# Patient Record
Sex: FEMALE | Race: WHITE | NOT HISPANIC OR LATINO | Employment: OTHER | URBAN - METROPOLITAN AREA
[De-identification: names, ages, dates, MRNs, and addresses within clinical notes are randomized per-mention and may not be internally consistent; named-entity substitution may affect disease eponyms.]

---

## 2017-08-11 ENCOUNTER — OFFICE VISIT (OUTPATIENT)
Dept: NEUROLOGY | Facility: CLINIC | Age: 37
End: 2017-08-11

## 2017-08-11 VITALS
BODY MASS INDEX: 30.25 KG/M2 | HEART RATE: 80 BPM | SYSTOLIC BLOOD PRESSURE: 147 MMHG | DIASTOLIC BLOOD PRESSURE: 105 MMHG | WEIGHT: 193.13 LBS

## 2017-08-11 DIAGNOSIS — M79.2 NEUROPATHIC PAIN: ICD-10-CM

## 2017-08-11 DIAGNOSIS — G62.9 SMALL FIBER NEUROPATHY: Primary | ICD-10-CM

## 2017-08-11 PROCEDURE — 99204 OFFICE O/P NEW MOD 45 MIN: CPT | Mod: S$PBB,,, | Performed by: PHYSICIAN ASSISTANT

## 2017-08-11 PROCEDURE — 3008F BODY MASS INDEX DOCD: CPT | Mod: ,,, | Performed by: PHYSICIAN ASSISTANT

## 2017-08-11 PROCEDURE — 99999 PR PBB SHADOW E&M-NEW PATIENT-LVL III: CPT | Mod: PBBFAC,,, | Performed by: PHYSICIAN ASSISTANT

## 2017-08-11 PROCEDURE — 99203 OFFICE O/P NEW LOW 30 MIN: CPT | Mod: PBBFAC | Performed by: PHYSICIAN ASSISTANT

## 2017-08-11 RX ORDER — CYCLOBENZAPRINE HCL 10 MG
10 TABLET ORAL CONTINUOUS PRN
COMMUNITY
End: 2019-06-29

## 2017-08-11 RX ORDER — PREGABALIN 100 MG/1
100 CAPSULE ORAL 4 TIMES DAILY
COMMUNITY
End: 2017-09-24 | Stop reason: SDUPTHER

## 2017-08-11 RX ORDER — MESALAMINE 0.38 G/1
1.5 CAPSULE, EXTENDED RELEASE ORAL 3 TIMES DAILY
COMMUNITY
End: 2019-01-03 | Stop reason: SDUPTHER

## 2017-08-11 RX ORDER — IBUPROFEN 600 MG/1
600 TABLET ORAL DAILY PRN
COMMUNITY

## 2017-08-11 NOTE — PROGRESS NOTES
[facility], Department of Neurology   [facility] [place] [address]  Phone:[phone]  Fax: [phone]    Patient Name: Caryl Cedeno  : 1980  MRN:  [id number]    2017     chief complaint: neuropathy     PCP: Primary Doctor No.    HPI:  Caryl Cedeno is a 37 y.o. female with hx of cervical spine surgery, celiac disease, fibromyalgia presents for small fiber neuropathy. Presents to [facility] care and for refills of Lyrica.     Onset of symptoms began in . Dx with small fiber neuropathy .   Notes fatigue and burning of feet. This gradually progressed up legs, then into hands, and finally into her face. On Lyrica 100 mg qid, initiated 2-3 yrs ago-helps though still has some pain. Denies numbness. Has occasional dizziness which can effect balance otherwise denies balance issues.     Had reportedly positive ebstein barr virus which has continued to be positive. Reportedly unremarkable emg. Went to [place] and dx with small fiber neuropathy in addition to fibromyalgia, reportedly had + sweat test. Tried gabapentin in the past with no relief. Thinks she has tried Nortriptyline. Has not tried Elavil, Cymbalta. Fear of Cymbalta due to friend turning bipolar.      Medications:   Current Outpatient Prescriptions   Medication Sig Dispense Refill    cyclobenzaprine (FLEXERIL) 10 MG tablet Take 10 mg by mouth continuous prn for Muscle spasms.      ibuprofen (ADVIL,MOTRIN) 600 MG tablet Take 600 mg by mouth daily as needed for Pain.      mesalamine (APRISO) 0.375 gram Cp24 Take 1.5 g by mouth 3 (three) times daily.      milnacipran (SAVELLA) 50 mg Tab Take 50 mg by mouth 2 (two) times daily.      pregabalin (LYRICA) 100 MG capsule Take 100 mg by mouth 4 (four) times daily.       No current facility-administered medications for this visit.        Allergies:  Review of patient's allergies indicates:   Allergen Reactions    Gluten protein     Latex, natural rubber         PMHx:  Past Medical History:   Diagnosis Date    Celiac disease     Fibromyalgia     Migraines     Small fiber neuropathy      Past Surgical History:   Procedure Laterality Date    CERVICAL SPINE SURGERY  2016       Fhx:  Family History   Problem Relation Age of Onset    Autoimmune disease Father     Autoimmune disease Sister     Celiac disease Maternal Grandfather     Cancer Maternal Grandfather     Cancer Paternal Grandmother        Shx:   Social History     Social History    Marital status:      Spouse name: N/A    Number of children: N/A    Years of education: N/A     Occupational History    Not on file.     Social History Main Topics    Smoking status: Former Smoker    Smokeless tobacco: Not on file    Alcohol use Yes      Comment: rarely     Drug use: Unknown    Sexual activity: Not on file     Other Topics Concern    Not on file     Social History Narrative    No narrative on file       ROS:  Review of Systems (Questions asked; positives or additions noted in BOLD)  Gen Malaise/fatigue, weight change   HEENT Hearing loss, blurred vision, diplopia   Card CP, palpitations   Pulm SOB, cough    Vasc Easy bruising, easy bleeding    GI Nausea, vomiting, constipation    Frequency, urgency   M/S Joint pain, myalgias, falls    Endocrine Temperature intolerance, polyuria, polydipsia   Neuro Dizziness, tremors, seizures, focal weakness, Others- as noted in HPI   Psych Memory loss, depression, anxiety, hallucinations     PHYSICAL EXAM:   BP (!) 147/105   Pulse 80   Wt 87.6 kg (193 lb 2 oz)   BMI 30.25 kg/m²    GEN:  NAD, well-developed, well-groomed.  HEENT: No cervical lymphadenopathy noted.  CARDIOVASCULAR: Radial pulses 2+ bilaterally. No LE edema noted.  NEURO:  Mental Status: Awake, alert, and oriented. Normal attention and concentration.  Speech is fluent and appropriate language function.    Cranial Nerves:  II Pupils are round and reactive to direct and consensual light and  accommodation. Visual fields full to confrontation.   III, IV, VI Extraocular eye movements full bilaterally. No ptosis noted.   V Normal facial sensation to pinprick and light touch.   VII Symmetric facial expressions.   VIII Hearing intact to finger rub bilaterally.   IX, X Palate elevates midline and symmetrically.   XI Shoulder elevation is symmetric and full strength bilaterally. Neck rotation strength is normal bilaterally.   XII Tongue is midline.     Sensory: Sensation is decreased to pinprick in bl distal upper and lower extremities.     Motor: Extremities demonstrate normal bulk and tone in all four limbs. No atrophy or fasciculations noted. No pronator drift.   Strength-       RUE: 5/5                LUE: 5/5                RLE: 5/5                LLE: 5/5     Deep Tendon Relfexes: 2+ and symmetric in the upper and lower extremities bilaterally, slightly brisk LE. Babinski downgoing bilaterally.    Coordination: Finger to nose WNL.     Gait: Normal heel, toe, tandem, and casual gait.    ASSESSMENT:     ICD-10-CM ICD-9-CM   1. Small fiber neuropathy G62.9 356.9   2. Neuropathic pain M79.2 729.2       PLAN:     No orders of the defined types were placed in this encounter.    36 yo female who presents to establish care for reported dx of small fiber neuropathy along with medication refill. No outside records available for me to review today, though it sounds as if she has had extensive outside work up. Has not had nerve bx. Exam with length dependent sensory decrement, good strength. Reflexes are somewhat brisk in setting of neuropathy, however, this may be due to her hx of cervical spine surgery.     -refilled Lyrica 100 mg qid for 2 mos. Should consider tapering this dose at next visit.   -informed pt to bring outside records   -may wish to consider nerve bx   -RTC 2 mos     The patient indicates understanding of these issues and agrees to the plan.      Attending, Dr. Kelley, was available during  today's encounter. Any change to plan along with cosign to appear in the EMR.       This document has been electronically signed by Olena Hernández PA-C on 8/11/2017, 2:51 PM. I have personally typed this message using the EMR.

## 2017-09-24 ENCOUNTER — NURSE TRIAGE (OUTPATIENT)
Dept: ADMINISTRATIVE | Facility: CLINIC | Age: 37
End: 2017-09-24

## 2017-09-24 RX ORDER — PREGABALIN 100 MG/1
100 CAPSULE ORAL 4 TIMES DAILY
Qty: 12 CAPSULE | Refills: 0 | Status: SHIPPED | OUTPATIENT
Start: 2017-09-24 | End: 2017-11-01 | Stop reason: SDUPTHER

## 2017-09-24 NOTE — TELEPHONE ENCOUNTER
Pt called re rx. Recent rx transferred as [facility] not [facility] med. Pt found pharmacy for lyrica.   [facility]: [facility] [phone]. Pharm closed.   Spoke with dr Keli vincent for 3 day supply. Pt notified. Pharm: [phone]. Called in at 747pm to [facility].       Reason for Disposition   Caller has URGENT medication question about med that PCP prescribed and triager unable to answer question    Protocols used: ST MEDICATION QUESTION CALL-A-

## 2017-10-11 ENCOUNTER — TELEPHONE (OUTPATIENT)
Dept: NEUROLOGY | Facility: CLINIC | Age: 37
End: 2017-10-11

## 2017-10-11 NOTE — TELEPHONE ENCOUNTER
----- Message from Alison Proctor sent at 10/11/2017  4:24 PM CDT -----  Contact: self @ [phone]  Pt is calling to apologize for missing her appt today.  Pt has rescheduled for 11-22-17.

## 2017-10-19 NOTE — TELEPHONE ENCOUNTER
----- Message from Lance Melendez sent at 10/19/2017  3:34 PM CDT -----  Contact: Self @ [phone]  Pt would like refill for milnacipran (SAVELLA) 50 mg Tab. Pt is asking for a refill asap, due to quantity at pharmacy.      [facility]/pharmacy #[id number] - [address] [address]  Phone: [phone] Fax: [phone]

## 2017-11-02 RX ORDER — PREGABALIN 100 MG/1
CAPSULE ORAL
Qty: 120 CAPSULE | Refills: 5 | Status: SHIPPED | OUTPATIENT
Start: 2017-11-02 | End: 2017-11-22 | Stop reason: SDUPTHER

## 2017-11-22 ENCOUNTER — LAB VISIT (OUTPATIENT)
Dept: LAB | Facility: HOSPITAL | Age: 37
End: 2017-11-22
Payer: COMMERCIAL

## 2017-11-22 ENCOUNTER — OFFICE VISIT (OUTPATIENT)
Dept: NEUROLOGY | Facility: CLINIC | Age: 37
End: 2017-11-22
Payer: COMMERCIAL

## 2017-11-22 VITALS
DIASTOLIC BLOOD PRESSURE: 106 MMHG | WEIGHT: 195.13 LBS | HEART RATE: 105 BPM | BODY MASS INDEX: 31.36 KG/M2 | SYSTOLIC BLOOD PRESSURE: 157 MMHG | HEIGHT: 66 IN

## 2017-11-22 DIAGNOSIS — G62.9 SMALL FIBER NEUROPATHY: ICD-10-CM

## 2017-11-22 DIAGNOSIS — K90.0 CELIAC DISEASE: ICD-10-CM

## 2017-11-22 DIAGNOSIS — G62.9 SMALL FIBER NEUROPATHY: Primary | ICD-10-CM

## 2017-11-22 LAB
ALBUMIN SERPL BCP-MCNC: 4.1 G/DL
ALP SERPL-CCNC: 104 U/L
ALT SERPL W/O P-5'-P-CCNC: 47 U/L
ANION GAP SERPL CALC-SCNC: 8 MMOL/L
AST SERPL-CCNC: 35 U/L
BASOPHILS # BLD AUTO: 0.02 K/UL
BASOPHILS NFR BLD: 0.3 %
BILIRUB SERPL-MCNC: 0.4 MG/DL
BUN SERPL-MCNC: 12 MG/DL
CALCIUM SERPL-MCNC: 9.5 MG/DL
CHLORIDE SERPL-SCNC: 101 MMOL/L
CO2 SERPL-SCNC: 30 MMOL/L
CREAT SERPL-MCNC: 0.9 MG/DL
DIFFERENTIAL METHOD: NORMAL
EOSINOPHIL # BLD AUTO: 0.1 K/UL
EOSINOPHIL NFR BLD: 1 %
ERYTHROCYTE [DISTWIDTH] IN BLOOD BY AUTOMATED COUNT: 13.1 %
EST. GFR  (AFRICAN AMERICAN): >60 ML/MIN/1.73 M^2
EST. GFR  (NON AFRICAN AMERICAN): >60 ML/MIN/1.73 M^2
GLUCOSE SERPL-MCNC: 101 MG/DL
HCT VFR BLD AUTO: 41.4 %
HGB BLD-MCNC: 13.9 G/DL
IMM GRANULOCYTES # BLD AUTO: 0.01 K/UL
IMM GRANULOCYTES NFR BLD AUTO: 0.1 %
LYMPHOCYTES # BLD AUTO: 1.5 K/UL
LYMPHOCYTES NFR BLD: 21.3 %
MCH RBC QN AUTO: 28.4 PG
MCHC RBC AUTO-ENTMCNC: 33.6 G/DL
MCV RBC AUTO: 85 FL
MONOCYTES # BLD AUTO: 0.5 K/UL
MONOCYTES NFR BLD: 7.4 %
NEUTROPHILS # BLD AUTO: 4.9 K/UL
NEUTROPHILS NFR BLD: 69.9 %
NRBC BLD-RTO: 0 /100 WBC
PLATELET # BLD AUTO: 316 K/UL
PMV BLD AUTO: 10.6 FL
POTASSIUM SERPL-SCNC: 3.2 MMOL/L
PROT SERPL-MCNC: 8 G/DL
RBC # BLD AUTO: 4.9 M/UL
SODIUM SERPL-SCNC: 139 MMOL/L
VIT B12 SERPL-MCNC: 580 PG/ML
WBC # BLD AUTO: 7.04 K/UL

## 2017-11-22 PROCEDURE — 84425 ASSAY OF VITAMIN B-1: CPT

## 2017-11-22 PROCEDURE — 99214 OFFICE O/P EST MOD 30 MIN: CPT | Mod: S$GLB,,, | Performed by: PSYCHIATRY & NEUROLOGY

## 2017-11-22 PROCEDURE — 80053 COMPREHEN METABOLIC PANEL: CPT

## 2017-11-22 PROCEDURE — 36415 COLL VENOUS BLD VENIPUNCTURE: CPT

## 2017-11-22 PROCEDURE — 84252 ASSAY OF VITAMIN B-2: CPT

## 2017-11-22 PROCEDURE — 83520 IMMUNOASSAY QUANT NOS NONAB: CPT | Mod: 59

## 2017-11-22 PROCEDURE — 85025 COMPLETE CBC W/AUTO DIFF WBC: CPT

## 2017-11-22 PROCEDURE — 82607 VITAMIN B-12: CPT

## 2017-11-22 PROCEDURE — 99999 PR PBB SHADOW E&M-EST. PATIENT-LVL IV: CPT | Mod: PBBFAC,,, | Performed by: PSYCHIATRY & NEUROLOGY

## 2017-11-22 PROCEDURE — 84207 ASSAY OF VITAMIN B-6: CPT

## 2017-11-22 RX ORDER — PREGABALIN 100 MG/1
100 CAPSULE ORAL 3 TIMES DAILY
Qty: 90 CAPSULE | Refills: 5 | Status: SHIPPED | OUTPATIENT
Start: 2017-11-22 | End: 2018-07-14 | Stop reason: SDUPTHER

## 2017-11-22 NOTE — PROGRESS NOTES
[facility], Department of Neurology   [facility] [place] [address]  Phone:[phone]  Fax: [phone]    Patient Name: Caryl Cedeno  : 1980  MRN:  [id number]    2017     chief complaint: neuropathy     PCP: Primary Doctor No.    Patient states that she has had improvement with savella. She endorses swelling feeling and burning of hands and feet. No visible swelling appreciated. Continues to take high dose lyrica.  Has been on savella for a few months and states that it has helped a lot.       HPI:  Caryl Cedeno is a 37 y.o. female with hx of cervical spine surgery, celiac disease, fibromyalgia presents for small fiber neuropathy. Presents to [facility] care and for refills of Lyrica.     Onset of symptoms began in . Dx with small fiber neuropathy .   Notes fatigue and burning of feet. This gradually progressed up legs, then into hands, and finally into her face. On Lyrica 100 mg qid, initiated 2-3 yrs ago-helps though still has some pain. Denies numbness. Has occasional dizziness which can effect balance otherwise denies balance issues.     Had reportedly positive ebstein barr virus which has continued to be positive. Reportedly unremarkable emg. Went to [place] and dx with small fiber neuropathy in addition to fibromyalgia, reportedly had + sweat test. Tried gabapentin in the past with no relief. Thinks she has tried Nortriptyline. Has not tried Elavil, Cymbalta. Fear of Cymbalta due to friend turning bipolar.      Medications:   Current Outpatient Prescriptions   Medication Sig Dispense Refill    cyclobenzaprine (FLEXERIL) 10 MG tablet Take 10 mg by mouth continuous prn for Muscle spasms.      ibuprofen (ADVIL,MOTRIN) 600 MG tablet Take 600 mg by mouth daily as needed for Pain.      LYRICA 100 mg capsule TAKE 1 CAPSULE BY MOUTH 4 TIMES A  capsule 5    mesalamine (APRISO) 0.375 gram Cp24 Take 1.5 g by mouth 3 (three) times daily.       "milnacipran (SAVELLA) 50 mg Tab Take 1 tablet (50 mg total) by mouth 2 (two) times daily. 60 tablet 1     No current facility-administered medications for this visit.        Allergies:  Review of patient's allergies indicates:   Allergen Reactions    Gluten protein     Latex, natural rubber        PMHx:  Past Medical History:   Diagnosis Date    Celiac disease     Fibromyalgia     Migraines     Small fiber neuropathy      Past Surgical History:   Procedure Laterality Date    CERVICAL SPINE SURGERY  2016       Fhx:  Family History   Problem Relation Age of Onset    Autoimmune disease Father     Autoimmune disease Sister     Celiac disease Maternal Grandfather     Cancer Maternal Grandfather     Cancer Paternal Grandmother        Shx:   Social History     Social History    Marital status:      Spouse name: N/A    Number of children: N/A    Years of education: N/A     Occupational History    Not on file.     Social History Main Topics    Smoking status: Former Smoker    Smokeless tobacco: Not on file    Alcohol use Yes      Comment: rarely     Drug use: Unknown    Sexual activity: Not on file     Other Topics Concern    Not on file     Social History Narrative    No narrative on file       ROS:  Review of Systems (Questions asked; positives or additions noted in BOLD)  Gen Malaise/fatigue, weight change   HEENT Hearing loss, blurred vision, diplopia   Card CP, palpitations   Pulm SOB, cough    Vasc Easy bruising, easy bleeding    GI Nausea, vomiting, constipation    Frequency, urgency   M/S Joint pain, myalgias, falls    Endocrine Temperature intolerance, polyuria, polydipsia   Neuro Dizziness, tremors, seizures, focal weakness, Others- as noted in HPI   Psych Memory loss, depression, anxiety, hallucinations     PHYSICAL EXAM:   BP (!) 157/106   Pulse 105   Ht 5' 6" (1.676 m)   Wt 88.5 kg (195 lb 1.7 oz)   BMI 31.49 kg/m²    GEN:  NAD, well-developed, well-groomed.  HEENT: No " cervical lymphadenopathy noted.  CARDIOVASCULAR: Radial pulses 2+ bilaterally. No LE edema noted.  NEURO:  Mental Status: Awake, alert, and oriented. Normal attention and concentration.  Speech is fluent and appropriate language function.    Cranial Nerves:  II Pupils are round and reactive to direct and consensual light and accommodation. Visual fields full to confrontation.   III, IV, VI Extraocular eye movements full bilaterally. No ptosis noted.   V Normal facial sensation to pinprick and light touch.   VII Symmetric facial expressions.   VIII Hearing intact to finger rub bilaterally.   IX, X Palate elevates midline and symmetrically.   XI Shoulder elevation is symmetric and full strength bilaterally. Neck rotation strength is normal bilaterally.   XII Tongue is midline.     Sensory: Sensation is decreased to pinprick in bl distal upper and lower extremities.     Motor: Extremities demonstrate normal bulk and tone in all four limbs. No atrophy or fasciculations noted. No pronator drift.   Strength-       RUE: 5/5                LUE: 5/5                RLE: 5/5                LLE: 5/5     Deep Tendon Relfexes: 2+ and symmetric in the upper and lower extremities bilaterally, slightly brisk LE. Babinski downgoing bilaterally.    Coordination: Finger to nose WNL.     Gait: Normal heel, toe, tandem, and casual gait.    ASSESSMENT:   No diagnosis found.    PLAN:     No orders of the defined types were placed in this encounter.    36 yo female who presents to establish care for reported dx of small fiber neuropathy along with medication refill. No outside records available for me to review today, though it sounds as if she has had extensive outside work up. Has not had nerve bx. Exam with length dependent sensory decrement, good strength. Reflexes are somewhat brisk in setting of neuropathy, however, this may be due to her hx of cervical spine surgery.     - Decrease Lyrica 100 mg tid (written for qid- takes 4x on bad  days). Takes 300 mg usually- will decreased to 300 mg and possibly 200 mg on good days total dosage. Will start this following increase in Savella.   - Increase Savella to 75 mg bid  - informed pt to bring outside records   - Nerve biopsy- consider next visit  - Sensory neuropathy panel  - Rheumatology referral   - Pain mgmt referral   - Gastroenterology referral     -RTC 2 mos - [facility] clinic

## 2017-11-25 LAB — VIT B2 SERPL-MCNC: 3 MCG/L (ref 1–19)

## 2017-11-26 NOTE — PROGRESS NOTES
I have seen the patient, reviewed the Resident's history and physical, assessment and plan. I have personally interviewed and examined the patient at bedside and agree with the findings.       MD David Puckett - Neurology

## 2017-11-27 ENCOUNTER — OFFICE VISIT (OUTPATIENT)
Dept: INTERNAL MEDICINE | Facility: CLINIC | Age: 37
End: 2017-11-27
Payer: COMMERCIAL

## 2017-11-27 VITALS
SYSTOLIC BLOOD PRESSURE: 134 MMHG | BODY MASS INDEX: 30.38 KG/M2 | DIASTOLIC BLOOD PRESSURE: 92 MMHG | HEART RATE: 101 BPM | WEIGHT: 193.56 LBS | TEMPERATURE: 98 F | OXYGEN SATURATION: 97 % | HEIGHT: 67 IN

## 2017-11-27 DIAGNOSIS — K90.0 CELIAC DISEASE: ICD-10-CM

## 2017-11-27 DIAGNOSIS — Z76.89 ENCOUNTER TO ESTABLISH CARE: Primary | ICD-10-CM

## 2017-11-27 DIAGNOSIS — M79.7 FIBROMYALGIA: ICD-10-CM

## 2017-11-27 DIAGNOSIS — J39.2 LESION OF THROAT: ICD-10-CM

## 2017-11-27 PROBLEM — G62.9 SMALL FIBER NEUROPATHY: Status: ACTIVE | Noted: 2017-11-27

## 2017-11-27 LAB
PYRIDOXAL SERPL-MCNC: 8 UG/L (ref 5–50)
VIT B1 SERPL-MCNC: 43 UG/L (ref 38–122)

## 2017-11-27 PROCEDURE — 99999 PR PBB SHADOW E&M-EST. PATIENT-LVL V: CPT | Mod: PBBFAC,,, | Performed by: NURSE PRACTITIONER

## 2017-11-27 PROCEDURE — 99204 OFFICE O/P NEW MOD 45 MIN: CPT | Mod: S$GLB,,, | Performed by: NURSE PRACTITIONER

## 2017-11-27 PROCEDURE — 87070 CULTURE OTHR SPECIMN AEROBIC: CPT

## 2017-11-27 NOTE — PROGRESS NOTES
"Subjective:       Patient ID: Caryl Cedeno is a 37 y.o. female.    Chief Complaint: Establish Care    HPI:  38 yo female that presents to clinic today to establish care and with concern of lesion/cyst in throat.    Medical history includes cervical spine surgery, celiac disease, fibromyalgia, thrush and small small fiber neuropathy.  She is currently followed by neurology here at [facility].    States that she has a history of thrush infections in throat and usually treats them with lemon water and acidophilus which always seems to work.  She noticed a cyst/lesion on the left side of her soft palate about two days ago.  States that it does not hurt and that it does not appear to have changed in size.  Denies any fever, sore throat or dysphagia.  States that she was being worked up for Crohn's disease by GI specialist before she moved that said "mouth lesions can be attributed to Crohn's disease."  She is scheduled to follow up with GI in December.    States that she would like to have her "throat cultured as she is very concerned to what it could be."      Review of Systems   Constitutional: Negative for chills, fatigue, fever and unexpected weight change.   HENT: Negative for congestion, ear pain, mouth sores, postnasal drip, rhinorrhea, sneezing and sore throat.         Admits throat lesion   Eyes: Negative for photophobia and visual disturbance.   Respiratory: Negative for apnea, cough, choking, chest tightness and shortness of breath.    Cardiovascular: Negative for chest pain, palpitations and leg swelling.   Gastrointestinal: Negative for abdominal distention, abdominal pain, constipation, diarrhea, nausea and vomiting.   Genitourinary: Negative for difficulty urinating, dysuria, flank pain, frequency and hematuria.   Musculoskeletal: Positive for arthralgias and myalgias. Negative for neck pain and neck stiffness.   Skin: Negative for color change, rash and wound.   Neurological: Positive for numbness. " Negative for weakness and headaches.   Psychiatric/Behavioral: Negative for behavioral problems. The patient is nervous/anxious.        Objective:      Physical Exam   Constitutional: She is oriented to person, place, and time. She appears well-developed and well-nourished. No distress.   HENT:   Head: Normocephalic and atraumatic.   Right Ear: External ear normal.   Left Ear: External ear normal.   Nose: Nose normal.   Mouth/Throat: Oropharynx is clear and moist and mucous membranes are normal. Mucous membranes are not pale and not dry. Normal dentition. No dental abscesses, uvula swelling or lacerations. No oropharyngeal exudate.       Lesion/appendage that is to the left of the uvula.  No signs of infection noted. Scant white spots but is not erythematous.   Eyes: Conjunctivae and EOM are normal. Pupils are equal, round, and reactive to light. No scleral icterus.   Neck: Normal range of motion. Neck supple. No tracheal deviation present. No thyromegaly present.   Cardiovascular: Normal rate, regular rhythm, normal heart sounds and intact distal pulses.    No murmur heard.  Pulmonary/Chest: Effort normal and breath sounds normal. No respiratory distress. She has no wheezes.   Abdominal: Soft. Bowel sounds are normal. She exhibits no distension and no mass. There is no tenderness.   Musculoskeletal: Normal range of motion. She exhibits no edema.   Lymphadenopathy:     She has no cervical adenopathy.   Neurological: She is alert and oriented to person, place, and time. No sensory deficit.   Skin: Skin is dry. No erythema.   Psychiatric: Her behavior is normal.       Assessment:       1. Encounter to establish care    2. Lesion of throat    3. Celiac disease    4. Fibromyalgia        Plan:         1. Encounter to establish care   -Will order blood work (TSH and vitamin D) today.  CMP and CBC already draw at previous neurology visit on 11/22/17.  -Can follow up with designated PCP for EPP in 6 months.    2. Lesion of  throat   -Will do a throat swab culture of lesion to rule or any fungal infections at request of patient.  -Will refer to ENT for further evaluation.  3. Celiac disease   -Is scheduled to see GI in December  -Encouraged to keep scheduled appointment.  4. Fibromyalgia and small fiber neuropathy.  -Continue to follow up with neurology as directed.

## 2017-11-29 ENCOUNTER — TELEPHONE (OUTPATIENT)
Dept: INTERNAL MEDICINE | Facility: CLINIC | Age: 37
End: 2017-11-29

## 2017-11-29 ENCOUNTER — NURSE TRIAGE (OUTPATIENT)
Dept: ADMINISTRATIVE | Facility: CLINIC | Age: 37
End: 2017-11-29

## 2017-11-29 NOTE — PROGRESS NOTES
Delroy Hewitt,    Can you give Ms. Cedeno a call and just let her know that her throat culture came back completely normal.  Please tell te keep her appointment with ENT.  Thanks!  Shayne

## 2017-11-29 NOTE — TELEPHONE ENCOUNTER
Reason for Disposition   [1] Caller requesting NON-URGENT health information AND [2] PCP's office is the best resource    Protocols used: ST INFORMATION ONLY CALL-A-AH

## 2017-11-29 NOTE — TELEPHONE ENCOUNTER
Have this thing in throat and was seen and had it swabbed. Does not see results on line at this time. Now feels like something is growing under her tongue. Pain varies when bumped up to 7-8. When not tocudhe rated 2/10. No bruising or discoloration noted. Pt verbalized concerns related to tongue. Looks like swelling on taste buds. Growth on roof of mouth on soft palate is what she came in for the first time. Pt wanting results from throat swab. States she is unable to get result on line. 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 Pt advised to urgent care for moderate to severe pain to tongue.

## 2017-11-30 LAB — BACTERIA THROAT CULT: NORMAL

## 2017-12-04 ENCOUNTER — OFFICE VISIT (OUTPATIENT)
Dept: OTOLARYNGOLOGY | Facility: CLINIC | Age: 37
End: 2017-12-04
Payer: COMMERCIAL

## 2017-12-04 VITALS
DIASTOLIC BLOOD PRESSURE: 97 MMHG | WEIGHT: 193.81 LBS | HEART RATE: 112 BPM | SYSTOLIC BLOOD PRESSURE: 142 MMHG | BODY MASS INDEX: 30.81 KG/M2 | TEMPERATURE: 99 F

## 2017-12-04 DIAGNOSIS — D10.9 THROAT PAPILLOMA: ICD-10-CM

## 2017-12-04 DIAGNOSIS — K13.79 LESION OF PALATE: Primary | ICD-10-CM

## 2017-12-04 PROCEDURE — 99999 PR PBB SHADOW E&M-EST. PATIENT-LVL III: CPT | Mod: PBBFAC,,, | Performed by: OTOLARYNGOLOGY

## 2017-12-04 PROCEDURE — 99203 OFFICE O/P NEW LOW 30 MIN: CPT | Mod: S$GLB,,, | Performed by: OTOLARYNGOLOGY

## 2017-12-04 NOTE — PATIENT INSTRUCTIONS
Understanding Human Papillomavirus (HPV)  Human papillomavirus (HPV) is a virus that causes warts. It can be hard to detect, so many people never even know they have it. Some strains (types) of HPV may cause warts on the hands, legs, or other parts of the body. These can spread from person to person. Other strains of HPV cause warts in the genital area. Of these, a few strains can lead to cancer in the area where the uterus and vagina meet (the cervix) and the genitals, as well as some other places. Treating genital forms of HPV now can help prevent serious health problems in the future.  How was I infected?  HPV is passed from person to person through contact with infected skin. Everyone with HPV has a different experience. Some people notice genital warts (condyloma) within a few months of exposure. In other people, warts take years to appear or may never appear. This makes it almost impossible to know when or by whom you were infected.    How warts form  HPV lives inside skin and mucous membrane (including in the mouth and vagina). The virus can make skin cells reproduce more often than they should. These extra skin cells build up into warts.  1. HPV invades the skin.  2. DNA from the virus enters skin cells.  3. HPV causes infected skin cells to multiply and form warts.  4. The virus sheds, allowing it to be passed to others.  Date Last Reviewed: 1/1/2017  © 5470-5204 [employer]. [address] [place] [address], [place] [address]. All rights reserved. This information is not intended as a substitute for professional medical care. Always follow your healthcare professional's instructions.

## 2017-12-04 NOTE — PROGRESS NOTES
[facility]  Department of Otorhinolaryngology and Communication Sciences    Caryl Cedeno is a 37 y.o. female who presents to the [facility] for consultation at the kind request of Shayne Griffith for further management of a throat lesion.     She reports an incidentally noted lesion in her throat. She had strained her voice recently and had a sore throat. She looked in the mirror and saw something in her throat. It has been essentially unchanged for a few weeks. She denies throat pain, odynophagia, otalgia, hemoptysis, hematemesis, neck mass. She quit smoking about 10 years ago. She rarely drinks EtOH. 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 Her symptoms have improved. She denies any exacerbating or alleviating factors. She denies any associated symptoms.    Past Medical History  She has a past medical history of Celiac disease; Fibromyalgia; Migraines; and Small fiber neuropathy.    Past Surgical History  She has a past surgical history that includes Cervical spine surgery (2016) and Spine surgery.    Family History  Her family history includes Autoimmune disease in her father and sister; Cancer in her maternal grandfather and paternal grandmother; Celiac disease in her maternal grandfather.    Social History  She reports that she has quit smoking. She has never used smokeless tobacco. She reports that she drinks alcohol. She reports that she does not use drugs.    Allergies  She is allergic to gluten protein and latex, natural rubber.    Medications  She has a current medication list which includes the following prescription(s): cyclobenzaprine, ibuprofen, mesalamine, milnacipran, and pregabalin.    Review of Systems   Constitutional: Negative for fever.   HENT: Positive for sore throat.    Eyes: Negative for visual disturbance.   Respiratory: Negative for wheezing.    Cardiovascular: Negative for chest pain.   Gastrointestinal: Positive for nausea.    Musculoskeletal: Negative for arthralgias.   Skin: Negative for rash.   Neurological: Negative for tremors.   Hematological: Does not bruise/bleed easily.   Psychiatric/Behavioral: The patient is nervous/anxious.           Objective:     VS reviewed     Physical Exam    Constitutional: comfortable, well dressed  Psychiatric: appropriate affect  Respiratory: comfortably breathing, symmetric chest rise, no stridor  Voice: minimal variable roughness/strain  Cardiovascular: upper extremities non-edematous  Lymphatic: no cervical lymphadenopathy  Neurologic: alert and oriented to time, place, person, and situation; cranial nerves 3-12 grossly intact  Head: normocephalic  Eyes: conjunctivae and sclerae clear  Ears: normal pinnae, normal external auditory canals, tympanic membranes intact  Nose: mucosa pink and noncongested, no masses, no mucopurulence, no polyps  Oral cavity / oropharynx: left posterior tonsillar pillar/soft palate with 4 mm papillomatous lesion; no ulceration/bleeding; no further mucosal lesions  Neck: soft, full range of motion, laryngotracheal complex palpable with appropriate landmarks, larynx elevates on swallowing  Indirect laryngoscopy: no masses, no mucosal abnormalities, no vocal fold paralysis/paresis      Assessment:     Caryl Cedeno is a 37 y.o. female with a left pharyngeal lesion involving the free edge of soft palate/posterior tonsillar pillar. 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      Plan:        I had a discussion with the patient and her  regarding her condition and the further workup and management options.      Options include definitive excision of the lesion or continued surveillance. I discussed with her the risks and benefits of excision, with risks including but not limited to pain, bleeding, scar, recurrence, and risks of anesthesia. I gave her the opportunity to ask questions and I answered  all of them to her satisfaction. She defers on excision at this time.    She will follow up with me in 6 month(s), or sooner if needed.    All questions were answered, and the patient is in agreement with the above.     Guanakito Prater M.D.  [facility]  Department of Otorhinolaryngology and Communication Sciences

## 2017-12-04 NOTE — LETTER
December 4, 2017      Shayne Griffith, MICHAELA  [address]  [facility] [address]           [facility]  [address] [address]  [facility] [address]  Phone: [phone]  Fax: [phone]          Patient: Caryl Cedeno   MR Number: [id number]   YOB: 1980   Date of Visit: 12/4/2017       Dear Shayne Griffith:    Thank you for referring Caryl Cedeno to me for evaluation. Attached you will find relevant portions of my assessment and plan of care.    If you have questions, please do not hesitate to call me. I look forward to following Caryl Cedeno along with you.    Sincerely,    Guanakito Prater MD    Enclosure  CC:  No Recipients    If you would like to receive this communication electronically, please contact [email or web address] or [phone] to request more information on [employer] access.    For providers and/or their staff who would like to refer a patient to [facility], please contact us through our one-stop-shop provider referral line, [facility] [place], at [phone].    If you feel you have received this communication in error or would no longer like to receive these types of communications, please e-mail [email or web address]

## 2017-12-08 LAB — SENSORY NEUROPATHY PROFILE: NORMAL

## 2018-01-08 ENCOUNTER — OFFICE VISIT (OUTPATIENT)
Dept: NEUROLOGY | Facility: CLINIC | Age: 38
End: 2018-01-08
Payer: COMMERCIAL

## 2018-01-08 VITALS
WEIGHT: 192.69 LBS | HEIGHT: 66 IN | SYSTOLIC BLOOD PRESSURE: 157 MMHG | HEART RATE: 105 BPM | BODY MASS INDEX: 30.97 KG/M2 | DIASTOLIC BLOOD PRESSURE: 107 MMHG

## 2018-01-08 DIAGNOSIS — M79.7 FIBROMYALGIA: ICD-10-CM

## 2018-01-08 DIAGNOSIS — G62.9 SMALL FIBER NEUROPATHY: Primary | ICD-10-CM

## 2018-01-08 DIAGNOSIS — G43.109 MIGRAINE WITH AURA AND WITHOUT STATUS MIGRAINOSUS, NOT INTRACTABLE: Chronic | ICD-10-CM

## 2018-01-08 DIAGNOSIS — G90.A POTS (POSTURAL ORTHOSTATIC TACHYCARDIA SYNDROME): Chronic | ICD-10-CM

## 2018-01-08 PROCEDURE — 99999 PR PBB SHADOW E&M-EST. PATIENT-LVL III: CPT | Mod: PBBFAC,,, | Performed by: PSYCHIATRY & NEUROLOGY

## 2018-01-08 PROCEDURE — 99214 OFFICE O/P EST MOD 30 MIN: CPT | Mod: S$GLB,,, | Performed by: PSYCHIATRY & NEUROLOGY

## 2018-01-08 RX ORDER — ZOLMITRIPTAN 5 MG/1
1 SPRAY NASAL ONCE AS NEEDED
Qty: 6 EACH | Refills: 11 | Status: SHIPPED | OUTPATIENT
Start: 2018-01-08 | End: 2019-05-13 | Stop reason: SDUPTHER

## 2018-01-10 PROBLEM — G43.109 MIGRAINE WITH AURA AND WITHOUT STATUS MIGRAINOSUS, NOT INTRACTABLE: Chronic | Status: ACTIVE | Noted: 2018-01-10

## 2018-01-10 PROBLEM — G90.A POTS (POSTURAL ORTHOSTATIC TACHYCARDIA SYNDROME): Chronic | Status: ACTIVE | Noted: 2018-01-10

## 2018-01-10 NOTE — ASSESSMENT & PLAN NOTE
Burning sensation mainly in the lower extremities with normal NCS. On Lyrica 100 Q12 at this point. At last resident clinic visit she had been on 150 Q12 but was pared down to 100 Q12 with an increase in Savella. She has noted an increase in pain and symptoms and wants to increase back to total 300/day. She is also interested in pursuing IVIg as therapy, but we will first need to have diagnostic confirmation per biopsy before IVIg will be approved.   - Order biopsy kit and schedule for procedure with me in clinic.   - IVIg therapy pending confirmation of the biopsy results.   - Continue Savella as currently prescribed and increase Lyrica to 100/200

## 2018-01-10 NOTE — ASSESSMENT & PLAN NOTE
Two per month with visual aura. Duration is up to 3 days. Included photo and phonophobia but no dysautonomia. Occipital onset with anterior spread. She refuses to take prophylactic medications despite my urging her to do so. She has considerable nausea with vomiting during the events and so pill form abortive therapy is a poor option.   - Zomig nasal spray 5 mg prn migraine. Use immediately at headache / aura onset and she can repeat after two hours if severe headache persists but no more than two applications per day. No history of cerebrovascular or cardiovascular disease.   - Compazine and Zofran offered and patient declined.   - PPX medications offered and patient declined.

## 2018-01-10 NOTE — ASSESSMENT & PLAN NOTE
Patient reports chronic clinical scenario and formal diagnostic testing with confirmatory results having been done at the [facility] recently. She does not have the documents with her and they have not been provided by the  via fax. Pending confirmation of positive objective analysis for POTS, I offered to start trial of Midodrine to address symptoms, but she is not interested in starting a new medication until assessed by a POTS expert. The regional expert is Dr. Oquendo at [facility].   - Referral to [facility] to see Dr. Oquendo for assessment and treatment   - Patient was urged to obtain her formal studies results from the [facility] in order to provide information to Dr. Oquendo.

## 2018-01-10 NOTE — PROGRESS NOTES
"Subjective:     Chief Complaint:  Follow-up      History of Present Illness:  Caryl Cedeno is a 37 y.o. female who presents today for initial evaluation in my clinic for multiple complaints. She has been seen in the [facility] and reports dissatisfaction that she was never seen by "a specialist." She moved to [place] from [place] and much of her medical care has been completed in [place] and we have scant records aside from some pertinent lab work.    Her first complaint is small fiber neuropathy, which she reports has been worked up extensively in [place] and included normal NCS studies but no biopsies as of yet. There has been an extensive battery of lab work done and she has shown me results in her [place] EMR with included normal values of: SPEP with Immunofixation, B12, folate, ferritin, ESR, voltage gated K channels, N-type Ca channels, ACh-R Ab, ACh-binding Ab, P/Q Ca channels, striated Ab, PCA 1-2, Anti-Magalis, SSA and SSB, Lyme, Gliadin, ARELY-1 and ARELY-2. It's unclear why myasthenia panels were run as she has no MG type complaints. Her ELIGIO was minimally positive (1:160). She has been fairly well-controlled on Lyrica and was recently started on Savella and had Lyrica reduced from 100/200 to 100/100 with an increase in symptoms since the reduction in Lyrica.    She has complaints of POTS and reports formal diagnostic testing was done at [facility] with impaired sweating in the hands and feet. She has not been treated medically and does not have a cardiologist here in [place]. She does not want to start Midodrine until seen by an expert here. She does not have any pertinent records available from the [facility] for my review today.    She has chronic migraine headaches, which occur twice monthly and are not catamenial. They are preceded by visual aura and have pain onset usually in the occipital region with anterior spread. Pain is sharp and stabbing / pounding and severe in intensity. There is " "associated photo and phonophobia and N/V. She is not on any ppx medications and uses only NSAIDs as abortive. Duration of headaches is 4 hours to 3 days and they are debilitating.    Review of Systems  Review of Systems   Constitutional: Positive for activity change and fatigue. Negative for fever.   HENT: Negative for congestion, dental problem, facial swelling, sinus pain, sinus pressure and tinnitus.    Eyes: Positive for photophobia and visual disturbance.   Respiratory: Negative for chest tightness and shortness of breath.    Cardiovascular: Positive for palpitations. Negative for chest pain.   Gastrointestinal: Positive for nausea and vomiting. Negative for abdominal pain.   Endocrine: Negative for cold intolerance and heat intolerance.   Musculoskeletal: Negative for arthralgias, back pain, neck pain and neck stiffness.   Skin: Negative for color change.   Allergic/Immunologic: Negative for environmental allergies and food allergies.   Neurological: Positive for light-headedness and headaches. Negative for dizziness, seizures, syncope, weakness and numbness.   Psychiatric/Behavioral: Negative.         Objective:     Vitals:    01/08/18 1456   BP: (!) 157/107   Pulse: 105   Weight: 87.4 kg (192 lb 10.9 oz)   Height: 5' 6" (1.676 m)   PainSc:   6       Neurologic Exam     Mental Status   Oriented to person, place, and time.   Attention: normal.   Speech: speech is normal   Level of consciousness: alert  Knowledge: good.     Cranial Nerves     CN II   Visual fields full to confrontation.     CN III, IV, VI   Pupils are equal, round, and reactive to light.  Extraocular motions are normal.     CN V   Facial sensation intact.     CN VII   Facial expression full, symmetric.     CN VIII   Hearing: intact    CN IX, X   CN IX normal.     CN XI   CN XI normal.     CN XII   CN XII normal.     Motor Exam   Muscle bulk: normal  Overall muscle tone: normal    Strength   Strength 5/5 throughout.     Sensory Exam   Right leg " light touch: decreased from ankle  Left leg light touch: decreased from ankle  Vibration normal.   Right leg pinprick: decreased from ankle  Left leg pinprick: decreased from ankle    Gait, Coordination, and Reflexes     Gait  Gait: normal    Tremor   Resting tremor: absent  Intention tremor: absent  Action tremor: absent    Reflexes   Right brachioradialis: 2+  Left brachioradialis: 2+  Right biceps: 2+  Left biceps: 2+  Right triceps: 2+  Left triceps: 2+  Right patellar: 2+  Left patellar: 2+  Right achilles: 2+  Left achilles: 2+      Physical Exam   Constitutional: She is oriented to person, place, and time. She appears well-developed and well-nourished.   HENT:   Head: Normocephalic and atraumatic.   Eyes: EOM are normal. Pupils are equal, round, and reactive to light.   Neck: Normal range of motion. Neck supple. No thyromegaly present.   Cardiovascular: Normal rate.    Pulmonary/Chest: Effort normal.   Abdominal: Soft.   Lymphadenopathy:     She has no cervical adenopathy.   Neurological: She is oriented to person, place, and time. She has normal strength. Gait normal.   Reflex Scores:       Tricep reflexes are 2+ on the right side and 2+ on the left side.       Bicep reflexes are 2+ on the right side and 2+ on the left side.       Brachioradialis reflexes are 2+ on the right side and 2+ on the left side.       Patellar reflexes are 2+ on the right side and 2+ on the left side.       Achilles reflexes are 2+ on the right side and 2+ on the left side.  Skin: Skin is warm and dry.   Psychiatric: She has a normal mood and affect. Her speech is normal and behavior is normal. Thought content normal.   Vitals reviewed.        Lab Results   Component Value Date    WBC 7.04 11/22/2017    HGB 13.9 11/22/2017    HCT 41.4 11/22/2017     11/22/2017    ALT 47 (H) 11/22/2017    AST 35 11/22/2017     11/22/2017    K 3.2 (L) 11/22/2017     11/22/2017    CREATININE 0.9 11/22/2017    BUN 12 11/22/2017    CO2  30 (H) 11/22/2017    TSH 1.536 11/27/2017    VNOARMGG21 580 11/22/2017    VITAMINB6 8 11/22/2017         Assessment and Plan:     Problem List Items Addressed This Visit     POTS (postural orthostatic tachycardia syndrome) (Chronic)    Current Assessment & Plan     Patient reports chronic clinical scenario and formal diagnostic testing with confirmatory results having been done at the [facility] recently. She does not have the documents with her and they have not been provided by the  via fax. Pending confirmation of positive objective analysis for POTS, I offered to start trial of Midodrine to address symptoms, but she is not interested in starting a new medication until assessed by a POTS expert. The regional expert is Dr. Oquendo at [facility].   - Referral to [facility] to see Dr. Oquendo for assessment and treatment   - Patient was urged to obtain her formal studies results from the [facility] in order to provide information to Dr. Oquendo.         Migraine with aura and without status migrainosus, not intractable (Chronic)    Current Assessment & Plan     Two per month with visual aura. Duration is up to 3 days. Included photo and phonophobia but no dysautonomia. Occipital onset with anterior spread. She refuses to take prophylactic medications despite my urging her to do so. She has considerable nausea with vomiting during the events and so pill form abortive therapy is a poor option.   - Zomig nasal spray 5 mg prn migraine. Use immediately at headache / aura onset and she can repeat after two hours if severe headache persists but no more than two applications per day. No history of cerebrovascular or cardiovascular disease.   - Compazine and Zofran offered and patient declined.   - PPX medications offered and patient declined.         Small fiber neuropathy - Primary    Current Assessment & Plan     Burning sensation mainly in the lower extremities with normal NCS. On Lyrica 100 Q12 at this point. At last  resident clinic visit she had been on 150 Q12 but was pared down to 100 Q12 with an increase in Savella. She has noted an increase in pain and symptoms and wants to increase back to total 300/day. She is also interested in pursuing IVIg as therapy, but we will first need to have diagnostic confirmation per biopsy before IVIg will be approved.   - Order biopsy kit and schedule for procedure with me in clinic.   - IVIg therapy pending confirmation of the biopsy results.   - Continue Savella as currently prescribed and increase Lyrica to 100/200         Fibromyalgia    Current Assessment & Plan     Continue Savella as prescribed.             Addendum 7/26/2018 - Tissue biopsy consistent with a small fiber neuropathy / idiopathic peripheral autonomic neuropathy. We will pursue IVIg therapy. Orders submitted.    Berlin Saleem MD  [facility]

## 2018-01-26 ENCOUNTER — TELEPHONE (OUTPATIENT)
Dept: NEUROLOGY | Facility: CLINIC | Age: 38
End: 2018-01-26

## 2018-01-26 NOTE — TELEPHONE ENCOUNTER
----- Message from Caprice Reeves sent at 1/25/2018  2:51 PM CST -----  Contact: self  Pt is calling in regards to the insurance only covering 60 pills a month for pt. Per pt she takes 90 pills a month. Pt states that she needs an approval from Dr. Saleem to get 90 pills a month.Pt also states that she is out of the medication(milnacipran (SAVELLA) 50 mg Tab) and would like to know if it can be called in today.    Pt can be reached at [phone].    Thank you

## 2018-01-26 NOTE — TELEPHONE ENCOUNTER
----- Message from Caprice Reeves sent at 1/25/2018  2:51 PM CST -----  Contact: self  Pt is calling in regards to the insurance only covering 60 pills a month for pt. Per pt she takes 90 pills a month. Pt states that she needs an approval from Dr. Saleem to get 90 pills a month.Pt also states that she is out of the medication(milnacipran (SAVELLA) 50 mg Tab) and would like to know if it can be called in today.    Pt can be reached at [phone].    Thank you

## 2018-02-02 ENCOUNTER — OFFICE VISIT (OUTPATIENT)
Dept: GASTROENTEROLOGY | Facility: CLINIC | Age: 38
End: 2018-02-02
Payer: COMMERCIAL

## 2018-02-02 VITALS
WEIGHT: 196.19 LBS | SYSTOLIC BLOOD PRESSURE: 157 MMHG | HEIGHT: 66 IN | BODY MASS INDEX: 31.53 KG/M2 | DIASTOLIC BLOOD PRESSURE: 111 MMHG

## 2018-02-02 DIAGNOSIS — K90.0 CELIAC DISEASE: Primary | ICD-10-CM

## 2018-02-02 PROCEDURE — 3008F BODY MASS INDEX DOCD: CPT | Mod: S$GLB,,, | Performed by: INTERNAL MEDICINE

## 2018-02-02 PROCEDURE — 99999 PR PBB SHADOW E&M-EST. PATIENT-LVL III: CPT | Mod: PBBFAC,,, | Performed by: INTERNAL MEDICINE

## 2018-02-02 PROCEDURE — 99204 OFFICE O/P NEW MOD 45 MIN: CPT | Mod: S$GLB,,, | Performed by: INTERNAL MEDICINE

## 2018-02-02 NOTE — PROGRESS NOTES
"REASON FOR VISIT:  Diagnosis of celiac and possible Crohn's disease.    HISTORY OF PRESENT ILLNESS:  Ms. Cedeno is a 37-year-old with multiple medical   problems and symptoms, currently being evaluated for nephropathy, especially   small fiber neuropathy.  She has been worked up extensively in [place], where   she moved down from about nine months ago and has also been worked up at [facility].  She does not have any records with her today.  She was asked to bring   records along that have been done so far, especially [place] in [facility], so   that I can review those and see what has been done thus far and what the   potential diagnoses are.  She describes "inflammation" in her intestines with   generalized abdominal pain, more so with palpation in the left lower quadrant,   some bloating.  She used to have diarrhea until celiac was diagnosed and   thereafter being gluten-free, has noticed more constipation with straining and a   feeling of incomplete evacuation.  Today, we discussed about trial of Senokot-S   one to two tablets after supper to help with possible dyssynergic   defecation/slow transit constipation.  Currently, patient is on Apriso three a   day for possible diagnosis of Crohn's disease; however, not clear as to the   extent or severity of the disease.    PAST MEDICAL, SURGICAL, SOCIAL AND FAMILY HISTORY:  Reviewed.    MEDICATIONS AND ALLERGIES:  Reviewed.    REVIEW OF SYSTEMS:  CONSTITUTIONAL:  No fevers or chills.  Complains of malaise, fatigue and   lightheadedness.  ENT occasional thrush in the mouth.  Currently, no dysphagia   or odynophagia.  EYES:  No visual changes or red eyes.  CARDIOVASCULAR:  No angina or palpitation.  RESPIRATORY:  No shortness of breath or wheezing.  GENITOURINARY:  No dysuria or frequency.  MUSCULOSKELETAL:  Arthralgias and myalgias.  SKIN:  No pruritus or eczema.  NEUROLOGIC:  Numbness.  PSYCHIATRIC:  Some anxiety.  No depression.    PHYSICAL EXAMINATION:  VITAL " SIGNS:  See EPIC.  Alert and oriented x3, no acute distress.  NECK:  Supple.  No carotid bruits.  No cervical adenopathy.  ABDOMEN:  Obese, soft, nondistended, mild tenderness to palpation diffusely with   no focal guarding or rigidity.  Bowel sounds are normal.  No murmurs heard.  EYES:  Conjunctivae anicteric.  ENT:  Oropharynx, mucosa moist.  CARDIOVASCULAR:  S1, S2 normal.  RESPIRATORY:  Bilateral air entry equal.  SKIN:  No palmar erythema or spider angiomata.  NEUROLOGIC:  No asterixis.  PSYCHIATRY:  Affect appropriate.  LOWER EXTREMITY:  No pedal edema.    IMPRESSION:  1.  Presumed celiac disease, currently gluten-free:  2.  Chronic constipation.  3.  Presumed inflammatory bowel disease, i.e., possible Crohn's, currently on   Apriso (diagnosis uncertain).    RECOMMENDATION:  1.  Obtain records both from [place] and from [facility].  2.  Start Senokot-S 1-2 tablets post-supper daily.  3.  Further recommendations will be based on record review.      ACT/IN  dd: 02/02/2018 16:09:09 (CST)  td: 02/03/2018 06:28:33 (CST)  Doc ID   #[id number]  Job ID #[id number]    CC:

## 2018-02-16 ENCOUNTER — PATIENT MESSAGE (OUTPATIENT)
Dept: NEUROLOGY | Facility: CLINIC | Age: 38
End: 2018-02-16

## 2018-02-16 ENCOUNTER — PATIENT MESSAGE (OUTPATIENT)
Dept: GASTROENTEROLOGY | Facility: CLINIC | Age: 38
End: 2018-02-16

## 2018-02-16 RX ORDER — FLUCONAZOLE 100 MG/1
100 TABLET ORAL DAILY
Qty: 15 TABLET | Refills: 1 | Status: SHIPPED | OUTPATIENT
Start: 2018-02-16 | End: 2018-03-18

## 2018-02-19 ENCOUNTER — PATIENT MESSAGE (OUTPATIENT)
Dept: GASTROENTEROLOGY | Facility: CLINIC | Age: 38
End: 2018-02-19

## 2018-03-06 ENCOUNTER — PATIENT MESSAGE (OUTPATIENT)
Dept: NEUROLOGY | Facility: CLINIC | Age: 38
End: 2018-03-06

## 2018-04-10 ENCOUNTER — TELEPHONE (OUTPATIENT)
Dept: NEUROLOGY | Facility: CLINIC | Age: 38
End: 2018-04-10

## 2018-04-10 ENCOUNTER — PATIENT MESSAGE (OUTPATIENT)
Dept: NEUROLOGY | Facility: CLINIC | Age: 38
End: 2018-04-10

## 2018-04-13 ENCOUNTER — OFFICE VISIT (OUTPATIENT)
Dept: NEUROLOGY | Facility: CLINIC | Age: 38
End: 2018-04-13
Payer: COMMERCIAL

## 2018-04-13 VITALS
BODY MASS INDEX: 30.63 KG/M2 | SYSTOLIC BLOOD PRESSURE: 176 MMHG | DIASTOLIC BLOOD PRESSURE: 121 MMHG | HEIGHT: 67 IN | HEART RATE: 86 BPM | WEIGHT: 195.13 LBS

## 2018-04-13 DIAGNOSIS — G89.29 CHRONIC NECK PAIN: ICD-10-CM

## 2018-04-13 DIAGNOSIS — M54.2 CHRONIC NECK PAIN: ICD-10-CM

## 2018-04-13 DIAGNOSIS — G62.9 SMALL FIBER NEUROPATHY: Primary | ICD-10-CM

## 2018-04-13 PROCEDURE — 11100 PR BIOPSY OF SKIN LESION: CPT | Mod: S$GLB,,, | Performed by: PSYCHIATRY & NEUROLOGY

## 2018-04-13 PROCEDURE — 11101 PR BIOPSY, EACH ADDED LESION: CPT | Mod: S$GLB,,, | Performed by: PSYCHIATRY & NEUROLOGY

## 2018-04-13 PROCEDURE — 99999 PR PBB SHADOW E&M-EST. PATIENT-LVL III: CPT | Mod: PBBFAC,,, | Performed by: PSYCHIATRY & NEUROLOGY

## 2018-04-15 NOTE — PROGRESS NOTES
The indication for the punch biopsy is evaluation for small fiber neuropathy.     After informed written consent was obtained, using Betadine for cleansing and 1% Lidocaine without epinephrine for anesthetic, with sterile technique a 3 mm punch biopsy was used to obtain two biopsy specimens, one from the left leg approximately 10 cm proximal to the lateral malleolus, and a second approximately 20 cm distal to the left iliac spine at the level of the pubus. Hemostasis was obtained by pressure and wound was appropriately bandaged. Wound care instructions were provided. Patient was instructed to be alert for any signs of cutaneous infection. The specimen was labeled and sent to pathology for evaluation ([facility]). The procedure was well tolerated without complications.    Berlin Saleem MD  [facility]

## 2018-05-01 ENCOUNTER — PATIENT MESSAGE (OUTPATIENT)
Dept: NEUROLOGY | Facility: CLINIC | Age: 38
End: 2018-05-01

## 2018-05-03 DIAGNOSIS — G62.9 SMALL FIBER NEUROPATHY: Primary | ICD-10-CM

## 2018-05-04 ENCOUNTER — TELEPHONE (OUTPATIENT)
Dept: NEUROLOGY | Facility: CLINIC | Age: 38
End: 2018-05-04

## 2018-05-04 NOTE — TELEPHONE ENCOUNTER
----- Message from Lance COLMENARES Melendez sent at 5/4/2018  8:54 AM CDT -----  Needs Medical Advice    Who Called: Pt  Communication Preference (Franky response to Pt. (or) Call Back # and timeframe): [phone]  Additional Information: Pt is asking for a doctor's note for school that states her chronic illness causes her to miss school. Pt is also asking to speak w/ the doctor about being prescribed a scooter. Pt states she needs this today b4 she goes to school in the afternoon. Pt said if Dr. Saleem can't complete this, she's asking Dr. Shoemaker complete it. Pls call. (pt would like the note sent through [facility], if possible).

## 2018-05-08 ENCOUNTER — CLINICAL SUPPORT (OUTPATIENT)
Dept: REHABILITATION | Facility: OTHER | Age: 38
End: 2018-05-08
Attending: PSYCHIATRY & NEUROLOGY
Payer: COMMERCIAL

## 2018-05-08 DIAGNOSIS — M54.2 CHRONIC NECK PAIN: ICD-10-CM

## 2018-05-08 DIAGNOSIS — G89.29 CHRONIC NECK PAIN: ICD-10-CM

## 2018-05-08 PROCEDURE — 97163 PT EVAL HIGH COMPLEX 45 MIN: CPT | Performed by: PHYSICAL MEDICINE & REHABILITATION

## 2018-05-08 PROCEDURE — 97110 THERAPEUTIC EXERCISES: CPT | Performed by: PHYSICAL MEDICINE & REHABILITATION

## 2018-05-08 NOTE — PLAN OF CARE
[facility] - PHYSICAL THERAPY EVALUATION   CHRONIC PAIN< MULTIPLE PRECAUTIONS< ANXIETY>PROGRESS WITH CARE    Name: Caryl Cedeno  Clinic Number: [id number]    Caryl is a 37 y.o. female evaluated on 05/08/2018.   Time in: 3:15 ( patient 15 min late)  Time out:4:30 pm    Diagnosis:   Encounter Diagnosis   Name Primary?    Chronic neck pain      Physician: Berlin Saleem*  Treatment Orders: PT Eval and Treat    Past Medical History:   Diagnosis Date    Celiac disease     Fibromyalgia     Migraines     Small fiber neuropathy      Current Outpatient Prescriptions   Medication Sig    cyclobenzaprine (FLEXERIL) 10 MG tablet Take 10 mg by mouth continuous prn for Muscle spasms.    ibuprofen (ADVIL,MOTRIN) 600 MG tablet Take 600 mg by mouth daily as needed for Pain.    mesalamine (APRISO) 0.375 gram Cp24 Take 1.5 g by mouth 3 (three) times daily.    milnacipran (SAVELLA) 50 mg Tab Take 1.5 tablets (75 mg total) by mouth 2 (two) times daily. (Patient taking differently: Take 100 mg by mouth 2 (two) times daily. )    pregabalin (LYRICA) 100 MG capsule Take 1 capsule (100 mg total) by mouth 3 (three) times daily.    water Liqd 150 mL with MILK OF MAGNESIA 400 mg/5 mL Susp 400 mg, diphenhydrAMINE 12.5 mg/5 mL Elix 60 mg, nystatin 100,000 unit/mL Susp 500,000 Units SWISH AND SPIT 10ML'S BY MOUTH EVERY 4 HOURS    ZOLMitriptan (ZOMIG) 5 mg Spry 1 spray by Nasal route once as needed.     No current facility-administered medications for this visit.      Review of patient's allergies indicates:   Allergen Reactions    Gluten protein     Latex, natural rubber      Precautions:   POTS (postural orthostatic tachycardia syndrome) (Chronic)  Small fiber neuropathy - Primary  Fibromyalgia  Neck sx: disc replacement Nov 2016        Pattern of pain determined: 1 no movement preference, anxiety, fearful of movement, neck sx, POTS, small fiber neuropathy, fibromyalgia   Visit # authorized:  25  Authorization period: 12/31/18  Plan of care Expiration: sent 5/8/18        HISTORY   History of Present Illness: patient is from [place] and most of her care occurred there.   She  reports she has had headaches for a long time. She started with left arm tingling, had MRI, due to small fiber neuropathy to ensure not MS. She was dx with disc issues and had disc replacement in 2016.  Since having the disc replacement, she has had neck pain, but the arm tingling is gone.  She has had therapy in [place], which helped.  She takes migraine medication and muscle relaxer's.   She has been in [place] since July 2017.   She is unable to work, or clean her house.  She feels limited by fatigue, pain, and some anxiety.    Current symptoms:  1. Neck fatigue, sensitive to bumps in the car.  Neck pain is constant, bilat neck, base of neck, into base of head, and into left shoulder.    No symptoms in left arm.   No numbness or tingling in hands.  10/10 at worst, 2/10 at best, and 2/10 now.  2.  Migraines, light sensitivity, around eyes, base of head, around head.  3-4/week.  Last until she takes her meds.          Diagnostic Tests: not recently    Pain Scale: Caryl rates pain on a scale of 0-10 to be 10 at worst; 2 currently; 2 at best using VAS.   Pain location: neck and left shoulder    Aggravating factors: any time she has to hold her neck up, sitting, driving  Easing Factors: lying down  Disturbed Sleep: once in awhile but not usually    Pattern of pain questions:  1.  Where is your pain the worst? neck  2.  Is your pain constant or intermittent? constant  3.  Does bending forward make your typical pain worse? yes  4.  Since the start of your back pain, has there been a change in your bowel or bladder? no  5.  What can't you do now that you use to be able to do? Working, cleaning, going for walks, on a good day she can drive and make a meal, uses scooter to shop, can't walk more than 2 blocks    Prior Treatment:  therapy, med, sx, chiropractic treatment  Prior functional status: full  DME owned/used: belongs to [employer], swimming, going to gym and doing rowing 4 min  Occupation:     , but not working, working on disability  Leisure: gym 1 if that per week, pool 2/week, sometimes goes to gym                     Pts goals:   Socialize, cleaning, working-tutoring    Red Flag Screening:   Cough  Sneeze  Strain: (--)  Bladder/ bowel: (--)  Falls: (--)  Night pain: (--)  Unexplained weight loss: (--)  Swallowing: (--)  Dizziness: (--)  General health: fair    OBJECTIVE   Postural examination/scapula alignment: Rounded shoulder and Head forward  Sitting: poor posture, slouches, poor trunk control  Standing: shifts weight, leans on counter, reports fatigue  Correction of posture: better with lumbar roll    Range of Motion - MOVEMENT LOSS    ROM Loss   Flexion within functional limits   Extension minimal loss   Side bending Right minimal loss   Side bending Left minimal loss   Rotation Right minimal loss   Rotation Left minimal loss   Protraction within functional limits   Retraction  minimal loss       Upper Extremity Strength  (R) UE  (L) UE    Shoulder flexion: 4+/5 Shoulder flexion: 4+/5   Shoulder Abduction: 4+/5 Shoulder abduction: 4+/5   Elbow flexion: 5/5 Elbow flexion: 5/5   Elbow extension: 5/5 Elbow extension: 5/5   Wrist flexion: 5/5 Wrist flexion: 5/5   Wrist extension: 5/5 Wrist extension: 5/5    5/5 : 5/5     NEUROLOGICAL SCREEN    Sensory deficit: intact UE and LE  Walks on toes  Walks on heels  SLS 10 sec    Special Tests:   Test Name  Testing Result   Compression (--)   Distraction (--)   Neural Tension Test (--)   Saddle Sensation (--)     Reflexes:    Left Right   Biceps  1+ 1+   Brachioradialis  1+ 1+   Clonus (--) (--)   Babinski (--) (--)       REPEATED TEST MOVEMENTS:   Repeated Protraction in Sitting end range pain  no worse   Repeated Flexion in Sitting end range pain  no worse   Repeated Retraction  in Sitting  no better   Repeated Retraction Extension in Sitting end range pain  no worse   Repeated Protraction in lying end range pain  no worse   Repeated Flexion in lying end range pain  no worse   Repeated Retraction in lying end range pain  no worse   Repeated Retraction Extension in lying end range pain  no worse       Baseline Isometric Testing on Med X equipment:  Testing administered by PT  Date of testin18  ROM 36-90 deg   Max Peak Torque 96 in lbs    Min Peak Torque 67 in lbs    Flex/Ext Ratio 1./21   % below normative data -67%   Seat adjustment 291   counterweight .8   Seat pad 0     [facility] - Short 2018   Visit Number 1   VAS Pain Rating 2   Recumbent Bike - Seat Pos. 6   Retraction in Sitting 10   Scapular Retraction 10   Cervical Extension - Seat Pad 0   Seat Adjustment 291   Top Dead Center 66   Counterweight 0.8   Cervical Flexion 90   Cervical Extension 36   Cervical Peak Torque 96       GAIT:  Assistive Device used: none  Level of Assistance: independent  Patient displays the following gait deviations:  no gait deviations observed.       FOTO: Focus on Therapeutic Outcomes   Category: cervical   Intake 59% Current Status CK - At least 40 percent but less than 60 percent  % Impaired: 59%  Current Score  = CK = at least 40% but < 60% impaired, limited or restricted  Goal  = CJ = at least 20% but < 40% impaired, limited or restricted    Score interpretation is as follows:   TEST SCORE  Modifier  Impairment Limitation Restriction    0/50  CH  0 % impaired, limited or restricted   1-9/50  CI  @ least 1% but less than 20% impaired, limited or restricted   10-19/50  CJ  @ least 20%<40% impaired, limited or restricted   20-29/50  CK  @ least 40%<60% impaired, limited or restricted   30-39/50  CL  @ least 60% <80% impaired, limited or restricted   40-49/50  CM  @ least 80%<100% impaired limited or restricted   50/50  CN  100% impaired, limited or restricted       Treatment   Time  "In: 4:00pm  Time Out: 4:30 pm    PT Evaluation Completed? Yes  Discussed Plan of Care with patient: Yes      Home Exercise Program as follows:   Handouts were given to the patient. Pt demo fair understanding of the education provided. Caryl demonstrated fair return demonstration of activities.     - Patient received education regarding proper posture and body mechanics.    - Chang roll tried, recommended, and purchase information was provided.    - Patient received a handout regarding anticipated muscular soreness following the isometric test and strategies for management were reviewed with patient including stretching, using ice and scheduled rest.       Pt was instructed in and performed the following:   Cardiovascular exercise and therapeutic exercise to improve posture, lumbar/cervical ROM, strength, and muscular endurance as follows:     Caryl received therapeutic exercises to develop/improve posture, lumbar/cervical ROM, strength and muscular endurance for 30 minutes including the following exercises:   Bike  Dynamic cervical warm up  Isometric neck test, practice and baseline      HEP started as follows:  -patient received education and handouts given regarding back care, with information regarding posture, body mech, ergonomics, and components of good exercise program  -Patient received education regarding proper posture and body mechanics.  Chang roll tried, recommended, and purchase information was provided.  -discussed concept of developing "tool box" or "strategies, using positions or exercises to reduce symptoms.  Discussed using these tools to reduce symptoms, and also to prevent symptoms if able.      - Patient received a handout regarding anticipated muscular soreness following the isometric test and strategies for management were reviewed with patient including stretching, using ice and scheduled rest.   --gave top 10 tips handouts on back and neck care and discusses sitting posture, use of " lumbar roll, standing  Posture, correct lifting techniques, need to exercise and encouraged walking, drinking water, healthy diet, regular sleep    HEP as follows:  Swim 2/week  Use lumbar roll  Neck retractions 3/day 10 reps  Shoulder shrugs 3/day 10 reps  Shoulder ext rot and  scap retraction 3/day 10 reps        Assessment   This is a 37 y.o. female referred to [facility] and presents with a medical diagnosis of   Encounter Diagnosis   Name Primary?    Chronic neck pain     and demonstrates limitations as described below in the problem list. Pt rehab potential is Fair. Pt presents with no movement preference, anxiety, fearful of movement, neck sx, POTS, small fiber neuropathy, fibromyalgia , decreased endurance and tolerance to movement        Pattern of pain determined: 1 no movement preference, anxiety, fearful of movement, neck sx, POTS, small fiber neuropathy, fibromyalgia        Patient received education on the [facility] program, purpose of the isometric test, progression of back strengthening as well as wellness approach and systemic strengthening.  Details of the program were discussed.  Reviewed that patient should feel support/pressure from med ex restraints but no pain or discomfort and patient expressed understanding.    Based on the above history and physical examination an active physical therapy program is recommended.  Pt will continue to benefit from skilled outpatient physical therapy to address the deficits listed below in the chart, provide pt/family education and to maximize pt's level of independence in the home and community environment. .     No environmental, cultural, spiritual, developmental or education needs expressed or noted    Medical necessity is demonstrated by the following problem list.    Pt presents with the following impairments:   History  Co-morbidities and personal factors that may impact the plan of care Examination  Body Structures and Functions, activity  limitations and participation restrictions that may impact the plan of care Clinical Presentation   Decision Making/ Complexity Score   Co-morbidities:     POTS (postural orthostatic tachycardia syndrome) (Chronic)  Small fiber neuropathy - Primary  Fibromyalgia  Neck sx: disc replacement Nov 2016          Personal Factors:   Anxiety  Weak  Fearful of movement Body Regions:   head  neck  back  lower extremities  upper extremities    Body Systems:   gross symmetry  ROM  strength  gross coordinated movement  balance  gait  transfers  motor control    Activity limitations:   Learning and applying knowledge  no deficits    General Tasks and Commands  no deficits    Communication  no deficits    Mobility  fine hand use (grasping/picking up)  walking  moving around using equipment (WC)  using transportation (bus, train, plane, car)  driving (bike, car, motorcycle)    Self care  washing oneself (bathing, drying, washing hands)  caring for body parts (brushing teeth, shaving, grooming)  toileting  dressing  looking after one's health    Domestic Life  shopping  cooking  doing house work (cleaning house, washing dishes, laundry)  assisting others    Interactions/Relationships  no deficits    Life Areas  no deficits    Community and Social Life  community life  recreation and leisure    Participation Restrictions:  Not shopping, going out, driving     unstable clinical presentation with unpredictable characteristics   high     GOALS: Pt is in agreement with the following goals.    Short term goals: 6 weeks or 10 visits   1.  Pt will demonstrate increased isometric torque by 5% from initial test indicating positive muscular response to program of progressive resistance training  2. Pt will demonstrate reduced pain and improved functional outcomes as reported on the patient centered questionnaires. Report 2-4 points reduction NDI indicating improvement in function and pain  3.. Pt will demonstrate independence with reducing or  "controlling symptoms with ther ex, movement, or position independently, able to reduce pain 1-2 points on pain scale using strategies taught in therapy  4. Pt will demonstrate increased maximum isometric torque value by 30% when compared to the initial value resulting in improved ability to perform bending, lifting, and carrying activities safely, confidently.        Long term goals: 13 weeks or 20 visits  1. Pt will demonstratte increased cervical ROM as measured by med ex by 6 degrees from initial test which results in full functional ROM of neck for ease with ADLs and driving  2. Pt will demonstrate increased isometric torque by 10% from initial test to improve ability to lift and carry.   3.Patient will demonstrate improved overall function per FOTO Survey to CJ = at least 20% but < 40% impaired, limited or restricted score or less.   4. Pt will demonstrate independence with reducing or controlling symptoms with ther ex, movement, or position independently, able to reduce pain 2-4 points on pain scale using strategies taught in therapy  5. Pt will demonstrate independence with the HEP at discharge.   6.   drive and make a meal, regularly  7. Not use a scooter to shop  8.   walk more than 2 blocks      Plan   Outpatient physical therapy 2x week for 13 weeks or 20 visits to include the following:   - Patient education  - Therapeutic exercise  - Manual therapy  - Performance testing   - Neuromuscular Re-education  - Therapeutic activity   - Modalities    Pt may be seen by PTA as part of the rehabilitation team.     Therapist: Adrianna Mendoza, PT  5/8/2018      "I certify the need for these services furnished under this plan of treatment and while under my care."    ____________________________________  Physician/Referring Practitioner    _______________  Date of Signature                                      "

## 2018-05-08 NOTE — PATIENT INSTRUCTIONS
Top 10 tips for back and neck pain    The spine is the pillar of the body, providing the foundation for the upper and lower extremities to attach.  Our spines withstand significant forces all day long.  There are many ways in which we can take care of our backs.  Here are a couple tips to help you keep your back in action.    1. Watch your posture in sitting.     Sit in chairs with back supports, and use a lumbar roll to maintain the normal curve of your low back. Ensure the height of your chair is such that your feet rest flat on the floor with your knees and hips level.  The average American sits 9 hours a day.  Do not sit longer than 1 hour without getting up to stretch or move.     2. Watch your posture in standing.   Maintain the normal curves of your spine in standing.   When standing tall, you should be able to draw a line down through your ear, shoulder, hip, and ankle.  Wear good shoes and consider using a standing desk mat if you stand a lot during work.  Take breaks from standing.    3. Lift correctly   Lift objects by using the strong muscles in your legs.  Get close to the object, bend your knees and your hips, and maintain the normal curve of your low back. Do not twist when lifting or carrying items. Think about the tasks you perform daily at work or home, and minimize lifting and carrying objects.  Use rolling carts or other strategies to reduce back strain.    4. Exercise regularly  Individuals who exercise regularly generally experience better health, reduced back pain, and less stress.  A good exercise program has a stretching component, a strengthening component, and an aerobic component.   Maintain the mobility of your spine by stretching daily. Strengthen your core and extremities several times a week.   Get regular cardiovascular exercise, 3-5/week.  Choose activities you like such as walking, swimming, dancing, or riding a bike.     5. Quit Smoking  Smoking increases the likelihood of back  pain.  It is thought that smoking reduces the blood supply to the discs between the vertebrae and this may lead to degeneration of these discs.  Talk to your Physician about quitting.  There are many smoking cessation options that may work for you.    6. Keep moving even when you have pain  Motion is lotion.   The majority of back pain is mechanical in nature, and will likely reduce with gentle movements, stretching, and walking.  As tempting as it may be to stay in bed when you are hurting, remember that you will likely feel better by getting up and gently moving and walking.  Limit bed rest.      7. Maintain a healthy diet  Try to maintain a healthy diet and weight.        8. Stay hydrated  The average adult is approximately 60 % water.  Staying hydrated is beneficial for all aspects of health.  In general, an adult should drink half of their body weight in ounces.  For example, if you weight 180 lbs, you should drink 90 ounces of water daily.     9. Get regular sleep   Ensure that you get a good nights rest on a regular basis. The discs in your spine hydrate when you lie down to sleep. Your spine needs the rest too.     10. See Your Physician    Make an appointment to see your Physician for back pain that is progressively worsening, and for back pain that is no better or worse with changing positions and activities.        Z LIE POSITION  Z Lie is a position that you can use to unload your back and assist with pain reduction.  Lie on your back and rest your calves on the seat on a chair or a bench.  Viewed from the side, you should resemble a Z.  Your therapist may suggest sliding the chair closer to you, so your knees are over your stomach.   Your therapist may also suggest a pillow under your buttock if needed.  Follow the directions from your therapist.  The goal of this position is to reduce your symptoms.    Z lie can be done in a variety of ways.  It can be done on a bed resting your legs on a light  and easy to lift chair                          CERVICAL FLEXION    Tilt your head downwards, then return back to looking straight ahead.                      CERVICAL EXTENSION    Tilt your head upwards, then return back to looking straight ahead.                  CERVICAL SIDE BEND    Tilt your head towards the side, then return back to looking straight ahead. Repeat to opposite side        CERVICAL ROTATION    Turn your head towards the side, then return back to looking straight ahead. Repeat to opposite side.            RETRACTION / CHIN TUCK    Slowly draw your head back so that your ears line up with your shoulders. Hold for  5-10 secs.                                SHOULDER ROLLS    Roll your shoulders forward    Roll your shoulders backward  Practice doing one shoulder at a time                    SHRUGS    Raise your shoulders upward towards your ears    Practice shrugging both shoulders at same time  Practice shrugging one shoulder at a time                SCAPULAR RETRACTIONS  Draw your shoulder blades back and down  Practice doing both at same time  Practice one shoulder blade at a time              BILATERAL SCAPTION    With thumbs pointing up toward the ceiling, raise your arms up in a V angle (45 deg)  Do not shrug your shoulder upwards

## 2018-05-11 ENCOUNTER — OFFICE VISIT (OUTPATIENT)
Dept: GASTROENTEROLOGY | Facility: CLINIC | Age: 38
End: 2018-05-11
Payer: COMMERCIAL

## 2018-05-11 VITALS
BODY MASS INDEX: 30.17 KG/M2 | HEART RATE: 94 BPM | DIASTOLIC BLOOD PRESSURE: 108 MMHG | HEIGHT: 67 IN | WEIGHT: 192.25 LBS | SYSTOLIC BLOOD PRESSURE: 157 MMHG

## 2018-05-11 DIAGNOSIS — K59.09 CHRONIC CONSTIPATION: Primary | ICD-10-CM

## 2018-05-11 PROCEDURE — 3008F BODY MASS INDEX DOCD: CPT | Mod: CPTII,S$GLB,, | Performed by: INTERNAL MEDICINE

## 2018-05-11 PROCEDURE — 99999 PR PBB SHADOW E&M-EST. PATIENT-LVL III: CPT | Mod: PBBFAC,,, | Performed by: INTERNAL MEDICINE

## 2018-05-11 PROCEDURE — 99214 OFFICE O/P EST MOD 30 MIN: CPT | Mod: S$GLB,,, | Performed by: INTERNAL MEDICINE

## 2018-05-11 RX ORDER — CETIRIZINE HYDROCHLORIDE 10 MG/1
10 TABLET ORAL DAILY
COMMUNITY

## 2018-05-11 NOTE — PROGRESS NOTES
DATE OF CONSULTATION: 05/11/2018    REASON FOR VISIT:  Constipation.      Ms. Cedeno was last seen on 02/02/2018 for complaints of constipation with   straining and feeling of incomplete evacuation.  She was asked to try Senokot as   one to two after supper and returns today with some improvement in her   constipation as long as she takes the medication.  She has also initiated on   Metamucil capsules daily.  She carries a diagnosis of possible Crohn disease and   is on Apriso three a day; however, I do not have any records from her previous   physicians to corroborate that.  Today, we discussed about trying to obtain   records from her physicians in [place], so I can review those records and see   if any additional evaluation needs to be done.  Currently, she got a note from   her insurance company saying they will not be covering Apriso and I have asked   the patient to give them a call to see if there is an alternate drug that  they would approve. In the meanwhile, she is being evaluated for small fiber   neuropathy and is being assessed for IVIG infusion.  Otherwise, no new   complaints.    PAST MEDICAL, SURGICAL, SOCIAL AND FAMILY HISTORY:  Reviewed.    MEDICATIONS AND ALLERGIES:  Reviewed.    REVIEW OF SYSTEMS:  CONSTITUTIONAL:  No fever, no chills, malaise and fatigue, lightheadedness.  EYES:  No visual changes.  ENT:  No odynophagia or hoarseness of voice.  CARDIOVASCULAR:  No angina or palpitation.  RESPIRATORY:  No shortness of breath or wheezing.  GASTROINTESTINAL:  See HPI.  No blood in the stool.    PHYSICAL EXAMINATION:  VITAL SIGNS see EPIC.  GENERAL:  Alert and oriented x3, no acute distress.      About 25 minutes spent with the patient with more than 50% in counseling.    IMPRESSION:    1.  Presumed Crohn disease, presumed celiac disease, currently on gluten-free   diet.   2.  Chronic constipation.    RECOMMENDATION:  1.  Continue Senokot as one to two tablets after supper daily.  2.  Obtain  records from her prior gastroenterologist in [place].    Further recommendation based on record review.      ACT/HN  dd: 05/11/2018 17:05:48 (CDT)  td: 05/12/2018 00:57:55 (CDT)  Doc ID   #[id number]  Job ID #[id number]    CC:

## 2018-05-15 ENCOUNTER — CLINICAL SUPPORT (OUTPATIENT)
Dept: REHABILITATION | Facility: OTHER | Age: 38
End: 2018-05-15
Attending: PSYCHIATRY & NEUROLOGY
Payer: COMMERCIAL

## 2018-05-15 DIAGNOSIS — M54.2 CHRONIC NECK PAIN: ICD-10-CM

## 2018-05-15 DIAGNOSIS — G89.29 CHRONIC NECK PAIN: ICD-10-CM

## 2018-05-15 PROCEDURE — 97110 THERAPEUTIC EXERCISES: CPT

## 2018-05-15 NOTE — PROGRESS NOTES
[facility]      Name: Caryl Cedeno  Clinic Number: [id number]  Date of Treatment: 05/15/2018   Diagnosis:   Encounter Diagnosis   Name Primary?    Chronic neck pain      Physician: [place] Saleem*    Pain pattern determined: 1 no movement preference, anxiety, fearful of movement, neck sx, POTS, small fiber neuropathy, fibromyalgia   Plan of care signed: 2018  Time in: 3:05  Time Out: 415  Total Billable Units: 30  Precautions: POTS (postural orthostatic tachycardia syndrome) (Chronic)  Small fiber neuropathy - Primary  Fibromyalgia  Neck sx: disc replacement 2016  Visit #: 2 (pt states that manual helps her more than anything)    CHRONIC PAIN< MULTIPLE PRECAUTIONS< ANXIETY>PROGRESS WITH CARE    Visit # authorized: 25  Authorization period: 18  Plan of care Expiration: 18    Reassessment Due: 2018    Subjective   Caryl reports no change in symptoms. She was much worse the day after the test but the soreness only lasted for a day, but she has been very cautious, only doing the stretches and exercises once a day and she plans on progressing to twice a day starting this week. Pt wore a dress with flip flops and states that she can only do the recumbent bike and cannot walk on the treadmill or around the therapy gym. Pt declined to do a warm up today.    Patient reports tolerating previous visit poorly, states that she was in a lot of pain  Patient reports their pain to be 4/10 on a 0-10 scale with 0 being no pain and 10 being the worst pain imaginable.  Pain Location: neck and shoulders     Occupation:     , but not working, working on disability  Leisure: gym 1 if that per week, pool 2/week, sometimes goes to gym                     Pts goals:   Socialize, cleaning, working-tutoring    Objective     Baseline IM Testing Results:   Date of testin2018  ROM 36-90 deg   Max Peak Torque 96    Min Peak Torque 67    Flex/Ext Ratio 1.2/1    % below normative data 67     Seat adjustment 291   counterweight .8   Seat pad 0       Outcomes: Cervical  Initial score:59  Visit 5 score:  Goal:      Treatment    Pt was instructed in and performed the following:     Caryl received therapeutic exercises to develop/improved posture, cardiovascular endurance, muscular endurance, lumbar/cervical ROM, strength and muscular endurance for 30 minutes including the following exercises:       [facility] 5/15/2018   Visit Number 2   VAS Pain Rating 4   Recumbent Bike Seat Pos. -   Retraction in Sitting 10   Scapular Retraction 10   Cervical Extension Seat Pad -   Seat Adjustment -   Top Dead Center -   Counterweight -   Cervical Flexion -   Cervical Extension -   Cervical Peak Torque -   Cervical Weight 60   Repetitions 15   Rating of Perceived Exertion 5.5   Ice - Z Lie (in min.) -     Shoulder shrugs in sitting    Peripheral muscle strengthening which included 1 set of 15-20 repetitions at a slow, controlled 7 second per rep pace focused on strengthening supporting musculature for improved body mechanics and functional mobility.  Pt and therapist focused on proper form during treatment to ensure optimal strengthening of each targeted muscle group.  Machines were utilized including torso rotation, leg extension, leg curl, chest press, upright row. Tricep extension, bicep curl, leg press, and hip abduction added visit 3    Caryl received the following manual therapy techniques: none were applied to the: neck for 00 minutes.         Home Exercise Program as follows:   Handouts were given to the patient. Pt demo good understanding of the education provided. Caryl demonstrated good return demonstration of activities.     Swim 2/week  Use lumbar roll  Neck retractions 3/day 10 reps  Shoulder shrugs 3/day 10 reps  Shoulder ext rot and  scap retraction 3/day 10 reps    Lumbar roll use compliance: unknown  Additional exercises taught this treatment session:     HEP  review    Assessment   fair  Patient is making fairnprogress towards established goals. She is completing her exercises intermittently. She was not able to complete the warm up and didn't want to do the ice because she feels like that was one of the contributing factors for the increase in her pain. Pt was able to complete 15 reps on the cervical MedX at 60 inlbs and felt it was difficulty. Will continue with progressing the repetitions as tolerated. Pt is highly irritated and and cautious of movements and activities.  Pt will continue to benefit from skilled outpatient physical therapy to address the deficits stated in the impairment chart, provide pt/family education and to maximize pt's level of independence in the home and community environment.       Pt's spiritual, cultural and educational needs considered and pt agreeable to plan of care and goals as stated below:     Medical necessity is demonstrated by the following problem list.    Pt presents with the following impairments:   History  Co-morbidities and personal factors that may impact the plan of care Examination  Body Structures and Functions, activity limitations and participation restrictions that may impact the plan of care Clinical Presentation    Decision Making/ Complexity Score   Co-morbidities:      POTS (postural orthostatic tachycardia syndrome) (Chronic)  Small fiber neuropathy - Primary  Fibromyalgia  Neck sx: disc replacement Nov 2016              Personal Factors:   Anxiety  Weak  Fearful of movement Body Regions:   head  neck  back  lower extremities  upper extremities     Body Systems:   gross symmetry  ROM  strength  gross coordinated movement  balance  gait  transfers  motor control     Activity limitations:   Learning and applying knowledge  no deficits     General Tasks and Commands  no deficits     Communication  no deficits     Mobility  fine hand use (grasping/picking up)  walking  moving around using equipment (WC)  using  transportation (bus, train, plane, car)  driving (bike, car, motorcycle)     Self care  washing oneself (bathing, drying, washing hands)  caring for body parts (brushing teeth, shaving, grooming)  toileting  dressing  looking after one's health     Domestic Life  shopping  cooking  doing house work (cleaning house, washing dishes, laundry)  assisting others     Interactions/Relationships  no deficits     Life Areas  no deficits     Community and Social Life  community life  recreation and leisure     Participation Restrictions:  Not shopping, going out, driving       unstable clinical presentation with unpredictable characteristics    high      GOALS: Pt is in agreement with the following goals.     Short term goals: 6 weeks or 10 visits   1.  Pt will demonstrate increased isometric torque by 5% from initial test indicating positive muscular response to program of progressive resistance trainingProgressing  2. Pt will demonstrate reduced pain and improved functional outcomes as reported on the patient centered questionnaires. Report 2-4 points reduction NDI indicating improvement in function and pain Progressing  3.. Pt will demonstrate independence with reducing or controlling symptoms with ther ex, movement, or position independently, able to reduce pain 1-2 points on pain scale using strategies taught in therapy Progressing  4. Pt will demonstrate increased maximum isometric torque value by 30% when compared to the initial value resulting in improved ability to perform bending, lifting, and carrying activities safely, confidently.Progressing           Long term goals: 13 weeks or 20 visits  1. Pt will demonstratte increased cervical ROM as measured by med ex by 6 degrees from initial test which results in full functional ROM of neck for ease with ADLs and drivingProgressing  2. Pt will demonstrate increased isometric torque by 10% from initial test to improve ability to lift and carry.   3.Patient will  demonstrate improved overall function per FOTO Survey to CJ = at least 20% but < 40% impaired, limited or restricted score or less.  Progressing  4. Pt will demonstrate independence with reducing or controlling symptoms with ther ex, movement, or position independently, able to reduce pain 2-4 points on pain scale using strategies taught in therapy Progressing  5. Pt will demonstrate independence with the HEP at discharge.  Progressing  6.   drive and make a meal, regularly Progressing  7. Not use a scooter to shop Progressing  8.   walk more than 2 blocks Progressing        Plan   Continue with established Plan of Care towards established PT goals.

## 2018-05-21 ENCOUNTER — CLINICAL SUPPORT (OUTPATIENT)
Dept: REHABILITATION | Facility: OTHER | Age: 38
End: 2018-05-21
Attending: PSYCHIATRY & NEUROLOGY
Payer: COMMERCIAL

## 2018-05-21 DIAGNOSIS — M54.2 CHRONIC NECK PAIN: ICD-10-CM

## 2018-05-21 DIAGNOSIS — G89.29 CHRONIC NECK PAIN: ICD-10-CM

## 2018-05-21 PROCEDURE — 97140 MANUAL THERAPY 1/> REGIONS: CPT

## 2018-05-21 PROCEDURE — 97110 THERAPEUTIC EXERCISES: CPT

## 2018-05-21 NOTE — PROGRESS NOTES
[facility]      Name: Caryl Cedeno  Clinic Number: [id number]  Date of Treatment: 2018   Diagnosis:   Encounter Diagnosis   Name Primary?    Chronic neck pain      Physician: [place] Saleem*    Pain pattern determined: 1 no movement preference, anxiety, fearful of movement, neck sx, POTS, small fiber neuropathy, fibromyalgia   Plan of care signed: 2018  Time in: 2:30pm  Time Out: 3:30pm  Total Billable Units: 40  Precautions: POTS (postural orthostatic tachycardia syndrome) (Chronic)  Small fiber neuropathy - Primary  Fibromyalgia  Neck sx: disc replacement 2016  Visit #: 3    CHRONIC PAIN< MULTIPLE PRECAUTIONS< ANXIETY>PROGRESS WITH CARE    Visit # authorized: 25  Authorization period: 18  Plan of care Expiration: 18    Reassessment Due: 2018    Subjective   Caryl reports she feels a little better today and is afraid to progress too quickly secondary to chronic nature of her pain.    Patient reports tolerating previous visit poorly, states that she was in a lot of pain  Patient reports their pain to be 4/10 on a 0-10 scale with 0 being no pain and 10 being the worst pain imaginable.  Pain Location: neck and shoulders     Occupation:     , but not working, working on disability  Leisure: gym 1 if that per week, pool 2/week, sometimes goes to gym                     Pts goals:   Socialize, cleaning, working-tutoring    Objective     Baseline IM Testing Results:   Date of testin2018  ROM 36-90 deg   Max Peak Torque 96    Min Peak Torque 67    Flex/Ext Ratio 1.2/1   % below normative data 67     Seat adjustment 291   counterweight .8   Seat pad 0       Outcomes: Cervical  Initial score:59  Visit 5 score:  Goal:      Treatment    Pt was instructed in and performed the following:     Caryl received therapeutic exercises to develop/improved posture, cardiovascular endurance, muscular endurance, lumbar/cervical ROM, strength and  muscular endurance for 30 minutes including the following exercises:     [facility] 5/21/2018   Visit Number 3   VAS Pain Rating 4   Recumbent Bike Seat Pos. -   Time 6   Retraction in Sitting 10   Scapular Retraction 10   Manual Therapy 10   Cervical Extension Seat Pad -   Seat Adjustment -   Top Dead Center -   Counterweight -   Cervical Flexion -   Cervical Extension -   Cervical Peak Torque -   Cervical Weight 60   Repetitions 18   Rating of Perceived Exertion 4.5   Ice - Z Lie (in min.) 10   Pt able to perform bike x 6 minutes, attempt 7 next visit    Shoulder shrugs in sitting    Peripheral muscle strengthening which included 1 set of 15-20 repetitions at a slow, controlled 7 second per rep pace focused on strengthening supporting musculature for improved body mechanics and functional mobility.  Pt and therapist focused on proper form during treatment to ensure optimal strengthening of each targeted muscle group.  Machines were utilized including torso rotation, leg extension, leg curl, chest press, upright row. Tricep extension, bicep curl, leg press, and hip abduction added visit 3    Caryl received the following manual therapy techniques: none were applied to the: neck for 10 minutes. Supine sub occipital release/DTM B UT        Home Exercise Program as follows:   Handouts were given to the patient. Pt demo good understanding of the education provided. Caryl demonstrated good return demonstration of activities.     Swim 2/week  Use lumbar roll  Neck retractions 3/day 10 reps  Shoulder shrugs 3/day 10 reps  Shoulder ext rot and  scap retraction 3/day 10 reps    Lumbar roll use compliance: unknown  Additional exercises taught this treatment session:     HEP review    Assessment     Patient tolerated treatment fair .  Pt very fearful of progressing too quickly and was educated on progress through our program being based on exertion scale and working up towards 20 reps before weight increase..  Pt  responds well to manual therapy, added gentle stretching,massage and sub occ release with good results.  Pt understood.  Pt will continue to benefit from skilled outpatient physical therapy to address the deficits stated in the impairment chart, provide pt/family education and to maximize pt's level of independence in the home and community environment.       Pt's spiritual, cultural and educational needs considered and pt agreeable to plan of care and goals as stated below:     Medical necessity is demonstrated by the following problem list.    Pt presents with the following impairments:   History  Co-morbidities and personal factors that may impact the plan of care Examination  Body Structures and Functions, activity limitations and participation restrictions that may impact the plan of care Clinical Presentation    Decision Making/ Complexity Score   Co-morbidities:      POTS (postural orthostatic tachycardia syndrome) (Chronic)  Small fiber neuropathy - Primary  Fibromyalgia  Neck sx: disc replacement Nov 2016              Personal Factors:   Anxiety  Weak  Fearful of movement Body Regions:   head  neck  back  lower extremities  upper extremities     Body Systems:   gross symmetry  ROM  strength  gross coordinated movement  balance  gait  transfers  motor control     Activity limitations:   Learning and applying knowledge  no deficits     General Tasks and Commands  no deficits     Communication  no deficits     Mobility  fine hand use (grasping/picking up)  walking  moving around using equipment (WC)  using transportation (bus, train, plane, car)  driving (bike, car, motorcycle)     Self care  washing oneself (bathing, drying, washing hands)  caring for body parts (brushing teeth, shaving, grooming)  toileting  dressing  looking after one's health     Domestic Life  shopping  cooking  doing house work (cleaning house, washing dishes, laundry)  assisting others     Interactions/Relationships  no  deficits     Life Areas  no deficits     Community and Social Life  community life  recreation and leisure     Participation Restrictions:  Not shopping, going out, driving       unstable clinical presentation with unpredictable characteristics    high      GOALS: Pt is in agreement with the following goals.     Short term goals: 6 weeks or 10 visits   1.  Pt will demonstrate increased isometric torque by 5% from initial test indicating positive muscular response to program of progressive resistance trainingProgressing  2. Pt will demonstrate reduced pain and improved functional outcomes as reported on the patient centered questionnaires. Report 2-4 points reduction NDI indicating improvement in function and pain Progressing  3.. Pt will demonstrate independence with reducing or controlling symptoms with ther ex, movement, or position independently, able to reduce pain 1-2 points on pain scale using strategies taught in therapy Progressing  4. Pt will demonstrate increased maximum isometric torque value by 30% when compared to the initial value resulting in improved ability to perform bending, lifting, and carrying activities safely, confidently.Progressing           Long term goals: 13 weeks or 20 visits  1. Pt will demonstratte increased cervical ROM as measured by med ex by 6 degrees from initial test which results in full functional ROM of neck for ease with ADLs and drivingProgressing  2. Pt will demonstrate increased isometric torque by 10% from initial test to improve ability to lift and carry.   3.Patient will demonstrate improved overall function per FOTO Survey to CJ = at least 20% but < 40% impaired, limited or restricted score or less.  Progressing  4. Pt will demonstrate independence with reducing or controlling symptoms with ther ex, movement, or position independently, able to reduce pain 2-4 points on pain scale using strategies taught in therapy Progressing  5. Pt will demonstrate independence with  the HEP at discharge.  Progressing  6.   drive and make a meal, regularly Progressing  7. Not use a scooter to shop Progressing  8.   walk more than 2 blocks Progressing        Plan   Continue with established Plan of Care towards established PT goals.

## 2018-05-30 ENCOUNTER — CLINICAL SUPPORT (OUTPATIENT)
Dept: REHABILITATION | Facility: OTHER | Age: 38
End: 2018-05-30
Attending: PSYCHIATRY & NEUROLOGY
Payer: COMMERCIAL

## 2018-05-30 DIAGNOSIS — M54.2 CHRONIC NECK PAIN: ICD-10-CM

## 2018-05-30 DIAGNOSIS — G89.29 CHRONIC NECK PAIN: ICD-10-CM

## 2018-05-30 PROCEDURE — 97140 MANUAL THERAPY 1/> REGIONS: CPT | Performed by: PHYSICAL THERAPIST

## 2018-05-30 PROCEDURE — 97110 THERAPEUTIC EXERCISES: CPT | Performed by: PHYSICAL THERAPIST

## 2018-05-30 NOTE — PROGRESS NOTES
[facility] Treatment      Name: Caryl Cedeno  Clinic Number: [id number]  Date of Treatment: 2018   Diagnosis:   Encounter Diagnosis   Name Primary?    Chronic neck pain      Physician: Berlin Saleem*    Pain pattern determined: 1 no movement preference, anxiety, fearful of movement, neck sx, POTS, small fiber neuropathy, fibromyalgia   Plan of care signed: 2018  Time in: 3pm  Time Out: 4pm  Total Billable Units: 40  Precautions: POTS (postural orthostatic tachycardia syndrome) (Chronic)  Small fiber neuropathy - Primary  Fibromyalgia  Neck sx: disc replacement 2016  Visit #: 4    CHRONIC PAIN< MULTIPLE PRECAUTIONS< ANXIETY>PROGRESS WITH CARE    Visit # authorized: 25  Authorization period: 18  Plan of care Expiration: 18    Reassessment Due: 2018    Subjective   Caryl reports she has a bad HA today and is afraid to progress too quickly secondary to chronic nature of her pain.  She is concerned about overdoing the exercises and/or the manual therapy.  She states she was sore after last visit that lasted a few days.     Patient reports tolerating previous visit poorly, states that she was in a lot of pain  Patient reports their pain to be 6/10 on a 0-10 scale with 0 being no pain and 10 being the worst pain imaginable.  Pain Location: neck and shoulders     Occupation:     , but not working, working on disability  Leisure: gym 1 if that per week, pool 2/week, sometimes goes to gym                     Pts goals:   Socialize, cleaning, working-tutoring    Objective     Baseline IM Testing Results:   Date of testin2018  ROM 36-90 deg   Max Peak Torque 96    Min Peak Torque 67    Flex/Ext Ratio 1.2/1   % below normative data 67     Seat adjustment 291   counterweight .8   Seat pad 0       Outcomes: Cervical  Initial score:59  Visit 5 score:  Goal:      Treatment    Pt was instructed in and performed the following:     Caryl received  therapeutic exercises to develop/improved posture, cardiovascular endurance, muscular endurance, lumbar/cervical ROM, strength and muscular endurance for 30 minutes including the following exercises:     [facility] 5/30/2018   Visit Number 4   VAS Pain Rating 6   Recumbent Bike Seat Pos. 6   Time 7   Retraction in Sitting 10   Scapular Retraction 10   Manual Therapy 10   Cervical Weight 60   Repetitions 20   Rating of Perceived Exertion 5   Ice - Z Lie (in min.) 10     Pt able to perform bike x 7 minutes, attempt 8 next visit?    Shoulder shrugs in sitting  Retractions 10 reps in sitting  Scapular adduction with shoulder ER 10 reps    Peripheral muscle strengthening which included 1 set of 15-20 repetitions at a slow, controlled 7 second per rep pace focused on strengthening supporting musculature for improved body mechanics and functional mobility.  Pt and therapist focused on proper form during treatment to ensure optimal strengthening of each targeted muscle group.  Machines were utilized including torso rotation, leg extension, leg curl, chest press, upright row. Tricep extension, and hip abduction    Caryl received the following manual therapy techniques: none were applied to the: neck for 10 minutes. Supine sub occipital release/DTM B UT      Home Exercise Program as follows:   Handouts were given to the patient. Pt demo good understanding of the education provided. Caryl demonstrated good return demonstration of activities.     Swim 2/week  Use lumbar roll  Neck retractions 3/day 10 reps  Shoulder shrugs 3/day 10 reps  Shoulder ext rot and  scap retraction 3/day 10 reps    Lumbar roll use compliance: unknown  Additional exercises taught this treatment session:     HEP review    Assessment     Patient tolerated treatment fair, HA some better after STM and gentle retractions.  Pt very fearful of progressing too quickly and was educated on progress through our program being based on exertion scale and  working up towards 20 reps before weight increase..  Pt responds well to manual therapy, continue with  gentle stretching,massage and sub occ release with good results.  Pt understood.  Pt will continue to benefit from skilled outpatient physical therapy to address the deficits stated in the impairment chart, provide pt/family education and to maximize pt's level of independence in the home and community environment.       Pt's spiritual, cultural and educational needs considered and pt agreeable to plan of care and goals as stated below:     Medical necessity is demonstrated by the following problem list.    Pt presents with the following impairments:   History  Co-morbidities and personal factors that may impact the plan of care Examination  Body Structures and Functions, activity limitations and participation restrictions that may impact the plan of care Clinical Presentation    Decision Making/ Complexity Score   Co-morbidities:      POTS (postural orthostatic tachycardia syndrome) (Chronic)  Small fiber neuropathy - Primary  Fibromyalgia  Neck sx: disc replacement Nov 2016              Personal Factors:   Anxiety  Weak  Fearful of movement Body Regions:   head  neck  back  lower extremities  upper extremities     Body Systems:   gross symmetry  ROM  strength  gross coordinated movement  balance  gait  transfers  motor control     Activity limitations:   Learning and applying knowledge  no deficits     General Tasks and Commands  no deficits     Communication  no deficits     Mobility  fine hand use (grasping/picking up)  walking  moving around using equipment (WC)  using transportation (bus, train, plane, car)  driving (bike, car, motorcycle)     Self care  washing oneself (bathing, drying, washing hands)  caring for body parts (brushing teeth, shaving, grooming)  toileting  dressing  looking after one's health     Domestic Life  shopping  cooking  doing house work (cleaning house, washing dishes,  laundry)  assisting others     Interactions/Relationships  no deficits     Life Areas  no deficits     Community and Social Life  community life  recreation and leisure     Participation Restrictions:  Not shopping, going out, driving       unstable clinical presentation with unpredictable characteristics    high      GOALS: Pt is in agreement with the following goals.     Short term goals: 6 weeks or 10 visits   1.  Pt will demonstrate increased isometric torque by 5% from initial test indicating positive muscular response to program of progressive resistance trainingProgressing  2. Pt will demonstrate reduced pain and improved functional outcomes as reported on the patient centered questionnaires. Report 2-4 points reduction NDI indicating improvement in function and pain Progressing  3.. Pt will demonstrate independence with reducing or controlling symptoms with ther ex, movement, or position independently, able to reduce pain 1-2 points on pain scale using strategies taught in therapy Progressing  4. Pt will demonstrate increased maximum isometric torque value by 30% when compared to the initial value resulting in improved ability to perform bending, lifting, and carrying activities safely, confidently.Progressing           Long term goals: 13 weeks or 20 visits  1. Pt will demonstratte increased cervical ROM as measured by med ex by 6 degrees from initial test which results in full functional ROM of neck for ease with ADLs and drivingProgressing  2. Pt will demonstrate increased isometric torque by 10% from initial test to improve ability to lift and carry.   3.Patient will demonstrate improved overall function per FOTO Survey to CJ = at least 20% but < 40% impaired, limited or restricted score or less.  Progressing  4. Pt will demonstrate independence with reducing or controlling symptoms with ther ex, movement, or position independently, able to reduce pain 2-4 points on pain scale using strategies taught in  therapy Progressing  5. Pt will demonstrate independence with the HEP at discharge.  Progressing  6.   drive and make a meal, regularly Progressing  7. Not use a scooter to shop Progressing  8.   walk more than 2 blocks Progressing        Plan   Continue with established Plan of Care towards established PT goals. Continue with STM and stretching if it helps.

## 2018-06-01 ENCOUNTER — CLINICAL SUPPORT (OUTPATIENT)
Dept: REHABILITATION | Facility: OTHER | Age: 38
End: 2018-06-01
Attending: PSYCHIATRY & NEUROLOGY
Payer: COMMERCIAL

## 2018-06-01 DIAGNOSIS — M54.2 CHRONIC NECK PAIN: ICD-10-CM

## 2018-06-01 DIAGNOSIS — G89.29 CHRONIC NECK PAIN: ICD-10-CM

## 2018-06-01 PROCEDURE — 97110 THERAPEUTIC EXERCISES: CPT

## 2018-06-01 PROCEDURE — 97140 MANUAL THERAPY 1/> REGIONS: CPT

## 2018-06-01 NOTE — PROGRESS NOTES
[facility]      Name: Caryl Cedeno  Clinic Number: [id number]  Date of Treatment: 2018   Diagnosis:   Encounter Diagnosis   Name Primary?    Chronic neck pain      Physician: [place] Saleem*    Pain pattern determined: 1 no movement preference, anxiety, fearful of movement, neck sx, POTS, small fiber neuropathy, fibromyalgia   Plan of care signed: 2018  Time in: 2:50pm (20 min late)  Time Out:3:40  Total Billable Units: 40  Precautions: POTS (postural orthostatic tachycardia syndrome) (Chronic)  Small fiber neuropathy - Primary  Fibromyalgia  Neck sx: disc replacement 2016  Visit #: 5    CHRONIC PAIN< MULTIPLE PRECAUTIONS< ANXIETY>PROGRESS WITH CARE    Visit # authorized: 25  Authorization period: 18  Plan of care Expiration: 18    Reassessment Due: 2018    Subjective   Caryl reports she is feeling okay today. Likes more slike manual therapy    Patient reports tolerating previous visit poorly, states that she was in a lot of pain  Patient reports their pain to be 3-4/10 on a 0-10 scale with 0 being no pain and 10 being the worst pain imaginable.  Pain Location: neck and shoulders     Occupation:     , but not working, working on disability  Leisure: gym 1 if that per week, pool 2/week, sometimes goes to gym                     Pts goals:   Socialize, cleaning, working-tutoring    Objective     Baseline IM Testing Results:   Date of testin2018  ROM 36-90 deg   Max Peak Torque 96    Min Peak Torque 67    Flex/Ext Ratio 1.2/1   % below normative data 67     Seat adjustment 291   counterweight .8   Seat pad 0       Outcomes: Cervical  Initial score:59  Visit 5 score:  Goal:      Treatment    Pt was instructed in and performed the following:     Caryl received therapeutic exercises to develop/improved posture, cardiovascular endurance, muscular endurance, lumbar/cervical ROM, strength and muscular endurance for 30 minutes  including the following exercises:     [facility] 6/1/2018   Visit Number 5   VAS Pain Rating 3.5   Recumbent Bike Seat Pos. -   Time -   Retraction in Sitting 10   Scapular Retraction 10   Manual Therapy 10   Cervical Extension Seat Pad -   Seat Adjustment -   Top Dead Center -   Counterweight -   Cervical Flexion -   Cervical Extension -   Cervical Peak Torque -   Cervical Weight 63   Repetitions 15   Rating of Perceived Exertion 5   Ice - Z Lie (in min.) 10         Pt able to perform bike x 7 minutes, attempt 8 next visit?    Shoulder shrugs in sitting  Retractions 10 reps in sitting  Scapular adduction with shoulder ER 10 reps    Peripheral muscle strengthening which included 1 set of 15-20 repetitions at a slow, controlled 7 second per rep pace focused on strengthening supporting musculature for improved body mechanics and functional mobility.  Pt and therapist focused on proper form during treatment to ensure optimal strengthening of each targeted muscle group.  Machines were utilized including torso rotation, leg extension, leg curl, chest press, upright row. Tricep extension, and hip abduction    Caryl received the following manual therapy techniques: none were applied to the: neck for 10 minutes. Supine sub occipital release/DTM B UT      Home Exercise Program as follows:   Handouts were given to the patient. Pt demo good understanding of the education provided. Caryl demonstrated good return demonstration of activities.     Swim 2/week  Use lumbar roll  Neck retractions 3/day 10 reps  Shoulder shrugs 3/day 10 reps  Shoulder ext rot and  scap retraction 3/day 10 reps    Lumbar roll use compliance: unknown  Additional exercises taught this treatment session:     HEP review    Assessment     Patient was 20 min late to therapy and skipped the warm up.  Pt responds well to manual therapy, continue with  gentle stretching,massage and sub occ release with good results.  Pt was able to complete 15 reps  at 5% weight increase and complete all prepheral strengthening exercise. Will continue to progress as tolerated.   Pt will continue to benefit from skilled outpatient physical therapy to address the deficits stated in the impairment chart, provide pt/family education and to maximize pt's level of independence in the home and community environment.       Pt's spiritual, cultural and educational needs considered and pt agreeable to plan of care and goals as stated below:     Medical necessity is demonstrated by the following problem list.    Pt presents with the following impairments:   History  Co-morbidities and personal factors that may impact the plan of care Examination  Body Structures and Functions, activity limitations and participation restrictions that may impact the plan of care Clinical Presentation    Decision Making/ Complexity Score   Co-morbidities:      POTS (postural orthostatic tachycardia syndrome) (Chronic)  Small fiber neuropathy - Primary  Fibromyalgia  Neck sx: disc replacement Nov 2016              Personal Factors:   Anxiety  Weak  Fearful of movement Body Regions:   head  neck  back  lower extremities  upper extremities     Body Systems:   gross symmetry  ROM  strength  gross coordinated movement  balance  gait  transfers  motor control     Activity limitations:   Learning and applying knowledge  no deficits     General Tasks and Commands  no deficits     Communication  no deficits     Mobility  fine hand use (grasping/picking up)  walking  moving around using equipment (WC)  using transportation (bus, train, plane, car)  driving (bike, car, motorcycle)     Self care  washing oneself (bathing, drying, washing hands)  caring for body parts (brushing teeth, shaving, grooming)  toileting  dressing  looking after one's health     Domestic Life  shopping  cooking  doing house work (cleaning house, washing dishes, laundry)  assisting others     Interactions/Relationships  no deficits     Life  Areas  no deficits     Community and Social Life  community life  recreation and leisure     Participation Restrictions:  Not shopping, going out, driving       unstable clinical presentation with unpredictable characteristics    high      GOALS: Pt is in agreement with the following goals.     Short term goals: 6 weeks or 10 visits   1.  Pt will demonstrate increased isometric torque by 5% from initial test indicating positive muscular response to program of progressive resistance trainingProgressing  2. Pt will demonstrate reduced pain and improved functional outcomes as reported on the patient centered questionnaires. Report 2-4 points reduction NDI indicating improvement in function and pain Progressing  3.. Pt will demonstrate independence with reducing or controlling symptoms with ther ex, movement, or position independently, able to reduce pain 1-2 points on pain scale using strategies taught in therapy Progressing  4. Pt will demonstrate increased maximum isometric torque value by 30% when compared to the initial value resulting in improved ability to perform bending, lifting, and carrying activities safely, confidently.Progressing           Long term goals: 13 weeks or 20 visits  1. Pt will demonstratte increased cervical ROM as measured by med ex by 6 degrees from initial test which results in full functional ROM of neck for ease with ADLs and drivingProgressing  2. Pt will demonstrate increased isometric torque by 10% from initial test to improve ability to lift and carry.   3.Patient will demonstrate improved overall function per FOTO Survey to CJ = at least 20% but < 40% impaired, limited or restricted score or less.  Progressing  4. Pt will demonstrate independence with reducing or controlling symptoms with ther ex, movement, or position independently, able to reduce pain 2-4 points on pain scale using strategies taught in therapy Progressing  5. Pt will demonstrate independence with the HEP at  discharge.  Progressing  6.   drive and make a meal, regularly Progressing  7. Not use a scooter to shop Progressing  8.   walk more than 2 blocks Progressing        Plan   Continue with established Plan of Care towards established PT goals. Continue with STM and stretching if it helps.

## 2018-06-04 ENCOUNTER — CLINICAL SUPPORT (OUTPATIENT)
Dept: REHABILITATION | Facility: OTHER | Age: 38
End: 2018-06-04
Attending: PSYCHIATRY & NEUROLOGY
Payer: COMMERCIAL

## 2018-06-04 DIAGNOSIS — M54.2 CHRONIC NECK PAIN: ICD-10-CM

## 2018-06-04 DIAGNOSIS — G89.29 CHRONIC NECK PAIN: ICD-10-CM

## 2018-06-04 PROCEDURE — 97110 THERAPEUTIC EXERCISES: CPT

## 2018-06-04 PROCEDURE — 97140 MANUAL THERAPY 1/> REGIONS: CPT

## 2018-06-04 NOTE — PROGRESS NOTES
[facility]      Name: Caryl Cedeno  Clinic Number: [id number]  Date of Treatment: 2018   Diagnosis:   Encounter Diagnosis   Name Primary?    Chronic neck pain      Physician: Berlin Saleem*    Pain pattern determined: 1 no movement preference, anxiety, fearful of movement, neck sx, POTS, small fiber neuropathy, fibromyalgia   Plan of care signed: 2018  Time in: 3:00  Time Out: 4:00  Total Billable Units: 40  Precautions: POTS (postural orthostatic tachycardia syndrome) (Chronic)  Small fiber neuropathy - Primary  Fibromyalgia  Neck sx: disc replacement 2016  Visit #: 6    CHRONIC PAIN< MULTIPLE PRECAUTIONS< ANXIETY>PROGRESS WITH CARE    Visit # authorized: 25  Authorization period: 18  Plan of care Expiration: 18    Reassessment Due: 2018    Subjective   Caryl reports she is not too bad today. She always has pain on the L side in the lower neck/shoulder region and in the clavicle.     Patient reports tolerating previous visit poorly, states that she was in a lot of pain  Patient reports their pain to be 2/10 on a 0-10 scale with 0 being no pain and 10 being the worst pain imaginable.  Pain Location: neck and shoulders     Occupation:     , but not working, working on disability  Leisure: gym 1 if that per week, pool 2/week, sometimes goes to gym                     Pts goals:   Socialize, cleaning, working-tutoring    Objective     Baseline IM Testing Results:   Date of testin2018  ROM 36-90 deg   Max Peak Torque 96    Min Peak Torque 67    Flex/Ext Ratio 1.2/1   % below normative data 67     Seat adjustment 291   counterweight .8   Seat pad 0       Outcomes: Cervical  Initial score:59  Visit 5 score:  Goal:      Treatment    Pt was instructed in and performed the following:     Caryl received therapeutic exercises to develop/improved posture, cardiovascular endurance, muscular endurance, lumbar/cervical ROM, strength  and muscular endurance for 30 minutes including the following exercises:     [facility] 6/4/2018   Visit Number 6   VAS Pain Rating 2   Recumbent Bike Seat Pos. 6   Time 7   Retraction in Sitting 10   Scapular Retraction 10   Manual Therapy 10   Cervical Extension Seat Pad -   Seat Adjustment -   Top Dead Center -   Counterweight -   Cervical Flexion -   Cervical Extension -   Cervical Peak Torque -   Cervical Weight 63   Repetitions 15   Rating of Perceived Exertion 7   Ice - Z Lie (in min.) 10           Pt able to perform bike x 7 minutes, attempt 8 next visit?    Shoulder shrugs in sitting  Retractions 10 reps in sitting  Scapular adduction with shoulder ER 10 reps    Peripheral muscle strengthening which included 1 set of 15-20 repetitions at a slow, controlled 7 second per rep pace focused on strengthening supporting musculature for improved body mechanics and functional mobility.  Pt and therapist focused on proper form during treatment to ensure optimal strengthening of each targeted muscle group.  Machines were utilized including torso rotation, leg extension, leg curl, chest press, upright row. Tricep extension, and hip abduction    Caryl received the following manual therapy techniques: none were applied to the: neck for 10 minutes. Supine sub occipital release/DTM B UT      Home Exercise Program as follows:   Handouts were given to the patient. Pt demo good understanding of the education provided. Caryl demonstrated good return demonstration of activities.     Swim 2/week  Use lumbar roll  Neck retractions 3/day 10 reps  Shoulder shrugs 3/day 10 reps  Shoulder ext rot and  scap retraction 3/day 10 reps    Lumbar roll use compliance: unknown  Additional exercises taught this treatment session:     HEP review    Assessment     Patient reports that she doesn't feel any worse post therapy. She feels like she needs to increase her core strength and asked about core exercises that she couple do,  introduced her to PPT and LTR with legs unsupported. Pt responds well to manual therapy, continue with  gentle stretching,massage and sub occ release with good results.  Pt was able to complete 15 reps without weight increase, she reported feeling dizzy at about 15. She was able to complete all prepheral strengthening exercise. Will continue to progress as tolerated.   Pt will continue to benefit from skilled outpatient physical therapy to address the deficits stated in the impairment chart, provide pt/family education and to maximize pt's level of independence in the home and community environment.       Pt's spiritual, cultural and educational needs considered and pt agreeable to plan of care and goals as stated below:     Medical necessity is demonstrated by the following problem list.    Pt presents with the following impairments:   History  Co-morbidities and personal factors that may impact the plan of care Examination  Body Structures and Functions, activity limitations and participation restrictions that may impact the plan of care Clinical Presentation    Decision Making/ Complexity Score   Co-morbidities:      POTS (postural orthostatic tachycardia syndrome) (Chronic)  Small fiber neuropathy - Primary  Fibromyalgia  Neck sx: disc replacement Nov 2016              Personal Factors:   Anxiety  Weak  Fearful of movement Body Regions:   head  neck  back  lower extremities  upper extremities     Body Systems:   gross symmetry  ROM  strength  gross coordinated movement  balance  gait  transfers  motor control     Activity limitations:   Learning and applying knowledge  no deficits     General Tasks and Commands  no deficits     Communication  no deficits     Mobility  fine hand use (grasping/picking up)  walking  moving around using equipment (WC)  using transportation (bus, train, plane, car)  driving (bike, car, motorcycle)     Self care  washing oneself (bathing, drying, washing hands)  caring for body parts  (brushing teeth, shaving, grooming)  toileting  dressing  looking after one's health     Domestic Life  shopping  cooking  doing house work (cleaning house, washing dishes, laundry)  assisting others     Interactions/Relationships  no deficits     Life Areas  no deficits     Community and Social Life  community life  recreation and leisure     Participation Restrictions:  Not shopping, going out, driving       unstable clinical presentation with unpredictable characteristics    high      GOALS: Pt is in agreement with the following goals.     Short term goals: 6 weeks or 10 visits   1.  Pt will demonstrate increased isometric torque by 5% from initial test indicating positive muscular response to program of progressive resistance trainingProgressing  2. Pt will demonstrate reduced pain and improved functional outcomes as reported on the patient centered questionnaires. Report 2-4 points reduction NDI indicating improvement in function and pain Progressing  3.. Pt will demonstrate independence with reducing or controlling symptoms with ther ex, movement, or position independently, able to reduce pain 1-2 points on pain scale using strategies taught in therapy Progressing  4. Pt will demonstrate increased maximum isometric torque value by 30% when compared to the initial value resulting in improved ability to perform bending, lifting, and carrying activities safely, confidently.Progressing           Long term goals: 13 weeks or 20 visits  1. Pt will demonstratte increased cervical ROM as measured by med ex by 6 degrees from initial test which results in full functional ROM of neck for ease with ADLs and drivingProgressing  2. Pt will demonstrate increased isometric torque by 10% from initial test to improve ability to lift and carry.   3.Patient will demonstrate improved overall function per FOTO Survey to CJ = at least 20% but < 40% impaired, limited or restricted score or less.  Progressing  4. Pt will demonstrate  independence with reducing or controlling symptoms with ther ex, movement, or position independently, able to reduce pain 2-4 points on pain scale using strategies taught in therapy Progressing  5. Pt will demonstrate independence with the HEP at discharge.  Progressing  6.   drive and make a meal, regularly Progressing  7. Not use a scooter to shop Progressing  8.   walk more than 2 blocks Progressing        Plan   Continue with established Plan of Care towards established PT goals. Continue with STM and stretching if it helps.

## 2018-06-06 ENCOUNTER — CLINICAL SUPPORT (OUTPATIENT)
Dept: REHABILITATION | Facility: OTHER | Age: 38
End: 2018-06-06
Attending: PSYCHIATRY & NEUROLOGY
Payer: COMMERCIAL

## 2018-06-06 DIAGNOSIS — M54.2 CHRONIC NECK PAIN: ICD-10-CM

## 2018-06-06 DIAGNOSIS — G89.29 CHRONIC NECK PAIN: ICD-10-CM

## 2018-06-06 PROCEDURE — 97140 MANUAL THERAPY 1/> REGIONS: CPT

## 2018-06-06 PROCEDURE — 97110 THERAPEUTIC EXERCISES: CPT

## 2018-06-06 NOTE — PROGRESS NOTES
[facility]      Name: Caryl Cedeno  Clinic Number: [id number]  Date of Treatment: 2018   Diagnosis:   Encounter Diagnosis   Name Primary?    Chronic neck pain      Physician: [place] Saleem*    Pain pattern determined: 1 no movement preference, anxiety, fearful of movement, neck sx, POTS, small fiber neuropathy, fibromyalgia   Plan of care signed: 2018  Time in: 3:00  Time Out: 4:00  Total Billable Units: 40  Precautions: POTS (postural orthostatic tachycardia syndrome) (Chronic)  Small fiber neuropathy - Primary  Fibromyalgia  Neck sx: disc replacement 2016  Visit #: 7(inc 5%)    CHRONIC PAIN< MULTIPLE PRECAUTIONS< ANXIETY>PROGRESS WITH CARE    Visit # authorized: 25  Authorization period: 18  Plan of care Expiration: 18    Reassessment Due: 2018    Subjective   Pt reports she feels pretty good today.  States she had a HA and neck pain the next day after last treatment which she feels is due to more aggressive manual treatment than previous treatments    Patient reports tolerating previous visit poorly, states that she was in a lot of pain  Patient reports their pain to be 4/10 on a 0-10 scale with 0 being no pain and 10 being the worst pain imaginable.  Pain Location: neck and shoulders     Occupation:     , but not working, working on disability  Leisure: gym 1 if that per week, pool 2/week, sometimes goes to gym                     Pts goals:   Socialize, cleaning, working-tutoring    Objective     Baseline IM Testing Results:   Date of testin2018  ROM 36-90 deg   Max Peak Torque 96    Min Peak Torque 67    Flex/Ext Ratio 1.2/1   % below normative data 67     Seat adjustment 291   counterweight .8   Seat pad 0       Outcomes: Cervical  Initial score:59  Visit 5 score:  Goal:      Treatment    Pt was instructed in and performed the following:     Caryl received therapeutic exercises to develop/improved posture,  cardiovascular endurance, muscular endurance, lumbar/cervical ROM, strength and muscular endurance for 40 minutes including the following exercises:   [facility] 6/6/2018   Visit Number 7   VAS Pain Rating 4   Recumbent Bike Seat Pos. -   Time 8   Retraction in Sitting 10   Scapular Retraction 10   Manual Therapy 10   Cervical Extension Seat Pad -   Seat Adjustment -   Top Dead Center -   Counterweight -   Cervical Flexion -   Cervical Extension -   Cervical Peak Torque -   Cervical Weight 63   Repetitions 20   Rating of Perceived Exertion 6   Ice - Z Lie (in min.) 10          cont to increase time on recumbent bike  Shoulder shrugs in sitting  Retractions 10 reps in sitting  Scapular adduction with shoulder ER 10 reps    Peripheral muscle strengthening which included 1 set of 15-20 repetitions at a slow, controlled 7 second per rep pace focused on strengthening supporting musculature for improved body mechanics and functional mobility.  Pt and therapist focused on proper form during treatment to ensure optimal strengthening of each targeted muscle group.  Machines were utilized including torso rotation, leg extension, leg curl, chest press, upright row. Tricep extension, and hip abduction    Caryl received the following manual therapy techniques: none were applied to the: neck for 10 minutes. Supine sub occipital release/DTM B UT/trigger point release to B UT      Home Exercise Program as follows:   Handouts were given to the patient. Pt demo good understanding of the education provided. Caryl demonstrated good return demonstration of activities.     Swim 2/week  Use lumbar roll  Neck retractions 3/day 10 reps  Shoulder shrugs 3/day 10 reps  Shoulder ext rot and  scap retraction 3/day 10 reps    Lumbar roll use compliance: unknown  Additional exercises taught this treatment session:     HEP review    Assessment   Pt tolerated treatment fair with c/o increased discomfort with sub occipt release when  fingertips were along occipital ridge.  Attempted towel to provide gentle manual traction/distraction which pt also c/o inc pain.  Pt did achieve 20 reps on Med X and will inc 5% next visit and increased to 8 minutes on bike.Pt appears very fearful of progressing too quickly .  Continue to progress slowly.  Pt will continue to benefit from skilled outpatient physical therapy to address the deficits stated in the impairment chart, provide pt/family education and to maximize pt's level of independence in the home and community environment.       Pt's spiritual, cultural and educational needs considered and pt agreeable to plan of care and goals as stated below:     Medical necessity is demonstrated by the following problem list.    Pt presents with the following impairments:   History  Co-morbidities and personal factors that may impact the plan of care Examination  Body Structures and Functions, activity limitations and participation restrictions that may impact the plan of care Clinical Presentation    Decision Making/ Complexity Score   Co-morbidities:      POTS (postural orthostatic tachycardia syndrome) (Chronic)  Small fiber neuropathy - Primary  Fibromyalgia  Neck sx: disc replacement Nov 2016              Personal Factors:   Anxiety  Weak  Fearful of movement Body Regions:   head  neck  back  lower extremities  upper extremities     Body Systems:   gross symmetry  ROM  strength  gross coordinated movement  balance  gait  transfers  motor control     Activity limitations:   Learning and applying knowledge  no deficits     General Tasks and Commands  no deficits     Communication  no deficits     Mobility  fine hand use (grasping/picking up)  walking  moving around using equipment (WC)  using transportation (bus, train, plane, car)  driving (bike, car, motorcycle)     Self care  washing oneself (bathing, drying, washing hands)  caring for body parts (brushing teeth, shaving,  grooming)  toileting  dressing  looking after one's health     Domestic Life  shopping  cooking  doing house work (cleaning house, washing dishes, laundry)  assisting others     Interactions/Relationships  no deficits     Life Areas  no deficits     Community and Social Life  community life  recreation and leisure     Participation Restrictions:  Not shopping, going out, driving       unstable clinical presentation with unpredictable characteristics    high      GOALS: Pt is in agreement with the following goals.     Short term goals: 6 weeks or 10 visits   1.  Pt will demonstrate increased isometric torque by 5% from initial test indicating positive muscular response to program of progressive resistance trainingProgressing  2. Pt will demonstrate reduced pain and improved functional outcomes as reported on the patient centered questionnaires. Report 2-4 points reduction NDI indicating improvement in function and pain Progressing  3.. Pt will demonstrate independence with reducing or controlling symptoms with ther ex, movement, or position independently, able to reduce pain 1-2 points on pain scale using strategies taught in therapy Progressing  4. Pt will demonstrate increased maximum isometric torque value by 30% when compared to the initial value resulting in improved ability to perform bending, lifting, and carrying activities safely, confidently.Progressing           Long term goals: 13 weeks or 20 visits  1. Pt will demonstratte increased cervical ROM as measured by med ex by 6 degrees from initial test which results in full functional ROM of neck for ease with ADLs and drivingProgressing  2. Pt will demonstrate increased isometric torque by 10% from initial test to improve ability to lift and carry.   3.Patient will demonstrate improved overall function per FOTO Survey to CJ = at least 20% but < 40% impaired, limited or restricted score or less.  Progressing  4. Pt will demonstrate independence with reducing  or controlling symptoms with ther ex, movement, or position independently, able to reduce pain 2-4 points on pain scale using strategies taught in therapy Progressing  5. Pt will demonstrate independence with the HEP at discharge.  Progressing  6.   drive and make a meal, regularly Progressing  7. Not use a scooter to shop Progressing  8.   walk more than 2 blocks Progressing        Plan   Continue with established Plan of Care towards established PT goals.

## 2018-06-13 ENCOUNTER — CLINICAL SUPPORT (OUTPATIENT)
Dept: REHABILITATION | Facility: OTHER | Age: 38
End: 2018-06-13
Attending: PSYCHIATRY & NEUROLOGY
Payer: COMMERCIAL

## 2018-06-13 DIAGNOSIS — M54.2 CHRONIC NECK PAIN: ICD-10-CM

## 2018-06-13 DIAGNOSIS — G89.29 CHRONIC NECK PAIN: ICD-10-CM

## 2018-06-13 PROCEDURE — 97110 THERAPEUTIC EXERCISES: CPT

## 2018-06-13 PROCEDURE — 97140 MANUAL THERAPY 1/> REGIONS: CPT

## 2018-06-13 NOTE — PROGRESS NOTES
[facility]      Name: Caryl Cedeno  Clinic Number: [id number]  Date of Treatment: 2018   Diagnosis:   Encounter Diagnosis   Name Primary?    Chronic neck pain      Physician: Berlin Saleem*    Pain pattern determined: 1 no movement preference, anxiety, fearful of movement, neck sx, POTS, small fiber neuropathy, fibromyalgia   Plan of care signed: 2018  Time in: 3:30  Time Out: 4:30  Total Billable Units: 60  Precautions: POTS (postural orthostatic tachycardia syndrome) (Chronic)  Small fiber neuropathy - Primary  Fibromyalgia  Neck sx: disc replacement 2016  Visit #: 8(inc 5%)    CHRONIC PAIN< MULTIPLE PRECAUTIONS< ANXIETY>PROGRESS WITH CARE    Visit # authorized: 25  Authorization period: 18  Plan of care Expiration: 18    Reassessment Due:18    Subjective   Pt reports the gentel traction peformed last session gave her a migraine and Ha's all week.  Pt reports pain from that session last throughout the week     Patient reports tolerating previous visit poorly, states that she was in a lot of pain  Patient reports their pain to be 7/10 on a 0-10 scale with 0 being no pain and 10 being the worst pain imaginable.  Pain Location: neck and shoulders     Occupation:     , but not working, working on disability  Leisure: gym 1 if that per week, pool 2/week, sometimes goes to gym                     Pts goals:   Socialize, cleaning, working-tutoring    Objective     Baseline IM Testing Results:   Date of testin2018  ROM 36-90 deg   Max Peak Torque 96    Min Peak Torque 67    Flex/Ext Ratio 1.2/1   % below normative data 67     Seat adjustment 291   counterweight .8   Seat pad 0       Outcomes: Cervical  Initial score:59  Visit 5 score:  Goal:      Treatment    Pt was instructed in and performed the following:     Caryl received therapeutic exercises to develop/improved posture, cardiovascular endurance, muscular endurance,  lumbar/cervical ROM, strength and muscular endurance for 40 minutes including the following exercises:     [facility] 6/13/2018   Visit Number 8   VAS Pain Rating 7   Recumbent Bike Seat Pos. -   Time 8   Retraction in Sitting 10   Scapular Retraction 10   Manual Therapy 15   Cervical Extension Seat Pad -   Seat Adjustment -   Top Dead Center -   Counterweight -   Cervical Flexion -   Cervical Extension -   Cervical Peak Torque -   Cervical Weight 69   Repetitions 15   Rating of Perceived Exertion 5   Ice - Z Lie (in min.) 10          cont to increase time on recumbent bike  Shoulder shrugs in sitting  Retractions 10 reps in sitting  Scapular adduction with shoulder ER 10 reps    Peripheral muscle strengthening which included 1 set of 15-20 repetitions at a slow, controlled 7 second per rep pace focused on strengthening supporting musculature for improved body mechanics and functional mobility.  Pt and therapist focused on proper form during treatment to ensure optimal strengthening of each targeted muscle group.  Machines were utilized including torso rotation, leg extension, leg curl, chest press, upright row. Tricep extension, and hip abduction    Caryl received the following manual therapy techniques: none were applied to the: neck for 10 minutes. Supine sub occipital release/DTM B UT/trigger point release to B UT      Home Exercise Program as follows:   Handouts were given to the patient. Pt demo good understanding of the education provided. Caryl demonstrated good return demonstration of activities.     Swim 2/week  Use lumbar roll  Neck retractions 3/day 10 reps  Shoulder shrugs 3/day 10 reps  Shoulder ext rot and  scap retraction 3/day 10 reps    Lumbar roll use compliance: unknown  Additional exercises taught this treatment session:     HEP review    Assessment   Pt tolerated treatment fair today.  Reports increased pain after last session, specifically after manual occipital release. Pt  requested only STM today secondary to fear of exacerbation of symptoms. Pt increased Med X weight to 69 in/lbs with completion of 15 reps. Continue to progress slowly.  Pt will continue to benefit from skilled outpatient physical therapy to address the deficits stated in the impairment chart, provide pt/family education and to maximize pt's level of independence in the home and community environment.       Pt's spiritual, cultural and educational needs considered and pt agreeable to plan of care and goals as stated below:     Medical necessity is demonstrated by the following problem list.    Pt presents with the following impairments:   History  Co-morbidities and personal factor after manual thrs that may impact the plan of care Examination  Body Structures and Functions, activity limitations and participation restrictions that may impact the plan of care Clinical Presentation    Decision Making/ Complexity Score   Co-morbidities:      POTS (postural orthostatic tachycardia syndrome) (Chronic)  Small fiber neuropathy - Primary  Fibromyalgia  Neck sx: disc replacement Nov 2016              Personal Factors:   Anxiety  Weak  Fearful of movement Body Regions:   head  neck  back  lower extremities  upper extremities     Body Systems:   gross symmetry  ROM  strength  gross coordinated movement  balance  gait  transfers  motor control     Activity limitations:   Learning and applying knowledge  no deficits     General Tasks and Commands  no deficits     Communication  no deficits     Mobility  fine hand use (grasping/picking up)  walking  moving around using equipment (WC)  using transportation (bus, train, plane, car)  driving (bike, car, motorcycle)     Self care  washing oneself (bathing, drying, washing hands)  caring for body parts (brushing teeth, shaving, grooming)  toileting  dressing  looking after one's health     Domestic Life  shopping  cooking  doing house work (cleaning house, washing dishes,  laundry)  assisting others     Interactions/Relationships  no deficits     Life Areas  no deficits     Community and Social Life  community life  recreation and leisure     Participation Restrictions:  Not shopping, going out, driving       unstable clinical presentation with unpredictable characteristics    high      GOALS: Pt is in agreement with the following goals.     Short term goals: 6 weeks or 10 visits   1.  Pt will demonstrate increased isometric torque by 5% from initial test indicating positive muscular response to program of progressive resistance trainingProgressing  2. Pt will demonstrate reduced pain and improved functional outcomes as reported on the patient centered questionnaires. Report 2-4 points reduction NDI indicating improvement in function and pain Progressing  3.. Pt will demonstrate independence with reducing or controlling symptoms with ther ex, movement, or position independently, able to reduce pain 1-2 points on pain scale using strategies taught in therapy Progressing  4. Pt will demonstrate increased maximum isometric torque value by 30% when compared to the initial value resulting in improved ability to perform bending, lifting, and carrying activities safely, confidently.Progressing           Long term goals: 13 weeks or 20 visits  1. Pt will demonstratte increased cervical ROM as measured by med ex by 6 degrees from initial test which results in full functional ROM of neck for ease with ADLs and drivingProgressing  2. Pt will demonstrate increased isometric torque by 10% from initial test to improve ability to lift and carry.   3.Patient will demonstrate improved overall function per FOTO Survey to CJ = at least 20% but < 40% impaired, limited or restricted score or less.  Progressing  4. Pt will demonstrate independence with reducing or controlling symptoms with ther ex, movement, or position independently, able to reduce pain 2-4 points on pain scale using strategies taught in  therapy Progressing  5. Pt will demonstrate independence with the HEP at discharge.  Progressing  6.   drive and make a meal, regularly Progressing  7. Not use a scooter to shop Progressing  8.   walk more than 2 blocks Progressing        Plan   Continue with established Plan of Care towards established PT goals.

## 2018-06-15 ENCOUNTER — CLINICAL SUPPORT (OUTPATIENT)
Dept: REHABILITATION | Facility: OTHER | Age: 38
End: 2018-06-15
Attending: PSYCHIATRY & NEUROLOGY
Payer: COMMERCIAL

## 2018-06-15 DIAGNOSIS — M54.2 CHRONIC NECK PAIN: ICD-10-CM

## 2018-06-15 DIAGNOSIS — G89.29 CHRONIC NECK PAIN: ICD-10-CM

## 2018-06-15 PROCEDURE — 97110 THERAPEUTIC EXERCISES: CPT

## 2018-06-15 NOTE — PROGRESS NOTES
[facility]      Name: Caryl Cedeno  Clinic Number: [id number]  Date of Treatment: 06/15/2018   Diagnosis:   Encounter Diagnosis   Name Primary?    Chronic neck pain      Physician: [place] Saleem*    Pain pattern determined: 1 no movement preference, anxiety, fearful of movement, neck sx, POTS, small fiber neuropathy, fibromyalgia   Plan of care signed: 2018  Time in: 3:12 (Pt late, warm up skipped)   Time Out: 4:00  Total Billable Units: 45  Precautions: POTS (postural orthostatic tachycardia syndrome) (Chronic)  Small fiber neuropathy - Primary  Fibromyalgia  Neck sx: disc replacement 2016    Visit #: 9  CHRONIC PAIN< MULTIPLE PRECAUTIONS< ANXIETY>PROGRESS WITH CARE    Visit # authorized: 25  Authorization period: 18  Plan of care Expiration: 18    Reassessment Due:18    Subjective   Pt reports the gentel traction peformed the session before last gave her a migraine and Ha's all week and she is still recovering. However she feels better today. Pt reports pain from that session last throughout the week     Patient reports tolerating previous visit fairly well with minimal reports of increased pain.     Patient reports their pain to be 5/10 on a 0-10 scale with 0 being no pain and 10 being the worst pain imaginable.  Pain Location: neck and shoulders     Occupation:     , but not working, working on disability  Leisure: gym 1 if that per week, pool 2/week, sometimes goes to gym                     Pts goals:   Socialize, cleaning, working-tutoring    Objective     Baseline IM Testing Results:   Date of testin2018  ROM 36-90 deg   Max Peak Torque 96    Min Peak Torque 67    Flex/Ext Ratio 1.2/1   % below normative data 67     Seat adjustment 291   counterweight .8   Seat pad 0       Outcomes: Cervical  Initial score:59  Visit 5 score:  Goal:      Treatment    Pt was instructed in and performed the following:     Caryl delon  therapeutic exercises to develop/improved posture, cardiovascular endurance, muscular endurance, lumbar/cervical ROM, strength and muscular endurance for 40 minutes including the following exercises:   [facility] 6/15/2018   Visit Number 9   VAS Pain Rating 5   Recumbent Bike Seat Pos. -   Time -   Retraction in Sitting 10   Scapular Retraction 10   Manual Therapy -   Cervical Extension Seat Pad -   Seat Adjustment -   Top Dead Center -   Counterweight -   Cervical Flexion -   Cervical Extension -   Cervical Peak Torque -   Cervical Weight 66   Repetitions 20   Rating of Perceived Exertion 5   Ice - Z Lie (in min.) 10          cont to increase time on recumbent bike  Shoulder shrugs in sitting  Retractions 10 reps in sitting  Scapular adduction with shoulder ER YTB 20x reps  RIS 10x     Peripheral muscle strengthening which included 1 set of 15-20 repetitions at a slow, controlled 7 second per rep pace focused on strengthening supporting musculature for improved body mechanics and functional mobility.  Pt and therapist focused on proper form during treatment to ensure optimal strengthening of each targeted muscle group.  Machines were utilized including torso rotation, leg extension, leg curl, chest press, upright row. Tricep extension, and hip abduction    Caryl received the following manual therapy techniques: none were applied to the: neck for 10 minutes. Supine sub occipital release/DTM B UT/trigger point release to B UT      Home Exercise Program as follows:   Handouts were given to the patient. Pt demo good understanding of the education provided. Caryl demonstrated good return demonstration of activities.     Swim 2/week  Use lumbar roll  Neck retractions 3/day 10 reps  Shoulder shrugs 3/day 10 reps  Shoulder ext rot and  scap retraction YTB 3/day 10 reps    Lumbar roll use compliance: unknown  Additional exercises taught this treatment session:   HEP review    Assessment   Pt tolerated treatment  fair today.  Reports she is still recovering from increased irritation from manual traction a few sessions ago. Reviewed HEP with minimal v/c. No exercises added today. Pt increased Med X weight to 66 in/lbs with completion of 20 reps. Continue to progress slowly. Retest next session.   Pt will continue to benefit from skilled outpatient physical therapy to address the deficits stated in the impairment chart, provide pt/family education and to maximize pt's level of independence in the home and community environment.       Pt's spiritual, cultural and educational needs considered and pt agreeable to plan of care and goals as stated below:     Medical necessity is demonstrated by the following problem list.    Pt presents with the following impairments:   History  Co-morbidities and personal factor after manual thrs that may impact the plan of care Examination  Body Structures and Functions, activity limitations and participation restrictions that may impact the plan of care Clinical Presentation    Decision Making/ Complexity Score   Co-morbidities:      POTS (postural orthostatic tachycardia syndrome) (Chronic)  Small fiber neuropathy - Primary  Fibromyalgia  Neck sx: disc replacement Nov 2016              Personal Factors:   Anxiety  Weak  Fearful of movement Body Regions:   head  neck  back  lower extremities  upper extremities     Body Systems:   gross symmetry  ROM  strength  gross coordinated movement  balance  gait  transfers  motor control     Activity limitations:   Learning and applying knowledge  no deficits     General Tasks and Commands  no deficits     Communication  no deficits     Mobility  fine hand use (grasping/picking up)  walking  moving around using equipment (WC)  using transportation (bus, train, plane, car)  driving (bike, car, motorcycle)     Self care  washing oneself (bathing, drying, washing hands)  caring for body parts (brushing teeth, shaving, grooming)  toileting  dressing  looking  after one's health     Domestic Life  shopping  cooking  doing house work (cleaning house, washing dishes, laundry)  assisting others     Interactions/Relationships  no deficits     Life Areas  no deficits     Community and Social Life  community life  recreation and leisure     Participation Restrictions:  Not shopping, going out, driving       unstable clinical presentation with unpredictable characteristics    high      GOALS: Pt is in agreement with the following goals.     Short term goals: 6 weeks or 10 visits   1.  Pt will demonstrate increased isometric torque by 5% from initial test indicating positive muscular response to program of progressive resistance trainingProgressing  2. Pt will demonstrate reduced pain and improved functional outcomes as reported on the patient centered questionnaires. Report 2-4 points reduction NDI indicating improvement in function and pain Progressing  3.. Pt will demonstrate independence with reducing or controlling symptoms with ther ex, movement, or position independently, able to reduce pain 1-2 points on pain scale using strategies taught in therapy Progressing  4. Pt will demonstrate increased maximum isometric torque value by 30% when compared to the initial value resulting in improved ability to perform bending, lifting, and carrying activities safely, confidently.Progressing           Long term goals: 13 weeks or 20 visits  1. Pt will demonstratte increased cervical ROM as measured by med ex by 6 degrees from initial test which results in full functional ROM of neck for ease with ADLs and drivingProgressing  2. Pt will demonstrate increased isometric torque by 10% from initial test to improve ability to lift and carry.   3.Patient will demonstrate improved overall function per FOTO Survey to CJ = at least 20% but < 40% impaired, limited or restricted score or less.  Progressing  4. Pt will demonstrate independence with reducing or controlling symptoms with ther ex,  movement, or position independently, able to reduce pain 2-4 points on pain scale using strategies taught in therapy Progressing  5. Pt will demonstrate independence with the HEP at discharge.  Progressing  6.   drive and make a meal, regularly Progressing  7. Not use a scooter to shop Progressing  8.   walk more than 2 blocks Progressing        Plan   Continue with established Plan of Care towards established PT goals.

## 2018-06-18 ENCOUNTER — CLINICAL SUPPORT (OUTPATIENT)
Dept: REHABILITATION | Facility: OTHER | Age: 38
End: 2018-06-18
Attending: PSYCHIATRY & NEUROLOGY
Payer: COMMERCIAL

## 2018-06-18 DIAGNOSIS — M54.2 CHRONIC NECK PAIN: ICD-10-CM

## 2018-06-18 DIAGNOSIS — G89.29 CHRONIC NECK PAIN: ICD-10-CM

## 2018-06-18 PROCEDURE — 97110 THERAPEUTIC EXERCISES: CPT

## 2018-06-18 PROCEDURE — 97140 MANUAL THERAPY 1/> REGIONS: CPT

## 2018-06-18 NOTE — PROGRESS NOTES
[facility]      Name: Caryl Cedeno  Clinic Number: [id number]  Date of Treatment: 2018   Diagnosis:   Encounter Diagnosis   Name Primary?    Chronic neck pain      Physician: Berlin Saleem*    Pain pattern determined: 1 no movement preference, anxiety, fearful of movement, neck sx, POTS, small fiber neuropathy, fibromyalgia   Plan of care signed: 2018  Time in: 4:35   Time Out: 5:30  Total Billable Units: 45  Precautions: POTS (postural orthostatic tachycardia syndrome) (Chronic)  Small fiber neuropathy - Primary  Fibromyalgia  Neck sx: disc replacement 2016    Visit #: 10 (maintain weight next visit, pt nervous because of ROM increase)  CHRONIC PAIN< MULTIPLE PRECAUTIONS< ANXIETY>PROGRESS WITH CARE    Visit # authorized: 25  Authorization period: 18  Plan of care Expiration: 18    Reassessment Due:18    Subjective   Pt reports that she is feeling good today, today is a good day as far as neck pain. She is feeling a little woozy because they are trying to figure out her blood pressure medication.     Patient reports tolerating previous visit fairly well with minimal reports of increased pain.     Patient reports their pain to be 3/10 on a 0-10 scale with 0 being no pain and 10 being the worst pain imaginable.  Pain Location: neck and shoulders     Occupation:     , but not working, working on disability  Leisure: gym 1 if that per week, pool 2/week, sometimes goes to gym                     Pts goals:   Socialize, cleaning, working-tutoring    Objective     Baseline IM Testing Results:   Date of testin2018  ROM 36-90 deg   Max Peak Torque 96    Min Peak Torque 67    Flex/Ext Ratio 1.2/1   % below normative data 67     Midpoint IM Testing Results:   Date of testin2018  ROM  deg   Max Peak Torque 98   Min Peak Torque 69   Flex/Ext Ratio 1.42:1   % below normative data -56%       Seat adjustment 291    counterweight .8   Seat pad 0       Outcomes: Cervical  Initial score:59  Visit 5 score:  Goal:      Treatment    Pt was instructed in and performed the following:     Caryl received therapeutic exercises to develop/improved posture, cardiovascular endurance, muscular endurance, lumbar/cervical ROM, strength and muscular endurance for 40 minutes including the following exercises:     [facility] 6/18/2018   Visit Number 10   VAS Pain Rating 3   Recumbent Bike Seat Pos. 6   Time 8   Retraction in Sitting 10   Scapular Retraction 10   Manual Therapy 10   Cervical Extension Seat Pad -   Seat Adjustment -   Top Dead Center -   Counterweight -   Cervical Flexion 102   Cervical Extension 30   Cervical Peak Torque 98   Cervical Weight -   Repetitions -   Rating of Perceived Exertion -   Ice - Z Lie (in min.) 10          cont to increase time on recumbent bike  Shoulder shrugs in sitting  Retractions 10 reps in sitting  Scapular adduction with shoulder ER YTB 20x reps  RIS 10x     Peripheral muscle strengthening which included 1 set of 15-20 repetitions at a slow, controlled 7 second per rep pace focused on strengthening supporting musculature for improved body mechanics and functional mobility.  Pt and therapist focused on proper form during treatment to ensure optimal strengthening of each targeted muscle group.  Machines were utilized including torso rotation, leg extension, leg curl, chest press, upright row. Tricep extension, and hip abduction    Caryl received the following manual therapy techniques: none were applied to the: neck for 10 minutes. Supine sub occipital release/DTM B UT/trigger point release to B UT      Home Exercise Program as follows:   Handouts were given to the patient. Pt demo good understanding of the education provided. Caryl demonstrated good return demonstration of activities.     Swim 2/week  Use lumbar roll  Neck retractions 3/day 10 reps  Shoulder shrugs 3/day 10 reps  Shoulder  ext rot and  scap retraction YTB 3/day 10 reps    Lumbar roll use compliance: unknown  Additional exercises taught this treatment session:   HEP review    Assessment   Patient has attended 10 visits at [facility] which included MD evaluation, PT evaluation with isometric testing, and physical therapy treatment including HEP instruction, education, aerobic work, dynamic strengthening on med ex equipment for the spine, and whole body strengthening on med ex equipment with increasing weight loads.  Patient  is demonstrating increased ability to reduce symptoms, improved posture, improved  cervical ROM, and improved cervical  strength on med ex test by 13%  Average. Pt did well on the test was nervous when the test started but she got more confident as the test went on, as a result her numbers are lower compared to her first visit at the most flexed motions.      Pt will continue to benefit from skilled outpatient physical therapy to address the deficits stated in the impairment chart, provide pt/family education and to maximize pt's level of independence in the home and community environment.       Pt's spiritual, cultural and educational needs considered and pt agreeable to plan of care and goals as stated below:     Medical necessity is demonstrated by the following problem list.    Pt presents with the following impairments:   History  Co-morbidities and personal factor after manual thrs that may impact the plan of care Examination  Body Structures and Functions, activity limitations and participation restrictions that may impact the plan of care Clinical Presentation    Decision Making/ Complexity Score   Co-morbidities:      POTS (postural orthostatic tachycardia syndrome) (Chronic)  Small fiber neuropathy - Primary  Fibromyalgia  Neck sx: disc replacement Nov 2016              Personal Factors:   Anxiety  Weak  Fearful of movement Body Regions:   head  neck  back  lower extremities  upper  extremities     Body Systems:   gross symmetry  ROM  strength  gross coordinated movement  balance  gait  transfers  motor control     Activity limitations:   Learning and applying knowledge  no deficits     General Tasks and Commands  no deficits     Communication  no deficits     Mobility  fine hand use (grasping/picking up)  walking  moving around using equipment (WC)  using transportation (bus, train, plane, car)  driving (bike, car, motorcycle)     Self care  washing oneself (bathing, drying, washing hands)  caring for body parts (brushing teeth, shaving, grooming)  toileting  dressing  looking after one's health     Domestic Life  shopping  cooking  doing house work (cleaning house, washing dishes, laundry)  assisting others     Interactions/Relationships  no deficits     Life Areas  no deficits     Community and Social Life  community life  recreation and leisure     Participation Restrictions:  Not shopping, going out, driving       unstable clinical presentation with unpredictable characteristics    high      GOALS: Pt is in agreement with the following goals.     Short term goals: 6 weeks or 10 visits   1.  Pt will demonstrate increased isometric torque by 5% from initial test indicating positive muscular response to program of progressive resistance trainingProgressing  2. Pt will demonstrate reduced pain and improved functional outcomes as reported on the patient centered questionnaires. Report 2-4 points reduction NDI indicating improvement in function and pain Progressing  3.. Pt will demonstrate independence with reducing or controlling symptoms with ther ex, movement, or position independently, able to reduce pain 1-2 points on pain scale using strategies taught in therapy Progressing  4. Pt will demonstrate increased maximum isometric torque value by 30% when compared to the initial value resulting in improved ability to perform bending, lifting, and carrying activities safely,  confidently.Progressing           Long term goals: 13 weeks or 20 visits  1. Pt will demonstratte increased cervical ROM as measured by med ex by 6 degrees from initial test which results in full functional ROM of neck for ease with ADLs and drivingProgressing  2. Pt will demonstrate increased isometric torque by 10% from initial test to improve ability to lift and carry.   3.Patient will demonstrate improved overall function per FOTO Survey to CJ = at least 20% but < 40% impaired, limited or restricted score or less.  Progressing  4. Pt will demonstrate independence with reducing or controlling symptoms with ther ex, movement, or position independently, able to reduce pain 2-4 points on pain scale using strategies taught in therapy Progressing  5. Pt will demonstrate independence with the HEP at discharge.  Progressing  6.   drive and make a meal, regularly Progressing  7. Not use a scooter to shop Progressing  8.   walk more than 2 blocks Progressing        Plan   Continue with established Plan of Care towards established PT goals.

## 2018-06-20 ENCOUNTER — CLINICAL SUPPORT (OUTPATIENT)
Dept: REHABILITATION | Facility: OTHER | Age: 38
End: 2018-06-20
Attending: PSYCHIATRY & NEUROLOGY
Payer: COMMERCIAL

## 2018-06-20 DIAGNOSIS — G89.29 CHRONIC NECK PAIN: ICD-10-CM

## 2018-06-20 DIAGNOSIS — M54.2 CHRONIC NECK PAIN: ICD-10-CM

## 2018-06-20 PROCEDURE — 97110 THERAPEUTIC EXERCISES: CPT

## 2018-06-20 PROCEDURE — 97140 MANUAL THERAPY 1/> REGIONS: CPT

## 2018-06-20 NOTE — PROGRESS NOTES
[facility] Back Physical Therapy Treatment      Name: Caryl Cedeno  Clinic Number: [id number]  Date of Treatment: 2018   Diagnosis:   Encounter Diagnosis   Name Primary?    Chronic neck pain      Physician: Berlin Saleem*    Pain pattern determined: 1 no movement preference, anxiety, fearful of movement, neck sx, POTS, small fiber neuropathy, fibromyalgia   Plan of care signed: 2018  Time in: 4:40  Time Out: 5:40  Total Billable Units: 45  Precautions: POTS (postural orthostatic tachycardia syndrome) (Chronic)  Small fiber neuropathy - Primary  Fibromyalgia  Neck sx: disc replacement 2016    Visit #: 11   CHRONIC PAIN< MULTIPLE PRECAUTIONS< ANXIETY>PROGRESS WITH CARE    Visit # authorized: 25  Authorization period: 18  Plan of care Expiration: 18    Reassessment Due:18    Subjective   Pt reports that she is feeling good today,and reports she did pretty good on her test last visit     Patient reports tolerating previous visit fairly well with minimal reports of increased pain.     Patient reports their pain to be 3/10 on a 0-10 scale with 0 being no pain and 10 being the worst pain imaginable.  Pain Location: neck and shoulders     Occupation:     , but not working, working on disability  Leisure: gym 1 if that per week, pool 2/week, sometimes goes to gym                     Pts goals:   Socialize, cleaning, working-tutoring    Objective     Baseline IM Testing Results:   Date of testin2018  ROM 36-90 deg   Max Peak Torque 96    Min Peak Torque 67    Flex/Ext Ratio 1.2/1   % below normative data 67     Midpoint IM Testing Results:   Date of testin2018  ROM  deg   Max Peak Torque 98   Min Peak Torque 69   Flex/Ext Ratio 1.42:1   % below normative data -56%       Seat adjustment 291   counterweight .8   Seat pad 0       Outcomes: Cervical  Initial score:59  Visit 5 score:  Goal:      Treatment    Pt was instructed in and performed the  following:     Caryl received therapeutic exercises to develop/improved posture, cardiovascular endurance, muscular endurance, lumbar/cervical ROM, strength and muscular endurance for 40 minutes including the following exercises:   [facility] 6/20/2018   Visit Number 11   VAS Pain Rating 3   Recumbent Bike Seat Pos. 6   Time 5   Retraction in Sitting 10   Scapular Retraction 10   Manual Therapy 10   Cervical Extension Seat Pad -   Seat Adjustment -   Top Dead Center -   Counterweight -   Cervical Flexion -   Cervical Extension -   Cervical Peak Torque -   Cervical Weight 66   Repetitions 20   Rating of Perceived Exertion 5   Ice - Z Lie (in min.) 10        cont to increase time on recumbent bike  Shoulder shrugs in sitting  Retractions 10 reps in sitting  Scapular adduction with shoulder ER YTB 20x reps  RIS 10x     Peripheral muscle strengthening which included 1 set of 15-20 repetitions at a slow, controlled 7 second per rep pace focused on strengthening supporting musculature for improved body mechanics and functional mobility.  Pt and therapist focused on proper form during treatment to ensure optimal strengthening of each targeted muscle group.  Machines were utilized including torso rotation, leg extension, leg curl, chest press, upright row. Tricep extension, and hip abduction    Caryl received the following manual therapy techniques: : neck for 10 minutes.DTM B UT/trigger point release to B UT      Home Exercise Program as follows:   Handouts were given to the patient. Pt demo good understanding of the education provided. Caryl demonstrated good return demonstration of activities.     Swim 2/week  Use lumbar roll  Neck retractions 3/day 10 reps  Shoulder shrugs 3/day 10 reps  Shoulder ext rot and  scap retraction YTB 3/day 10 reps    Lumbar roll use compliance: unknown  Additional exercises taught this treatment session:   HEP review    Assessment   Pt tolerated treatment well  and able to  complete 20 reps at 66 in/lbs.  Weight not increased today secondary to pts fear level.  ROM increased on visit 10 and pt was afraid to increase weight today.  Increase 5% next visit.  Trigger points in L>R UT, performed trigger point release in supine with stretching after  Pt will continue to benefit from skilled outpatient physical therapy to address the deficits stated in the impairment chart, provide pt/family education and to maximize pt's level of independence in the home and community environment.       Pt's spiritual, cultural and educational needs considered and pt agreeable to plan of care and goals as stated below:     Medical necessity is demonstrated by the following problem list.    Pt presents with the following impairments:   History  Co-morbidities and personal factor after manual thrs that may impact the plan of care Examination  Body Structures and Functions, activity limitations and participation restrictions that may impact the plan of care Clinical Presentation    Decision Making/ Complexity Score   Co-morbidities:      POTS (postural orthostatic tachycardia syndrome) (Chronic)  Small fiber neuropathy - Primary  Fibromyalgia  Neck sx: disc replacement Nov 2016              Personal Factors:   Anxiety  Weak  Fearful of movement Body Regions:   head  neck  back  lower extremities  upper extremities     Body Systems:   gross symmetry  ROM  strength  gross coordinated movement  balance  gait  transfers  motor control     Activity limitations:   Learning and applying knowledge  no deficits     General Tasks and Commands  no deficits     Communication  no deficits     Mobility  fine hand use (grasping/picking up)  walking  moving around using equipment (WC)  using transportation (bus, train, plane, car)  driving (bike, car, motorcycle)     Self care  washing oneself (bathing, drying, washing hands)  caring for body parts (brushing teeth, shaving, grooming)  toileting  dressing  looking after one's  health     Domestic Life  shopping  cooking  doing house work (cleaning house, washing dishes, laundry)  assisting others     Interactions/Relationships  no deficits     Life Areas  no deficits     Community and Social Life  community life  recreation and leisure     Participation Restrictions:  Not shopping, going out, driving       unstable clinical presentation with unpredictable characteristics    high      GOALS: Pt is in agreement with the following goals.     Short term goals: 6 weeks or 10 visits   1.  Pt will demonstrate increased isometric torque by 5% from initial test indicating positive muscular response to program of progressive resistance trainingProgressing  2. Pt will demonstrate reduced pain and improved functional outcomes as reported on the patient centered questionnaires. Report 2-4 points reduction NDI indicating improvement in function and pain Progressing  3.. Pt will demonstrate independence with reducing or controlling symptoms with ther ex, movement, or position independently, able to reduce pain 1-2 points on pain scale using strategies taught in therapy Progressing  4. Pt will demonstrate increased maximum isometric torque value by 30% when compared to the initial value resulting in improved ability to perform bending, lifting, and carrying activities safely, confidently.Progressing           Long term goals: 13 weeks or 20 visits  1. Pt will demonstratte increased cervical ROM as measured by med ex by 6 degrees from initial test which results in full functional ROM of neck for ease with ADLs and drivingProgressing  2. Pt will demonstrate increased isometric torque by 10% from initial test to improve ability to lift and carry.   3.Patient will demonstrate improved overall function per FOTO Survey to CJ = at least 20% but < 40% impaired, limited or restricted score or less.  Progressing  4. Pt will demonstrate independence with reducing or controlling symptoms with ther ex, movement, or  position independently, able to reduce pain 2-4 points on pain scale using strategies taught in therapy Progressing  5. Pt will demonstrate independence with the HEP at discharge.  Progressing  6.   drive and make a meal, regularly Progressing  7. Not use a scooter to shop Progressing  8.   walk more than 2 blocks Progressing        Plan   Continue with established Plan of Care towards established PT goals.

## 2018-06-25 ENCOUNTER — PATIENT MESSAGE (OUTPATIENT)
Dept: NEUROLOGY | Facility: CLINIC | Age: 38
End: 2018-06-25

## 2018-06-27 RX ORDER — PREGABALIN 100 MG/1
CAPSULE ORAL
Qty: 120 CAPSULE | OUTPATIENT
Start: 2018-06-27

## 2018-07-05 ENCOUNTER — CLINICAL SUPPORT (OUTPATIENT)
Dept: REHABILITATION | Facility: OTHER | Age: 38
End: 2018-07-05
Attending: PSYCHIATRY & NEUROLOGY
Payer: COMMERCIAL

## 2018-07-05 DIAGNOSIS — M54.2 CHRONIC NECK PAIN: ICD-10-CM

## 2018-07-05 DIAGNOSIS — G89.29 CHRONIC NECK PAIN: ICD-10-CM

## 2018-07-05 PROCEDURE — 97110 THERAPEUTIC EXERCISES: CPT

## 2018-07-11 ENCOUNTER — CLINICAL SUPPORT (OUTPATIENT)
Dept: REHABILITATION | Facility: OTHER | Age: 38
End: 2018-07-11
Attending: PSYCHIATRY & NEUROLOGY
Payer: COMMERCIAL

## 2018-07-11 DIAGNOSIS — G89.29 CHRONIC NECK PAIN: ICD-10-CM

## 2018-07-11 DIAGNOSIS — M54.2 CHRONIC NECK PAIN: ICD-10-CM

## 2018-07-11 PROCEDURE — 97110 THERAPEUTIC EXERCISES: CPT | Performed by: PHYSICAL THERAPIST

## 2018-07-11 PROCEDURE — 97140 MANUAL THERAPY 1/> REGIONS: CPT | Performed by: PHYSICAL THERAPIST

## 2018-07-11 NOTE — PROGRESS NOTES
[facility]      Name: Caryl Cedeno  Clinic Number: [id number]  Date of Treatment: 2018   Diagnosis:   Encounter Diagnosis   Name Primary?    Chronic neck pain      Physician: Berlin Saleem*    Pain pattern determined: 1 no movement preference, anxiety, fearful of movement, neck sx, POTS, small fiber neuropathy, fibromyalgia   Plan of care signed: 2018  Time in: 5  Time Out: 6  Total Billable Units: 45  Precautions: POTS (postural orthostatic tachycardia syndrome) (Chronic)  Small fiber neuropathy - Primary  Fibromyalgia  Neck sx: disc replacement 2016    Visit #: 13  CHRONIC PAIN< MULTIPLE PRECAUTIONS< ANXIETY>PROGRESS WITH CARE    Visit # authorized: 25  Authorization period: 18  Plan of care Expiration: 18    Reassessment done: 18  Reassessment Due:18, done 18  Next due 18    Subjective   Pt reports that she was sore after trying some new stretching and the craniocradle last visit.  Pt feels the craniocradle may have been too much, so will omit. Pt would like to begin lower back program when completed 20 visits for cervical spine.      Patient reports tolerating previous visit fairly well with minimal reports of increased pain.     Patient reports their pain to be 4/10 on a 0-10 scale with 0 being no pain and 10 being the worst pain imaginable.  Pain Location: neck and shoulders     Occupation:     , but not working, working on disability  Leisure: gym 1 if that per week, pool 2/week, sometimes goes to gym                     Pts goals:   Socialize, cleaning, working-tutoring    Objective     Baseline IM Testing Results:   Date of testin2018  ROM 36-90 deg   Max Peak Torque 96    Min Peak Torque 67    Flex/Ext Ratio 1.2/1   % below normative data 67     Midpoint IM Testing Results:   Date of testin2018  ROM  deg   Max Peak Torque 98   Min Peak Torque 69   Flex/Ext Ratio 1.42:1   % below  normative data -56%   Hinojosa compared to IE  +13%    Seat adjustment 291   counterweight .8   Seat pad 0       Outcomes: Cervical  Initial score:59  Visit 5 score:  Goal:      Range of Motion - MOVEMENT LOSS 7/11/18   ROM Loss   Flexion within functional limits   Extension minimal loss   Side bending Right minimal loss   Side bending Left minimal loss   Rotation Right minimal loss   Rotation Left minimal loss   Protraction within functional limits       Treatment    Pt was instructed in and performed the following:     Caryl received therapeutic exercises to develop/improved posture, cardiovascular endurance, muscular endurance, cervical ROM, strength and muscular endurance for 40 minutes including the following exercises:     [facility] 7/11/2018   Visit Number 13   VAS Pain Rating 4   Recumbent Bike Seat Pos. 6   Time 7:30   Retraction in Sitting 10   Scapular Retraction 10   Manual Therapy 10   Cervical Weight 69   Repetitions 15   Rating of Perceived Exertion 4   Ice - Z Lie (in min.) 10          cont to increase time on recumbent bike  Shoulder shrugs in sitting  Retractions 10 reps in sitting  Scapular adduction with shoulder ER YTB 20x reps  RIS 10x  3-5 s/h  RIL 10x 3-5 s/h  OB 10x   Craniocradle placement in supine 5 minutes  Reviewed PPT for core strengthening.      Peripheral muscle strengthening which included 1 set of 15-20 repetitions at a slow, controlled 7 second per rep pace focused on strengthening supporting musculature for improved body mechanics and functional mobility.  Pt and therapist focused on proper form during treatment to ensure optimal strengthening of each targeted muscle group.  Machines were utilized including torso rotation, leg extension, leg curl, chest press, upright row. Tricep extension, and hip abduction    Caryl received the following manual therapy techniques: : STM, manual therapy, no distraction 10 min      Home Exercise Program as follows:   Handouts were given  "to the patient. Pt demo good understanding of the education provided. Caryl demonstrated good return demonstration of activities.     Swim 2/week  Use lumbar roll  Neck retractions 3/day 10 reps  Shoulder shrugs 3/day 10 reps  Shoulder ext rot and  scap retraction YTB 3/day 10 reps  Cervical retractions in supine with towel roll 10x, 2x daily  Sidelying thoracic rotations "Open Books" 10x, 1-2daily    Lumbar roll use compliance: unknown  Additional exercises taught this treatment session:   Cervical retractions in supine with towel roll 10x, 2x daily  Sidelying thoracic rotations "Open Books" 10x, 1-2daily      Assessment   Pt tolerated treatment well  and able to complete 15 reps at 69 in/lbs  Pt tolerated genltle STM and gentle stretching with good results. Also attempted OB stretch with positive stretch response but pt reported some left should pain during. Pt is making fair-good progress towards treatment goals.   Pt will continue to benefit from skilled outpatient physical therapy to address the deficits stated in the impairment chart, provide pt/family education and to maximize pt's level of independence in the home and community environment.       Pt's spiritual, cultural and educational needs considered and pt agreeable to plan of care and goals as stated below:     Medical necessity is demonstrated by the following problem list.    Pt presents with the following impairments:   History  Co-morbidities and personal factor after manual thrs that may impact the plan of care Examination  Body Structures and Functions, activity limitations and participation restrictions that may impact the plan of care Clinical Presentation    Decision Making/ Complexity Score   Co-morbidities:      POTS (postural orthostatic tachycardia syndrome) (Chronic)  Small fiber neuropathy - Primary  Fibromyalgia  Neck sx: disc replacement Nov 2016              Personal Factors:   Anxiety  Weak  Fearful of movement Body Regions: "   head  neck  back  lower extremities  upper extremities     Body Systems:   gross symmetry  ROM  strength  gross coordinated movement  balance  gait  transfers  motor control     Activity limitations:   Learning and applying knowledge  no deficits     General Tasks and Commands  no deficits     Communication  no deficits     Mobility  fine hand use (grasping/picking up)  walking  moving around using equipment (WC)  using transportation (bus, train, plane, car)  driving (bike, car, motorcycle)     Self care  washing oneself (bathing, drying, washing hands)  caring for body parts (brushing teeth, shaving, grooming)  toileting  dressing  looking after one's health     Domestic Life  shopping  cooking  doing house work (cleaning house, washing dishes, laundry)  assisting others     Interactions/Relationships  no deficits     Life Areas  no deficits     Community and Social Life  community life  recreation and leisure     Participation Restrictions:  Not shopping, going out, driving       unstable clinical presentation with unpredictable characteristics    high      GOALS: Pt is in agreement with the following goals.     Short term goals: 6 weeks or 10 visits   1.  Pt will demonstrate increased isometric torque by 5% from initial test indicating positive muscular response to program of progressive resistance training Met 6/18/2018  2. Pt will demonstrate reduced pain and improved functional outcomes as reported on the patient centered questionnaires. Report 2-4 points reduction NDI indicating improvement in function and pain Progressing  3.. Pt will demonstrate independence with reducing or controlling symptoms with ther ex, movement, or position independently, able to reduce pain 1-2 points on pain scale using strategies taught in therapy Progressing  4. Pt will demonstrate increased maximum isometric torque value by 30% when compared to the initial value resulting in improved ability to perform bending, lifting, and  carrying activities safely, confidently.Progressing           Long term goals: 13 weeks or 20 visits  1. Pt will demonstratte increased cervical ROM as measured by med ex by 6 degrees from initial test which results in full functional ROM of neck for ease with ADLs and drivingMet 6/18/2018  2. Pt will demonstrate increased isometric torque by 10% from initial test to improve ability to lift and carry. Met 6/18/20183.Patient will   demonstrate improved overall function per FOTO Survey to CJ = at least 20% but < 40% impaired, limited or restricted score or less.  Progressing  4. Pt will demonstrate independence with reducing or controlling symptoms with ther ex, movement, or position independently, able to reduce pain 2-4 points on pain scale using strategies taught in therapy Progressing  5. Pt will demonstrate independence with the HEP at discharge.  Progressing  6.   drive and make a meal, regularly Progressing  7. Not use a scooter to shop Progressing  8.   walk more than 2 blocks Progressing        Plan   Continue with established Plan of Care towards established PT goals.

## 2018-07-13 ENCOUNTER — CLINICAL SUPPORT (OUTPATIENT)
Dept: REHABILITATION | Facility: OTHER | Age: 38
End: 2018-07-13
Attending: PSYCHIATRY & NEUROLOGY
Payer: COMMERCIAL

## 2018-07-13 DIAGNOSIS — G89.29 CHRONIC NECK PAIN: ICD-10-CM

## 2018-07-13 DIAGNOSIS — M54.2 CHRONIC NECK PAIN: ICD-10-CM

## 2018-07-13 PROCEDURE — 97110 THERAPEUTIC EXERCISES: CPT

## 2018-07-13 NOTE — PROGRESS NOTES
[facility]      Name: Caryl Cedeno  Clinic Number: [id number]  Date of Treatment: 2018   Diagnosis:   Encounter Diagnosis   Name Primary?    Chronic neck pain      Physician: Berlin Saleem*    Pain pattern determined: 1 no movement preference, anxiety, fearful of movement, neck sx, POTS, small fiber neuropathy, fibromyalgia   Plan of care signed: 2018  Time in: 3:08  Time Out: 4:05  Total Billable Units: 45  Precautions: POTS (postural orthostatic tachycardia syndrome) (Chronic)  Small fiber neuropathy - Primary  Fibromyalgia  Neck sx: disc replacement 2016    Visit #: 14  CHRONIC PAIN< MULTIPLE PRECAUTIONS< ANXIETY>PROGRESS WITH CARE    Visit # authorized: 25  Authorization period: 18  Plan of care Expiration: 18    Reassessment done: 18  Reassessment Due:18, done 18  Next due 18    Subjective   Pt reports that she was not too sore after last session. She feels like she is having a good day today. Pt would like to begin lower back program when completed 20 visits for cervical spine.      Patient reports tolerating previous visit fairly well with minimal reports of increased pain.     Patient reports their pain to be 3/10 on a 0-10 scale with 0 being no pain and 10 being the worst pain imaginable.  Pain Location: neck and shoulders     Occupation:     , but not working, working on disability  Leisure: gym 1 if that per week, pool 2/week, sometimes goes to gym                     Pts goals:   Socialize, cleaning, working-tutoring    Objective     Baseline IM Testing Results:   Date of testin2018  ROM 36-90 deg   Max Peak Torque 96    Min Peak Torque 67    Flex/Ext Ratio 1.2/1   % below normative data 67     Midpoint IM Testing Results:   Date of testin2018  ROM  deg   Max Peak Torque 98   Min Peak Torque 69   Flex/Ext Ratio 1.42:1   % below normative data -56%   Hinojosa compared to IE   +13%    Seat adjustment 291   counterweight .8   Seat pad 0       Outcomes: Cervical  Initial score:59  Visit 5 score:  Goal:      Range of Motion - MOVEMENT LOSS 7/11/18   ROM Loss   Flexion within functional limits   Extension minimal loss   Side bending Right minimal loss   Side bending Left minimal loss   Rotation Right minimal loss   Rotation Left minimal loss   Protraction within functional limits       Treatment    Pt was instructed in and performed the following:     Caryl received therapeutic exercises to develop/improved posture, cardiovascular endurance, muscular endurance, cervical ROM, strength and muscular endurance for 40 minutes including the following exercises:     [facility] 7/13/2018   Visit Number 14   VAS Pain Rating 3   Recumbent Bike Seat Pos. -   Time -   Cervical Stretches - Retraction In Lying 10   Retraction in Sitting 10   Scapular Retraction 10   Manual Therapy -   Cervical Extension Seat Pad -   Seat Adjustment -   Top Dead Center -   Counterweight -   Cervical Flexion -   Cervical Extension -   Cervical Peak Torque -   Cervical Weight 69   Repetitions 20   Rating of Perceived Exertion 5   Lumbar Weight -   Repetitions -   Rating of Perceived Exertion -   Ice - Z Lie (in min.) 10            cont to increase time on recumbent bike NT  Shoulder shrugs in sitting  Retractions 10 reps in sitting  Scapular adduction with shoulder ER YTB 20x reps  RIS 10x  3-5 s/h  RIL 10x 3-5 s/h  OB 10x   Reviewed PPT for core strengthening.      Peripheral muscle strengthening which included 1 set of 15-20 repetitions at a slow, controlled 7 second per rep pace focused on strengthening supporting musculature for improved body mechanics and functional mobility.  Pt and therapist focused on proper form during treatment to ensure optimal strengthening of each targeted muscle group.  Machines were utilized including torso rotation, leg extension, leg curl, chest press, upright row. Tricep  "extension, and hip abduction    Caryl received the following manual therapy techniques: STM in sitting. Vacuum/cupping STM with manual therapy techniques was performed to upper shoulder region to decrease muscle tightness, increase circulation and promote healing process. The pt's skin was monitored for redness adjusting pressure as needed. The pt was instructed in possible side effects of bruising and/or soreness.  5 min      Home Exercise Program as follows:   Handouts were given to the patient. Pt demo good understanding of the education provided. Caryl demonstrated good return demonstration of activities.     Swim 2/week  Use lumbar roll  Neck retractions 3/day 10 reps  Shoulder shrugs 3/day 10 reps  Shoulder ext rot and  scap retraction YTB 3/day 10 reps  Cervical retractions in supine with towel roll 10x, 2x daily  Sidelying thoracic rotations "Open Books" 10x, 1-2daily    Lumbar roll use compliance: unknown  Additional exercises taught this treatment session:   HEP Review       Assessment   Pt tolerated treatment well  and able to complete 20 reps at 69 in/lbs  Pt tolerated gentle STM and vacuum cupping with good results. Pt reported some skin sensitivity while cupping but found she felt less tender overall after. Also attempted OB stretch with positive stretch response and no reports of left shoulder pain this session. Pt is making fair-good progress towards treatment goals.   Pt will continue to benefit from skilled outpatient physical therapy to address the deficits stated in the impairment chart, provide pt/family education and to maximize pt's level of independence in the home and community environment.       Pt's spiritual, cultural and educational needs considered and pt agreeable to plan of care and goals as stated below:     Medical necessity is demonstrated by the following problem list.    Pt presents with the following impairments:   History  Co-morbidities and personal factor after manual thrs " that may impact the plan of care Examination  Body Structures and Functions, activity limitations and participation restrictions that may impact the plan of care Clinical Presentation    Decision Making/ Complexity Score   Co-morbidities:      POTS (postural orthostatic tachycardia syndrome) (Chronic)  Small fiber neuropathy - Primary  Fibromyalgia  Neck sx: disc replacement Nov 2016              Personal Factors:   Anxiety  Weak  Fearful of movement Body Regions:   head  neck  back  lower extremities  upper extremities     Body Systems:   gross symmetry  ROM  strength  gross coordinated movement  balance  gait  transfers  motor control     Activity limitations:   Learning and applying knowledge  no deficits     General Tasks and Commands  no deficits     Communication  no deficits     Mobility  fine hand use (grasping/picking up)  walking  moving around using equipment (WC)  using transportation (bus, train, plane, car)  driving (bike, car, motorcycle)     Self care  washing oneself (bathing, drying, washing hands)  caring for body parts (brushing teeth, shaving, grooming)  toileting  dressing  looking after one's health     Domestic Life  shopping  cooking  doing house work (cleaning house, washing dishes, laundry)  assisting others     Interactions/Relationships  no deficits     Life Areas  no deficits     Community and Social Life  community life  recreation and leisure     Participation Restrictions:  Not shopping, going out, driving       unstable clinical presentation with unpredictable characteristics    high      GOALS: Pt is in agreement with the following goals.     Short term goals: 6 weeks or 10 visits   1.  Pt will demonstrate increased isometric torque by 5% from initial test indicating positive muscular response to program of progressive resistance training Met 6/18/2018  2. Pt will demonstrate reduced pain and improved functional outcomes as reported on the patient centered questionnaires. Report  2-4 points reduction NDI indicating improvement in function and pain Progressing  3.. Pt will demonstrate independence with reducing or controlling symptoms with ther ex, movement, or position independently, able to reduce pain 1-2 points on pain scale using strategies taught in therapy Progressing  4. Pt will demonstrate increased maximum isometric torque value by 30% when compared to the initial value resulting in improved ability to perform bending, lifting, and carrying activities safely, confidently.Progressing           Long term goals: 13 weeks or 20 visits  1. Pt will demonstratte increased cervical ROM as measured by med ex by 6 degrees from initial test which results in full functional ROM of neck for ease with ADLs and drivingMet 6/18/2018  2. Pt will demonstrate increased isometric torque by 10% from initial test to improve ability to lift and carry. Met 6/18/20183.Patient will   demonstrate improved overall function per FOTO Survey to CJ = at least 20% but < 40% impaired, limited or restricted score or less.  Progressing  4. Pt will demonstrate independence with reducing or controlling symptoms with ther ex, movement, or position independently, able to reduce pain 2-4 points on pain scale using strategies taught in therapy Progressing  5. Pt will demonstrate independence with the HEP at discharge.  Progressing  6.   drive and make a meal, regularly Progressing  7. Not use a scooter to shop Progressing  8.   walk more than 2 blocks Progressing        Plan   Continue with established Plan of Care towards established PT goals.

## 2018-07-14 RX ORDER — PREGABALIN 100 MG/1
100 CAPSULE ORAL 3 TIMES DAILY
Qty: 90 CAPSULE | Refills: 5 | Status: SHIPPED | OUTPATIENT
Start: 2018-07-14 | End: 2018-07-19 | Stop reason: SDUPTHER

## 2018-07-18 ENCOUNTER — NURSE TRIAGE (OUTPATIENT)
Dept: ADMINISTRATIVE | Facility: CLINIC | Age: 38
End: 2018-07-18

## 2018-07-18 NOTE — TELEPHONE ENCOUNTER
Patient called to report the following:     -patient is out of Lyrica, requesting refill  -[facility] states that MD denied refill      Education completed per [facility] including follow up with provider. Patient verbalized understating.     Can you please advise patient?

## 2018-07-19 ENCOUNTER — PATIENT MESSAGE (OUTPATIENT)
Dept: NEUROLOGY | Facility: CLINIC | Age: 38
End: 2018-07-19

## 2018-07-19 RX ORDER — PREGABALIN 100 MG/1
100 CAPSULE ORAL 3 TIMES DAILY
Qty: 90 CAPSULE | Refills: 5 | Status: SHIPPED | OUTPATIENT
Start: 2018-07-19 | End: 2019-02-20 | Stop reason: SDUPTHER

## 2018-07-20 ENCOUNTER — CLINICAL SUPPORT (OUTPATIENT)
Dept: REHABILITATION | Facility: OTHER | Age: 38
End: 2018-07-20
Attending: PSYCHIATRY & NEUROLOGY
Payer: COMMERCIAL

## 2018-07-20 DIAGNOSIS — G89.29 CHRONIC NECK PAIN: ICD-10-CM

## 2018-07-20 DIAGNOSIS — M54.2 CHRONIC NECK PAIN: ICD-10-CM

## 2018-07-20 PROCEDURE — 97110 THERAPEUTIC EXERCISES: CPT

## 2018-07-20 NOTE — PROGRESS NOTES
[facility]      Name: Caryl Cedeno  Clinic Number: [id number]  Date of Treatment: 2018   Diagnosis:   No diagnosis found.  Physician: Berlin Saleem*    Pain pattern determined: 1 no movement preference, anxiety, fearful of movement, neck sx, POTS, small fiber neuropathy, fibromyalgia   Plan of care signed: 2018  Time in: 12:10  Time Out: 1:10  Total Billable Units: 45  Precautions: POTS (postural orthostatic tachycardia syndrome) (Chronic)  Small fiber neuropathy - Primary  Fibromyalgia  Neck sx: disc replacement 2016    Visit #: 14       CHRONIC PAIN< MULTIPLE PRECAUTIONS< ANXIETY>PROGRESS WITH CARE    Visit # authorized: 25  Authorization period: 18  Plan of care Expiration: 18    Reassessment done: 18  Reassessment Due:18, done 18  Next due 18    Subjective   Pt reports that she was not too sore after last session. She feels like she is having a good day today, but not feeling well due to her condition. Pt would like to begin lower back program when completed 20 visits for cervical spine.      Patient reports tolerating previous visit fairly well with minimal reports of increased pain.     Patient reports their pain to be 3/10 on a 0-10 scale with 0 being no pain and 10 being the worst pain imaginable.  Pain Location: neck and shoulders     Occupation:     , but not working, working on disability  Leisure: gym 1 if that per week, pool 2/week, sometimes goes to gym                     Pts goals:   Socialize, cleaning, working-tutoring    Objective     Baseline IM Testing Results:   Date of testin2018  ROM 36-90 deg   Max Peak Torque 96    Min Peak Torque 67    Flex/Ext Ratio 1.2/1   % below normative data 67     Midpoint IM Testing Results:   Date of testin2018  ROM  deg   Max Peak Torque 98   Min Peak Torque 69   Flex/Ext Ratio 1.42:1   % below normative data -56%   Hinojosa compared to IE   +13%    Seat adjustment 291   counterweight .8   Seat pad 0       Outcomes: Cervical  Initial score:59  Visit 5 score:  Goal:      Range of Motion - MOVEMENT LOSS 7/11/18   ROM Loss   Flexion within functional limits   Extension minimal loss   Side bending Right minimal loss   Side bending Left minimal loss   Rotation Right minimal loss   Rotation Left minimal loss   Protraction within functional limits       Treatment    Pt was instructed in and performed the following:     Caryl received therapeutic exercises to develop/improved posture, cardiovascular endurance, muscular endurance, cervical ROM, strength and muscular endurance for 40 minutes including the following exercises:       [facility] 7/20/2018   Visit Number 15   VAS Pain Rating 3   Recumbent Bike Seat Pos. -   Time -   Cervical Stretches - Retraction In Lying -   Retraction in Sitting 10   Scapular Retraction 10   Manual Therapy 10   Cervical Extension Seat Pad -   Seat Adjustment -   Top Dead Center -   Counterweight -   Cervical Flexion -   Cervical Extension -   Cervical Peak Torque -   Cervical Weight 72   Repetitions 15   Rating of Perceived Exertion 4   Lumbar Weight -   Repetitions -   Rating of Perceived Exertion -   Ice - Z Lie (in min.) 10   No warmup today due to pt not feeling well(fatigue)    cont to increase time on recumbent bike NT  Shoulder shrugs in sitting  Retractions 10 reps in sitting  Scapular adduction with shoulder ER YTB 20x reps  RIS 10x  3-5 s/h  RIL 10x 3-5 s/h  OB 10x   Reviewed PPT for core strengthening.  Scapular retractions 10x     Peripheral muscle strengthening which included 1 set of 15-20 repetitions at a slow, controlled 7 second per rep pace focused on strengthening supporting musculature for improved body mechanics and functional mobility.  Pt and therapist focused on proper form during treatment to ensure optimal strengthening of each targeted muscle group.  Machines were utilized including torso  "rotation, leg extension, leg curl, chest press, upright row. Tricep extension, and hip abduction    Caryl received the following manual therapy techniques: STM to cervical spine, B upper trap with gentle side bend and passive retraction 10 mins.      Home Exercise Program as follows:   Handouts were given to the patient. Pt demo good understanding of the education provided. Caryl demonstrated good return demonstration of activities.     Swim 2/week  Use lumbar roll  Neck retractions 3/day 10 reps  Shoulder shrugs 3/day 10 reps  Shoulder ext rot and  scap retraction YTB 3/day 10 reps  Cervical retractions in supine with towel roll 10x, 2x daily  Sidelying thoracic rotations "Open Books" 10x, 1-2daily    Lumbar roll use compliance: unknown  Additional exercises taught this treatment session:   Scapular retractions 10x       Assessment   Pt tolerated treatment well  and able to complete 15 reps with a weight increase 72 in/lbs.  Pt tolerated gentle STM that did not increase symptoms. She gets pain with traction, no traction done. No cupping today, but over pt does like cupping.  Pt is making fair-good progress towards treatment goals. Pt with less pain psot session. Educated her to ice at home after her visits due to she will occasional get pain and soreness with session.   Pt will continue to benefit from skilled outpatient physical therapy to address the deficits stated in the impairment chart, provide pt/family education and to maximize pt's level of independence in the home and community environment.       Pt's spiritual, cultural and educational needs considered and pt agreeable to plan of care and goals as stated below:     Medical necessity is demonstrated by the following problem list.    Pt presents with the following impairments:   History  Co-morbidities and personal factor after manual thrs that may impact the plan of care Examination  Body Structures and Functions, activity limitations and participation " restrictions that may impact the plan of care Clinical Presentation    Decision Making/ Complexity Score   Co-morbidities:      POTS (postural orthostatic tachycardia syndrome) (Chronic)  Small fiber neuropathy - Primary  Fibromyalgia  Neck sx: disc replacement Nov 2016              Personal Factors:   Anxiety  Weak  Fearful of movement Body Regions:   head  neck  back  lower extremities  upper extremities     Body Systems:   gross symmetry  ROM  strength  gross coordinated movement  balance  gait  transfers  motor control     Activity limitations:   Learning and applying knowledge  no deficits     General Tasks and Commands  no deficits     Communication  no deficits     Mobility  fine hand use (grasping/picking up)  walking  moving around using equipment (WC)  using transportation (bus, train, plane, car)  driving (bike, car, motorcycle)     Self care  washing oneself (bathing, drying, washing hands)  caring for body parts (brushing teeth, shaving, grooming)  toileting  dressing  looking after one's health     Domestic Life  shopping  cooking  doing house work (cleaning house, washing dishes, laundry)  assisting others     Interactions/Relationships  no deficits     Life Areas  no deficits     Community and Social Life  community life  recreation and leisure     Participation Restrictions:  Not shopping, going out, driving       unstable clinical presentation with unpredictable characteristics    high      GOALS: Pt is in agreement with the following goals.     Short term goals: 6 weeks or 10 visits   1.  Pt will demonstrate increased isometric torque by 5% from initial test indicating positive muscular response to program of progressive resistance training Met 6/18/2018  2. Pt will demonstrate reduced pain and improved functional outcomes as reported on the patient centered questionnaires. Report 2-4 points reduction NDI indicating improvement in function and pain Progressing  3.. Pt will demonstrate independence  with reducing or controlling symptoms with ther ex, movement, or position independently, able to reduce pain 1-2 points on pain scale using strategies taught in therapy Progressing  4. Pt will demonstrate increased maximum isometric torque value by 30% when compared to the initial value resulting in improved ability to perform bending, lifting, and carrying activities safely, confidently.Progressing           Long term goals: 13 weeks or 20 visits  1. Pt will demonstratte increased cervical ROM as measured by med ex by 6 degrees from initial test which results in full functional ROM of neck for ease with ADLs and drivingMet 6/18/2018  2. Pt will demonstrate increased isometric torque by 10% from initial test to improve ability to lift and carry. Met 6/18/20183.Patient will   demonstrate improved overall function per FOTO Survey to CJ = at least 20% but < 40% impaired, limited or restricted score or less.  Progressing  4. Pt will demonstrate independence with reducing or controlling symptoms with ther ex, movement, or position independently, able to reduce pain 2-4 points on pain scale using strategies taught in therapy Progressing  5. Pt will demonstrate independence with the HEP at discharge.  Progressing  6.   drive and make a meal, regularly Progressing  7. Not use a scooter to shop Progressing  8.   walk more than 2 blocks Progressing        Plan   Continue with established Plan of Care towards established PT goals.

## 2018-07-25 ENCOUNTER — CLINICAL SUPPORT (OUTPATIENT)
Dept: REHABILITATION | Facility: OTHER | Age: 38
End: 2018-07-25
Attending: PSYCHIATRY & NEUROLOGY
Payer: COMMERCIAL

## 2018-07-25 ENCOUNTER — PATIENT MESSAGE (OUTPATIENT)
Dept: NEUROLOGY | Facility: CLINIC | Age: 38
End: 2018-07-25

## 2018-07-25 DIAGNOSIS — M54.41 CHRONIC BILATERAL LOW BACK PAIN WITH BILATERAL SCIATICA: ICD-10-CM

## 2018-07-25 DIAGNOSIS — G89.29 CHRONIC BILATERAL LOW BACK PAIN WITH BILATERAL SCIATICA: ICD-10-CM

## 2018-07-25 DIAGNOSIS — M54.2 CHRONIC NECK PAIN: ICD-10-CM

## 2018-07-25 DIAGNOSIS — M54.42 CHRONIC BILATERAL LOW BACK PAIN WITH BILATERAL SCIATICA: ICD-10-CM

## 2018-07-25 DIAGNOSIS — G89.29 CHRONIC NECK PAIN: ICD-10-CM

## 2018-07-25 DIAGNOSIS — G89.29 CHRONIC NECK PAIN: Primary | ICD-10-CM

## 2018-07-25 DIAGNOSIS — M54.2 CHRONIC NECK PAIN: Primary | ICD-10-CM

## 2018-07-25 PROCEDURE — 97140 MANUAL THERAPY 1/> REGIONS: CPT

## 2018-07-25 PROCEDURE — 97110 THERAPEUTIC EXERCISES: CPT

## 2018-07-25 NOTE — PROGRESS NOTES
[facility]      Name: Caryl Cedeno  Clinic Number: [id number]  Date of Treatment: 2018   Diagnosis:   Encounter Diagnosis   Name Primary?    Chronic neck pain      Physician: Berlin Saleem*    Pain pattern determined: 1 no movement preference, anxiety, fearful of movement, neck sx, POTS, small fiber neuropathy, fibromyalgia   Plan of care signed: 2018  Time in:230pm  Time Out: 330pm  Total Billable Units:60  Precautions: POTS (postural orthostatic tachycardia syndrome) (Chronic)  Small fiber neuropathy - Primary  Fibromyalgia  Neck sx: disc replacement 2016    Visit #: 16     CHRONIC PAIN< MULTIPLE PRECAUTIONS< ANXIETY>PROGRESS WITH CARE  No manual traction/sub occipital release    Visit # authorized: 25  Authorization period: 18  Plan of care Expiration: 18    Reassessment done: 18  Reassessment Due:18, done 18  Next due 18    Subjective   Pt reports that she is not too sore today. Pt states she is having less really bad days since starting the program.  .   Patient reports their pain to be 2/10 on a 0-10 scale with 0 being no pain and 10 being the worst pain imaginable.  Pain Location: neck and shoulders     Occupation:     , but not working, working on disability  Leisure: gym 1 if that per week, pool 2/week, sometimes goes to gym                     Pts goals:   Socialize, cleaning, working-tutoring    Objective     Baseline IM Testing Results:   Date of testin2018  ROM 36-90 deg   Max Peak Torque 96    Min Peak Torque 67    Flex/Ext Ratio 1.2/1   % below normative data 67     Midpoint IM Testing Results:   Date of testin2018  ROM  deg   Max Peak Torque 98   Min Peak Torque 69   Flex/Ext Ratio 1.42:1   % below normative data -56%   Hinojosa compared to IE  +13%    Seat adjustment 291   counterweight .8   Seat pad 0       Outcomes: Cervical  Initial score:59  Visit 5 score:  Goal:      Range  of Motion - MOVEMENT LOSS 7/11/18   ROM Loss   Flexion within functional limits   Extension minimal loss   Side bending Right minimal loss   Side bending Left minimal loss   Rotation Right minimal loss   Rotation Left minimal loss   Protraction within functional limits       Treatment    Pt was instructed in and performed the following:     Caryl received therapeutic exercises to develop/improved posture, cardiovascular endurance, muscular endurance, cervical ROM, strength and muscular endurance for 45 minutes including the following exercises:   [facility] 7/25/2018   Visit Number 16   VAS Pain Rating 2   Recumbent Bike Seat Pos. 6   Time 10   Cervical Stretches - Retraction In Lying 10   Retraction in Sitting 10   Scapular Retraction 10   Manual Therapy 10   Cervical Extension Seat Pad -   Seat Adjustment -   Top Dead Center -   Counterweight -   Cervical Flexion -   Cervical Extension -   Cervical Peak Torque -   Cervical Weight 72   Repetitions 20   Rating of Perceived Exertion 5   Lumbar Weight -   Repetitions -   Rating of Perceived Exertion -   Ice - Z Lie (in min.) 10         Shoulder shrugs in sitting  Retractions 10 reps in sitting  Scapular adduction with shoulder ER YTB 20x reps  RIS 10x  3-5 s/h  RIL 10x 3-5 s/h  OB 10x   Reviewed PPT for core strengthening.  Scapular retractions 10x     Peripheral muscle strengthening which included 1 set of 15-20 repetitions at a slow, controlled 7 second per rep pace focused on strengthening supporting musculature for improved body mechanics and functional mobility.  Pt and therapist focused on proper form during treatment to ensure optimal strengthening of each targeted muscle group.  Machines were utilized including torso rotation, leg extension, leg curl, chest press, upright row. Tricep extension, and hip abduction    Caryl received the following manual therapy techniques: STM to cervical spine, B upper trap with gentle side bend and passive retraction  "10 mins.      Home Exercise Program as follows:   Handouts were given to the patient. Pt demo good understanding of the education provided. Caryl demonstrated good return demonstration of activities.     Swim 2/week  Use lumbar roll  Neck retractions 3/day 10 reps  Shoulder shrugs 3/day 10 reps  Shoulder ext rot and  scap retraction YTB 3/day 10 reps  Cervical retractions in supine with towel roll 10x, 2x daily  Sidelying thoracic rotations "Open Books" 10x, 1-2daily    Lumbar roll use compliance: unknown  Additional exercises taught this treatment session:   None today      Assessment   Pt tolerated treatment well.  Pt rec'd DTM to B UT with PROM of the cervical spine with release of trigger points.  Pt responds well to DTM/PROM, not traction or sub occipital release.  Pt able to complete 20 reps at 72 in/lbs with 5/10 exertion level.  Progress 5% next visit, allowing pt to guide reps by exertion level.  Pt is making progress towards goals.  Pt will continue to benefit from skilled outpatient physical therapy to address the deficits stated in the impairment chart, provide pt/family education and to maximize pt's level of independence in the home and community environment.       Pt's spiritual, cultural and educational needs considered and pt agreeable to plan of care and goals as stated below:     Medical necessity is demonstrated by the following problem list.    Pt presents with the following impairments:   History  Co-morbidities and personal factor after manual thrs that may impact the plan of care Examination  Body Structures and Functions, activity limitations and participation restrictions that may impact the plan of care Clinical Presentation    Decision Making/ Complexity Score   Co-morbidities:      POTS (postural orthostatic tachycardia syndrome) (Chronic)  Small fiber neuropathy - Primary  Fibromyalgia  Neck sx: disc replacement Nov 2016              Personal Factors:   Anxiety  Weak  Fearful of " movement Body Regions:   head  neck  back  lower extremities  upper extremities     Body Systems:   gross symmetry  ROM  strength  gross coordinated movement  balance  gait  transfers  motor control     Activity limitations:   Learning and applying knowledge  no deficits     General Tasks and Commands  no deficits     Communication  no deficits     Mobility  fine hand use (grasping/picking up)  walking  moving around using equipment (WC)  using transportation (bus, train, plane, car)  driving (bike, car, motorcycle)     Self care  washing oneself (bathing, drying, washing hands)  caring for body parts (brushing teeth, shaving, grooming)  toileting  dressing  looking after one's health     Domestic Life  shopping  cooking  doing house work (cleaning house, washing dishes, laundry)  assisting others     Interactions/Relationships  no deficits     Life Areas  no deficits     Community and Social Life  community life  recreation and leisure     Participation Restrictions:  Not shopping, going out, driving       unstable clinical presentation with unpredictable characteristics    high      GOALS: Pt is in agreement with the following goals.     Short term goals: 6 weeks or 10 visits   1.  Pt will demonstrate increased isometric torque by 5% from initial test indicating positive muscular response to program of progressive resistance training Met 6/18/2018  2. Pt will demonstrate reduced pain and improved functional outcomes as reported on the patient centered questionnaires. Report 2-4 points reduction NDI indicating improvement in function and pain Progressing  3.. Pt will demonstrate independence with reducing or controlling symptoms with ther ex, movement, or position independently, able to reduce pain 1-2 points on pain scale using strategies taught in therapy Progressing  4. Pt will demonstrate increased maximum isometric torque value by 30% when compared to the initial value resulting in improved ability to perform  bending, lifting, and carrying activities safely, confidently.Progressing           Long term goals: 13 weeks or 20 visits  1. Pt will demonstratte increased cervical ROM as measured by med ex by 6 degrees from initial test which results in full functional ROM of neck for ease with ADLs and drivingMet 6/18/2018  2. Pt will demonstrate increased isometric torque by 10% from initial test to improve ability to lift and carry. Met 6/18/20183.Patient will   demonstrate improved overall function per FOTO Survey to CJ = at least 20% but < 40% impaired, limited or restricted score or less.  Progressing  4. Pt will demonstrate independence with reducing or controlling symptoms with ther ex, movement, or position independently, able to reduce pain 2-4 points on pain scale using strategies taught in therapy Progressing  5. Pt will demonstrate independence with the HEP at discharge.  Progressing  6.   drive and make a meal, regularly Progressing  7. Not use a scooter to shop Progressing  8.   walk more than 2 blocks Progressing        Plan   Continue with established Plan of Care towards established PT goals.

## 2018-07-26 ENCOUNTER — PATIENT MESSAGE (OUTPATIENT)
Dept: NEUROLOGY | Facility: CLINIC | Age: 38
End: 2018-07-26

## 2018-07-26 DIAGNOSIS — G90.09 IDIOPATHIC PERIPHERAL AUTONOMIC NEUROPATHY: Primary | ICD-10-CM

## 2018-07-27 ENCOUNTER — CLINICAL SUPPORT (OUTPATIENT)
Dept: REHABILITATION | Facility: OTHER | Age: 38
End: 2018-07-27
Attending: PSYCHIATRY & NEUROLOGY
Payer: COMMERCIAL

## 2018-07-27 DIAGNOSIS — G89.29 CHRONIC NECK PAIN: ICD-10-CM

## 2018-07-27 DIAGNOSIS — M54.2 CHRONIC NECK PAIN: ICD-10-CM

## 2018-07-27 PROCEDURE — 97110 THERAPEUTIC EXERCISES: CPT

## 2018-07-27 NOTE — PROGRESS NOTES
[facility]      Name: Caryl Cedeno  Clinic Number: [id number]  Date of Treatment: 2018   Diagnosis:   Encounter Diagnosis   Name Primary?    Chronic neck pain      Physician: Berlin Saleem*    Pain pattern determined: 1 no movement preference, anxiety, fearful of movement, neck sx, POTS, small fiber neuropathy, fibromyalgia   Plan of care signed: 2018  Time in:245pm  Time Out: 330pm  Total Billable Units:45  Precautions: POTS (postural orthostatic tachycardia syndrome) (Chronic)  Small fiber neuropathy - Primary  Fibromyalgia  Neck sx: disc replacement 2016    Visit #: 17     CHRONIC PAIN< MULTIPLE PRECAUTIONS< ANXIETY>PROGRESS WITH CARE  No manual traction/sub occipital release    Visit # authorized: 25  Authorization period: 18  Plan of care Expiration: 18    Reassessment done: 18  Reassessment Due:18, done 18  Next due 18    Subjective   Pt reports that she is sore today which she attributes to stressful situations recently  .   Patient reports their pain to be 4/10 on a 0-10 scale with 0 being no pain and 10 being the worst pain imaginable.  Pain Location: neck and shoulders     Occupation:     , but not working, working on disability  Leisure: gym 1 if that per week, pool 2/week, sometimes goes to gym                     Pts goals:   Socialize, cleaning, working-tutoring    Objective     Baseline IM Testing Results:   Date of testin2018  ROM 36-90 deg   Max Peak Torque 96    Min Peak Torque 67    Flex/Ext Ratio 1.2/1   % below normative data 67     Midpoint IM Testing Results:   Date of testin2018  ROM  deg   Max Peak Torque 98   Min Peak Torque 69   Flex/Ext Ratio 1.42:1   % below normative data -56%   Hinojosa compared to IE  +13%    Seat adjustment 291   counterweight .8   Seat pad 0       Outcomes: Cervical  Initial score:59  Visit 5 score:  Goal:      Range of Motion - MOVEMENT LOSS  7/11/18   ROM Loss   Flexion within functional limits   Extension minimal loss   Side bending Right minimal loss   Side bending Left minimal loss   Rotation Right minimal loss   Rotation Left minimal loss   Protraction within functional limits       Treatment    Pt was instructed in and performed the following:     Caryl received therapeutic exercises to develop/improved posture, cardiovascular endurance, muscular endurance, cervical ROM, strength and muscular endurance for 45 minutes including the following exercises:     [facility] 7/27/2018   Visit Number 17   VAS Pain Rating 4   Recumbent Bike Seat Pos. -   Time -   Cervical Stretches - Retraction In Lying -   Retraction in Sitting 10   Scapular Retraction -   Manual Therapy 10   Cervical Extension Seat Pad -   Seat Adjustment -   Top Dead Center -   Counterweight -   Cervical Flexion -   Cervical Extension -   Cervical Peak Torque -   Cervical Weight 78   Repetitions 15   Rating of Perceived Exertion 5   Lumbar Weight -   Repetitions -   Rating of Perceived Exertion -   Ice - Z Lie (in min.) 10     Shoulder shrugs in sitting  Retractions 10 reps in sitting  Scapular adduction with shoulder ER YTB 20x reps  RIS 10x  3-5 s/h  RIL 10x 3-5 s/h  OB 10x   Reviewed PPT for core strengthening.  Scapular retractions 10x     Peripheral muscle strengthening which included 1 set of 15-20 repetitions at a slow, controlled 7 second per rep pace focused on strengthening supporting musculature for improved body mechanics and functional mobility.  Pt and therapist focused on proper form during treatment to ensure optimal strengthening of each targeted muscle group.  Machines were utilized including torso rotation, leg extension, leg curl, chest press, upright row. Tricep extension, and hip abduction    Caryl received the following manual therapy techniques: STM to cervical spine, B upper trap with gentle side bend and passive retraction 10 mins.      Home Exercise  "Program as follows:   Handouts were given to the patient. Pt demo good understanding of the education provided. Caryl demonstrated good return demonstration of activities.     Swim 2/week  Use lumbar roll  Neck retractions 3/day 10 reps  Shoulder shrugs 3/day 10 reps  Shoulder ext rot and  scap retraction YTB 3/day 10 reps  Cervical retractions in supine with towel roll 10x, 2x daily  Sidelying thoracic rotations "Open Books" 10x, 1-2daily    Lumbar roll use compliance: unknown  Additional exercises taught this treatment session:   None today      Assessment   Pt tolerated treatment well. Able to perform 15 reps at 5% inc in Med X weights.Pt has large trigger points in B UT's, discussed trigger point injections to assist decreasing pain and increasing flexibility.  Pt making good progress with PT continue to progress slowly.  Pt will continue to benefit from skilled outpatient physical therapy to address the deficits stated in the impairment chart, provide pt/family education and to maximize pt's level of independence in the home and community environment.   Pt's spiritual, cultural and educational needs considered and pt agreeable to plan of care and goals as stated below:     Medical necessity is demonstrated by the following problem list.    Pt presents with the following impairments:   History  Co-morbidities and personal factor after manual thrs that may impact the plan of care Examination  Body Structures and Functions, activity limitations and participation restrictions that may impact the plan of care Clinical Presentation    Decision Making/ Complexity Score   Co-morbidities:      POTS (postural orthostatic tachycardia syndrome) (Chronic)  Small fiber neuropathy - Primary  Fibromyalgia  Neck sx: disc replacement Nov 2016              Personal Factors:   Anxiety  Weak  Fearful of movement Body Regions:   head  neck  back  lower extremities  upper extremities     Body Systems:   gross " symmetry  ROM  strength  gross coordinated movement  balance  gait  transfers  motor control     Activity limitations:   Learning and applying knowledge  no deficits     General Tasks and Commands  no deficits     Communication  no deficits     Mobility  fine hand use (grasping/picking up)  walking  moving around using equipment (WC)  using transportation (bus, train, plane, car)  driving (bike, car, motorcycle)     Self care  washing oneself (bathing, drying, washing hands)  caring for body parts (brushing teeth, shaving, grooming)  toileting  dressing  looking after one's health     Domestic Life  shopping  cooking  doing house work (cleaning house, washing dishes, laundry)  assisting others     Interactions/Relationships  no deficits     Life Areas  no deficits     Community and Social Life  community life  recreation and leisure     Participation Restrictions:  Not shopping, going out, driving       unstable clinical presentation with unpredictable characteristics    high      GOALS: Pt is in agreement with the following goals.     Short term goals: 6 weeks or 10 visits   1.  Pt will demonstrate increased isometric torque by 5% from initial test indicating positive muscular response to program of progressive resistance training Met 6/18/2018  2. Pt will demonstrate reduced pain and improved functional outcomes as reported on the patient centered questionnaires. Report 2-4 points reduction NDI indicating improvement in function and pain Progressing  3.. Pt will demonstrate independence with reducing or controlling symptoms with ther ex, movement, or position independently, able to reduce pain 1-2 points on pain scale using strategies taught in therapy Progressing  4. Pt will demonstrate increased maximum isometric torque value by 30% when compared to the initial value resulting in improved ability to perform bending, lifting, and carrying activities safely, confidently.Progressing           Long term goals: 13  weeks or 20 visits  1. Pt will demonstratte increased cervical ROM as measured by med ex by 6 degrees from initial test which results in full functional ROM of neck for ease with ADLs and drivingMet 6/18/2018  2. Pt will demonstrate increased isometric torque by 10% from initial test to improve ability to lift and carry. Met 6/18/20183.Patient will   demonstrate improved overall function per FOTO Survey to CJ = at least 20% but < 40% impaired, limited or restricted score or less.  Progressing  4. Pt will demonstrate independence with reducing or controlling symptoms with ther ex, movement, or position independently, able to reduce pain 2-4 points on pain scale using strategies taught in therapy Progressing  5. Pt will demonstrate independence with the HEP at discharge.  Progressing  6.   drive and make a meal, regularly Progressing  7. Not use a scooter to shop Progressing  8.   walk more than 2 blocks Progressing        Plan   Continue with established Plan of Care towards established PT goals.

## 2018-08-07 ENCOUNTER — PATIENT MESSAGE (OUTPATIENT)
Dept: GASTROENTEROLOGY | Facility: CLINIC | Age: 38
End: 2018-08-07

## 2018-08-20 ENCOUNTER — OFFICE VISIT (OUTPATIENT)
Dept: NEUROLOGY | Facility: CLINIC | Age: 38
End: 2018-08-20
Payer: COMMERCIAL

## 2018-08-20 ENCOUNTER — CLINICAL SUPPORT (OUTPATIENT)
Dept: REHABILITATION | Facility: OTHER | Age: 38
End: 2018-08-20
Attending: PSYCHIATRY & NEUROLOGY
Payer: COMMERCIAL

## 2018-08-20 ENCOUNTER — TELEPHONE (OUTPATIENT)
Dept: GASTROENTEROLOGY | Facility: CLINIC | Age: 38
End: 2018-08-20

## 2018-08-20 ENCOUNTER — PATIENT MESSAGE (OUTPATIENT)
Dept: NEUROLOGY | Facility: CLINIC | Age: 38
End: 2018-08-20

## 2018-08-20 VITALS
HEART RATE: 97 BPM | HEIGHT: 67 IN | SYSTOLIC BLOOD PRESSURE: 137 MMHG | DIASTOLIC BLOOD PRESSURE: 110 MMHG | WEIGHT: 190.69 LBS | BODY MASS INDEX: 29.93 KG/M2

## 2018-08-20 DIAGNOSIS — M79.7 FIBROMYALGIA: ICD-10-CM

## 2018-08-20 DIAGNOSIS — G62.9 SMALL FIBER NEUROPATHY: Primary | ICD-10-CM

## 2018-08-20 DIAGNOSIS — M54.2 CHRONIC NECK PAIN: ICD-10-CM

## 2018-08-20 DIAGNOSIS — G90.1 DYSAUTONOMIA: ICD-10-CM

## 2018-08-20 DIAGNOSIS — G43.109 MIGRAINE WITH AURA AND WITHOUT STATUS MIGRAINOSUS, NOT INTRACTABLE: Chronic | ICD-10-CM

## 2018-08-20 DIAGNOSIS — R20.0 NUMBNESS AND TINGLING OF BOTH LOWER EXTREMITIES: ICD-10-CM

## 2018-08-20 DIAGNOSIS — G89.29 CHRONIC NECK PAIN: ICD-10-CM

## 2018-08-20 DIAGNOSIS — R20.2 NUMBNESS AND TINGLING OF BOTH LOWER EXTREMITIES: ICD-10-CM

## 2018-08-20 PROCEDURE — 99999 PR PBB SHADOW E&M-EST. PATIENT-LVL III: CPT | Mod: PBBFAC,,, | Performed by: PSYCHIATRY & NEUROLOGY

## 2018-08-20 PROCEDURE — 3008F BODY MASS INDEX DOCD: CPT | Mod: CPTII,S$GLB,, | Performed by: PSYCHIATRY & NEUROLOGY

## 2018-08-20 PROCEDURE — 99215 OFFICE O/P EST HI 40 MIN: CPT | Mod: S$GLB,,, | Performed by: PSYCHIATRY & NEUROLOGY

## 2018-08-20 NOTE — PLAN OF CARE
[facility] /POC     Name: Caryl Cedeno  Clinic Number: [id number]  Date of Treatment: 2018   Diagnosis:   Encounter Diagnosis   Name Primary?    Chronic neck pain      Physician: [place] Saleem*    Pain pattern determined: 1 no movement preference, anxiety, fearful of movement, neck sx, POTS, small fiber neuropathy, fibromyalgia   Plan of care signed: 2018  Time in:4pm  Time Out: 5pm  Total Billable Units:45  Precautions: POTS (postural orthostatic tachycardia syndrome) (Chronic)  Small fiber neuropathy - Primary  Fibromyalgia  Neck sx: disc replacement 2016    Visit #: 18     CHRONIC PAIN< MULTIPLE PRECAUTIONS< ANXIETY>PROGRESS WITH CARE  No manual traction/sub occipital release    Visit # authorized: 25  Authorization period: 18  Plan of care Expiration: 18    Reassessment done: 18  Reassessment Due:18, done 18, 18  Next due 18    Subjective   Pt reports that she has not been here secondary to vacation.  Pt reports she did pretty good with pain level on vacation which she attributes to the program  .   Patient reports their pain to be 2/10 on a 0-10 scale with 0 being no pain and 10 being the worst pain imaginable.  Pain Location: neck and shoulders     Occupation:     , but not working, working on disability  Leisure: gym 1 if that per week, pool 2/week, sometimes goes to gym                     Pts goals:   Socialize, cleaning, working-tutoring    Objective     Baseline IM Testing Results:   Date of testin2018  ROM 36-90 deg   Max Peak Torque 96    Min Peak Torque 67    Flex/Ext Ratio 1.2/1   % below normative data 67     Midpoint IM Testing Results:   Date of testin2018  ROM  deg   Max Peak Torque 98   Min Peak Torque 69   Flex/Ext Ratio 1.42:1   % below normative data -56%   Hinojosa compared to IE  +13%    Seat adjustment 291   counterweight .8   Seat pad 0       Outcomes:  Cervical  Initial score:59  Visit 5 score:  Goal:      Range of Motion - MOVEMENT LOSS 8/20/18   ROM Loss   Flexion within functional limits   Extension minimal loss   Side bending Right minimal loss   Side bending Left minimal loss   Rotation Right minimal loss   Rotation Left minimal loss   Protraction within functional limits       Treatment    Pt was instructed in and performed the following:     Caryl received therapeutic exercises to develop/improved posture, cardiovascular endurance, muscular endurance, cervical ROM, strength and muscular endurance for 50 minutes including the following exercises:   [facility] 8/20/2018   Visit Number 18   VAS Pain Rating 2   Recumbent Bike Seat Pos. -   Time 10   Cervical Stretches - Retraction In Lying -   Retraction in Sitting 10   Scapular Retraction -   Manual Therapy 10   Cervical Extension Seat Pad -   Seat Adjustment -   Top Dead Center -   Counterweight -   Cervical Flexion -   Cervical Extension -   Cervical Peak Torque -   Cervical Weight 72   Repetitions 20   Rating of Perceived Exertion 3   Lumbar Weight -   Repetitions -   Rating of Perceived Exertion -   Ice - Z Lie (in min.) 10             Shoulder shrugs in sitting  Retractions 10 reps in sitting  Scapular adduction with shoulder ER YTB 20x reps  RIS 10x  3-5 s/h  RIL 10x 3-5 s/h  OB 10x   Reviewed PPT for core strengthening.  Scapular retractions 10x     Peripheral muscle strengthening which included 1 set of 15-20 repetitions at a slow, controlled 7 second per rep pace focused on strengthening supporting musculature for improved body mechanics and functional mobility.  Pt and therapist focused on proper form during treatment to ensure optimal strengthening of each targeted muscle group.  Machines were utilized including torso rotation, leg extension, leg curl, chest press, upright row. Tricep extension, and hip abduction    Caryl received the following manual therapy techniques: STM/DTM to  "cervical spine, B upper trap  10 mins.      Home Exercise Program as follows:   Handouts were given to the patient. Pt demo good understanding of the education provided. Caryl demonstrated good return demonstration of activities.     Swim 2/week  Use lumbar roll  Neck retractions 3/day 10 reps  Shoulder shrugs 3/day 10 reps  Shoulder ext rot and  scap retraction YTB 3/day 10 reps  Cervical retractions in supine with towel roll 10x, 2x daily  Sidelying thoracic rotations "Open Books" 10x, 1-2daily    Lumbar roll use compliance: unknown  Additional exercises taught this treatment session:   None today      Assessment   Pt tolerated treatment well.  Returns after 2wks vacation.  Reduced weight on Med X to 72in/lbs with pt completing 20 reps.  Pt is feeling releif with program and reports she was able to tolerate her trip without significant neck pain.   Pt will continue to benefit from skilled outpatient physical therapy to address the deficits stated in the impairment chart, provide pt/family education and to maximize pt's level of independence in the home and community environment.   Pt's spiritual, cultural and educational needs considered and pt agreeable to plan of care and goals as stated below:     Medical necessity is demonstrated by the following problem list.    Pt presents with the following impairments:   History  Co-morbidities and personal factor after manual thrs that may impact the plan of care Examination  Body Structures and Functions, activity limitations and participation restrictions that may impact the plan of care Clinical Presentation    Decision Making/ Complexity Score   Co-morbidities:      POTS (postural orthostatic tachycardia syndrome) (Chronic)  Small fiber neuropathy - Primary  Fibromyalgia  Neck sx: disc replacement Nov 2016              Personal Factors:   Anxiety  Weak  Fearful of movement Body Regions:   head  neck  back  lower extremities  upper extremities     Body Systems: "   gross symmetry  ROM  strength  gross coordinated movement  balance  gait  transfers  motor control     Activity limitations:   Learning and applying knowledge  no deficits     General Tasks and Commands  no deficits     Communication  no deficits     Mobility  fine hand use (grasping/picking up)  walking  moving around using equipment (WC)  using transportation (bus, train, plane, car)  driving (bike, car, motorcycle)     Self care  washing oneself (bathing, drying, washing hands)  caring for body parts (brushing teeth, shaving, grooming)  toileting  dressing  looking after one's health     Domestic Life  shopping  cooking  doing house work (cleaning house, washing dishes, laundry)  assisting others     Interactions/Relationships  no deficits     Life Areas  no deficits     Community and Social Life  community life  recreation and leisure     Participation Restrictions:  Not shopping, going out, driving       unstable clinical presentation with unpredictable characteristics    high      GOALS: Pt is in agreement with the following goals.     Short term goals: 6 weeks or 10 visits   1.  Pt will demonstrate increased isometric torque by 5% from initial test indicating positive muscular response to program of progressive resistance training Met 6/18/2018  2. Pt will demonstrate reduced pain and improved functional outcomes as reported on the patient centered questionnaires. Report 2-4 points reduction NDI indicating improvement in function and pain Progressing  3.. Pt will demonstrate independence with reducing or controlling symptoms with ther ex, movement, or position independently, able to reduce pain 1-2 points on pain scale using strategies taught in therapy Progressing  4. Pt will demonstrate increased maximum isometric torque value by 30% when compared to the initial value resulting in improved ability to perform bending, lifting, and carrying activities safely, confidently.Progressing           Long term  goals: 13 weeks or 20 visits  1. Pt will demonstratte increased cervical ROM as measured by med ex by 6 degrees from initial test which results in full functional ROM of neck for ease with ADLs and drivingMet 6/18/2018  2. Pt will demonstrate increased isometric torque by 10% from initial test to improve ability to lift and carry. Met 6/18/20183.Patient will   demonstrate improved overall function per FOTO Survey to CJ = at least 20% but < 40% impaired, limited or restricted score or less.  Progressing  4. Pt will demonstrate independence with reducing or controlling symptoms with ther ex, movement, or position independently, able to reduce pain 2-4 points on pain scale using strategies taught in therapy Progressing  5. Pt will demonstrate independence with the HEP at discharge.  Progressing  6.   drive and make a meal, regularly Progressing  7. Not use a scooter to shop Progressing  8.   walk more than 2 blocks Progressing        Plan   Continue [facility] 2x/wk x 1 month to complete visits.

## 2018-08-20 NOTE — ASSESSMENT & PLAN NOTE
"Burning sensation mainly in the lower extremities with normal NCS. On Lyrica 100/200 and Savella 100 Q12. She has noted an modest improvement in lower extremity sensory derangements. Diagnosis confirmed by biopsy. Results are in chart and can be found on [facility] web site. IVIg orders were submitted and today we received notice that [facility] has refused to pay for the therapy ("investigational").              - Appeal to [facility] for IVIg coverage              - Continue Savella and Lyrica as above.  "

## 2018-08-20 NOTE — PROGRESS NOTES
"Subjective:     Chief Complaint:  Follow-up    Interval History 8/20/2018 - Symptoms of tingling and numbness in the LEs is modestly improved since I last saw her. She is followed by Dr. Cardoza and has recently started Losartan, which has improved her cardiac symptoms and helped to normalize blood pressure. [facility]rap[facility] biopsy assessment for small fiber neuropathy was positive in both distal and proximal sites. Dizziness is minimally improved. She uses Savella to address FM and Lyrica to address neuropathic sensory changes. We have submitted orders for IVIg, but [employer] has declined coverage pending appeal (use of Ig cited as "investigational" in small fiber neuropathy).    History of Present Illness:  Caryl Cedeno is a 38 y.o. female who presents today for initial evaluation in my clinic for multiple complaints. [facility] and reports dissatisfaction that she was never seen by "a specialist." She moved to [place] from [place] and much of her medical care has been completed in [place] and we have scant records aside from some pertinent lab work.    Her first complaint is small fiber neuropathy, which she reports has been worked up extensively in [place] and included normal NCS studies but no biopsies as of yet. There has been an extensive battery of lab work done and she has shown me results in her [place] EMR with included normal values of: SPEP with Immunofixation, B12, folate, ferritin, ESR, voltage gated K channels, N-type Ca channels, ACh-R Ab, ACh-binding Ab, P/Q Ca channels, striated Ab, PCA 1-2, Anti-Magalis, SSA and SSB, Lyme, Gliadin, ARELY-1 and ARELY-2. It's unclear why myasthenia panels were run as she has no MG type complaints. Her ELIGIO was minimally positive (1:160). She has been fairly well-controlled on Lyrica and was recently started on Savella and had Lyrica reduced from 100/200 to 100/100 with an increase in symptoms since the reduction in Lyrica.    She has complaints of " "POTS and reports formal diagnostic testing was done at [facility] with impaired sweating in the hands and feet. She has not been treated medically and does not have a cardiologist here in [place]. She does not want to start Midodrine until seen by an expert here. She does not have any pertinent records available from the [facility] for my review today.    She has chronic migraine headaches, which occur twice monthly and are not catamenial. They are preceded by visual aura and have pain onset usually in the occipital region with anterior spread. Pain is sharp and stabbing / pounding and severe in intensity. There is associated photo and phonophobia and N/V. She is not on any ppx medications and uses only NSAIDs as abortive. Duration of headaches is 4 hours to 3 days and they are debilitating.    Review of Systems  Review of Systems   Constitutional: Positive for activity change and fatigue. Negative for fever.   HENT: Negative for congestion, dental problem, facial swelling, sinus pressure, sinus pain and tinnitus.    Eyes: Positive for photophobia and visual disturbance.   Respiratory: Negative for chest tightness and shortness of breath.    Cardiovascular: Positive for palpitations. Negative for chest pain.   Gastrointestinal: Positive for nausea and vomiting. Negative for abdominal pain.   Endocrine: Negative for cold intolerance and heat intolerance.   Musculoskeletal: Negative for arthralgias, back pain, neck pain and neck stiffness.   Skin: Negative for color change.   Allergic/Immunologic: Negative for environmental allergies and food allergies.   Neurological: Positive for light-headedness and headaches. Negative for dizziness, seizures, syncope, weakness and numbness.   Psychiatric/Behavioral: Negative.         Objective:     Vitals:    08/20/18 1402   BP: (!) 137/110   Pulse: 97   Weight: 86.5 kg (190 lb 11.2 oz)   Height: 5' 7" (1.702 m)   PainSc:   4       Neurologic Exam     Mental Status "   Oriented to person, place, and time.   Attention: normal.   Speech: speech is normal   Level of consciousness: alert  Knowledge: good.     Cranial Nerves     CN II   Visual fields full to confrontation.     CN III, IV, VI   Pupils are equal, round, and reactive to light.  Extraocular motions are normal.     CN V   Facial sensation intact.     CN VII   Facial expression full, symmetric.     CN VIII   Hearing: intact    CN IX, X   CN IX normal.     CN XI   CN XI normal.     CN XII   CN XII normal.     Motor Exam   Muscle bulk: normal  Overall muscle tone: normal    Strength   Strength 5/5 throughout.     Sensory Exam   Right leg light touch: decreased from ankle  Left leg light touch: decreased from ankle  Vibration normal.   Right leg pinprick: decreased from ankle  Left leg pinprick: decreased from ankle    Gait, Coordination, and Reflexes     Gait  Gait: normal    Tremor   Resting tremor: absent  Intention tremor: absent  Action tremor: absent    Reflexes   Right brachioradialis: 2+  Left brachioradialis: 2+  Right biceps: 2+  Left biceps: 2+  Right triceps: 2+  Left triceps: 2+  Right patellar: 2+  Left patellar: 2+  Right achilles: 2+  Left achilles: 2+      Physical Exam   Constitutional: She is oriented to person, place, and time. She appears well-developed and well-nourished.   HENT:   Head: Normocephalic and atraumatic.   Eyes: EOM are normal. Pupils are equal, round, and reactive to light.   Neck: Normal range of motion. Neck supple. No thyromegaly present.   Cardiovascular: Normal rate.   Pulmonary/Chest: Effort normal.   Abdominal: Soft.   Lymphadenopathy:     She has no cervical adenopathy.   Neurological: She is oriented to person, place, and time. She has normal strength. Gait normal.   Reflex Scores:       Tricep reflexes are 2+ on the right side and 2+ on the left side.       Bicep reflexes are 2+ on the right side and 2+ on the left side.       Brachioradialis reflexes are 2+ on the right side and  2+ on the left side.       Patellar reflexes are 2+ on the right side and 2+ on the left side.       Achilles reflexes are 2+ on the right side and 2+ on the left side.  Skin: Skin is warm and dry.   Psychiatric: She has a normal mood and affect. Her speech is normal and behavior is normal. Thought content normal.   Vitals reviewed.        Lab Results   Component Value Date    WBC 7.04 11/22/2017    HGB 13.9 11/22/2017    HCT 41.4 11/22/2017     11/22/2017    ALT 47 (H) 11/22/2017    AST 35 11/22/2017     11/22/2017    K 3.2 (L) 11/22/2017     11/22/2017    CREATININE 0.9 11/22/2017    BUN 12 11/22/2017    CO2 30 (H) 11/22/2017    TSH 1.536 11/27/2017    URMDZPWB39 580 11/22/2017    VITAMINB6 8 11/22/2017       Tissue Biopsy 7/26/2018 - Tissue biopsy consistent with a small fiber neuropathy / idiopathic peripheral autonomic neuropathy.     Assessment and Plan:     Problem List Items Addressed This Visit     Migraine with aura and without status migrainosus, not intractable (Chronic)    Current Assessment & Plan     Two per month with visual aura. Duration is up to 3 days. Included photo and phonophobia but no dysautonomia. Occipital onset with anterior spread. She refuses to take prophylactic medications despite my urging her to do so. She has considerable nausea with vomiting during the events and so pill form abortive therapy is a poor option.              - Zomig nasal spray 5 mg prn migraine. Use immediately at headache / aura onset and she can repeat after two hours if severe headache persists but no more than two applications per day. No history of cerebrovascular or cardiovascular disease.              - Compazine and Zofran offered and patient declined.              - PPX medications offered and patient declined.         Small fiber neuropathy - Primary    Current Assessment & Plan     Burning sensation mainly in the lower extremities with normal NCS. On Lyrica 100/200 and Savella 100 Q12.  "She has noted an modest improvement in lower extremity sensory derangements. Diagnosis confirmed by biopsy. Results are in chart and can be found on [facility] web site. IVIg orders were submitted and today we received notice that [facility] has refused to pay for the therapy ("investigational").              - Appeal to [facility] for IVIg coverage              - Continue Savella and Lyrica as above.         Relevant Orders    IGA    Basic metabolic panel    Dysautonomia    Current Assessment & Plan     Symptoms are modestly improved with Losartan per Dr. Cardoza. Hopeful continued improvements with IVIg once approved.         Fibromyalgia    Current Assessment & Plan     Continue Savella as prescribed         Numbness and tingling of both lower extremities            Berlin Saleem MD  [facility]  "

## 2018-08-20 NOTE — PROGRESS NOTES
[facility] /POC     Name: Caryl Cedeno  Clinic Number: [id number]  Date of Treatment: 2018   Diagnosis:   Encounter Diagnosis   Name Primary?    Chronic neck pain      Physician: Berlin Saleem*    Pain pattern determined: 1 no movement preference, anxiety, fearful of movement, neck sx, POTS, small fiber neuropathy, fibromyalgia   Plan of care signed: 2018  Time in:4pm  Time Out: 5pm  Total Billable Units:45  Precautions: POTS (postural orthostatic tachycardia syndrome) (Chronic)  Small fiber neuropathy - Primary  Fibromyalgia  Neck sx: disc replacement 2016    Visit #: 18     CHRONIC PAIN< MULTIPLE PRECAUTIONS< ANXIETY>PROGRESS WITH CARE  No manual traction/sub occipital release    Visit # authorized: 25  Authorization period: 18  Plan of care Expiration: 18    Reassessment done: 18  Reassessment Due:18, done 18, 18  Next due 18    Subjective   Pt reports that she has not been here secondary to vacation.  Pt reports she did pretty good with pain level on vacation which she attributes to the program  .   Patient reports their pain to be 2/10 on a 0-10 scale with 0 being no pain and 10 being the worst pain imaginable.  Pain Location: neck and shoulders     Occupation:     , but not working, working on disability  Leisure: gym 1 if that per week, pool 2/week, sometimes goes to gym                     Pts goals:   Socialize, cleaning, working-tutoring    Objective     Baseline IM Testing Results:   Date of testin2018  ROM 36-90 deg   Max Peak Torque 96    Min Peak Torque 67    Flex/Ext Ratio 1.2/1   % below normative data 67     Midpoint IM Testing Results:   Date of testin2018  ROM  deg   Max Peak Torque 98   Min Peak Torque 69   Flex/Ext Ratio 1.42:1   % below normative data -56%   Hinojosa compared to IE  +13%    Seat adjustment 291   counterweight .8   Seat pad 0       Outcomes:  Cervical  Initial score:59  Visit 5 score:  Goal:      Range of Motion - MOVEMENT LOSS 8/20/18   ROM Loss   Flexion within functional limits   Extension minimal loss   Side bending Right minimal loss   Side bending Left minimal loss   Rotation Right minimal loss   Rotation Left minimal loss   Protraction within functional limits       Treatment    Pt was instructed in and performed the following:     Caryl received therapeutic exercises to develop/improved posture, cardiovascular endurance, muscular endurance, cervical ROM, strength and muscular endurance for 50 minutes including the following exercises:   [facility] 8/20/2018   Visit Number 18   VAS Pain Rating 2   Recumbent Bike Seat Pos. -   Time 10   Cervical Stretches - Retraction In Lying -   Retraction in Sitting 10   Scapular Retraction -   Manual Therapy 10   Cervical Extension Seat Pad -   Seat Adjustment -   Top Dead Center -   Counterweight -   Cervical Flexion -   Cervical Extension -   Cervical Peak Torque -   Cervical Weight 72   Repetitions 20   Rating of Perceived Exertion 3   Lumbar Weight -   Repetitions -   Rating of Perceived Exertion -   Ice - Z Lie (in min.) 10             Shoulder shrugs in sitting  Retractions 10 reps in sitting  Scapular adduction with shoulder ER YTB 20x reps  RIS 10x  3-5 s/h  RIL 10x 3-5 s/h  OB 10x   Reviewed PPT for core strengthening.  Scapular retractions 10x     Peripheral muscle strengthening which included 1 set of 15-20 repetitions at a slow, controlled 7 second per rep pace focused on strengthening supporting musculature for improved body mechanics and functional mobility.  Pt and therapist focused on proper form during treatment to ensure optimal strengthening of each targeted muscle group.  Machines were utilized including torso rotation, leg extension, leg curl, chest press, upright row. Tricep extension, and hip abduction    Caryl received the following manual therapy techniques: STM/DTM to  "cervical spine, B upper trap  10 mins.      Home Exercise Program as follows:   Handouts were given to the patient. Pt demo good understanding of the education provided. Caryl demonstrated good return demonstration of activities.     Swim 2/week  Use lumbar roll  Neck retractions 3/day 10 reps  Shoulder shrugs 3/day 10 reps  Shoulder ext rot and  scap retraction YTB 3/day 10 reps  Cervical retractions in supine with towel roll 10x, 2x daily  Sidelying thoracic rotations "Open Books" 10x, 1-2daily    Lumbar roll use compliance: unknown  Additional exercises taught this treatment session:   None today      Assessment   Pt tolerated treatment well.  Returns after 2wks vacation.  Reduced weight on Med X to 72in/lbs with pt completing 20 reps.  Pt is feeling releif with program and reports she was able to tolerate her trip without significant neck pain.   Pt will continue to benefit from skilled outpatient physical therapy to address the deficits stated in the impairment chart, provide pt/family education and to maximize pt's level of independence in the home and community environment.   Pt's spiritual, cultural and educational needs considered and pt agreeable to plan of care and goals as stated below:     Medical necessity is demonstrated by the following problem list.    Pt presents with the following impairments:   History  Co-morbidities and personal factor after manual thrs that may impact the plan of care Examination  Body Structures and Functions, activity limitations and participation restrictions that may impact the plan of care Clinical Presentation    Decision Making/ Complexity Score   Co-morbidities:      POTS (postural orthostatic tachycardia syndrome) (Chronic)  Small fiber neuropathy - Primary  Fibromyalgia  Neck sx: disc replacement Nov 2016              Personal Factors:   Anxiety  Weak  Fearful of movement Body Regions:   head  neck  back  lower extremities  upper extremities     Body Systems: "   gross symmetry  ROM  strength  gross coordinated movement  balance  gait  transfers  motor control     Activity limitations:   Learning and applying knowledge  no deficits     General Tasks and Commands  no deficits     Communication  no deficits     Mobility  fine hand use (grasping/picking up)  walking  moving around using equipment (WC)  using transportation (bus, train, plane, car)  driving (bike, car, motorcycle)     Self care  washing oneself (bathing, drying, washing hands)  caring for body parts (brushing teeth, shaving, grooming)  toileting  dressing  looking after one's health     Domestic Life  shopping  cooking  doing house work (cleaning house, washing dishes, laundry)  assisting others     Interactions/Relationships  no deficits     Life Areas  no deficits     Community and Social Life  community life  recreation and leisure     Participation Restrictions:  Not shopping, going out, driving       unstable clinical presentation with unpredictable characteristics    high      GOALS: Pt is in agreement with the following goals.     Short term goals: 6 weeks or 10 visits   1.  Pt will demonstrate increased isometric torque by 5% from initial test indicating positive muscular response to program of progressive resistance training Met 6/18/2018  2. Pt will demonstrate reduced pain and improved functional outcomes as reported on the patient centered questionnaires. Report 2-4 points reduction NDI indicating improvement in function and pain Progressing  3.. Pt will demonstrate independence with reducing or controlling symptoms with ther ex, movement, or position independently, able to reduce pain 1-2 points on pain scale using strategies taught in therapy Progressing  4. Pt will demonstrate increased maximum isometric torque value by 30% when compared to the initial value resulting in improved ability to perform bending, lifting, and carrying activities safely, confidently.Progressing           Long term  goals: 13 weeks or 20 visits  1. Pt will demonstratte increased cervical ROM as measured by med ex by 6 degrees from initial test which results in full functional ROM of neck for ease with ADLs and drivingMet 6/18/2018  2. Pt will demonstrate increased isometric torque by 10% from initial test to improve ability to lift and carry. Met 6/18/20183.Patient will   demonstrate improved overall function per FOTO Survey to CJ = at least 20% but < 40% impaired, limited or restricted score or less.  Progressing  4. Pt will demonstrate independence with reducing or controlling symptoms with ther ex, movement, or position independently, able to reduce pain 2-4 points on pain scale using strategies taught in therapy Progressing  5. Pt will demonstrate independence with the HEP at discharge.  Progressing  6.   drive and make a meal, regularly Progressing  7. Not use a scooter to shop Progressing  8.   walk more than 2 blocks Progressing        Plan   Continue [facility] 2x/wk x 1 month to complete visits.

## 2018-08-20 NOTE — ASSESSMENT & PLAN NOTE
Symptoms are modestly improved with Losartan per Dr. Cardoza. Hopeful continued improvements with IVIg once approved.

## 2018-08-20 NOTE — TELEPHONE ENCOUNTER
Dr. Santoyo,  Patient would like to know if they have a replacement medication that just as effective as the apriso , she states that helps   Please advise

## 2018-08-20 NOTE — TELEPHONE ENCOUNTER
----- Message from Gretel Baron sent at 8/20/2018  3:08 PM CDT -----  Contact: self - [phone]  Natanael - returning your call - please call patient at

## 2018-08-20 NOTE — TELEPHONE ENCOUNTER
Ma returned pt call no answer message left on pt vm   +      Please notify patient, the records from [facility] reveal evidence of Celiac disease, no Crohn's. Insurance will not cover Apriso

## 2018-08-22 ENCOUNTER — PATIENT MESSAGE (OUTPATIENT)
Dept: NEUROLOGY | Facility: CLINIC | Age: 38
End: 2018-08-22

## 2018-08-23 ENCOUNTER — CLINICAL SUPPORT (OUTPATIENT)
Dept: REHABILITATION | Facility: OTHER | Age: 38
End: 2018-08-23
Attending: PSYCHIATRY & NEUROLOGY
Payer: COMMERCIAL

## 2018-08-23 DIAGNOSIS — G89.29 CHRONIC NECK PAIN: ICD-10-CM

## 2018-08-23 DIAGNOSIS — M54.2 CHRONIC NECK PAIN: ICD-10-CM

## 2018-08-23 PROCEDURE — 97110 THERAPEUTIC EXERCISES: CPT

## 2018-08-23 NOTE — PROGRESS NOTES
[facility] Treatment /POC     Name: Caryl Cedeno  Clinic Number: [id number]  Date of Treatment: 2018   Diagnosis:   Encounter Diagnosis   Name Primary?    Chronic neck pain      Physician: Berlin Saleem*    Pain pattern determined: 1 no movement preference, anxiety, fearful of movement, neck sx, POTS, small fiber neuropathy, fibromyalgia   Plan of care signed: 2018  Time in:4:13pm (Pt 11 minutes late for appointment)   Time Out: 5:15 pm  Total Billable Units:45  Precautions: POTS (postural orthostatic tachycardia syndrome) (Chronic)  Small fiber neuropathy - Primary  Fibromyalgia  Neck sx: disc replacement 2016    Visit #: 19     CHRONIC PAIN< MULTIPLE PRECAUTIONS< ANXIETY>PROGRESS WITH CARE  No manual traction/sub occipital release    Visit # authorized: 25  Authorization period: 18  Plan of care Expiration: 18    Reassessment Next due 18    Subjective   Pt reports that she has not been here secondary to vacation.  Pt reports she did pretty good with pain level on vacation which she attributes to the program. She reports he low back hurts (6/10) more than her neck (2/10). She plans on starting treatment for low back pain when completing neck treatment.   .   Patient reports their pain to be 2/10 on a 0-10 scale with 0 being no pain and 10 being the worst pain imaginable.  Pain Location: neck and shoulders     Occupation:     , but not working, working on disability  Leisure: gym 1 if that per week, pool 2/week, sometimes goes to gym                     Pts goals:   Socialize, cleaning, working-tutoring    Objective     Baseline IM Testing Results:   Date of testin2018  ROM 36-90 deg   Max Peak Torque 96    Min Peak Torque 67    Flex/Ext Ratio 1.2/1   % below normative data 67     Midpoint IM Testing Results:   Date of testin2018  ROM  deg   Max Peak Torque 98   Min Peak Torque 69   Flex/Ext Ratio 1.42:1   % below  normative data -56%   Hinojosa compared to IE  +13%    Seat adjustment 291   counterweight .8   Seat pad 0       Outcomes: Cervical  Initial score:59  Visit 5 score:  Goal:      Range of Motion - MOVEMENT LOSS 8/20/18   ROM Loss   Flexion within functional limits   Extension minimal loss   Side bending Right minimal loss   Side bending Left minimal loss   Rotation Right minimal loss   Rotation Left minimal loss   Protraction within functional limits       Treatment    Pt was instructed in and performed the following:     Caryl received therapeutic exercises to develop/improved posture, cardiovascular endurance, muscular endurance, cervical ROM, strength and muscular endurance for 50 minutes including the following exercises:   [facility] 8/23/2018   Visit Number 19   VAS Pain Rating -   Recumbent Bike Seat Pos. -   Time 10   Cervical Stretches - Retraction In Lying 10   Retraction in Sitting 10   Scapular Retraction -   Manual Therapy -   Cervical Extension Seat Pad -   Seat Adjustment -   Top Dead Center -   Counterweight -   Cervical Flexion -   Cervical Extension -   Cervical Peak Torque -   Cervical Weight 72   Repetitions 20   Rating of Perceived Exertion 4   Lumbar Weight -   Repetitions -   Rating of Perceived Exertion -   Ice - Z Lie (in min.) 10     Shoulder shrugs in sitting  Retractions 10 reps in sitting  Scapular adduction with shoulder ER YTB 20x reps  RIS 10x  3-5 s/h  RIL 10x 3-5 s/h  OB 10x   Reviewed PPT for core strengthening.  Scapular retractions 10x     Peripheral muscle strengthening which included 1 set of 15-20 repetitions at a slow, controlled 7 second per rep pace focused on strengthening supporting musculature for improved body mechanics and functional mobility.  Pt and therapist focused on proper form during treatment to ensure optimal strengthening of each targeted muscle group.  Machines were utilized including torso rotation, leg extension, leg curl, chest press, upright row.  "Tricep extension, and hip abduction    Caryl received the following manual therapy techniques: none      Home Exercise Program as follows:   Handouts were given to the patient. Pt demo good understanding of the education provided. Caryl demonstrated good return demonstration of activities.     Swim 2/week  Use lumbar roll  Neck retractions 3/day 10 reps  Shoulder shrugs 3/day 10 reps  Shoulder ext rot and  scap retraction YTB 3/day 10 reps  Cervical retractions in supine with towel roll 10x, 2x daily  Sidelying thoracic rotations "Open Books" 10x, 1-2daily    Lumbar roll use compliance: unknown  Additional exercises taught this treatment session:   RIL with self overpressure 10x       Assessment   Pt tolerated treatment well. Pt performed HEP with minimal v/c for technique. Reduced weight on Med X to 72in/lbs with pt completing 20 reps.  Pt is feeling releif with program and reports she was able to tolerate her trip without significant neck pain.   Pt will continue to benefit from skilled outpatient physical therapy to address the deficits stated in the impairment chart, provide pt/family education and to maximize pt's level of independence in the home and community environment.   Pt's spiritual, cultural and educational needs considered and pt agreeable to plan of care and goals as stated below:     Medical necessity is demonstrated by the following problem list.    Pt presents with the following impairments:   History  Co-morbidities and personal factor after manual thrs that may impact the plan of care Examination  Body Structures and Functions, activity limitations and participation restrictions that may impact the plan of care Clinical Presentation    Decision Making/ Complexity Score   Co-morbidities:      POTS (postural orthostatic tachycardia syndrome) (Chronic)  Small fiber neuropathy - Primary  Fibromyalgia  Neck sx: disc replacement Nov 2016              Personal Factors:   Anxiety  Weak  Fearful of " movement Body Regions:   head  neck  back  lower extremities  upper extremities     Body Systems:   gross symmetry  ROM  strength  gross coordinated movement  balance  gait  transfers  motor control     Activity limitations:   Learning and applying knowledge  no deficits     General Tasks and Commands  no deficits     Communication  no deficits     Mobility  fine hand use (grasping/picking up)  walking  moving around using equipment (WC)  using transportation (bus, train, plane, car)  driving (bike, car, motorcycle)     Self care  washing oneself (bathing, drying, washing hands)  caring for body parts (brushing teeth, shaving, grooming)  toileting  dressing  looking after one's health     Domestic Life  shopping  cooking  doing house work (cleaning house, washing dishes, laundry)  assisting others     Interactions/Relationships  no deficits     Life Areas  no deficits     Community and Social Life  community life  recreation and leisure     Participation Restrictions:  Not shopping, going out, driving       unstable clinical presentation with unpredictable characteristics    high      GOALS: Pt is in agreement with the following goals.     Short term goals: 6 weeks or 10 visits   1.  Pt will demonstrate increased isometric torque by 5% from initial test indicating positive muscular response to program of progressive resistance training Met 6/18/2018  2. Pt will demonstrate reduced pain and improved functional outcomes as reported on the patient centered questionnaires. Report 2-4 points reduction NDI indicating improvement in function and pain Progressing  3.. Pt will demonstrate independence with reducing or controlling symptoms with ther ex, movement, or position independently, able to reduce pain 1-2 points on pain scale using strategies taught in therapy Progressing  4. Pt will demonstrate increased maximum isometric torque value by 30% when compared to the initial value resulting in improved ability to perform  bending, lifting, and carrying activities safely, confidently.Progressing           Long term goals: 13 weeks or 20 visits  1. Pt will demonstratte increased cervical ROM as measured by med ex by 6 degrees from initial test which results in full functional ROM of neck for ease with ADLs and drivingMet 6/18/2018  2. Pt will demonstrate increased isometric torque by 10% from initial test to improve ability to lift and carry. Met 6/18/20183.Patient will   demonstrate improved overall function per FOTO Survey to CJ = at least 20% but < 40% impaired, limited or restricted score or less.  Progressing  4. Pt will demonstrate independence with reducing or controlling symptoms with ther ex, movement, or position independently, able to reduce pain 2-4 points on pain scale using strategies taught in therapy Progressing  5. Pt will demonstrate independence with the HEP at discharge.  Progressing  6.   drive and make a meal, regularly Progressing  7. Not use a scooter to shop Progressing  8.   walk more than 2 blocks Progressing        Plan   Continue wi [facility] 2x/wk x 1 month to complete visits.

## 2018-08-31 ENCOUNTER — OFFICE VISIT (OUTPATIENT)
Dept: INTERNAL MEDICINE | Facility: CLINIC | Age: 38
End: 2018-08-31
Payer: COMMERCIAL

## 2018-08-31 VITALS
WEIGHT: 189.81 LBS | SYSTOLIC BLOOD PRESSURE: 142 MMHG | BODY MASS INDEX: 29.79 KG/M2 | OXYGEN SATURATION: 99 % | HEART RATE: 91 BPM | HEIGHT: 67 IN | DIASTOLIC BLOOD PRESSURE: 98 MMHG | TEMPERATURE: 99 F

## 2018-08-31 DIAGNOSIS — K90.0 CELIAC DISEASE: ICD-10-CM

## 2018-08-31 DIAGNOSIS — D10.9 THROAT PAPILLOMA: ICD-10-CM

## 2018-08-31 DIAGNOSIS — J02.9 ACUTE VIRAL PHARYNGITIS: Primary | ICD-10-CM

## 2018-08-31 PROCEDURE — 99214 OFFICE O/P EST MOD 30 MIN: CPT | Mod: S$GLB,,, | Performed by: INTERNAL MEDICINE

## 2018-08-31 PROCEDURE — 99999 PR PBB SHADOW E&M-EST. PATIENT-LVL IV: CPT | Mod: PBBFAC,,, | Performed by: INTERNAL MEDICINE

## 2018-08-31 PROCEDURE — 3008F BODY MASS INDEX DOCD: CPT | Mod: CPTII,S$GLB,, | Performed by: INTERNAL MEDICINE

## 2018-08-31 NOTE — PATIENT INSTRUCTIONS
Viral Pharyngitis (Sore Throat)    You (or your child, if your child is the patient) have pharyngitis (sore throat). This infection is caused by a virus. It can cause throat pain that is worse when swallowing, aching all over, headache, and fever. The infection may be spread by coughing, kissing, or touching others after touching your mouth or nose. Antibiotic medications do not work against viruses, so they are not used for treating this condition.  Home care  · If your symptoms are severe, rest at home. Return to work or school when you feel well enough.   · Drink plenty of fluids to avoid dehydration.  · For children: Use acetaminophen for fever, fussiness or discomfort. In infants over six months of age, you may use ibuprofen instead of acetaminophen. (NOTE: If your child has chronic liver or kidney disease or ever had a stomach ulcer or GI bleeding, talk with your doctor before using these medicines.) (NOTE: Aspirin should never be used in anyone under 18 years of age who is ill with a fever. It may cause severe liver damage.)   · For adults: You may use acetaminophen or ibuprofen to control pain or fever, unless another medicine was prescribed for this. (NOTE: If you have chronic liver or kidney disease or ever had a stomach ulcer or GI bleeding, talk with your doctor before using these medicines.)  · Throat lozenges or numbing throat sprays can help reduce pain. Gargling with warm salt water will also help reduce throat pain. For this, dissolve 1/2 teaspoon of salt in 1 glass of warm water. To help soothe a sore throat, children can sip on juice or a popsicle. Children 5 years and older can also suck on a lollipop or hard candy.  · Avoid salty or spicy foods, which can be irritating to the throat.  Follow-up care  Follow up with your healthcare provider or our staff if you are not improving over the next week.  When to seek medical advice  Call your healthcare provider right away if any of these  occur:  · Fever as directed by your doctor.  For children, seek care if:  ¨ Your child is of any age and has repeated fevers above 104°F (40°C).  ¨ Your child is younger than 2 years of age and has a fever of 100.4°F (38°C) that continues for more than 1 day.  ¨ Your child is 2 years old or older and has a fever of 100.4°F (38°C) that continues for more than 3 days.  · New or worsening ear pain, sinus pain, or headache  · Painful lumps in the back of neck  · Stiff neck  · Lymph nodes are getting larger  · Inability to swallow liquids, excessive drooling, or inability to open mouth wide due to throat pain  · Signs of dehydration (very dark urine or no urine, sunken eyes, dizziness)  · Trouble breathing or noisy breathing  · Muffled voice  · New rash  · Child appears to be getting sicker  Date Last Reviewed: 4/13/2015  © 0852-5738 [employer]. [address] [place] [address], [place] [address]. All rights reserved. This information is not intended as a substitute for professional medical care. Always follow your healthcare professional's instructions.

## 2018-08-31 NOTE — PROGRESS NOTES
"Subjective:       Patient ID: Caryl Cedeno is a 38 y.o. female.    Chief Complaint: Sore Throat    HPI  39 y/o woman with h/o small fiber neuropathy, POTS, fibromyalgia, throat papilloma here for urgent visit for sore throat.    Sore throat x 3 days, feels chilled / sometimes sweats. Afebrile on clinic temp checks. Has been taking ibuprofen. Concerned about having "lumps in throat".  +cough, not productive. +ear fullness/pressure, but not pain. No sinus pain.   +hoarseness  +loose stool recently - pt attributes to her celiac disease, has this occasionally    Has been eating soup, cough drops, +chloraseptic spray, +ibuprofen  Nephew sick 3 weeks ago, no other sick contacts    Not taking her mesalamine regularly, only if having GI symptoms.  Per recent GI note - has celiac, not crohn's. Follows strict gluten free diet and has for years.     Review of Systems   Constitutional: Negative for activity change and fever (subjective; no objective fevers).   HENT: Positive for sore throat. Negative for congestion, ear pain, sinus pain, trouble swallowing and voice change.    Eyes: Negative for visual disturbance.   Respiratory: Positive for cough. Negative for shortness of breath.    Cardiovascular: Negative for chest pain and leg swelling.   Gastrointestinal: Positive for diarrhea. Negative for abdominal pain and blood in stool.        Notes having intermittent GI symptoms (pain, bloating, loose stool) as well as sometimes oral ulcerations/irritation; not currently   Genitourinary: Negative.    Musculoskeletal: Negative for gait problem and joint swelling.   Skin: Negative for rash.   Neurological: Negative.    Psychiatric/Behavioral: Negative.          Past medical history, surgical history, and family medical history reviewed and updated as appropriate.    Medications and allergies reviewed.     Objective:          Vitals:    08/31/18 1435   BP: (!) 142/98   BP Location: Right arm   Patient Position: Sitting   BP " "Method: Large (Manual)   Pulse: 91   Temp: 98.5 °F (36.9 °C)   SpO2: 99%   Weight: 86.1 kg (189 lb 13.1 oz)   Height: 5' 6.5" (1.689 m)     Body mass index is 30.18 kg/m².  Physical Exam   Constitutional: She is oriented to person, place, and time. She appears well-developed and well-nourished. No distress.   HENT:   Head: Normocephalic and atraumatic.   Right Ear: External ear normal.   Left Ear: External ear normal.   Nose: Nose normal. Right sinus exhibits no maxillary sinus tenderness and no frontal sinus tenderness. Left sinus exhibits no maxillary sinus tenderness and no frontal sinus tenderness.   Mouth/Throat: Mucous membranes are normal. No oral lesions. Posterior oropharyngeal erythema (mild) present. No oropharyngeal exudate or posterior oropharyngeal edema. No tonsillar exudate.   Small smooth nodule at tonsillar pillar on L, 5mm   Eyes: Conjunctivae and EOM are normal.   Neck: Neck supple. No thyromegaly present.   Cardiovascular: Normal rate, regular rhythm and normal heart sounds.   Pulmonary/Chest: Effort normal and breath sounds normal. No respiratory distress. She has no wheezes. She has no rales.   Abdominal: Soft. Bowel sounds are normal. She exhibits no distension. There is no tenderness.   Musculoskeletal: She exhibits no edema.   Lymphadenopathy:     She has cervical adenopathy (mildly tender slightly enlarged submandibular LAD).   Neurological: She is alert and oriented to person, place, and time. No cranial nerve deficit. Gait normal.   Skin: Skin is warm and dry.   Psychiatric: She has a normal mood and affect.   Vitals reviewed.      Lab Results   Component Value Date    WBC 7.04 11/22/2017    HGB 13.9 11/22/2017    HCT 41.4 11/22/2017     11/22/2017    ALT 47 (H) 11/22/2017    AST 35 11/22/2017     08/20/2018    K 3.7 08/20/2018     08/20/2018    CREATININE 1.0 08/20/2018    BUN 11 08/20/2018    CO2 27 08/20/2018    TSH 1.536 11/27/2017       Assessment:       1. Acute " "viral pharyngitis    2. Throat papilloma        Plan:   Caryl was seen today for sore throat.    Diagnoses and all orders for this visit:    Acute viral pharyngitis - counseled re: home symptom-focused treatment and normal progression of viral URI symptoms  Reviewed return precautions  Reassured patient that second "lump" in throat she has noted appears to be her tonsil which is mildly inflamed but otherwise normal.     Throat papilloma - no change per patient; counseled that she can return for follow up with ENT if desired    Celiac disease - she has questions about persistent GI symptoms despite following consistent gluten-free diet. Recommended she follow up with GI for this.    Follow-up if symptoms worsen or fail to improve.    Alex Pearce MD  Internal Medicine  [facility]  8/31/2018      "

## 2018-09-06 ENCOUNTER — PATIENT MESSAGE (OUTPATIENT)
Dept: NEUROLOGY | Facility: CLINIC | Age: 38
End: 2018-09-06

## 2018-09-10 ENCOUNTER — CLINICAL SUPPORT (OUTPATIENT)
Dept: REHABILITATION | Facility: OTHER | Age: 38
End: 2018-09-10
Attending: PSYCHIATRY & NEUROLOGY
Payer: COMMERCIAL

## 2018-09-10 DIAGNOSIS — M54.2 CHRONIC NECK PAIN: ICD-10-CM

## 2018-09-10 DIAGNOSIS — G89.29 CHRONIC NECK PAIN: ICD-10-CM

## 2018-09-10 PROCEDURE — 97110 THERAPEUTIC EXERCISES: CPT

## 2018-09-10 NOTE — PROGRESS NOTES
[facility] Treatment /DC     Name: Caryl Cedeno  Clinic Number: [id number]  Date of Treatment: 09/10/2018   Diagnosis:   Encounter Diagnosis   Name Primary?    Chronic neck pain      Physician: Berlin Saleem*    Pain pattern determined: 1 no movement preference, anxiety, fearful of movement, neck sx, POTS, small fiber neuropathy, fibromyalgia   Plan of care signed: 2018  Time in: 1505  Time Out: 1600  Total Billable Units: 45 minutes  Precautions: POTS (postural orthostatic tachycardia syndrome) (Chronic)  Small fiber neuropathy - Primary  Fibromyalgia  Neck sx: disc replacement 2016    Visit #: 20     CHRONIC PAIN< MULTIPLE PRECAUTIONS< ANXIETY>PROGRESS WITH CARE  No manual traction/sub occipital release    Visit # authorized: 25  Authorization period: 18  Plan of care Expiration: 18    Reassessment Next due 18    Enrique Clark arrives to PT today and reports 6-7/10 Neck pain. She had not been here for a little while, since 18. She agrees that today was her last visit for her neck. She  Agrees to proceed and transition to wellness for her neck while waiting for her    .   Patient reports their pain to be 6-7 /10 on a 0-10 scale with 0 being no pain and 10 being the worst pain imaginable.  Pain Location: neck and shoulders     Occupation:     , but not working, working on disability  Leisure: gym 1 if that per week, pool 2/week, sometimes goes to gym                     Pts goals:   Socialize, cleaning, working-tutoring    Objective     Gross AROM Cervical spine WNL in all planes.      Baseline IM Testing Results:   Date of testin2018  ROM 36-90 deg   Max Peak Torque 96    Min Peak Torque 67    Flex/Ext Ratio 1.2/1   % below normative data 67     Midpoint IM Testing Results:   Date of testin2018  ROM  deg   Max Peak Torque 98   Min Peak Torque 69   Flex/Ext Ratio 1.42:1   % below normative data -56%   Hinojosa  compared to IE  +13%    Seat adjustment 291   counterweight .8   Seat pad 0     Visit 20  Date of testin1018  ROM  deg   Max Peak Torque 143   Min Peak Torque 102   Flex/Ext Ratio 1.40:1   % below normative data -40%   Hinojosa compared to IE  +60%    Outcomes: Cervical  Initial score:59  Visit 5 score:  Visit 20: 9/10/18 at 59% (Patient has not been in to PT since 18)  Goal:      Range of Motion - MOVEMENT LOSS 18 (No changes 9/10/18)   ROM Loss   Flexion within functional limits   Extension minimal loss   Side bending Right minimal loss   Side bending Left minimal loss   Rotation Right minimal loss   Rotation Left minimal loss   Protraction within functional limits       Treatment    Pt was instructed in and performed the following:     Caryl received therapeutic exercises to develop/improved posture, cardiovascular endurance, muscular endurance, cervical ROM, strength and muscular endurance for 30  minutes including the following exercises:     [facility] 9/10/2018   Visit Number 20   VAS Pain Rating 7   Recumbent Bike Seat Pos. -   Time -   Cervical Stretches - Retraction In Lying -   Retraction in Sitting -   Scapular Retraction -   Manual Therapy 15   Cervical Extension Seat Pad -   Seat Adjustment -   Top Dead Center -   Counterweight -   Cervical Flexion -   Cervical Extension -   Cervical Peak Torque -   Cervical Weight -   Repetitions -   Rating of Perceived Exertion -   Lumbar Flexion 108   Lumbar Extension 30   Lumbar Peak Torque 143   Min Torque 102   Test Percent Below Normative Data 40   Test Percent Gain in Strength from Initial  60   Lumbar Weight -   Repetitions -   Rating of Perceived Exertion -   Ice - Z Lie (in min.) 10       Shoulder shrugs in sitting  Retractions 10 reps in sitting  Scapular adduction with shoulder ER YTB 20x reps  RIS 10x  3-5 s/h  RIL 10x 3-5 s/h  OB 10x   Reviewed PPT for core strengthening.  Scapular retractions 10x     Peripheral muscle  "strengthening which included 1 set of 15-20 repetitions at a slow, controlled 7 second per rep pace focused on strengthening supporting musculature for improved body mechanics and functional mobility.  Pt and therapist focused on proper form during treatment to ensure optimal strengthening of each targeted muscle group.  Machines were utilized including torso rotation, leg extension, leg curl, chest press, upright row. Tricep extension, and hip abduction    Caryl received the following manual therapy techniques: 15 Minutes  STM/MFR with Jai to B Cervico-thoracic  muscles in supine.       Home Exercise Program as follows:   Handouts were given to the patient. Pt demo good understanding of the education provided. Caryl demonstrated good return demonstration of activities.     Swim 2/week  Use lumbar roll  Neck retractions 3/day 10 reps  Shoulder shrugs 3/day 10 reps  Shoulder ext rot and  scap retraction YTB 3/day 10 reps  Cervical retractions in supine with towel roll 10x, 2x daily  Sidelying thoracic rotations "Open Books" 10x, 1-2daily    Lumbar roll use compliance: unknown  Additional exercises taught this treatment session:   RIL with self overpressure 10x       Assessment     Caryl displayed significant improvements with c-spine mobility and extensor strength gain per testing today. She has not been to PT since 8/23/18, and she arrived flared up today, with neck pain at 6-7/10. She received manual therapy with Jai, which brought her pain down to 3-4/10. Patient improved her strength by 38% from the last testing, and by 60% overall from IE. Her max peak torque reach 143, and min at 102. She was 40% below norm today. Patient will transition to Wellness to continue to address neck stability and strengthening. This is her last visit woth program for her neck. Patient will return for an assessment of her lower back.     Pt will continue to benefit from skilled outpatient physical therapy to address the " deficits stated in the impairment chart, provide pt/family education and to maximize pt's level of independence in the home and community environment.   Pt's spiritual, cultural and educational needs considered and pt agreeable to plan of care and goals as stated below:     Medical necessity is demonstrated by the following problem list.    Pt presents with the following impairments:   History  Co-morbidities and personal factor after manual thrs that may impact the plan of care Examination  Body Structures and Functions, activity limitations and participation restrictions that may impact the plan of care Clinical Presentation    Decision Making/ Complexity Score   Co-morbidities:      POTS (postural orthostatic tachycardia syndrome) (Chronic)  Small fiber neuropathy - Primary  Fibromyalgia  Neck sx: disc replacement Nov 2016              Personal Factors:   Anxiety  Weak  Fearful of movement Body Regions:   head  neck  back  lower extremities  upper extremities     Body Systems:   gross symmetry  ROM  strength  gross coordinated movement  balance  gait  transfers  motor control     Activity limitations:   Learning and applying knowledge  no deficits     General Tasks and Commands  no deficits     Communication  no deficits     Mobility  fine hand use (grasping/picking up)  walking  moving around using equipment (WC)  using transportation (bus, train, plane, car)  driving (bike, car, motorcycle)     Self care  washing oneself (bathing, drying, washing hands)  caring for body parts (brushing teeth, shaving, grooming)  toileting  dressing  looking after one's health     Domestic Life  shopping  cooking  doing house work (cleaning house, washing dishes, laundry)  assisting others     Interactions/Relationships  no deficits     Life Areas  no deficits     Community and Social Life  community life  recreation and leisure     Participation Restrictions:  Not shopping, going out, driving       unstable clinical  presentation with unpredictable characteristics    high      GOALS: Pt is in agreement with the following goals.     Short term goals: 6 weeks or 10 visits   1.  Pt will demonstrate increased isometric torque by 5% from initial test indicating positive muscular response to program of progressive resistance training Met 6/18/2018  2. Pt will demonstrate reduced pain and improved functional outcomes as reported on the patient centered questionnaires. Report 2-4 points reduction NDI indicating improvement in function and pain Progressing  3.. Pt will demonstrate independence with reducing or controlling symptoms with ther ex, movement, or position independently, able to reduce pain 1-2 points on pain scale using strategies taught in therapy Progressing  4. Pt will demonstrate increased maximum isometric torque value by 30% when compared to the initial value resulting in improved ability to perform bending, lifting, and carrying activities safely, confidently.Progressing           Long term goals: 13 weeks or 20 visits  1. Pt will demonstratte increased cervical ROM as measured by med ex by 6 degrees from initial test which results in full functional ROM of neck for ease with ADLs and drivingMet 6/18/2018  2. Pt will demonstrate increased isometric torque by 10% from initial test to improve ability to lift and carry. Met 6/18/20183.Patient will   demonstrate improved overall function per FOTO Survey to CJ = at least 20% but < 40% impaired, limited or restricted score or less.  Progressing  4. Pt will demonstrate independence with reducing or controlling symptoms with ther ex, movement, or position independently, able to reduce pain 2-4 points on pain scale using strategies taught in therapy Progressing  5. Pt will demonstrate independence with the HEP at discharge.  Progressing  6.   drive and make a meal, regularly Progressing  7. Not use a scooter to shop Progressing  8.   walk more than 2 blocks  Progressing        Plan   Continue [facility] 2x/wk x 1 month to complete visits.

## 2018-09-13 ENCOUNTER — DOCUMENTATION ONLY (OUTPATIENT)
Dept: REHABILITATION | Facility: OTHER | Age: 38
End: 2018-09-13
Attending: PSYCHIATRY & NEUROLOGY
Payer: COMMERCIAL

## 2018-09-13 NOTE — PROGRESS NOTES
Health  Wellness Visit Note    Name: Caryl Cedeno  Clinic Number: [id number]  Physician: Berlin Saleem*  Diagnosis: No diagnosis found.  Past Medical History:   Diagnosis Date    Celiac disease     Fibromyalgia     Migraines     Small fiber neuropathy      Visit Number: 21  Precautions: POTS (postural orthostatic tachycardia syndrome) (Chronic)  Small fiber neuropathy - Primary  Fibromyalgia  Neck sx: disc replacement Nov 2016  6 Month: November 12 Month: May  Time In: 12:30  Time Out: 1:15  Total Treatment Time: 45minutes    Subjective:   Patient reports having pain in her right hip, and having pain in her neck. She said the pain in her neck is ok unless she moves, but the pain in her back is constant. Pt had been inconsistent with going in for therapy due to no shows, cancellations, and no available times. Pt is not very compliant to HEP, but she said she's trying to be better. Pt is trying to go back through [facility] for her back and was hoping to do wellness and [facility]. Pt struggles with dizziness on torso rotation and chest press. She has to do the torso rotation last because the dizziness isn't as bad if she spaces it out from the cervical machine.     Objective:   Caryl completed therapeutic stretches (EIL, MATI) and the following MedX exercise machines: core lumbar, torso rotation l/r, leg extension, leg curl, upright row, chest press, biceps curl, triceps extension, leg press    See exercise log in patient folder for rate of exertion and repetitions completed.     Pt did hip abduction upstairs in [facility] after.     Assessment:   Patient tolerated Cervical MedX machine and all peripheral machines with minimal pain, but did have dizziness on the torso rotation and the chest press. Pt did warm up on bike and iced in [facility] after visit.     Plan:  Continue with established plan of care towards wellness goals. Pt has one free visit left, but is thinking of doing Plan B.    Health  : Lola Aguilar  PCT  9/13/2018

## 2018-09-17 ENCOUNTER — DOCUMENTATION ONLY (OUTPATIENT)
Dept: REHABILITATION | Facility: OTHER | Age: 38
End: 2018-09-17
Attending: PSYCHIATRY & NEUROLOGY
Payer: COMMERCIAL

## 2018-09-17 NOTE — PROGRESS NOTES
"Health  Wellness Visit Note    Name: Caryl Cedeno  Clinic Number: [id number]  Physician: Berlin Saleem*  Diagnosis: No diagnosis found.  Past Medical History:   Diagnosis Date    Celiac disease     Fibromyalgia     Migraines     Small fiber neuropathy      Visit Number: 22  Precautions: POTS (postural orthostatic tachycardia syndrome) (Chronic)  Small fiber neuropathy - Primary  Fibromyalgia  Neck sx: disc replacement Nov 2016  6 Month: November 12 Month: May  Time In: 5:00PM  Time Out:5:40 PM  Total TreatmentTime: 40minutes    Subjective:   Patient reports decreased pain today, says she is feeling ok. Last session she was very flared up, she had not exercised in a few weeks. Pt was having some neck soreness earlier but stretched and "rested" her neck which really helped. She has become more aware of her posture and keeping better form. She doesice at home trying to get her to be more complaint with stretching. Will be out of town for a week.       Objective:   Caryl completed therapeutic stretches (EIL, MATI) and the following MedX exercise machines: core lumbar, torso rotation l/r, leg extension, leg curl, upright row, chest press, biceps curl, triceps extension, leg press    See exercise log in patient folder for rate of exertion and repetitions completed.     Pt did hip abduction upstairs in [facility] after.     Assessment:   Patient tolerated Cervical MedX machine and all peripheral machines with minimal pain, but did have dizziness on the torso rotation and the chest press. Warm up on bike and ice.     Plan:  Continue with established plan of care towards wellness goals. Pt will do Plan B, until she is re admitted into [facility], then will do Plan A.     Health  : Osito Lizama  9/17/2018    "

## 2018-09-25 ENCOUNTER — PATIENT MESSAGE (OUTPATIENT)
Dept: NEUROLOGY | Facility: CLINIC | Age: 38
End: 2018-09-25

## 2018-09-28 ENCOUNTER — DOCUMENTATION ONLY (OUTPATIENT)
Dept: REHABILITATION | Facility: OTHER | Age: 38
End: 2018-09-28
Attending: PSYCHIATRY & NEUROLOGY
Payer: COMMERCIAL

## 2018-09-28 DIAGNOSIS — R20.0 NUMBNESS AND TINGLING OF BOTH LOWER EXTREMITIES: Primary | ICD-10-CM

## 2018-09-28 DIAGNOSIS — G89.29 CHRONIC BILATERAL LOW BACK PAIN WITHOUT SCIATICA: ICD-10-CM

## 2018-09-28 DIAGNOSIS — M54.50 CHRONIC BILATERAL LOW BACK PAIN WITHOUT SCIATICA: ICD-10-CM

## 2018-09-28 DIAGNOSIS — R20.2 NUMBNESS AND TINGLING OF BOTH LOWER EXTREMITIES: Primary | ICD-10-CM

## 2018-09-28 NOTE — PROGRESS NOTES
Health  Wellness Visit Note    Name: Caryl Cedeno  Clinic Number: [id number]  Physician: Berlin Saleem*  Diagnosis: No diagnosis found.  Past Medical History:   Diagnosis Date    Celiac disease     Fibromyalgia     Migraines     Small fiber neuropathy      Visit Number: 23  Precautions: POTS (postural orthostatic tachycardia syndrome) (Chronic)  Small fiber neuropathy - Primary  Fibromyalgia  Neck sx: disc replacement Nov 2016  6 Month: November 12 Month: May  Time In: 12:30PM  Time Out: 1:15 PM  Total TreatmentTime: 45 minutes    Subjective:   Patient reports decreased pain today, says she is feeling ok with her neck; she is having some soreness in her back though. She says she had a migraine for two days after returning from her trip but says it is better now. She was able to complete 20 reps on all machines today; none of the machines increased her pain. She has become more aware of her posture and keeping better form. She doesice at home trying to get her to be more complaint with stretching.       Objective:   Caryl completed therapeutic stretches (EIL, MATI) and the following MedX exercise machines: core lumbar, torso rotation l/r, leg extension, leg curl, upright row, chest press, biceps curl, triceps extension, leg press    See exercise log in patient folder for rate of exertion and repetitions completed.     Pt did hip abduction upstairs in [facility] after.     Assessment:   Patient tolerated Cervical MedX machine and all peripheral machines with minimal pain, but did have dizziness on the torso rotation and the chest press. Warm up on bike and ice.     Plan:  Continue with established plan of care towards wellness goals. Pt will do Plan B, until she is re admitted into [facility], then will do Plan A.     Health  : Maico Shearer, PCT  9/28/2018

## 2018-10-01 ENCOUNTER — DOCUMENTATION ONLY (OUTPATIENT)
Dept: REHABILITATION | Facility: OTHER | Age: 38
End: 2018-10-01
Attending: PSYCHIATRY & NEUROLOGY
Payer: COMMERCIAL

## 2018-10-01 NOTE — PROGRESS NOTES
Health  Wellness Visit Note    Name: Caryl Cedeno  Clinic Number: [id number]  Physician: Berlin Saleem*  Diagnosis: No diagnosis found.  Past Medical History:   Diagnosis Date    Celiac disease     Fibromyalgia     Migraines     Small fiber neuropathy      Visit Number: 24  Precautions: POTS (postural orthostatic tachycardia syndrome) (Chronic)  Small fiber neuropathy - Primary  Fibromyalgia  Neck sx: disc replacement Nov 2016  6 Month: November 12 Month: May  Time In: 4:50PM  Time Out: 5:50PM  Total TreatmentTime:  60minutes    Subjective:   Patient reports feeling ok today, says her migraine is gone for now. Says neck is always sore the next day after exercising, encouraged her to ice later in the day, only ices when it starts to hurt. Staying compliant with HEP throughout the day. She has been a little dizzy lately, in the process of changing her BP medicine, could not do torso rotation today. Stated she made a appointment for her back, isn't until November, in the mean time she will monitor her back motion and do the stretches she knows. Has become more aware of her posture and keeping better form.       Objective:   Caryl completed therapeutic stretches (EIL, MATI) and the following MedX exercise machines: core lumbar, torso rotation l/r, leg extension, leg curl, upright row, chest press, biceps curl, triceps extension, leg press    See exercise log in patient folder for rate of exertion and repetitions completed.     Pt did hip abduction upstairs in [facility] after.     Assessment:   Patient tolerated Cervical MedX machine and all peripheral machines with minimal pain, but did have dizziness on the torso rotation and the chest press. Warm up on bike and ice.     Plan:  Continue with established plan of care towards wellness goals. Pt will do Plan B, until she is re admitted into [facility], then will do Plan A.     Health  : Osito Lizama  10/1/2018

## 2018-10-02 DIAGNOSIS — G43.109 MIGRAINE WITH AURA AND WITHOUT STATUS MIGRAINOSUS, NOT INTRACTABLE: Primary | ICD-10-CM

## 2018-10-04 ENCOUNTER — OFFICE VISIT (OUTPATIENT)
Dept: INTERNAL MEDICINE | Facility: CLINIC | Age: 38
End: 2018-10-04
Payer: COMMERCIAL

## 2018-10-04 VITALS
DIASTOLIC BLOOD PRESSURE: 88 MMHG | OXYGEN SATURATION: 96 % | BODY MASS INDEX: 29.41 KG/M2 | WEIGHT: 187.38 LBS | HEIGHT: 67 IN | SYSTOLIC BLOOD PRESSURE: 140 MMHG | HEART RATE: 107 BPM | TEMPERATURE: 99 F

## 2018-10-04 DIAGNOSIS — N39.0 URINARY TRACT INFECTION WITHOUT HEMATURIA, SITE UNSPECIFIED: Primary | ICD-10-CM

## 2018-10-04 PROCEDURE — 99999 PR PBB SHADOW E&M-EST. PATIENT-LVL IV: CPT | Mod: PBBFAC,,, | Performed by: INTERNAL MEDICINE

## 2018-10-04 PROCEDURE — 3008F BODY MASS INDEX DOCD: CPT | Mod: CPTII,S$GLB,, | Performed by: INTERNAL MEDICINE

## 2018-10-04 PROCEDURE — 99214 OFFICE O/P EST MOD 30 MIN: CPT | Mod: S$GLB,,, | Performed by: INTERNAL MEDICINE

## 2018-10-04 RX ORDER — PHENAZOPYRIDINE HYDROCHLORIDE 100 MG/1
100 TABLET, FILM COATED ORAL 3 TIMES DAILY PRN
Qty: 21 TABLET | Refills: 0 | Status: SHIPPED | OUTPATIENT
Start: 2018-10-04 | End: 2018-10-14

## 2018-10-04 RX ORDER — NITROFURANTOIN 25; 75 MG/1; MG/1
100 CAPSULE ORAL EVERY 12 HOURS
Qty: 10 CAPSULE | Refills: 0 | Status: SHIPPED | OUTPATIENT
Start: 2018-10-04 | End: 2018-10-09

## 2018-10-04 NOTE — PROGRESS NOTES
Subjective:       Patient ID: Caryl Cedeno is a 38 y.o. female.    Chief Complaint: Urinary Tract Infection and Back Pain    HPI - Ms Cedeno has had UTI symptoms for the past 2-3 days, with dysuria, frequency and worsening obstruction.  In addition, she has had back pain.  No f/c.  S/S typical for UTI for her.  She has a fairly complex PMH with neuropathies and FM.  Not a smoker    Pmh/meds:  Reviewed and reconciled in EPIC with patient during visit today.    Review of Systems   Constitutional: Negative for fever.   HENT: Negative for congestion.    Respiratory: Negative for shortness of breath.    Cardiovascular: Negative for chest pain.   Gastrointestinal: Negative for abdominal pain.   Genitourinary: Positive for difficulty urinating, dysuria, flank pain, frequency and hematuria.   Musculoskeletal: Positive for back pain.   Skin: Negative for rash.   Neurological: Negative for headaches.   Psychiatric/Behavioral: Positive for sleep disturbance.       Objective:      Physical Exam   Constitutional: She is oriented to person, place, and time. She appears well-developed and well-nourished.   Moderate distress.  Standing and pacing during much of interview and exam   HENT:   Head: Normocephalic and atraumatic.   Cardiovascular: Normal rate, regular rhythm and normal heart sounds. Exam reveals no gallop and no friction rub.   No murmur heard.  Pulmonary/Chest: Effort normal and breath sounds normal. No respiratory distress. She has no wheezes. She has no rales. She exhibits no tenderness.   Genitourinary:   Genitourinary Comments: Bilateral mild flank tenderness   Neurological: She is alert and oriented to person, place, and time.   Skin: Skin is warm and dry. No erythema.   Psychiatric: She has a normal mood and affect.   Nursing note and vitals reviewed.      Assessment:       1. Urinary tract infection without hematuria, site unspecified        Plan:       Caryl was seen today for urinary tract infection  and back pain.    Diagnoses and all orders for this visit:    Urinary tract infection without hematuria, site unspecified - pregnancy test negative.  Will start pyridium for dysuria and abx for UTI symptoms.  She needs a primary provider in addition to her neurologist and other specialists.    -     PREGNANCY TEST, URINE RAPID  -     phenazopyridine (PYRIDIUM) 100 MG tablet; Take 1 tablet (100 mg total) by mouth 3 (three) times daily as needed for Pain.  -     nitrofurantoin, macrocrystal-monohydrate, (MACROBID) 100 MG capsule; Take 1 capsule (100 mg total) by mouth every 12 (twelve) hours. for 5 days    rtc prn    ALIN Cárdenas MD MPH  Staff Internist

## 2018-10-11 ENCOUNTER — NURSE TRIAGE (OUTPATIENT)
Dept: ADMINISTRATIVE | Facility: CLINIC | Age: 38
End: 2018-10-11

## 2018-10-12 NOTE — TELEPHONE ENCOUNTER
Reason for Disposition   Patient sounds very sick or weak to the triager    Protocols used: ST DIZZINESS - [id number]    Pt calling regarding getting out of shower and body began to buzz inside and dizziness.   Buzzing feeling stopped.  Pt continues with dizziness-mild to moderate Pt is lying down.  Care advice given.

## 2018-10-16 ENCOUNTER — DOCUMENTATION ONLY (OUTPATIENT)
Dept: REHABILITATION | Facility: OTHER | Age: 38
End: 2018-10-16
Attending: PSYCHIATRY & NEUROLOGY
Payer: COMMERCIAL

## 2018-10-16 NOTE — PROGRESS NOTES
Health  Wellness Visit Note    Name: Caryl Cedeno  Clinic Number: [id number]  Physician: Berlin Saleem*  Diagnosis: No diagnosis found.  Past Medical History:   Diagnosis Date    Celiac disease     Fibromyalgia     Migraines     Small fiber neuropathy      Visit Number: 25  Precautions: POTS (postural orthostatic tachycardia syndrome) (Chronic)  Small fiber neuropathy - Primary  Fibromyalgia  Neck sx: disc replacement Nov 2016  6 Month: November 12 Month: May  Time In: 1:20pM  Time Out: 2:20P  Total TreatmentTime:  40minutes    Subjective:   Patient reports some neck and R arm pain today, says she was holding her phone awkwardly and was not paying attention. This happened a few days, and has not gotten better yet. Will continue stretching and using ice. Says neck is always sore the next day after exercising. Still feeling a little dizzy, medication for BP has not taken effect yet. Has a appointment for her back, isn't until November, in the mean time she will monitor her back motion and do the stretches she knows. Has become more aware of her posture and keeping better form.       Objective:   Caryl completed therapeutic stretches (EIL, MATI) and the following MedX exercise machines: core lumbar, torso rotation l/r, leg extension, leg curl, upright row, chest press, biceps curl, triceps extension, leg press    See exercise log in patient folder for rate of exertion and repetitions completed.     Pt did hip abduction upstairs in [facility] after.     Assessment:   Patient tolerated Cervical MedX machine and all peripheral machines with minimal pain, but did have dizziness on the torso rotation and the chest press. Warm up on bike and ice.     Plan:  Continue with established plan of care towards wellness goals. Pt will do Plan B, until she is re admitted into [facility], then will do Plan A.     Health  : Osito Lizama  10/16/2018

## 2018-10-18 ENCOUNTER — DOCUMENTATION ONLY (OUTPATIENT)
Dept: REHABILITATION | Facility: OTHER | Age: 38
End: 2018-10-18
Attending: PSYCHIATRY & NEUROLOGY
Payer: COMMERCIAL

## 2018-10-18 NOTE — PROGRESS NOTES
Health  Wellness Visit Note    Name: Caryl Cedeno  Clinic Number: [id number]  Physician: Berlin Saleem*  Diagnosis: No diagnosis found.  Past Medical History:   Diagnosis Date    Celiac disease     Fibromyalgia     Migraines     Small fiber neuropathy      Visit Number: 26  Precautions: POTS (postural orthostatic tachycardia syndrome) (Chronic)  Small fiber neuropathy - Primary  Fibromyalgia  Neck sx: disc replacement Nov 2016  6 Month: November 12 Month: May  Time In: 1:45PM  Time Out: 2:30PM  Total TreatmentTime:  45minutes    Subjective:   Patient reports having some low back pain today. She said she was feeling pain more on there left side today, but usually she experiences back pain on her left side. She said her neck and arm is feeling better today, and she was able to complete the arm exercises today. Says neck is always sore the next day after exercising. Still feeling a little dizzy, medication for BP has not taken effect yet. She goes to [facility] for her back in early November. In the mean time she will monitor her back motion and do the stretches she knows. Has become more aware of her posture and keeping better form.     Objective:   Caryl completed therapeutic stretches (EIL, MATI) and the following MedX exercise machines: core lumbar, torso rotation l/r, leg extension, leg curl, upright row, chest press, biceps curl, triceps extension, leg press    See exercise log in patient folder for rate of exertion and repetitions completed.     Pt did hip abduction upstairs in [facility] after.     Assessment:   Patient tolerated Cervical MedX machine and all peripheral machines with minimal pain, but did have dizziness on the torso rotation and the chest press. Warm up on bike and ice.     Plan:  Continue with established plan of care towards wellness goals. Pt will do Plan B, until she is re admitted into [facility], then will do Plan A.     Health  : Lola Aguilar, PCT  10/18/2018

## 2018-10-24 ENCOUNTER — DOCUMENTATION ONLY (OUTPATIENT)
Dept: REHABILITATION | Facility: OTHER | Age: 38
End: 2018-10-24
Attending: PSYCHIATRY & NEUROLOGY
Payer: COMMERCIAL

## 2018-10-24 NOTE — PROGRESS NOTES
Health  Wellness Visit Note    Name: Caryl Cedeno  Clinic Number: [id number]  Physician: Berlin Saleem*  Diagnosis: No diagnosis found.  Past Medical History:   Diagnosis Date    Celiac disease     Fibromyalgia     Migraines     Small fiber neuropathy      Visit Number: 27  Precautions: POTS (postural orthostatic tachycardia syndrome) (Chronic)  Small fiber neuropathy - Primary  Fibromyalgia  Neck sx: disc replacement Nov 2016  6 Month: November 12 Month: May  Time In: 2:00PM  Time Out: 3:00PM  Total TreatmentTime:  60minutes    Subjective:   Patient reports having an overall good day today. She said she's not dizzy today but is feeling some general aches. Her biggest complaint is her low back today. She said she was trying to push herself a little on the bike today, and she did a mile and a half in 10 min (before was a mile in 10min). Pt goes to cardiologist today to check in on her BP medication.  She goes to [facility] for her back in early November. In the mean time she will monitor her back motion and do the stretches she knows. Has become more aware of her posture and keeping better form.     Objective:   Caryl completed therapeutic stretches (EIL, MATI) and the following MedX exercise machines: core lumbar, torso rotation l/r, leg extension, leg curl, upright row, chest press, biceps curl, triceps extension, leg press    See exercise log in patient folder for rate of exertion and repetitions completed.     Pt did hip abduction upstairs in [facility] after.     Assessment:   Patient tolerated Cervical MedX machine and all peripheral machines with minimal pain, but did have dizziness on the torso rotation and the chest press. Warm up on bike and ice.     Plan:  Continue with established plan of care towards wellness goals. Pt will do Plan B, until she is re admitted into [facility], then will do Plan A.     Health  : Lola Aguilar, PCT  10/24/2018

## 2018-10-26 ENCOUNTER — DOCUMENTATION ONLY (OUTPATIENT)
Dept: REHABILITATION | Facility: OTHER | Age: 38
End: 2018-10-26
Attending: PSYCHIATRY & NEUROLOGY
Payer: COMMERCIAL

## 2018-10-26 NOTE — PROGRESS NOTES
Health  Wellness Visit Note    Name: Caryl Cedeno  Clinic Number: [id number]  Physician: Berlin Saleem*  Diagnosis: No diagnosis found.  Past Medical History:   Diagnosis Date    Celiac disease     Fibromyalgia     Migraines     Small fiber neuropathy      Visit Number: 28  Precautions: POTS (postural orthostatic tachycardia syndrome) (Chronic)  Small fiber neuropathy - Primary  Fibromyalgia  Neck sx: disc replacement Nov 2016  6 Month: November 12 Month: May  Time In: 12:30PM  Time Out: 1:20PM  Total TreatmentTime:  50 minutes    Subjective:   Patient reports having an overall good day today. She said she's not dizzy today but is feeling some general aches. Her biggest complaint is her low back today. She said she was trying to push herself a little on the bike today, and she did a mile and a half in 10 min (before was a mile in 10min). Pt goes to cardiologist today to check in on her BP medication.  She goes to [facility] for her back in early November. In the mean time she will monitor her back motion and do the stretches she knows. Has become more aware of her posture and keeping better form. Will be going out with some old friends this weekend; HC reminded her to do some extra stretching if she feels any soreness this weekend.    Objective:   Caryl completed therapeutic stretches (EIL, MATI) and the following MedX exercise machines: core lumbar, torso rotation l/r, leg extension, leg curl, upright row, chest press, biceps curl, triceps extension, leg press    See exercise log in patient folder for rate of exertion and repetitions completed.     Pt did hip abduction upstairs in [facility] after.     Assessment:   Patient tolerated Cervical MedX machine and all peripheral machines with minimal pain, but did have dizziness on the torso rotation and the chest press. Warm up on bike and ice.     Plan:  Continue with established plan of care towards wellness goals. Pt will do Plan B, until she is re  admitted into [facility], then will do Plan A.     Health  : Maico Shearer, PCT  10/26/2018

## 2018-10-29 ENCOUNTER — DOCUMENTATION ONLY (OUTPATIENT)
Dept: REHABILITATION | Facility: OTHER | Age: 38
End: 2018-10-29
Attending: PSYCHIATRY & NEUROLOGY
Payer: COMMERCIAL

## 2018-10-29 NOTE — PROGRESS NOTES
Health  Wellness Visit Note    Name: Caryl Cedeno  Clinic Number: [id number]  Physician: Berlin Saleem*  Diagnosis: No diagnosis found.  Past Medical History:   Diagnosis Date    Celiac disease     Fibromyalgia     Migraines     Small fiber neuropathy      Visit Number: 29  Precautions: POTS (postural orthostatic tachycardia syndrome) (Chronic)  Small fiber neuropathy - Primary  Fibromyalgia  Neck sx: disc replacement Nov 2016  6 Month: November 12 Month: May  Time In: 3:45PM  Time Out: 4:20PM  Total TreatmentTime:  45 minutes    Subjective:   Patient reports feeling well today, says her neck has not had any problems recently, but her lower back is still aching. Eager for appointment with therapy. Doctor is currently changing BP medicine, on a few machines she felt some minor dizziness. Felt good after today's session, will participate in Wellness stretching class.       Objective:   Caryl completed therapeutic stretches (EIL, MATI) and the following MedX exercise machines: core lumbar, torso rotation l/r, leg extension, leg curl, upright row, chest press, biceps curl, triceps extension, leg press    See exercise log in patient folder for rate of exertion and repetitions completed.     Pt did hip abduction upstairs in [facility] after.     Assessment:   Patient tolerated Cervical MedX machine and all peripheral machines with minimal pain, but did have dizziness on the torso rotation and the chest press. Warm up on bike and ice.     Plan:  Continue with established plan of care towards wellness goals. Pt will do Plan B, until she is re admitted into [facility], then will do Plan A.     Health  : Osito Lizama  10/29/2018

## 2018-10-30 ENCOUNTER — OFFICE VISIT (OUTPATIENT)
Dept: OBSTETRICS AND GYNECOLOGY | Facility: CLINIC | Age: 38
End: 2018-10-30
Payer: COMMERCIAL

## 2018-10-30 VITALS
BODY MASS INDEX: 29.45 KG/M2 | DIASTOLIC BLOOD PRESSURE: 92 MMHG | HEIGHT: 67 IN | SYSTOLIC BLOOD PRESSURE: 130 MMHG | WEIGHT: 187.63 LBS

## 2018-10-30 DIAGNOSIS — Z12.39 BREAST CANCER SCREENING: ICD-10-CM

## 2018-10-30 DIAGNOSIS — N89.8 VAGINAL CYST: Primary | ICD-10-CM

## 2018-10-30 PROCEDURE — 3008F BODY MASS INDEX DOCD: CPT | Mod: CPTII,S$GLB,, | Performed by: NURSE PRACTITIONER

## 2018-10-30 PROCEDURE — 99203 OFFICE O/P NEW LOW 30 MIN: CPT | Mod: S$GLB,,, | Performed by: NURSE PRACTITIONER

## 2018-10-30 PROCEDURE — 99999 PR PBB SHADOW E&M-EST. PATIENT-LVL III: CPT | Mod: PBBFAC,,, | Performed by: NURSE PRACTITIONER

## 2018-10-30 RX ORDER — PREGABALIN 100 MG/1
100 CAPSULE ORAL
COMMUNITY
End: 2018-10-30 | Stop reason: SDUPTHER

## 2018-10-30 RX ORDER — LOSARTAN POTASSIUM 50 MG/1
100 TABLET ORAL DAILY
Refills: 5 | COMMUNITY
Start: 2018-09-12 | End: 2019-01-03 | Stop reason: SDUPTHER

## 2018-10-30 RX ORDER — MILNACIPRAN HYDROCHLORIDE 100 MG/1
1 TABLET, FILM COATED ORAL 2 TIMES DAILY
Refills: 11 | COMMUNITY
Start: 2018-09-12 | End: 2018-10-30 | Stop reason: SDUPTHER

## 2018-10-30 RX ORDER — MESALAMINE 0.38 G/1
375 CAPSULE, EXTENDED RELEASE ORAL
COMMUNITY
End: 2019-06-28

## 2018-10-31 NOTE — PROGRESS NOTES
CC: painful vaginal bump    HPI: Pt is a 38 y.o.  female who presents for c/o a painful vaginal bump- onset 2 days ago. Reports tender when wiping.  Denies associated itching or tingling.  She has not tried any alleviating factors.  She is in need of a screening mammogram.       ROS:  GENERAL: Feeling well overall. Denies fever or chills.   SKIN: Denies rash or lesions.   HEAD: Denies head injury or headache.   NODES: Denies enlarged lymph nodes.   CHEST: Denies chest pain or shortness of breath.   CARDIOVASCULAR: Denies palpitations or left sided chest pain.   ABDOMEN: No abdominal pain, constipation, diarrhea, nausea, vomiting or rectal bleeding.   URINARY: No dysuria, hematuria, or burning on urination.  REPRODUCTIVE: See HPI.   BREASTS: Denies pain, lumps, or nipple discharge.   HEMATOLOGIC: No easy bruisability or excessive bleeding.   MUSCULOSKELETAL: Denies joint pain or swelling.   NEUROLOGIC: Denies syncope or weakness.   PSYCHIATRIC: Denies depression, anxiety or mood swings.    PE:   APPEARANCE: Well nourished, well developed, White female in no acute distress.  VULVA: No lesions. Normal external female genitalia. + labial cyst to inferior labia minora, lanced with blunt needle.   URETHRAL MEATUS: Normal size and location, no lesions, no prolapse.  URETHRA: No masses, tenderness, or prolapse.  VAGINA: Moist. No lesions or lacerations noted. No abnormal discharge present. No odor present.   CERVIX: No lesions or discharge. No cervical motion tenderness.   UTERUS: Normal size, regular shape, mobile, non-tender.  ADNEXA: No tenderness. No fullness or masses palpated in the adnexal regions.   ANUS PERINEUM: Normal.      Diagnosis:  1. Vaginal cyst    2. Breast cancer screening        Plan:   Discussed to keep area clean and dry. Cleansing with antibacterial soap. Warm compresses BID, Motrin PRN pain  Discussed to notify clinic if symptoms worsen or fail to improve  Mammogram    Orders Placed This Encounter     Mammo Digital Screening Bil[facility] w/ [facility]         Follow-up PRN no resolution of symptoms.    Dalila Adames, TRACEYP-C

## 2018-11-01 ENCOUNTER — DOCUMENTATION ONLY (OUTPATIENT)
Dept: REHABILITATION | Facility: OTHER | Age: 38
End: 2018-11-01
Attending: PSYCHIATRY & NEUROLOGY
Payer: COMMERCIAL

## 2018-11-01 NOTE — PROGRESS NOTES
Health  Wellness Visit Note    Name: Caryl Cedeno  Clinic Number: [id number]  Physician: Berlin Saleem*  Diagnosis: No diagnosis found.  Past Medical History:   Diagnosis Date    Celiac disease     Fibromyalgia     Migraines     Small fiber neuropathy      Visit Number: 30  Precautions: POTS (postural orthostatic tachycardia syndrome) (Chronic)  Small fiber neuropathy - Primary  Fibromyalgia  Neck sx: disc replacement Nov 2016  6 Month: November 12 Month: May  Time In: 3:45PM  Time Out: 4:20PM  Total TreatmentTime:  45 minutes    Subjective:   Patient reports feeling ok today. Says she was feeling a lot of pain in her neck earlier but started feeling better throughout the day; says riding with slimline in the car also helped. Eager for appointment with therapy. Doctor is currently changing BP medicine, on a few machines she felt some minor dizziness. Felt good after today's session. She really enjoyed the wellness stretching class; says the hip stretch really helped relieve some pain she was having in her low back.     Objective:   Caryl completed therapeutic stretches (EIL, MATI) and the following MedX exercise machines: core lumbar, torso rotation l/r, leg extension, leg curl, upright row, chest press, biceps curl, triceps extension, leg press    See exercise log in patient folder for rate of exertion and repetitions completed.     Pt did hip abduction upstairs in [facility] after.     Assessment:   Patient tolerated Cervical MedX machine and all peripheral machines with minimal pain, but did have dizziness on the torso rotation and the chest press. Warm up on bike and ice.     Plan:  Continue with established plan of care towards wellness goals. Pt will do Plan B, until she is re admitted into [facility], then will do Plan A.     Health  : Maico Shearer, PCT  11/1/2018

## 2018-11-08 ENCOUNTER — CLINICAL SUPPORT (OUTPATIENT)
Dept: REHABILITATION | Facility: OTHER | Age: 38
End: 2018-11-08
Attending: PSYCHIATRY & NEUROLOGY
Payer: COMMERCIAL

## 2018-11-08 DIAGNOSIS — G89.29 CHRONIC BILATERAL LOW BACK PAIN WITHOUT SCIATICA: ICD-10-CM

## 2018-11-08 DIAGNOSIS — M54.50 CHRONIC BILATERAL LOW BACK PAIN WITHOUT SCIATICA: ICD-10-CM

## 2018-11-08 PROBLEM — M54.2 CHRONIC NECK PAIN: Status: RESOLVED | Noted: 2018-05-08 | Resolved: 2018-11-08

## 2018-11-08 PROCEDURE — 97110 THERAPEUTIC EXERCISES: CPT

## 2018-11-08 PROCEDURE — 97162 PT EVAL MOD COMPLEX 30 MIN: CPT

## 2018-11-12 ENCOUNTER — DOCUMENTATION ONLY (OUTPATIENT)
Dept: REHABILITATION | Facility: OTHER | Age: 38
End: 2018-11-12
Attending: PSYCHIATRY & NEUROLOGY
Payer: COMMERCIAL

## 2018-11-12 NOTE — PROGRESS NOTES
"Health  Wellness Visit Note    Name: Caryl Cedeno  Clinic Number: [id number]  Physician: Berlin Saleem*  Diagnosis: No diagnosis found.  Past Medical History:   Diagnosis Date    Celiac disease     Fibromyalgia     Migraines     Small fiber neuropathy      Visit Number: 31  Precautions: POTS (postural orthostatic tachycardia syndrome) (Chronic)  Small fiber neuropathy - Primary  Fibromyalgia  Neck sx: disc replacement Nov 2016  6 Month: November 12 Month: May  Time In: 3:30PM  Time Out: 4:30PM  Total TreatmentTime: 60  minutes    Subjective:   Patient reports her neck is feeling better today, over the weekend she consumed to much gluten and had a "neck headache" which really flared her up. She had her Lumbar evaluation last week, was told her hips are shifted and weak, but her back strength was low but not as low as expected. She is excited to be working on her neck and back currently. BP is almost back to normal with new medications.     Objective:   Caryl completed therapeutic stretches (EIL, MATI) and the following MedX exercise machines: core lumbar, torso rotation l/r, leg extension, leg curl, upright row, chest press, biceps curl, triceps extension, leg press    See exercise log in patient folder for rate of exertion and repetitions completed.     Pt did hip abduction upstairs in [facility] after.     Assessment:   Patient tolerated Cervical MedX machine and all peripheral machines with minimal pain, but did have dizziness on the torso rotation and the chest press. Warm up on bike and ice.     Plan:  Continue with established plan of care towards wellness goals. Pt will attend Physical Therapy and Wellness each once a week.     Health  : Osito Lizama  11/12/2018    "

## 2018-11-12 NOTE — PLAN OF CARE
[facility] - PHYSICAL THERAPY EVALUATION     Name: Caryl Cedeno  Clinic Number: [id number]      Diagnosis:   Encounter Diagnosis   Name Primary?    Chronic bilateral low back pain without sciatica      Physician: Berlin Saleem*  Treatment Orders: PT Eval and Treat    Past Medical History:   Diagnosis Date    Celiac disease     Fibromyalgia     Migraines     Small fiber neuropathy      Current Outpatient Medications   Medication Sig    cetirizine (ZYRTEC) 10 MG tablet Take 10 mg by mouth once daily.    cyclobenzaprine (FLEXERIL) 10 MG tablet Take 10 mg by mouth continuous prn for Muscle spasms.    ibuprofen (ADVIL,MOTRIN) 600 MG tablet Take 600 mg by mouth daily as needed for Pain.    losartan (COZAAR) 50 MG tablet Take 100 mg by mouth once daily.    mesalamine (APRISO) 0.375 gram Cp24 Take 1.5 g by mouth 3 (three) times daily.    mesalamine (APRISO) 0.375 gram Cp24 Take 375 mg by mouth.    milnacipran (SAVELLA) 50 mg Tab Take 1.5 tablets (75 mg total) by mouth 2 (two) times daily. (Patient taking differently: Take 100 mg by mouth 2 (two) times daily. )    pregabalin (LYRICA) 100 MG capsule Take 1 capsule (100 mg total) by mouth 3 (three) times daily.    water Liqd 150 mL with MILK OF MAGNESIA 400 mg/5 mL Susp 400 mg, diphenhydrAMINE 12.5 mg/5 mL Elix 60 mg, nystatin 100,000 unit/mL Susp 500,000 Units SWISH AND SPIT 10ML'S BY MOUTH EVERY 4 HOURS    ZOLMitriptan (ZOMIG) 5 mg Spry 1 spray by Nasal route once as needed.     Current Facility-Administered Medications   Medication    onabotulinumtoxina injection 200 Units     Review of patient's allergies indicates:   Allergen Reactions    Latex, natural rubber     Gluten protein Rash    Latex Rash       Precautions: Fibromyalgia, small fiber neuropathy, celiac disease, migraines, neck pain     Pattern of pain determined: 1 PEN    Evaluation Date: 11/8/2018  Authorization Period Expiration: 9/28/19  Plan of Care Expiration:  "2/6/19  Reassessment Due: 12/8/18  Visit # / Visits authorized: 1/ 30    Time In: 1:10  Time Out: 2:30  Total Billable Time: 80 minutes       HISTORY   History of Present Illness:  Pt reports pain is located in low back, pain either on the left or right side but usually only on the right side. Her symptoms can refer to the lateral leg, depending on the side is hurting more. She describes the symptoms as "achy" and intensify to sharpness with some positions. Her back has been bothering her on and off since high school. The last flare up symptoms started several months ago and is worsening overall. 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 She reports she is anxious about performing lumbar extensions in both standing and prone position.       Diagnostic Tests: From EPIC None      Pain Scale: Caryl rates pain on a scale of 0-10 to be 8 at worst; 8 currently; 3 at best using VAS.   Pain location: Low back    Aggravating factors: Walking, forward bending, lifting,   Easing Factors: stretching, laying down   Disturbed Sleep: Yes, pain can occasionally delay sleeping     Pattern of pain questions:  1.  Where is your pain the worst? LEs/ hip   2.  Is your pain constant or intermittent? Constant   3.  Does bending forward make your typical pain worse? Yes    4.  Since the start of your back pain, has there been a change in your bowel or bladder? Yes, some has some difficulty initiating urination. Has frequent constipation  from celiac's diet.   5.  What can't you do now that you use to be able to do? Everything, can work, play sports, do dishes cook, get dressed without leaning on wall.     Prior Treatment: Has done PT but not for long (no significant change)  Prior functional status: Independent  DME owned/used: none  Occupation:  On disability    Leisure: Mostly sedentary, watch TV, swimming at home pool                      Pts goals:  Reduce pain, get stronger and more mobile, " tolerate standing for longer than 10 minutes without increased pain     Red Flag Screening:   Cough  Sneeze  Strain: Yes, pain isolated to back   Bladder/ bowel: No  Falls: No   General health: Good   Night pain: No   Unexplained weight loss: No     OBJECTIVE     POSTURE  Posture Alignment :increased lordosis   Postural examination/scapula alignment: right psis lower compared to right, no movement on forward bending, right higher ocmpared to rightht malious,  supine left asis high compared to right     Right posterior  Joint integrity: WNL as seen through spring testing  Sitting: Fair  Standing: Good  Correction of posture: Added slimline roll, Improved posture in sitting.     SLS: Right 10 seconds, Left 10 seconds  Trendelenburg: Right +, Left +    MOVEMENT LOSS    ROM Loss   Flexion moderate loss and tight HS, ERP, W   Extension moderate loss   Side bending Right minimal loss ERP   Side bending Left minimal loss ERP   Rotation Right within functional limits   Rotation Left within functional limits     Hip Flexiblity:   Supine Flexion:              Right minimal loss   Left minimal loss  Supine Internal rotation: Right minimal loss Left minimal loss  Hamstrings 90/90:          Right minimal loss Left minimal loss  Prone Quadriceps:         Right minimal loss Left minimal loss  Prone Extension:            Right moderate loss Left moderate loss      Lower Extremity Strength   Right LE  Left LE   Hip flexion: 4-/5 Low back pain  Hip flexion: 4-/5 Low back pain    Hip extension:  3+/5 Hip extension: 3+/5   Hip abduction: 4+/5 Hip abduction: 4+/5   Hip adduction:  4/5 Hip adduction:  4/5   Hip Internal rotation   5/5 Hip Internal rotation 5/5   Knee Flexion 5/5 Knee Flexion 5/5   Knee Extension 5/5 Knee Extension 5/5   Ankle dorsiflexion: 5/5 Ankle dorsiflexion: 5/5   Ankle plantarflexion: 5/5 Ankle plantarflexion: 5/5         GAIT:  Assistive Device used: none  Level of Assistance: independent  Patient displays the  following gait deviations: antalgic gait.     Special Tests:   Test Name  Test Result   Prone Instability Test (+)   SI Joint Provocation Test (+)   Straight Leg Raise (--)   Neural Tension Test (--)   Crossed Straight Leg Raise (--)   Walking on toes (--)   Walking on heels  (--)       NEUROLOGICAL SCREENING     Sensory deficit: LE intact to light touch    Reflexes:    Left Right   Patella Tendon 2+ 2+   Achilles Tendon 2+ 2+   Babinski NT NT   Clonus - -     REPEATED TEST  MOVEMENTS:  Repeated Flexion in Standing NT   Repeated Extension in Standing no effect apprehension   Repeated Flexion in lying no effect   Repeated Extension in lying  no effect  apprehension       Baseline Isometric Testing on Med X equipment: Testing administered by PT    Baseline IM Testing Results:   Date of testin2018  Femur Restraint 6   Top Dead Center 24   Counterweight 185   Lumbar Flexion 42   Lumbar Extension 6   Lumbar Peak Torque 83   Min Torque 68   Test Percent Below Normative Data 41     FOTO: Focus on Therapeutic Outcomes   Category: lumbar   % Impaired: 78%  Current Score  = CL = least 60% but < 80% impaired, limited or restricted  Goal at Discharge Score = CK = at least 40% but < 60% impaired, limited or restricted    Score interpretation is as follows:     TEST SCORE  Modifier  Impairment Limitation Restriction    0/50  CH  0 % impaired, limited or restricted   1-9/50  CI  @ least 1% but less than 20% impaired, limited or restricted   10-19/50  CJ  @ least 20%<40% impaired, limited or restricted   20-29/50  CK  @ least 40%<60% impaired, limited or restricted   30-39/50  CL  @ least 60% <80% impaired, limited or restricted   40-49/50  CM  @ least 80%<100% impaired limited or restricted   50/50  CN  100% impaired, limited or restricted       Treatment   Time In: 1:50  Time Out: 2:30    PT Evaluation Completed? Yes  Discussed Plan of Care with patient: Yes      Written Home Exercises Provided:   Handouts were given to  the patient. Pt demo good understanding of the education provided. Caryl demonstrated good return demonstration of activities.     - Patient received education regarding proper posture and body mechanics.    - Chang roll tried, recommended, and purchase information was provided.    - Patient received a handout regarding anticipated muscular soreness following the isometric test and strategies for management were reviewed with patient including stretching, using ice and scheduled rest.     HEP as follows     MET for right anterior rotation 5 s/h 5x, 3-4x daily   Supine pelvic tilts 5-10 s/h 5x, 2x daily    Z-lie 10-20 min, 2x daily      Pt was instructed in and performed the following:   Cardiovascular exercise and therapeutic exercise to improve posture, lumbar/cervical ROM, strength, and muscular endurance as follows:     Caryl received therapeutic exercises to develop/improve posture, lumbar/cervical ROM, strength and muscular endurance for 30  minutes including the following exercises: med-x lumbar machine testing    [facility] 11/8/2018   Visit Number 1   VAS Pain Rating 8   Recumbent Bike Seat Pos. -   Time -   Cervical Stretches - Retraction In Lying -   Retraction in Sitting -   Scapular Retraction -   Manual Therapy -   Cervical Extension Seat Pad -   Seat Adjustment -   Top Dead Center -   Counterweight -   Cervical Flexion -   Cervical Extension -   Cervical Peak Torque -   Cervical Weight -   Repetitions -   Rating of Perceived Exertion -   Lumbar Extension Seat Pad 0   Femur Restraint 6   Top Dead Center 24   Counterweight 185   Lumbar Flexion 42   Lumbar Extension 6   Lumbar Peak Torque 83   Min Torque 68   Test Percent Below Normative Data 41   Test Percent Gain in Strength from Initial  -   Lumbar Weight 45   Repetitions -   Rating of Perceived Exertion -   Ice - Z Lie (in min.) 10       Caryl received the following manual therapy techniques:None  Assessment   This is a 38 y.o. female  referred to [facility] and presents with a medical diagnosis of   Encounter Diagnosis   Name Primary?    Chronic bilateral low back pain without sciatica     and demonstrates limitations as described below in the problem list. Pt rehab potential is Good. Pt presents with lumbar dysfunction, decreased lumbar strength and ROM compared to norms, decreased LE ROM, decreased LE strength, decreased hip flexibility, fair postural alignment, antalgic gait. Pt demonstrates right anterior ASIS rotation which improved following MET correction. Pt also demonstrated signficantly improved pain and reduced gait deviations by end of treatment session. Pt will continue to participate in wellness for cervical strengthening and perform lumbar strenthening 1x weekly.     Pain Pattern: 1 PEN        Patient received education on the [facility] program, purpose of the isometric test, progression of back strengthening as well as wellness approach and systemic strengthening.  Details of the program were discussed.  Reviewed that patient should feel support/pressure from med ex restraints but no pain or discomfort and patient expressed understanding.    Based on the above history and physical examination an active physical therapy program is recommended.  Pt will continue to benefit from skilled outpatient physical therapy to address the deficits listed below in the chart, provide pt/family education and to maximize pt's level of independence in the home and community environment. .     No environmental, cultural, spiritual, developmental or education needs expressed or noted    Medical necessity is demonstrated by the following problem list.    Pt presents with the following impairments:   History  Co-morbidities and personal factors that may impact the plan of care Examination  Body Structures and Functions, activity limitations and participation restrictions that may impact the plan of care Clinical Presentation   Decision  Making/ Complexity Score   Co-morbidities:   See precautions        Personal Factors:   lifestyle  sedentary Body Regions:   neck  back  lower extremities    Body Systems:   gross symmetry  ROM  strength  gait  motor control    Activity limitations:   Learning and applying knowledge  no deficits    General Tasks and Commands  no deficits    Communication  no deficits    Mobility  lifting and carrying objects  walking  driving (bike, car, motorcycle)    Self care  dressing  looking after one's health    Domestic Life  shopping  cooking  doing house work (cleaning house, washing dishes, laundry)    Interactions/Relationships  family relationships  intimate relationships    Life Areas  employment    Community and Social Life  community life  recreation and leisure    Participation Restrictions:   Pt unable to work, exercise, or participate in social activities      evolving clinical presentation with changing clinical characteristics   Moderate          GOALS: Pt is in agreement with the following goals.    Short term goals:  6 weeks or 10 visits   1.  Pt will demonstrate increased lumbar ROM by at least 6 degrees from the initial ROM value with improvements noted in functional ROM and ability to perform ADLs  2.  Pt will demonstrate increased maximum isometric torque value by 10% when compared to the initial value resulting in improved ability to perform bending, lifting, and carrying activities safely, confidently.  3.  Patient report a reduction in worst pain score by 1-2 points for improved tolerance during work and recreational activities  4.  Pt able to perform HEP correctly with minimal cueing or supervision for therapist    Long term goals: 13 weeks or 20 visits   1. Pt will demonstrate increased lumbar ROM by at least 9 degrees from initial ROM value, resulting in improved ability to perform functional fwd bending while standing and sitting.   2. Pt will demonstrate increased maximum isometric torque value by  "20% when compared to the initial value resulting in improved ability to perform bending, lifting, and carrying activities safely, confidently.  3. Pt to demonstrate ability to independently control and reduce their pain through posture positioning and mechanical movements throughout a typical day.  4.  Patient will demonstrate improved overall function per FOTO Survey to CK = at least 40% but < 60% impaired, limited or restricted score or less.  5. Pt will toelrate standing for longer than 10 minutes without increased pain by more than 1-2 VAS pain points.       Plan   Outpatient physical therapy 2x week for 13 weeks or 20 visits to include the following:   - Patient education  - Therapeutic exercise  - Manual therapy  - Performance testing   - Neuromuscular Re-education  - Therapeutic activity   - Modalities    Pt may be seen by PTA as part of the rehabilitation team.     Therapist: Brandi Baca, PT  11/8/2018    "I certify the need for these services furnished under this plan of treatment and while under my care."    ____________________________________  Physician/Referring Practitioner    _______________  Date of Signature              "

## 2018-11-14 ENCOUNTER — CLINICAL SUPPORT (OUTPATIENT)
Dept: REHABILITATION | Facility: OTHER | Age: 38
End: 2018-11-14
Attending: PSYCHIATRY & NEUROLOGY
Payer: COMMERCIAL

## 2018-11-14 DIAGNOSIS — G89.29 CHRONIC BILATERAL LOW BACK PAIN WITHOUT SCIATICA: ICD-10-CM

## 2018-11-14 DIAGNOSIS — M54.50 CHRONIC BILATERAL LOW BACK PAIN WITHOUT SCIATICA: ICD-10-CM

## 2018-11-14 PROCEDURE — 97110 THERAPEUTIC EXERCISES: CPT | Performed by: PHYSICAL THERAPIST

## 2018-11-14 NOTE — PROGRESS NOTES
[facility] Physical Therapy Treatment      Name: Caryl Cedeno  Clinic Number: [id number]  Date of Treatment: 2018   Diagnosis:   Encounter Diagnosis   Name Primary?    Chronic bilateral low back pain without sciatica      Physician: Berlin Saleem*    Pain pattern determined: Pattern 1 PEN  Plan of care signed: 18   Time in: 415 (pt 15 min late)  Time Out: 500  Total Treatment time: 60  Precautions: lower back pain  Visit #: 2    POC due: 19  Reassessment due:  18    Subjective   Caryl reports improvement of symptoms.  She was not sore after the initial evaluation and was pleased with how she felt.     Patient reports their pain to be 4/10 on a 0-10 scale with 0 being no pain and 10 being the worst pain imaginable.    Pain Location: lower back and cervical area     Occupation:  On disability    Leisure: Mostly sedentary, watch TV, swimming at home pool                      Pts goals:  Reduce pain, get stronger and more mobile, tolerate standing for longer than 10 minutes without increased pain       Objective       Baseline Isometric Testing on Med X equipment: Testing administered by PT     Baseline IM Testing Results:   Date of testin2018  Femur Restraint 6   Top Dead Center 24   Counterweight 185   Lumbar Flexion 42   Lumbar Extension 6   Lumbar Peak Torque 83   Min Torque 68   Test Percent Below Normative Data 41      FOTO: Focus on Therapeutic Outcomes   Category: lumbar   % Impaired: 78%  Current Score  = CL = least 60% but < 80% impaired, limited or restricted  Goal at Discharge Score = CK = at least 40% but < 60% impaired, limited or restricted    Score interpretation is as follows:      TEST SCORE  Modifier  Impairment Limitation Restriction    050  CH  0 % impaired, limited or restricted   1-  CI  @ least 1% but less than 20% impaired, limited or restricted   10-  CJ  @ least 20%<40% impaired, limited or restricted   -  CK  @  least 40%<60% impaired, limited or restricted   30-39/50  CL  @ least 60% <80% impaired, limited or restricted   40-49/50  CM  @ least 80%<100% impaired limited or restricted   50/50  CN  100% impaired, limited or restricted          Treatment    Pt was instructed in and performed the following:   PPT 5 reps  PPT with pillow squeeze 5 reps  Clamshells 10 reps  OB 10 reps  MET for right ant rotation ( not needed today), continue to reassess      Caryl received therapeutic exercises to develop/improved posture, cardiovascular endurance, muscular endurance, lumbar  ROM, strength and muscular endurance for 30 minutes including the following exercises:     [facility] 11/14/2018   Visit Number 2   VAS Pain Rating 4   Lumbar Weight 41   Repetitions 15   Rating of Perceived Exertion 4   Ice - Z Lie (in min.) 10           Peripheral muscle strengthening which included 1 set of 15-20 repetitions at a slow, controlled 7 second per rep pace focused on strengthening supporting musculature for improved body mechanics and functional mobility.  Pt and therapist focused on proper form during treatment to ensure optimal strengthening of each targeted muscle group.  Machines were utilized including torso rotation, leg extension, leg curl, chest press, upright row. Tricep extension, bicep curl, leg press, and hip abduction added on third visit.       Caryl received the following manual therapy techniques: none      Home Exercise Program as follows:   PPT 5 reps  PPT with pillow squeeze 5 reps  Clamshells 10 reps  OB 10 reps      Handouts were given to the patient. Pt demo good understanding of the education provided. Caryl demonstrated good return demonstration of activities.     Lumbar roll use compliance: yes  Additional exercises taught this treatment session: PPT with pillow squeeze and 5 sec hold    Assessment     Tolerated treatment well with good understanding of [facility] program as she has been through for the cervical  spine.  Good tolerance to Med X at 41 ftlbs (50% peak torque), 15 reps and RPE 5.   Patient is making good progress towards established goals.  Pt will continue to benefit from skilled outpatient physical therapy to address the deficits stated in the impairment chart, provide pt/family education and to maximize pt's level of independence in the home and community environment.       Pt's spiritual, cultural and educational needs considered and pt agreeable to plan of care and goals as stated below:     Medical necessity is demonstrated by the following problem list.    Pt presents with the following impairments:   History  Co-morbidities and personal factors that may impact the plan of care Examination  Body Structures and Functions, activity limitations and participation restrictions that may impact the plan of care Clinical Presentation    Decision Making/ Complexity Score   Co-morbidities:   See precautions           Personal Factors:   lifestyle  sedentary Body Regions:   neck  back  lower extremities     Body Systems:   gross symmetry  ROM  strength  gait  motor control     Activity limitations:   Learning and applying knowledge  no deficits     General Tasks and Commands  no deficits     Communication  no deficits     Mobility  lifting and carrying objects  walking  driving (bike, car, motorcycle)     Self care  dressing  looking after one's health     Domestic Life  shopping  cooking  doing house work (cleaning house, washing dishes, laundry)     Interactions/Relationships  family relationships  intimate relationships     Life Areas  employment     Community and Social Life  community life  recreation and leisure     Participation Restrictions:   Pt unable to work, exercise, or participate in social activities        evolving clinical presentation with changing clinical characteristics    Moderate             GOALS: Pt is in agreement with the following goals.     Short term goals:  6 weeks or 10 visits   1.   Pt will demonstrate increased lumbar ROM by at least 6 degrees from the initial ROM value with improvements noted in functional ROM and ability to perform ADLs  2.  Pt will demonstrate increased maximum isometric torque value by 10% when compared to the initial value resulting in improved ability to perform bending, lifting, and carrying activities safely, confidently.  3.  Patient report a reduction in worst pain score by 1-2 points for improved tolerance during work and recreational activities  4.  Pt able to perform HEP correctly with minimal cueing or supervision for therapist     Long term goals: 13 weeks or 20 visits   1. Pt will demonstrate increased lumbar ROM by at least 9 degrees from initial ROM value, resulting in improved ability to perform functional fwd bending while standing and sitting.   2. Pt will demonstrate increased maximum isometric torque value by 20% when compared to the initial value resulting in improved ability to perform bending, lifting, and carrying activities safely, confidently.  3. Pt to demonstrate ability to independently control and reduce their pain through posture positioning and mechanical movements throughout a typical day.  4.  Patient will demonstrate improved overall function per FOTO Survey to CK = at least 40% but < 60% impaired, limited or restricted score or less.  5. Pt will toelrate standing for longer than 10 minutes without increased pain by more than 1-2 VAS pain points.         Plan   Outpatient physical therapy 2x week for 13 weeks or 20 visits

## 2018-11-15 ENCOUNTER — TELEPHONE (OUTPATIENT)
Dept: NEUROLOGY | Facility: CLINIC | Age: 38
End: 2018-11-15

## 2018-11-15 NOTE — TELEPHONE ENCOUNTER
Call placed to patient to inform appointment is canceled due to denial of Botox approval. Left detailed message requesting office call back.

## 2018-11-19 ENCOUNTER — DOCUMENTATION ONLY (OUTPATIENT)
Dept: REHABILITATION | Facility: OTHER | Age: 38
End: 2018-11-19
Attending: PSYCHIATRY & NEUROLOGY
Payer: COMMERCIAL

## 2018-11-19 NOTE — PROGRESS NOTES
Health  Wellness Visit Note    Name: Caryl Cedeno  Clinic Number: [id number]  Physician: Berlin Saleem*  Diagnosis: No diagnosis found.  Past Medical History:   Diagnosis Date    Celiac disease     Fibromyalgia     Migraines     Small fiber neuropathy      Visit Number: 31  Precautions: POTS (postural orthostatic tachycardia syndrome) (Chronic)  Small fiber neuropathy - Primary  Fibromyalgia  Neck sx: disc replacement Nov 2016  6 Month: November 12 Month: May  Time In: 3:30PM  Time Out: 4:10PM  Total TreatmentTime: 40  minutes    Subjective:   Patient reports feeling good today. Not having too much pain. She had her Lumbar evaluation last week, was told her hips are shifted and weak, but her back strength was low but not as low as expected. She is excited to be working on her neck and back currently. BP is almost back to normal with new medications.     Objective:   Caryl completed therapeutic stretches (EIL, MATI) and the following MedX exercise machines: core lumbar, torso rotation l/r, leg extension, leg curl, upright row, chest press, biceps curl, triceps extension, leg press    See exercise log in patient folder for rate of exertion and repetitions completed.     Pt did hip abduction upstairs in [facility] after.     Assessment:   Patient tolerated Cervical MedX machine and all peripheral machines with minimal pain, but did have dizziness on the torso rotation and the chest press. Warm up on bike and ice.     Plan:  Continue with established plan of care towards wellness goals. Pt will attend Physical Therapy and Wellness each once a week.     Health  : Lola Aguilar, PCT  11/19/2018

## 2018-11-26 ENCOUNTER — DOCUMENTATION ONLY (OUTPATIENT)
Dept: REHABILITATION | Facility: OTHER | Age: 38
End: 2018-11-26
Attending: PSYCHIATRY & NEUROLOGY
Payer: COMMERCIAL

## 2018-11-26 NOTE — PROGRESS NOTES
Health  Wellness Visit Note    Name: Caryl Cedeno  Clinic Number: [id number]  Physician: Berlin Saleem*  Diagnosis: No diagnosis found.  Past Medical History:   Diagnosis Date    Celiac disease     Fibromyalgia     Migraines     Small fiber neuropathy      Visit Number: 32  Precautions: POTS (postural orthostatic tachycardia syndrome) (Chronic)  Small fiber neuropathy - Primary  Fibromyalgia  Neck sx: disc replacement Nov 2016  6 Month: November 12 Month: May  Time In: 3:30PM  Time Out: 4:10PM  Total TreatmentTime: 40  minutes    Subjective:   Patient reports feeling good today. She had a very tiring week because her family was in town. Her parents are visiting from [place] and they are still there this week. She said it does get frustrating because her family doesn't understand her situation and they don't understand she needs to rest. She was not having a lot of pain today. Overall, she was just tired.    Objective:   Caryl completed therapeutic stretches (EIL, MATI) and the following MedX exercise machines: core lumbar, torso rotation l/r, leg extension, leg curl, upright row, chest press, biceps curl, triceps extension, leg press    See exercise log in patient folder for rate of exertion and repetitions completed.     Pt did hip abduction upstairs in [facility] after.     Assessment:   Patient tolerated Cervical MedX machine and all peripheral machines with minimal pain, but did have dizziness on the torso rotation and the chest press. Warm up on bike and ice.     Plan:  Continue with established plan of care towards wellness goals. Pt will attend Physical Therapy and Wellness each once a week.     Health  : Lola Aguilar, PCT  11/26/2018

## 2018-12-04 ENCOUNTER — DOCUMENTATION ONLY (OUTPATIENT)
Dept: REHABILITATION | Facility: OTHER | Age: 38
End: 2018-12-04
Attending: PSYCHIATRY & NEUROLOGY
Payer: COMMERCIAL

## 2018-12-04 NOTE — PROGRESS NOTES
Health  Wellness Visit Note    Name: Caryl Cedeno  Clinic Number: [id number]  Physician: Berlin Saleem*  Diagnosis: No diagnosis found.  Past Medical History:   Diagnosis Date    Celiac disease     Fibromyalgia     Migraines     Small fiber neuropathy      Visit Number: 33  Precautions: POTS (postural orthostatic tachycardia syndrome) (Chronic)  Small fiber neuropathy - Primary  Fibromyalgia  Neck sx: disc replacement Nov 2016  6 Month: November 12 Month: May  Time In: 4:30PM  Time Out: 5:10PM  Total TreatmentTime: 40  minutes    Subjective:   Patient reports feeling sore today. She had a lot of pain after her last visit and lowered her weight on the cervical extension. She had to help with some laundry for her parents and do some tutoring on skype so that may have also contributed to the pain. She says her hip has been bothering her a lot so she will be trying to get in contact with an ortho doctor soon.  She was not having a lot of pain today. Overall, she felt better on the machines today and plans to take it easy the next couple of weeks.    Objective:   Caryl completed therapeutic stretches (EIL, MATI) and the following MedX exercise machines: core lumbar, torso rotation l/r, leg extension, leg curl, upright row, chest press, biceps curl, triceps extension, leg press    See exercise log in patient folder for rate of exertion and repetitions completed.     Pt did hip abduction upstairs in [facility] after.     Assessment:   Patient tolerated Cervical MedX machine and all peripheral machines with minimal pain, but did have dizziness on the torso rotation and the chest press. Warm up on bike and ice.     Plan:  Continue with established plan of care towards wellness goals. Pt will attend Physical Therapy and Wellness each once a week.     Health  : Maico Shearer, PCT  12/4/2018

## 2018-12-06 ENCOUNTER — HOSPITAL ENCOUNTER (OUTPATIENT)
Dept: RADIOLOGY | Facility: OTHER | Age: 38
Discharge: HOME | End: 2018-12-06
Attending: NURSE PRACTITIONER
Payer: COMMERCIAL

## 2018-12-06 ENCOUNTER — CLINICAL SUPPORT (OUTPATIENT)
Dept: REHABILITATION | Facility: OTHER | Age: 38
End: 2018-12-06
Attending: PSYCHIATRY & NEUROLOGY
Payer: COMMERCIAL

## 2018-12-06 DIAGNOSIS — G89.29 CHRONIC BILATERAL LOW BACK PAIN WITHOUT SCIATICA: ICD-10-CM

## 2018-12-06 DIAGNOSIS — M54.50 CHRONIC BILATERAL LOW BACK PAIN WITHOUT SCIATICA: ICD-10-CM

## 2018-12-06 DIAGNOSIS — Z12.39 BREAST CANCER SCREENING: ICD-10-CM

## 2018-12-06 PROCEDURE — 77067 SCR MAMMO BI INCL CAD: CPT | Mod: TC

## 2018-12-06 PROCEDURE — 77063 BREAST TOMOSYNTHESIS BI: CPT | Mod: TC

## 2018-12-06 PROCEDURE — 77063 BREAST TOMOSYNTHESIS BI: CPT | Mod: 26,,, | Performed by: INTERNAL MEDICINE

## 2018-12-06 PROCEDURE — 77067 SCR MAMMO BI INCL CAD: CPT | Mod: 26,,, | Performed by: INTERNAL MEDICINE

## 2018-12-06 PROCEDURE — 97110 THERAPEUTIC EXERCISES: CPT

## 2018-12-06 NOTE — PROGRESS NOTES
[facility]      Name: Caryl Cedeno  Clinic Number: [id number]  Date of Treatment: 2018   Diagnosis:   Encounter Diagnosis   Name Primary?    Chronic bilateral low back pain without sciatica      Physician: Berlin Saleem*    Pain pattern determined: Pattern 1 PEN  Plan of care signed: 18   Time in: 415 (pt 15 min late)  Time Out: 515  Total Treatment time: 60  Precautions: lower back pain  Visit #: 3 (inc 5%)    POC due: 19  Reassessment due:  18    Subjective   Caryl reports her right hip feels unstable and pain increases in Non weight bearing positions.  Pt reports she has not been performing MET as directed previously    Patient reports their pain to be 4/10 on a 0-10 scale with 0 being no pain and 10 being the worst pain imaginable.    Pain Location: lower back and cervical area     Occupation:  On disability    Leisure: Mostly sedentary, watch TV, swimming at home pool                      Pts goals:  Reduce pain, get stronger and more mobile, tolerate standing for longer than 10 minutes without increased pain       Objective       Baseline Isometric Testing on Med X equipment: Testing administered by PT     Baseline IM Testing Results:   Date of testin2018  Femur Restraint 6   Top Dead Center 24   Counterweight 185   Lumbar Flexion 42   Lumbar Extension 6   Lumbar Peak Torque 83   Min Torque 68   Test Percent Below Normative Data 41      FOTO: Focus on Therapeutic Outcomes   Category: lumbar   % Impaired: 78%  Current Score  = CL = least 60% but < 80% impaired, limited or restricted  Goal at Discharge Score = CK = at least 40% but < 60% impaired, limited or restricted    Score interpretation is as follows:      TEST SCORE  Modifier  Impairment Limitation Restriction    050  CH  0 % impaired, limited or restricted   1-  CI  @ least 1% but less than 20% impaired, limited or restricted   10-  CJ  @ least 20%<40%  impaired, limited or restricted   20-29/50  CK  @ least 40%<60% impaired, limited or restricted   30-39/50  CL  @ least 60% <80% impaired, limited or restricted   40-49/50  CM  @ least 80%<100% impaired limited or restricted   50/50  CN  100% impaired, limited or restricted          Treatment    Pt was instructed in and performed the following:   PPT 10 reps  PPT with pillow squeeze 5 reps  Clamshells 10 reps  OB 10 reps  MET for right ant rotation     Caryl received therapeutic exercises to develop/improved posture, cardiovascular endurance, muscular endurance, lumbar  ROM, strength and muscular endurance for 30 minutes including the following exercises:       [facility] 12/6/2018   Visit Number 3   VAS Pain Rating 4   Recumbent Bike Seat Pos. -   Time 5   Cervical Stretches - Retraction In Lying -   Retraction in Sitting -   Scapular Retraction -   Manual Therapy -   Cervical Extension Seat Pad -   Seat Adjustment -   Top Dead Center -   Counterweight -   Cervical Flexion -   Cervical Extension -   Cervical Peak Torque -   Cervical Weight -   Repetitions -   Rating of Perceived Exertion -   Lumbar Extension Seat Pad -   Femur Restraint -   Top Dead Center -   Counterweight -   Lumbar Flexion -   Lumbar Extension -   Lumbar Peak Torque -   Min Torque -   Test Percent Below Normative Data -   Test Percent Gain in Strength from Initial  -   Lumbar Weight 41   Repetitions 20   Rating of Perceived Exertion 6   Ice - Z Lie (in min.) 10       Peripheral muscle strengthening which included 1 set of 15-20 repetitions at a slow, controlled 7 second per rep pace focused on strengthening supporting musculature for improved body mechanics and functional mobility.  Pt and therapist focused on proper form during treatment to ensure optimal strengthening of each targeted muscle group.  Machines were utilized including torso rotation, leg extension, leg curl, chest press, upright row. Tricep extension, bicep curl, leg  press, and hip abduction added on third visit.       Caryl received the following manual therapy techniques: none      Home Exercise Program as follows:   PPT 5 reps  PPT with pillow squeeze 5 reps  Clamshells 10 reps  OB 10 reps      Handouts were given to the patient. Pt demo good understanding of the education provided. Caryl demonstrated good return demonstration of activities.     Lumbar roll use compliance: yes  Additional exercises taught this treatment session: reviewed HEP and instructed pt to perform MET 5x/day followed by stability exercises    Assessment     Tolerated treatment well and has been able to complete 20 repeat 41ft/lbs.  Pt arrived with assymetry of pelvis (R anterior illial rotation), corrected with MET.  Following MET performed stability exercises.  Also discussed pt performing hip flexor stretching to assist in stabilizing pelvis.  Reviewed MET with pt and placed a towel wrapped around cane for comfort.  Patient is making good progress towards established goals.  Pt will continue to benefit from skilled outpatient physical therapy to address the deficits stated in the impairment chart, provide pt/family education and to maximize pt's level of independence in the home and community environment.       Pt's spiritual, cultural and educational needs considered and pt agreeable to plan of care and goals as stated below:     Medical necessity is demonstrated by the following problem list.    Pt presents with the following impairments:   History  Co-morbidities and personal factors that may impact the plan of care Examination  Body Structures and Functions, activity limitations and participation restrictions that may impact the plan of care Clinical Presentation    Decision Making/ Complexity Score   Co-morbidities:   See precautions           Personal Factors:   lifestyle  sedentary Body Regions:   neck  back  lower extremities     Body Systems:   gross symmetry  ROM  strength  gait  motor  control     Activity limitations:   Learning and applying knowledge  no deficits     General Tasks and Commands  no deficits     Communication  no deficits     Mobility  lifting and carrying objects  walking  driving (bike, car, motorcycle)     Self care  dressing  looking after one's health     Domestic Life  shopping  cooking  doing house work (cleaning house, washing dishes, laundry)     Interactions/Relationships  family relationships  intimate relationships     Life Areas  employment     Community and Social Life  community life  recreation and leisure     Participation Restrictions:   Pt unable to work, exercise, or participate in social activities        evolving clinical presentation with changing clinical characteristics    Moderate             GOALS: Pt is in agreement with the following goals.     Short term goals:  6 weeks or 10 visits   1.  Pt will demonstrate increased lumbar ROM by at least 6 degrees from the initial ROM value with improvements noted in functional ROM and ability to perform ADLs  2.  Pt will demonstrate increased maximum isometric torque value by 10% when compared to the initial value resulting in improved ability to perform bending, lifting, and carrying activities safely, confidently.  3.  Patient report a reduction in worst pain score by 1-2 points for improved tolerance during work and recreational activities  4.  Pt able to perform HEP correctly with minimal cueing or supervision for therapist     Long term goals: 13 weeks or 20 visits   1. Pt will demonstrate increased lumbar ROM by at least 9 degrees from initial ROM value, resulting in improved ability to perform functional fwd bending while standing and sitting.   2. Pt will demonstrate increased maximum isometric torque value by 20% when compared to the initial value resulting in improved ability to perform bending, lifting, and carrying activities safely, confidently.  3. Pt to demonstrate ability to independently control  and reduce their pain through posture positioning and mechanical movements throughout a typical day.  4.  Patient will demonstrate improved overall function per FOTO Survey to CK = at least 40% but < 60% impaired, limited or restricted score or less.  5. Pt will toelrate standing for longer than 10 minutes without increased pain by more than 1-2 VAS pain points.         Plan   Outpatient physical therapy 2x week for 13 weeks or 20 visits

## 2018-12-07 ENCOUNTER — TELEPHONE (OUTPATIENT)
Dept: OBSTETRICS AND GYNECOLOGY | Facility: CLINIC | Age: 38
End: 2018-12-07

## 2018-12-07 DIAGNOSIS — Z91.89 AT RISK FOR BREAST CANCER: Primary | ICD-10-CM

## 2018-12-07 NOTE — TELEPHONE ENCOUNTER
notify pt her recent mammogram was normal.   Please contact pt and schedule her for a  CONSULT with [facility] [place] due to having a [facility] lifetime breast cancer risk of  22.87 %.         Thanks Lisa

## 2018-12-10 ENCOUNTER — DOCUMENTATION ONLY (OUTPATIENT)
Dept: REHABILITATION | Facility: OTHER | Age: 38
End: 2018-12-10
Attending: PSYCHIATRY & NEUROLOGY
Payer: COMMERCIAL

## 2018-12-10 NOTE — PROGRESS NOTES
Health  Wellness Visit Note    Name: Caryl Cedeno  Clinic Number: [id number]  Physician: Berlin Saleem*  Diagnosis: No diagnosis found.  Past Medical History:   Diagnosis Date    Celiac disease     Fibromyalgia     Migraines     Small fiber neuropathy      Visit Number: 33  Precautions: POTS (postural orthostatic tachycardia syndrome) (Chronic)  Small fiber neuropathy - Primary  Fibromyalgia  Neck sx: disc replacement Nov 2016  6 Month: November 12 Month: May  Time In: 3:40PM  Time Out: 4:30PM  Total TreatmentTime: 50  minutes    Subjective:   Patient reports having a lot of right hip pain today. She knows it's from weakness in her hips and that's what they're trying to help her with in [facility] for her back. Her neck was feeling better today. She says her hip has been bothering her a lot so she will be trying to get in contact with an ortho doctor soon.      Objective:   Caryl completed therapeutic stretches (EIL, MATI) and the following MedX exercise machines: core lumbar, torso rotation l/r, leg extension, leg curl, upright row, chest press, biceps curl, triceps extension, leg press    See exercise log in patient folder for rate of exertion and repetitions completed.     Pt did hip abduction upstairs in [facility] after.     Assessment:   Patient tolerated Cervical MedX machine and all peripheral machines with minimal pain, but did have dizziness on the torso rotation and the chest press. Warm up on bike and ice.     Plan:  Continue with established plan of care towards wellness goals. Pt will attend Physical Therapy and Wellness each once a week.     Health  : Lola Aguilar, PCT  12/10/2018

## 2018-12-12 ENCOUNTER — PATIENT MESSAGE (OUTPATIENT)
Dept: NEUROLOGY | Facility: CLINIC | Age: 38
End: 2018-12-12

## 2018-12-13 ENCOUNTER — CLINICAL SUPPORT (OUTPATIENT)
Dept: REHABILITATION | Facility: OTHER | Age: 38
End: 2018-12-13
Attending: PSYCHIATRY & NEUROLOGY
Payer: COMMERCIAL

## 2018-12-13 DIAGNOSIS — M54.50 CHRONIC BILATERAL LOW BACK PAIN WITHOUT SCIATICA: Primary | ICD-10-CM

## 2018-12-13 DIAGNOSIS — G89.29 CHRONIC BILATERAL LOW BACK PAIN WITHOUT SCIATICA: Primary | ICD-10-CM

## 2018-12-13 PROCEDURE — 97110 THERAPEUTIC EXERCISES: CPT

## 2018-12-13 NOTE — PROGRESS NOTES
[facility]      Name: Caryl Cedeno  Clinic Number: [id number]  Date of Treatment: 2018   Diagnosis:   Encounter Diagnosis   Name Primary?    Chronic bilateral low back pain without sciatica Yes     Physician: Berlin Saleem*    Pain pattern determined: Pattern 1 PEN  Plan of care signed: 18   Time in:10:00  Time Out: 11:00  Total Treatment time: 40  Precautions: lower back pain  Visit #: 4 (reassess ROM next session)     POC due: 19  Reassessment due:  19    Subjective   Caryl reports her right hip and back feel much better. She has been more consistent with HEP exercises and feels it is beginning to help.     Patient reports their pain to be 4/10 on a 0-10 scale with 0 being no pain and 10 being the worst pain imaginable.    Pain Location: lower back and cervical area     Occupation:  On disability    Leisure: Mostly sedentary, watch TV, swimming at home pool                      Pts goals:  Reduce pain, get stronger and more mobile, tolerate standing for longer than 10 minutes without increased pain       Objective       Baseline Isometric Testing on Med X equipment: Testing administered by PT     Baseline IM Testing Results:   Date of testin2018  Femur Restraint 6   Top Dead Center 24   Counterweight 185   Lumbar Flexion 42   Lumbar Extension 6   Lumbar Peak Torque 83   Min Torque 68   Test Percent Below Normative Data 41      FOTO: Focus on Therapeutic Outcomes   Category: lumbar   % Impaired: 78%  Current Score  = CL = least 60% but < 80% impaired, limited or restricted  Goal at Discharge Score = CK = at least 40% but < 60% impaired, limited or restricted    Treatment    Pt was instructed in and performed the following:       Caryl received therapeutic exercises to develop/improved posture, cardiovascular endurance, muscular endurance, lumbar  ROM, strength and muscular endurance for 30 minutes including the following  exercises:     [facility] 12/13/2018   Visit Number 4   VAS Pain Rating 5   Recumbent Bike Seat Pos. -   Time 10   Cervical Stretches - Retraction In Lying -   Retraction in Sitting -   Scapular Retraction -   Manual Therapy -   Cervical Extension Seat Pad -   Seat Adjustment -   Top Dead Center -   Counterweight -   Cervical Flexion -   Cervical Extension -   Cervical Peak Torque -   Cervical Weight -   Repetitions -   Rating of Perceived Exertion -   Lumbar Extension Seat Pad -   Femur Restraint -   Top Dead Center -   Counterweight -   Lumbar Flexion -   Lumbar Extension -   Lumbar Peak Torque -   Min Torque -   Test Percent Below Normative Data -   Test Percent Gain in Strength from Initial  -   Lumbar Weight 43   Repetitions 18   Rating of Perceived Exertion 6   Ice - Z Lie (in min.) 10         PPT 10 reps  PPT with pillow squeeze 5 reps  Clamshells 10 reps  OB 10 reps  MET for right ant rotation 5x 5 s/h     Peripheral muscle strengthening which included 1 set of 15-20 repetitions at a slow, controlled 7 second per rep pace focused on strengthening supporting musculature for improved body mechanics and functional mobility.  Pt and therapist focused on proper form during treatment to ensure optimal strengthening of each targeted muscle group.  Machines were utilized including torso rotation, leg extension, leg curl, chest press, upright row. Tricep extension, bicep curl, leg press, and hip abduction added on third visit.       Caryl received the following manual therapy techniques: none      Home Exercise Program as follows:   PPT 5 reps  PPT with pillow squeeze 5 reps  Clamshells 10 reps  OB 10 reps      Handouts were given to the patient. Pt demo good understanding of the education provided. Caryl demonstrated good return demonstration of activities.     Lumbar roll use compliance: yes  Additional exercises taught this treatment session: reviewed HEP and instructed pt to perform MET 5x/day followed  by stability exercises    Assessment     Tolerated treatment well and has been able to complete 18 repeat 43ft/lbs.  Pt arrived with minimal assymetry of pelvis (R anterior illial rotation), corrected with MET.  Improved following MET stability exercises.  Pt reported using cane helped a little but she preferred performing with hands today. She reports improving compliance with HEP exercises with improvement.   Patient is making good progress towards established goals.  Pt will continue to benefit from skilled outpatient physical therapy to address the deficits stated in the impairment chart, provide pt/family education and to maximize pt's level of independence in the home and community environment.       Pt's spiritual, cultural and educational needs considered and pt agreeable to plan of care and goals as stated below:     Medical necessity is demonstrated by the following problem list.    Pt presents with the following impairments:   History  Co-morbidities and personal factors that may impact the plan of care Examination  Body Structures and Functions, activity limitations and participation restrictions that may impact the plan of care Clinical Presentation    Decision Making/ Complexity Score   Co-morbidities:   See precautions           Personal Factors:   lifestyle  sedentary Body Regions:   neck  back  lower extremities     Body Systems:   gross symmetry  ROM  strength  gait  motor control     Activity limitations:   Learning and applying knowledge  no deficits     General Tasks and Commands  no deficits     Communication  no deficits     Mobility  lifting and carrying objects  walking  driving (bike, car, motorcycle)     Self care  dressing  looking after one's health     Domestic Life  shopping  cooking  doing house work (cleaning house, washing dishes, laundry)     Interactions/Relationships  family relationships  intimate relationships     Life Areas  employment     Community and Social Life  community  life  recreation and leisure     Participation Restrictions:   Pt unable to work, exercise, or participate in social activities        evolving clinical presentation with changing clinical characteristics    Moderate             GOALS: Pt is in agreement with the following goals.     Short term goals:  6 weeks or 10 visits   1.  Pt will demonstrate increased lumbar ROM by at least 6 degrees from the initial ROM value with improvements noted in functional ROM and ability to perform ADLs  2.  Pt will demonstrate increased maximum isometric torque value by 10% when compared to the initial value resulting in improved ability to perform bending, lifting, and carrying activities safely, confidently.  3.  Patient report a reduction in worst pain score by 1-2 points for improved tolerance during work and recreational activities  4.  Pt able to perform HEP correctly with minimal cueing or supervision for therapist     Long term goals: 13 weeks or 20 visits   1. Pt will demonstrate increased lumbar ROM by at least 9 degrees from initial ROM value, resulting in improved ability to perform functional fwd bending while standing and sitting.   2. Pt will demonstrate increased maximum isometric torque value by 20% when compared to the initial value resulting in improved ability to perform bending, lifting, and carrying activities safely, confidently.  3. Pt to demonstrate ability to independently control and reduce their pain through posture positioning and mechanical movements throughout a typical day.  4.  Patient will demonstrate improved overall function per FOTO Survey to CK = at least 40% but < 60% impaired, limited or restricted score or less.  5. Pt will toelrate standing for longer than 10 minutes without increased pain by more than 1-2 VAS pain points.         Plan   Outpatient physical therapy 2x week for 13 weeks or 20 visits

## 2018-12-17 ENCOUNTER — DOCUMENTATION ONLY (OUTPATIENT)
Dept: REHABILITATION | Facility: OTHER | Age: 38
End: 2018-12-17
Attending: PSYCHIATRY & NEUROLOGY
Payer: COMMERCIAL

## 2018-12-17 NOTE — PROGRESS NOTES
Health  Wellness Visit Note    Name: Caryl Cedeno  Clinic Number: [id number]  Physician: Berlin Saleem*  Diagnosis: No diagnosis found.  Past Medical History:   Diagnosis Date    Celiac disease     Fibromyalgia     Migraines     Small fiber neuropathy      Visit Number: 34  Precautions: POTS (postural orthostatic tachycardia syndrome) (Chronic)  Small fiber neuropathy - Primary  Fibromyalgia  Neck sx: disc replacement Nov 2016  6 Month: November 12 Month: May  Time In: 3:30PM  Time Out: 4:15PM  Total TreatmentTime: 45  minutes    Subjective:   Patient reports feeling ok today. After last session she was having a lot of neck pain, and she had to be in bed for a whole day. HC adjusted her ROM on the cervical machine. Her hip is not hurting, but that pain has moved to her quad. She thinks that it may be nerve related. She says her hip has been bothering her a lot so she will be trying to get in contact with an ortho doctor soon.      Objective:   Caryl completed therapeutic stretches (EIL, MATI) and the following MedX exercise machines: core lumbar, torso rotation l/r, leg extension, leg curl, upright row, chest press, biceps curl, triceps extension, leg press    See exercise log in patient folder for rate of exertion and repetitions completed.     Pt did hip abduction upstairs in [facility] after.     Assessment:   Patient tolerated Cervical MedX machine and all peripheral machines with minimal pain, but did have dizziness on the torso rotation and the chest press. Warm up on bike and ice.     Plan:  Continue with established plan of care towards wellness goals. Pt will attend Physical Therapy and Wellness each once a week.     Health  : Lola Aguilar, PCT  12/17/2018

## 2018-12-19 ENCOUNTER — CLINICAL SUPPORT (OUTPATIENT)
Dept: REHABILITATION | Facility: OTHER | Age: 38
End: 2018-12-19
Attending: PSYCHIATRY & NEUROLOGY
Payer: COMMERCIAL

## 2018-12-19 DIAGNOSIS — M54.50 CHRONIC BILATERAL LOW BACK PAIN WITHOUT SCIATICA: Primary | ICD-10-CM

## 2018-12-19 DIAGNOSIS — G89.29 CHRONIC BILATERAL LOW BACK PAIN WITHOUT SCIATICA: Primary | ICD-10-CM

## 2018-12-19 PROCEDURE — 97110 THERAPEUTIC EXERCISES: CPT

## 2018-12-19 NOTE — PROGRESS NOTES
[facility]      Name: Caryl Cedeno  Clinic Number: [id number]  Date of Treatment: 2018   Diagnosis:   Encounter Diagnosis   Name Primary?    Chronic bilateral low back pain without sciatica Yes     Physician: Berlin Saleem*    Pain pattern determined: Pattern 1 PEN  Plan of care signed: 18   Time in:400pm  Time Out: 500pm  Total Treatment time: 40  Precautions: lower back pain  Visit #: 5 (reassess ROM next session)     POC due: 19  Reassessment due:  19    Subjective   Caryl reports she can walk four blocks now without significant increase in LBP     Patient reports their pain to be 4/10 on a 0-10 scale with 0 being no pain and 10 being the worst pain imaginable.    Pain Location: lower back and cervical area     Occupation:  On disability    Leisure: Mostly sedentary, watch TV, swimming at home pool                      Pts goals:  Reduce pain, get stronger and more mobile, tolerate standing for longer than 10 minutes without increased pain       Objective       Baseline Isometric Testing on Med X equipment: Testing administered by PT     Baseline IM Testing Results:   Date of testin2018  Femur Restraint 6   Top Dead Center 24   Counterweight 185   Lumbar Flexion 42   Lumbar Extension 6   Lumbar Peak Torque 83   Min Torque 68   Test Percent Below Normative Data 41      FOTO: Focus on Therapeutic Outcomes   Category: lumbar   % Impaired: 78%  Current Score  = CL = least 60% but < 80% impaired, limited or restricted  Goal at Discharge Score = CK = at least 40% but < 60% impaired, limited or restricted    Treatment    Pt was instructed in and performed the following:       Caryl received therapeutic exercises to develop/improved posture, cardiovascular endurance, muscular endurance, lumbar  ROM, strength and muscular endurance for 30 minutes including the following exercises:   [facility] 2018   Visit Number 5   VAS  Pain Rating -   Recumbent Bike Seat Pos. -   Time 10   Cervical Stretches - Retraction In Lying -   Retraction in Sitting -   Scapular Retraction -   Manual Therapy 5   Cervical Extension Seat Pad -   Seat Adjustment -   Top Dead Center -   Counterweight -   Cervical Flexion -   Cervical Extension -   Cervical Peak Torque -   Cervical Weight -   Repetitions -   Rating of Perceived Exertion -   Lumbar Extension Seat Pad -   Femur Restraint -   Top Dead Center -   Counterweight -   Lumbar Flexion -   Lumbar Extension -   Lumbar Peak Torque -   Min Torque -   Test Percent Below Normative Data -   Test Percent Gain in Strength from Initial  -   Lumbar Weight 43   Repetitions 20   Rating of Perceived Exertion 2   Ice - Z Lie (in min.) 10         PPT 10 reps  PPT with pillow squeeze 5 reps  Clamshells 10 reps  OB 10 reps  MET for right ant rotation 5x 5 s/h     Peripheral muscle strengthening which included 1 set of 15-20 repetitions at a slow, controlled 7 second per rep pace focused on strengthening supporting musculature for improved body mechanics and functional mobility.  Pt and therapist focused on proper form during treatment to ensure optimal strengthening of each targeted muscle group.  Machines were utilized including torso rotation, leg extension, leg curl, chest press, upright row. Tricep extension, bicep curl, leg press, and hip abduction added on third visit.       Caryl received the following manual therapy techniques: cupping with chano lotion B LB region 5 min      Home Exercise Program as follows:   PPT 5 reps  PPT with pillow squeeze 5 reps  Clamshells 10 reps  OB 10 reps      Handouts were given to the patient. Pt demo good understanding of the education provided. Caryl demonstrated good return demonstration of activities.     Lumbar roll use compliance: yes  Additional exercises taught this treatment session:no exercises, discussed gradual inc in walking distances    Assessment     Pt tolerated  treatment well and was able to complete  20 reps on Med X at 43ft/lbs with 2/10 exertion level..  Pt gaining endurance and reports increased ability to walk with decreased pain. Pt instructed to continue to slowly increase activities.  Pt reports relief with massage and cupping today.  Patient is making good progress towards established goals.  Pt will continue to benefit from skilled outpatient physical therapy to address the deficits stated in the impairment chart, provide pt/family education and to maximize pt's level of independence in the home and community environment.       Pt's spiritual, cultural and educational needs considered and pt agreeable to plan of care and goals as stated below:     Medical necessity is demonstrated by the following problem list.    Pt presents with the following impairments:   History  Co-morbidities and personal factors that may impact the plan of care Examination  Body Structures and Functions, activity limitations and participation restrictions that may impact the plan of care Clinical Presentation    Decision Making/ Complexity Score   Co-morbidities:   See precautions           Personal Factors:   lifestyle  sedentary Body Regions:   neck  back  lower extremities     Body Systems:   gross symmetry  ROM  strength  gait  motor control     Activity limitations:   Learning and applying knowledge  no deficits     General Tasks and Commands  no deficits     Communication  no deficits     Mobility  lifting and carrying objects  walking  driving (bike, car, motorcycle)     Self care  dressing  looking after one's health     Domestic Life  shopping  cooking  doing house work (cleaning house, washing dishes, laundry)     Interactions/Relationships  family relationships  intimate relationships     Life Areas  employment     Community and Social Life  community life  recreation and leisure     Participation Restrictions:   Pt unable to work, exercise, or participate in social activities         evolving clinical presentation with changing clinical characteristics    Moderate             GOALS: Pt is in agreement with the following goals.     Short term goals:  6 weeks or 10 visits   1.  Pt will demonstrate increased lumbar ROM by at least 6 degrees from the initial ROM value with improvements noted in functional ROM and ability to perform ADLs  2.  Pt will demonstrate increased maximum isometric torque value by 10% when compared to the initial value resulting in improved ability to perform bending, lifting, and carrying activities safely, confidently.  3.  Patient report a reduction in worst pain score by 1-2 points for improved tolerance during work and recreational activities  4.  Pt able to perform HEP correctly with minimal cueing or supervision for therapist     Long term goals: 13 weeks or 20 visits   1. Pt will demonstrate increased lumbar ROM by at least 9 degrees from initial ROM value, resulting in improved ability to perform functional fwd bending while standing and sitting.   2. Pt will demonstrate increased maximum isometric torque value by 20% when compared to the initial value resulting in improved ability to perform bending, lifting, and carrying activities safely, confidently.  3. Pt to demonstrate ability to independently control and reduce their pain through posture positioning and mechanical movements throughout a typical day.  4.  Patient will demonstrate improved overall function per FOTO Survey to CK = at least 40% but < 60% impaired, limited or restricted score or less.  5. Pt will toelrate standing for longer than 10 minutes without increased pain by more than 1-2 VAS pain points.         Plan   Outpatient physical therapy 2x week for 13 weeks or 20 visits

## 2019-01-03 ENCOUNTER — OFFICE VISIT (OUTPATIENT)
Dept: OBSTETRICS AND GYNECOLOGY | Facility: CLINIC | Age: 39
End: 2019-01-03
Payer: COMMERCIAL

## 2019-01-03 ENCOUNTER — TELEPHONE (OUTPATIENT)
Dept: OBSTETRICS AND GYNECOLOGY | Facility: CLINIC | Age: 39
End: 2019-01-03

## 2019-01-03 VITALS
DIASTOLIC BLOOD PRESSURE: 88 MMHG | BODY MASS INDEX: 22.59 KG/M2 | SYSTOLIC BLOOD PRESSURE: 138 MMHG | WEIGHT: 142.06 LBS

## 2019-01-03 DIAGNOSIS — Z01.419 WELL WOMAN EXAM WITH ROUTINE GYNECOLOGICAL EXAM: Primary | ICD-10-CM

## 2019-01-03 PROCEDURE — 99396 PREV VISIT EST AGE 40-64: CPT | Mod: S$GLB,,, | Performed by: OBSTETRICS & GYNECOLOGY

## 2019-01-03 PROCEDURE — 99396 PR PREVENTIVE VISIT,EST,40-64: ICD-10-PCS | Mod: S$GLB,,, | Performed by: OBSTETRICS & GYNECOLOGY

## 2019-01-03 RX ORDER — MILNACIPRAN HYDROCHLORIDE 100 MG/1
1 TABLET, FILM COATED ORAL 2 TIMES DAILY
Refills: 11 | COMMUNITY
Start: 2018-12-08 | End: 2019-02-10 | Stop reason: SDUPTHER

## 2019-01-03 RX ORDER — ZOLMITRIPTAN 5 MG/1
SPRAY, METERED NASAL
Refills: 11 | COMMUNITY
Start: 2018-12-09 | End: 2019-06-20

## 2019-01-03 RX ORDER — LOSARTAN POTASSIUM 100 MG/1
100 TABLET ORAL DAILY
Refills: 3 | COMMUNITY
Start: 2018-10-24 | End: 2019-03-28

## 2019-01-03 NOTE — TELEPHONE ENCOUNTER
----- Message from Jefe Barrios sent at 1/3/2019  3:12 PM CST -----  Contact: ROMEO HAMM [[id number]]  Name of Who is Calling: ROMEO HAMM [[id number]]        What is the request in detail: Pt called stating she will be 10 mins late for today's appointment.           Can the clinic reply by [facility]: N    What Number to Call Back if not in [facility]: [phone]

## 2019-01-03 NOTE — PROGRESS NOTES
History & Physical  Gynecology      SUBJECTIVE:     Chief Complaint: Annual Exam       History of Present Illness:  Annual Exam-Premenopausal  Patient presents for annual exam. The patient has no complaints today. Menstrual cycles are once a month/regular.  The patient is sexually active. GYN screening history: last pap/hpv: around 4 years ago and normal per patient. The patient participates in regular exercise: yes.  The patient does not smoke.      Contraception: none, desires pregnancy    FH:  Breast cancer: paternal grandmother  Colon cancer: none  Ovarian cancer: none    Review of patient's allergies indicates:   Allergen Reactions    Latex, natural rubber     Gluten protein Rash    Latex Rash       Past Medical History:   Diagnosis Date    Celiac disease     Fibromyalgia     Migraines     Small fiber neuropathy      Past Surgical History:   Procedure Laterality Date    CERVICAL SPINE SURGERY  2016    SPINE SURGERY       OB History      Para Term  AB Living    1   0   1      SAB TAB Ectopic Multiple Live Births      1              Family History   Problem Relation Age of Onset    Autoimmune disease Father     Autoimmune disease Sister     Celiac disease Maternal Grandfather     Cancer Maternal Grandfather     Cancer Paternal Grandmother     Breast cancer Paternal Grandmother     Colon cancer Neg Hx     Esophageal cancer Neg Hx     Ovarian cancer Neg Hx      Social History     Tobacco Use    Smoking status: Former Smoker    Smokeless tobacco: Never Used   Substance Use Topics    Alcohol use: Yes     Comment: rarely     Drug use: No       Current Outpatient Medications   Medication Sig    cetirizine (ZYRTEC) 10 MG tablet Take 10 mg by mouth once daily.    cyclobenzaprine (FLEXERIL) 10 MG tablet Take 10 mg by mouth continuous prn for Muscle spasms.    ibuprofen (ADVIL,MOTRIN) 600 MG tablet Take 600 mg by mouth daily as needed for Pain.    losartan (COZAAR) 100 MG tablet Take  100 mg by mouth once daily.    mesalamine (APRISO) 0.375 gram Cp24 Take 375 mg by mouth.    milnacipran (SAVELLA) 50 mg Tab Take 1.5 tablets (75 mg total) by mouth 2 (two) times daily. (Patient taking differently: Take 100 mg by mouth 2 (two) times daily. )    pregabalin (LYRICA) 100 MG capsule Take 1 capsule (100 mg total) by mouth 3 (three) times daily.    SAVELLA 100 mg Tab Take 1 tablet by mouth 2 (two) times daily.    water Liqd 150 mL with MILK OF MAGNESIA 400 mg/5 mL Susp 400 mg, diphenhydrAMINE 12.5 mg/5 mL Elix 60 mg, nystatin 100,000 unit/mL Susp 500,000 Units SWISH AND SPIT 10ML'S BY MOUTH EVERY 4 HOURS    ZOMIG 5 mg Spry USE 1 SPRAY IN EACH NOSTRIL ONCE DAILY.     No current facility-administered medications for this visit.          Review of Systems:  Review of Systems   Constitutional: Negative for activity change, appetite change and fever.   Respiratory: Negative for shortness of breath.    Cardiovascular: Negative for chest pain.   Gastrointestinal: Negative for abdominal pain, constipation, diarrhea, nausea and vomiting.   Genitourinary: Negative for menorrhagia, menstrual problem, pelvic pain, vaginal bleeding, vaginal discharge and vaginal pain.   Neurological: Negative for numbness and headaches.   Breast: Negative for mastodynia and nipple discharge       OBJECTIVE:     Physical Exam:  Physical Exam   Constitutional: She is oriented to person, place, and time. She appears well-developed and well-nourished.   Neck: Normal range of motion. Neck supple. No tracheal deviation present. No thyromegaly present.   Cardiovascular: Normal rate, regular rhythm and normal heart sounds.   Pulmonary/Chest: Effort normal and breath sounds normal. Right breast exhibits no inverted nipple, no mass, no nipple discharge, no skin change and no tenderness. Left breast exhibits no inverted nipple, no mass, no nipple discharge, no skin change and no tenderness. Breasts are symmetrical.   Abdominal: Soft.    Genitourinary: Vagina normal and uterus normal. No labial fusion. There is no rash, tenderness, lesion or injury on the right labia. There is no rash, tenderness, lesion or injury on the left labia. Uterus is not deviated, not enlarged, not fixed and not tender. Cervix exhibits no motion tenderness, no discharge and no friability. Right adnexum displays no mass, no tenderness and no fullness. Left adnexum displays no mass, no tenderness and no fullness. No erythema, tenderness or bleeding in the vagina. No foreign body in the vagina. No signs of injury around the vagina. No vaginal discharge found.   Genitourinary Comments: Urethra: normal appearing urethra with no masses, tenderness or lesions  Urethral meatus: normal size, anterior vaginal wall with no prolapse, no lesions  Very difficult exam due to patient discomfort   Neurological: She is alert and oriented to person, place, and time.   Psychiatric: She has a normal mood and affect. Her behavior is normal. Judgment and thought content normal.   Nursing note and vitals reviewed.      Chaperoned by: Janice Abreu    ASSESSMENT:       ICD-10-CM ICD-9-CM    1. Well woman exam with routine gynecological exam Z01.419 V72.31 CANCELED: HPV High Risk Genotypes, PCR      CANCELED: Liquid-based pap smear, screening          Plan:      Caryl was seen today for annual exam.    Diagnoses and all orders for this visit:    Well woman exam with routine gynecological exam  -     Cancel: HPV High Risk Genotypes, PCR  -     Cancel: Liquid-based pap smear, screening        No orders of the defined types were placed in this encounter.      Well Woman:  - Pap smear up to date, due next year  - Birth control: n/a  - GC/CT:n/a  - Mammogram: up to date  - Smoking cessation: n/a  - Labs: none required   - Vaccines: none required  - Exercise counseling    Preconception:  - reviewed medications and discussed changing losartan and lyrica prior to conception  - advised to avoid flexaril  and ibuprofen  - discussed referral to [facility] given age and unprotected intercourse    Follow up in one year for annual or prn.    Barbara Cote

## 2019-01-04 ENCOUNTER — CLINICAL SUPPORT (OUTPATIENT)
Dept: REHABILITATION | Facility: OTHER | Age: 39
End: 2019-01-04
Attending: PSYCHIATRY & NEUROLOGY
Payer: COMMERCIAL

## 2019-01-04 DIAGNOSIS — G89.29 CHRONIC BILATERAL LOW BACK PAIN WITHOUT SCIATICA: Primary | ICD-10-CM

## 2019-01-04 DIAGNOSIS — M54.50 CHRONIC BILATERAL LOW BACK PAIN WITHOUT SCIATICA: Primary | ICD-10-CM

## 2019-01-04 PROCEDURE — 97110 THERAPEUTIC EXERCISES: CPT

## 2019-01-04 NOTE — PROGRESS NOTES
[facility]      Name: Caryl Cedeno  Clinic Number: [id number]  Date of Treatment: 2019   Diagnosis:   Encounter Diagnosis   Name Primary?    Chronic bilateral low back pain without sciatica Yes     Physician: Berlin Saleem*    Pain pattern determined: Pattern 1 PEN  Plan of care signed: 18   Time in:1110  Time Out: 1200  Total Treatment time: 50 minutes  Precautions: lower back pain  Visit #: 6     POC due: 19  Reassessment due:  19    Subjective   Caryl reports pain in the Right lumbar/hip region. She states it has been consistently moving between the low back and hip back-n-forth.      Patient reports their pain to be 4/10 on a 0-10 scale with 0 being no pain and 10 being the worst pain imaginable.    Pain Location: lower back and cervical area     Occupation:  On disability    Leisure: Mostly sedentary, watch TV, swimming at home pool                      Pts goals:  Reduce pain, get stronger and more mobile, tolerate standing for longer than 10 minutes without increased pain       Objective       Baseline Isometric Testing on Med X equipment: Testing administered by PT     Baseline IM Testing Results:   Date of testin2018  Femur Restraint 6   Top Dead Center 24   Counterweight 185   Lumbar Flexion 42   Lumbar Extension 6   Lumbar Peak Torque 83   Min Torque 68   Test Percent Below Normative Data 41      FOTO: Focus on Therapeutic Outcomes   Category: lumbar   % Impaired: 78%  Current Score  = CL = least 60% but < 80% impaired, limited or restricted  Goal at Discharge Score = CK = at least 40% but < 60% impaired, limited or restricted    Treatment    Pt was instructed in and performed the following:       Caryl received therapeutic exercises to develop/improved posture, cardiovascular endurance, muscular endurance, lumbar  ROM, strength and muscular endurance for 40 minutes including the following exercises:     Open Books x5  ea L/R  LTR x20  Pelvic Tilts x5--> TrA  +Manual Resisted Hip Flexion x5 ea L/R  Bridging x8 with 5 second holds     [facility] 1/4/2019   Visit Number 6   VAS Pain Rating 4   Recumbent Bike Seat Pos. -   Time 5   Lumbar Flexion 42   Lumbar Extension 0   Lumbar Weight 43   Repetitions 20   Rating of Perceived Exertion 4   Ice - Z Lie (in min.) 10     Peripheral muscle strengthening which included 1 set of 15-20 repetitions at a slow, controlled 7 second per rep pace focused on strengthening supporting musculature for improved body mechanics and functional mobility.  Pt and therapist focused on proper form during treatment to ensure optimal strengthening of each targeted muscle group.  Machines were utilized including torso rotation, leg extension, leg curl, chest press, upright row. Tricep extension, bicep curl, leg press, and hip abduction added on third visit.     Home Exercise Program as follows:   PPT 5 reps  PPT with pillow squeeze 5 reps  Clamshells 10 reps  OB 10 reps    Handouts were given to the patient. Pt demo good understanding of the education provided. Caryl demonstrated good return demonstration of activities.     Lumbar roll use compliance: yes  Additional exercises taught this treatment session: Bridging    Assessment     Caryl tolerated today's session well with good ability to progress to bridging and resisted TrA exercises, but she does fatigue quickly. She will benefit from conservative progression working to increase strength and endurance of pelvic/core stabilizers. She increased range well to full extension of 0-42d on the MedX.     -Patient is making good progress towards established goals. She completed x20 reps of 43 ft/lbs at an RPE of 4/10; Increase 5-10% as appropriate  Pt will continue to benefit from skilled outpatient physical therapy to address the deficits stated in the impairment chart, provide pt/family education and to maximize pt's level of independence in the home  and community environment.     Pt's spiritual, cultural and educational needs considered and pt agreeable to plan of care and goals as stated below:     Medical necessity is demonstrated by the following problem list.    Pt presents with the following impairments:   History  Co-morbidities and personal factors that may impact the plan of care Examination  Body Structures and Functions, activity limitations and participation restrictions that may impact the plan of care Clinical Presentation    Decision Making/ Complexity Score   Co-morbidities:   See precautions           Personal Factors:   lifestyle  sedentary Body Regions:   neck  back  lower extremities     Body Systems:   gross symmetry  ROM  strength  gait  motor control     Activity limitations:   Learning and applying knowledge  no deficits     General Tasks and Commands  no deficits     Communication  no deficits     Mobility  lifting and carrying objects  walking  driving (bike, car, motorcycle)     Self care  dressing  looking after one's health     Domestic Life  shopping  cooking  doing house work (cleaning house, washing dishes, laundry)     Interactions/Relationships  family relationships  intimate relationships     Life Areas  employment     Community and Social Life  community life  recreation and leisure     Participation Restrictions:   Pt unable to work, exercise, or participate in social activities        evolving clinical presentation with changing clinical characteristics    Moderate             GOALS: Pt is in agreement with the following goals.     Short term goals:  6 weeks or 10 visits   1.  Pt will demonstrate increased lumbar ROM by at least 6 degrees from the initial ROM value with improvements noted in functional ROM and ability to perform ADLs  2.  Pt will demonstrate increased maximum isometric torque value by 10% when compared to the initial value resulting in improved ability to perform bending, lifting, and carrying activities  safely, confidently.  3.  Patient report a reduction in worst pain score by 1-2 points for improved tolerance during work and recreational activities  4.  Pt able to perform HEP correctly with minimal cueing or supervision for therapist     Long term goals: 13 weeks or 20 visits   1. Pt will demonstrate increased lumbar ROM by at least 9 degrees from initial ROM value, resulting in improved ability to perform functional fwd bending while standing and sitting.   2. Pt will demonstrate increased maximum isometric torque value by 20% when compared to the initial value resulting in improved ability to perform bending, lifting, and carrying activities safely, confidently.  3. Pt to demonstrate ability to independently control and reduce their pain through posture positioning and mechanical movements throughout a typical day.  4.  Patient will demonstrate improved overall function per FOTO Survey to CK = at least 40% but < 60% impaired, limited or restricted score or less.  5. Pt will toelrate standing for longer than 10 minutes without increased pain by more than 1-2 VAS pain points.         Plan   Outpatient physical therapy 2x week for 13 weeks or 20 visits

## 2019-01-07 ENCOUNTER — DOCUMENTATION ONLY (OUTPATIENT)
Dept: REHABILITATION | Facility: OTHER | Age: 39
End: 2019-01-07
Attending: PSYCHIATRY & NEUROLOGY
Payer: COMMERCIAL

## 2019-01-07 NOTE — PROGRESS NOTES
Health  Wellness Visit Note    Name: Caryl Cedeno  Clinic Number: [id number]  Physician: Berlin Saleem*  Diagnosis: No diagnosis found.  Past Medical History:   Diagnosis Date    Celiac disease     Fibromyalgia     Migraines     Small fiber neuropathy      Visit Number: 35  Precautions: POTS (postural orthostatic tachycardia syndrome) (Chronic)  Small fiber neuropathy - Primary  Fibromyalgia  Neck sx: disc replacement Nov 2016  6 Month: November 12 Month: May  Time In: 3:45PM  Time Out: 4:45PM  Total TreatmentTime:  60 minutes    Subjective:   Patient reports feeling good today, believes her neck is doing better. Since changing extension ROM she has not had any delayed symptoms. Lower back therapy is progressing slow she says, but knows that it will help in the long run. She did not make appointment with Ortho doctor for hip was busy during the holidays. Pain fluctuates, experiencing  nerve pain in quad.    .      Objective:   Caryl completed therapeutic stretches (EIL, MATI) and the following MedX exercise machines: core lumbar, torso rotation l/r, leg extension, leg curl, upright row, chest press, biceps curl, triceps extension, leg press    See exercise log in patient folder for rate of exertion and repetitions completed.     Pt did hip abduction upstairs in [facility] after.     Assessment:   Patient tolerated Cervical MedX machine and all peripheral machines with minimal pain, but did have dizziness on the torso rotation and the chest press. Warm up on bike and ice.     Plan:  Continue with established plan of care towards wellness goals. Pt will attend Physical Therapy and Wellness each once a week.     Health  : Osito Lizama  1/7/2019

## 2019-01-08 ENCOUNTER — PATIENT MESSAGE (OUTPATIENT)
Dept: NEUROLOGY | Facility: CLINIC | Age: 39
End: 2019-01-08

## 2019-01-10 ENCOUNTER — CLINICAL SUPPORT (OUTPATIENT)
Dept: REHABILITATION | Facility: OTHER | Age: 39
End: 2019-01-10
Attending: PSYCHIATRY & NEUROLOGY
Payer: COMMERCIAL

## 2019-01-10 ENCOUNTER — DOCUMENTATION ONLY (OUTPATIENT)
Dept: REHABILITATION | Facility: OTHER | Age: 39
End: 2019-01-10

## 2019-01-10 DIAGNOSIS — M54.50 CHRONIC BILATERAL LOW BACK PAIN WITHOUT SCIATICA: Primary | ICD-10-CM

## 2019-01-10 DIAGNOSIS — G89.29 CHRONIC BILATERAL LOW BACK PAIN WITHOUT SCIATICA: Primary | ICD-10-CM

## 2019-01-10 PROCEDURE — 97110 THERAPEUTIC EXERCISES: CPT

## 2019-01-10 NOTE — PROGRESS NOTES
[facility]      Name: Caryl Cedeno  Clinic Number: [id number]  Date of Treatment: 01/10/2019   Diagnosis:   Encounter Diagnosis   Name Primary?    Chronic bilateral low back pain without sciatica Yes     Physician: Berlin Saleem*    Pain pattern determined: Pattern 1 PEN  Plan of care signed: 18   Time in:130pm  Time Out: 230pm  Total Treatment time: 60 minutes  Precautions: lower back pain  Visit #: 7    POC due: 19  Reassessment due:  2/10/19     Subjective   Caryl reports her LB is feeling better today, but her neck is hurting.      Patient reports their pain to be 4/10 on a 0-10 scale with 0 being no pain and 10 being the worst pain imaginable.    Pain Location: lower back and cervical area     Occupation:  On disability    Leisure: Mostly sedentary, watch TV, swimming at home pool                      Pts goals:  Reduce pain, get stronger and more mobile, tolerate standing for longer than 10 minutes without increased pain       Objective       Baseline Isometric Testing on Med X equipment: Testing administered by PT     Baseline IM Testing Results:   Date of testin2018  Femur Restraint 6   Top Dead Center 24   Counterweight 185   Lumbar Flexion 42   Lumbar Extension 6   Lumbar Peak Torque 83   Min Torque 68   Test Percent Below Normative Data 41      FOTO: Focus on Therapeutic Outcomes   Category: lumbar   % Impaired: 78%  Current Score  = CL = least 60% but < 80% impaired, limited or restricted  Goal at Discharge Score = CK = at least 40% but < 60% impaired, limited or restricted    Treatment    Pt was instructed in and performed the following:       Caryl received therapeutic exercises to develop/improved posture, cardiovascular endurance, muscular endurance, lumbar  ROM, strength and muscular endurance for 60 minutes including the following exercises:   [facility] 1/10/2019   Visit Number 7   VAS Pain Rating 4   Recumbent  Bike Seat Pos. -   Time 10   Cervical Stretches - Retraction In Lying -   Retraction in Sitting -   Scapular Retraction -   Manual Therapy -   Cervical Extension Seat Pad -   Seat Adjustment -   Top Dead Center -   Counterweight -   Cervical Flexion -   Cervical Extension -   Cervical Peak Torque -   Cervical Weight -   Repetitions -   Rating of Perceived Exertion -   Lumbar Extension Seat Pad -   Femur Restraint -   Top Dead Center -   Counterweight -   Lumbar Flexion -   Lumbar Extension -   Lumbar Peak Torque -   Min Torque -   Test Percent Below Normative Data -   Test Percent Gain in Strength from Initial  -   Lumbar Weight 45   Repetitions 15   Rating of Perceived Exertion 3.5   Ice - Z Lie (in min.) 10       Open Books x5 ea L/R  LTR x20  Pelvic Tilts x5--> TrA  +Manual Resisted Hip Flexion x5 ea L/R  Bridging x8 with 5 second holds Peripheral muscle strengthening which included 1 set of 15-20 repetitions at a slow, controlled 7 second per rep pace focused on strengthening supporting musculature for improved body mechanics and functional mobility.  Pt and therapist focused on proper form during treatment to ensure optimal strengthening of each targeted muscle group.  Machines were utilized including torso rotation, leg extension, leg curl, chest press, upright row. Tricep extension, bicep curl, leg press, and hip abduction added on third visit.     Home Exercise Program as follows:   PPT 5 reps  PPT with pillow squeeze 5 reps  Clamshells 10 reps  OB 10 reps    Handouts were given to the patient. Pt demo good understanding of the education provided. Caryl demonstrated good return demonstration of activities.     Lumbar roll use compliance: yes  Additional exercises taught this treatment session: none    Assessment     Caryl tolerated treatment well and was able to increase 5% on Lumbar Med X with completion of 15 reps.  Pt is progressing well and reports increased functional mobility with decreased pain  overall.  Instructed pt to try a cervical pillow to sleep to assist in restoring cervical curve.    Pt will continue to benefit from skilled outpatient physical therapy to address the deficits stated in the impairment chart, provide pt/family education and to maximize pt's level of independence in the home and community environment.     Pt's spiritual, cultural and educational needs considered and pt agreeable to plan of care and goals as stated below:     Medical necessity is demonstrated by the following problem list.    Pt presents with the following impairments:   History  Co-morbidities and personal factors that may impact the plan of care Examination  Body Structures and Functions, activity limitations and participation restrictions that may impact the plan of care Clinical Presentation    Decision Making/ Complexity Score   Co-morbidities:   See precautions           Personal Factors:   lifestyle  sedentary Body Regions:   neck  back  lower extremities     Body Systems:   gross symmetry  ROM  strength  gait  motor control     Activity limitations:   Learning and applying knowledge  no deficits     General Tasks and Commands  no deficits     Communication  no deficits     Mobility  lifting and carrying objects  walking  driving (bike, car, motorcycle)     Self care  dressing  looking after one's health     Domestic Life  shopping  cooking  doing house work (cleaning house, washing dishes, laundry)     Interactions/Relationships  family relationships  intimate relationships     Life Areas  employment     Community and Social Life  community life  recreation and leisure     Participation Restrictions:   Pt unable to work, exercise, or participate in social activities        evolving clinical presentation with changing clinical characteristics    Moderate             GOALS: Pt is in agreement with the following goals.     Short term goals:  6 weeks or 10 visits   1.  Pt will demonstrate increased lumbar ROM by at  least 6 degrees from the initial ROM value with improvements noted in functional ROM and ability to perform ADLs  2.  Pt will demonstrate increased maximum isometric torque value by 10% when compared to the initial value resulting in improved ability to perform bending, lifting, and carrying activities safely, confidently.  3.  Patient report a reduction in worst pain score by 1-2 points for improved tolerance during work and recreational activities  4.  Pt able to perform HEP correctly with minimal cueing or supervision for therapist     Long term goals: 13 weeks or 20 visits   1. Pt will demonstrate increased lumbar ROM by at least 9 degrees from initial ROM value, resulting in improved ability to perform functional fwd bending while standing and sitting.   2. Pt will demonstrate increased maximum isometric torque value by 20% when compared to the initial value resulting in improved ability to perform bending, lifting, and carrying activities safely, confidently.  3. Pt to demonstrate ability to independently control and reduce their pain through posture positioning and mechanical movements throughout a typical day.  4.  Patient will demonstrate improved overall function per FOTO Survey to CK = at least 40% but < 60% impaired, limited or restricted score or less.  5. Pt will toelrate standing for longer than 10 minutes without increased pain by more than 1-2 VAS pain points.         Plan   Outpatient physical therapy 2x week for 13 weeks or 20 visits

## 2019-01-12 ENCOUNTER — PATIENT MESSAGE (OUTPATIENT)
Dept: NEUROLOGY | Facility: CLINIC | Age: 39
End: 2019-01-12

## 2019-01-12 ENCOUNTER — NURSE TRIAGE (OUTPATIENT)
Dept: ADMINISTRATIVE | Facility: CLINIC | Age: 39
End: 2019-01-12

## 2019-01-12 ENCOUNTER — HOSPITAL ENCOUNTER (EMERGENCY)
Facility: HOSPITAL | Age: 39
Discharge: HOME | End: 2019-01-13
Attending: EMERGENCY MEDICINE
Payer: COMMERCIAL

## 2019-01-12 VITALS
OXYGEN SATURATION: 100 % | DIASTOLIC BLOOD PRESSURE: 114 MMHG | SYSTOLIC BLOOD PRESSURE: 186 MMHG | HEART RATE: 90 BPM | WEIGHT: 185 LBS | HEIGHT: 66 IN | BODY MASS INDEX: 29.73 KG/M2 | TEMPERATURE: 99 F

## 2019-01-12 DIAGNOSIS — G43.009 ATYPICAL MIGRAINE: Primary | ICD-10-CM

## 2019-01-12 PROCEDURE — 99281 EMR DPT VST MAYX REQ PHY/QHP: CPT

## 2019-01-12 PROCEDURE — 99282 EMERGENCY DEPT VISIT SF MDM: CPT | Mod: ,,, | Performed by: EMERGENCY MEDICINE

## 2019-01-12 PROCEDURE — 99282 PR EMERGENCY DEPT VISIT,LEVEL II: ICD-10-PCS | Mod: ,,, | Performed by: EMERGENCY MEDICINE

## 2019-01-13 ENCOUNTER — PATIENT MESSAGE (OUTPATIENT)
Dept: NEUROLOGY | Facility: CLINIC | Age: 39
End: 2019-01-13

## 2019-01-13 NOTE — ED PROVIDER NOTES
"Encounter Date: 1/12/2019    SCRIBE #1 NOTE: I, Graciela Eduardo , am scribing for, and in the presence of,  Phillip Louis MD. I have scribed the entire note.       History     Chief Complaint   Patient presents with    Headache     Pt states "earlier I had a weird buzzing feeling in my head an hour ago, it is pretty much gone now" Pt reports mild pain to left side of head and face along with tingling to left side of face. Pt states "It felt like a popping sensation and then it went away gradually". Pt denies weakness in any extremities. Pt AAX4, no facial asymetry.  Pt has history of migraines     Time patient was seen by the provider: 11:55 PM      The patient is a 38 y.o. female with co-morbidities including: of a history of migraines and viral meningitis, who presents to the ED with a complaint of a headache that started about an hour ago that lasted for about one minute. Patient states that her migraines usually cause her to be sensitive to light and a headache, but this migraine was different than the migraines that she has had in the past. Patient endorses having trouble with her vision, the left side of her face "vibrating" and "buzzing" for approximately 30 seconds. Patient also states having discomfort in her left temple and arm. Patient denies being nauseated and having blind spots in vision. Patient endorses that the headache has dissipated to a 1/10 and does not require medication.       The history is provided by the patient.     Review of patient's allergies indicates:   Allergen Reactions    Latex, natural rubber     Gluten protein Rash    Latex Rash     Past Medical History:   Diagnosis Date    Celiac disease     Fibromyalgia     Migraines     Small fiber neuropathy      Past Surgical History:   Procedure Laterality Date    CERVICAL SPINE SURGERY  2016    SPINE SURGERY       Family History   Problem Relation Age of Onset    Autoimmune disease Father     Autoimmune disease Sister     " "Celiac disease Maternal Grandfather     Cancer Maternal Grandfather     Cancer Paternal Grandmother     Breast cancer Paternal Grandmother     Colon cancer Neg Hx     Esophageal cancer Neg Hx     Ovarian cancer Neg Hx      Social History     Tobacco Use    Smoking status: Former Smoker    Smokeless tobacco: Never Used   Substance Use Topics    Alcohol use: Yes     Comment: rarely     Drug use: No     Review of Systems   Constitutional: Negative for fever.   HENT: Negative for sore throat.    Respiratory: Negative for shortness of breath.    Cardiovascular: Negative for chest pain.   Gastrointestinal: Negative for nausea.   Genitourinary: Negative for dysuria.   Musculoskeletal: Negative for back pain.        Positive for discomfort in her left arm.    Skin: Negative for rash.   Neurological: Positive for numbness (left-side of face; "buzzing" and "vibrating" ) and headaches. Negative for weakness.        Positive for discomfort in left temple. Positive for light sensitivity. Negative for blind spots in vision.        Physical Exam     Initial Vitals [01/12/19 2348]   BP Pulse Resp Temp SpO2   (!) 186/114 90 -- 98.5 °F (36.9 °C) 100 %      MAP       --         Physical Exam    Nursing note and vitals reviewed.  Constitutional: She appears well-developed and well-nourished. She is not diaphoretic. No distress.   HENT:   Head: Normocephalic and atraumatic.   Mouth/Throat: Oropharynx is clear and moist.   Eyes: Conjunctivae and EOM are normal. Pupils are equal, round, and reactive to light.   Neck: Normal range of motion. Neck supple. No JVD present.   Cardiovascular: Normal rate, regular rhythm and normal heart sounds.   No murmur heard.  Pulmonary/Chest: Breath sounds normal. No respiratory distress. She has no wheezes. She has no rhonchi. She has no rales.   Abdominal: Soft. Bowel sounds are normal. She exhibits no distension and no mass. There is no tenderness. There is no rebound and no guarding. "   Musculoskeletal: Normal range of motion. She exhibits no edema or tenderness.   Neurological: She is alert and oriented to person, place, and time. She has normal strength. No cranial nerve deficit or sensory deficit.   No nuchal rigidity.    Skin: Skin is warm and dry. No rash noted.   Psychiatric: She has a normal mood and affect. Her behavior is normal. Thought content normal.         ED Course   Procedures  Labs Reviewed - No data to display       Imaging Results    None          Medical Decision Making:   History:   Old Medical Records: I decided to obtain old medical records.            Scribe Attestation:   Scribe #1: I performed the above scribed service and the documentation accurately describes the services I performed. I attest to the accuracy of the note.               Clinical Impression:   The encounter diagnosis was Atypical migraine.                             Phillip Louis MD  03/04/19 0906

## 2019-01-13 NOTE — TELEPHONE ENCOUNTER
Reason for Disposition   [1] Lightheadedness or dizziness AND [2] persists > 10 minutes AND [3] not relieved by reassurance provided by triager    Protocols used: ST ANXIETY AND PANIC ATTACK-A-AH    Patient says she felt a pop in her head while she was eating at an ice cream shop a few minutes ago. Her vision was affected and around her ears started hurting. The ear ringing she heard is gone. She is anxious about this and feels uneasy. Advised patient to get off the rode driving and go to a safe place that has a lot of light and activity like a gas station and phone family or friends to pick her up and to drive her to the ED and another to safe drive her car home since this is her major concern at this time. Patient verbalized understanding.

## 2019-01-13 NOTE — ED TRIAGE NOTES
"Pt c/o "vibrating head feeling"  States it went away, started approx 1 hr ago and has already stopped.  States her vision was "shaky" when incident occurred.  Reports hx of "small fiber neuropathy".  "

## 2019-01-13 NOTE — ED PROVIDER NOTES
"Encounter Date: 1/12/2019       History     Chief Complaint   Patient presents with    Headache     Pt states "earlier I had a weird buzzing feeling in my head an hour ago, it is pretty much gone now" Pt reports mild pain to left side of head and face along with tingling to left side of face. Pt states "It felt like a popping sensation and then it went away gradually". Pt denies weakness in any extremities. Pt AAX4, no facial asymetry.  Pt has history of migraines     HPI  Review of patient's allergies indicates:   Allergen Reactions    Latex, natural rubber     Gluten protein Rash    Latex Rash     Past Medical History:   Diagnosis Date    Celiac disease     Fibromyalgia     Migraines     Small fiber neuropathy      Past Surgical History:   Procedure Laterality Date    CERVICAL SPINE SURGERY  2016    SPINE SURGERY       Family History   Problem Relation Age of Onset    Autoimmune disease Father     Autoimmune disease Sister     Celiac disease Maternal Grandfather     Cancer Maternal Grandfather     Cancer Paternal Grandmother     Breast cancer Paternal Grandmother     Colon cancer Neg Hx     Esophageal cancer Neg Hx     Ovarian cancer Neg Hx      Social History     Tobacco Use    Smoking status: Former Smoker    Smokeless tobacco: Never Used   Substance Use Topics    Alcohol use: Yes     Comment: rarely     Drug use: No     Review of Systems    Physical Exam     Initial Vitals [01/12/19 2348]   BP Pulse Resp Temp SpO2   (!) 186/114 90 -- 98.5 °F (36.9 °C) 100 %      MAP       --         Physical Exam    ED Course   Procedures  Labs Reviewed - No data to display       Imaging Results    None          Medical Decision Making:   Initial Assessment:   Atypical migraine likely. Not c/w SAH or stroke.  Symptoms resolved including HA  Differential Diagnosis:   Atypical migraine, complex partial seizure( unlikley)    Additional MDM:     NIH Stroke Scale:   Level of consciousness = 0 - alert  LOC " questions = 0 - answers both correctly  LOC commands = 0 - performs both correctly  Best gaze = 0 - normal  Visual = 0 - no visual loss  Facial palsy = 0 - normal  Motor left arm =  0 - no drift  Motor right arm =  0 - no drift  Motor left leg = 0 - no drift  Motor right leg =  0 - no drift  Limb ataxia = 0 - absent  Sensory = 0 - normal  Best language = 0 - no aphasia  Dysarthria = 0 - normal articulation  Extinction and inattention = 0 - no neglect  NIH Stroke Scale Total = 0                   Clinical Impression:   The encounter diagnosis was Atypical migraine.                             Phillip Louis MD  01/13/19 0019

## 2019-01-17 ENCOUNTER — CLINICAL SUPPORT (OUTPATIENT)
Dept: REHABILITATION | Facility: OTHER | Age: 39
End: 2019-01-17
Attending: PSYCHIATRY & NEUROLOGY
Payer: COMMERCIAL

## 2019-01-17 DIAGNOSIS — G89.29 CHRONIC BILATERAL LOW BACK PAIN WITHOUT SCIATICA: Primary | ICD-10-CM

## 2019-01-17 DIAGNOSIS — M54.50 CHRONIC BILATERAL LOW BACK PAIN WITHOUT SCIATICA: Primary | ICD-10-CM

## 2019-01-17 PROCEDURE — 97110 THERAPEUTIC EXERCISES: CPT

## 2019-01-17 NOTE — PROGRESS NOTES
[facility]      Name: Caryl Cedeno  Clinic Number: [id number]  Date of Treatment: 2019   Diagnosis:   Encounter Diagnosis   Name Primary?    Chronic bilateral low back pain without sciatica Yes     Physician: Berlin Saleem*    Pain pattern determined: Pattern 1 PEN  Plan of care signed: 18   Time in:100pm  Time Out: 200pm  Total Treatment time: 60 minutes  Precautions: lower back pain  Visit #: 8    POC due: 19  Reassessment due:  2/10/19     Subjective   Caryl reports her LB is feeling good today, even after being ill all weekend and remaining in bed  Patient reports their pain to be 3/10 on a 0-10 scale with 0 being no pain and 10 being the worst pain imaginable.    Pain Location: lower back and cervical area     Occupation:  On disability    Leisure: Mostly sedentary, watch TV, swimming at home pool                      Pts goals:  Reduce pain, get stronger and more mobile, tolerate standing for longer than 10 minutes without increased pain       Objective       Baseline Isometric Testing on Med X equipment: Testing administered by PT     Baseline IM Testing Results:   Date of testin2018  Femur Restraint 6   Top Dead Center 24   Counterweight 185   Lumbar Flexion 42   Lumbar Extension 6   Lumbar Peak Torque 83   Min Torque 68   Test Percent Below Normative Data 41      FOTO: Focus on Therapeutic Outcomes   Category: lumbar   % Impaired: 78%  Current Score  = CL = least 60% but < 80% impaired, limited or restricted  Goal at Discharge Score = CK = at least 40% but < 60% impaired, limited or restricted    Treatment    Pt was instructed in and performed the following:       Caryl received therapeutic exercises to develop/improved posture, cardiovascular endurance, muscular endurance, lumbar  ROM, strength and muscular endurance for 60 minutes including the following exercises:     [facility] 2019   Visit Number 8   VAS  Pain Rating 2   Recumbent Bike Seat Pos. -   Time 10   Cervical Stretches - Retraction In Lying -   Retraction in Sitting -   Scapular Retraction -   Manual Therapy -   Cervical Extension Seat Pad -   Seat Adjustment -   Top Dead Center -   Counterweight -   Cervical Flexion -   Cervical Extension -   Cervical Peak Torque -   Cervical Weight -   Repetitions -   Rating of Perceived Exertion -   Lumbar Extension Seat Pad -   Femur Restraint -   Top Dead Center -   Counterweight -   Lumbar Flexion -   Lumbar Extension -   Lumbar Peak Torque -   Min Torque -   Test Percent Below Normative Data -   Test Percent Gain in Strength from Initial  -   Lumbar Weight 45   Repetitions 16   Rating of Perceived Exertion 3   Ice - Z Lie (in min.) 10       Open Books x5 ea L/R  LTR x20  Pelvic Tilts x5--> TrA  +Manual Resisted Hip Flexion x5 ea L/R  Bridging x8 with 5 second holds  Add clams with YTB next visit     Peripheral muscle strengthening which included 1 set of 15-20 repetitions at a slow, controlled 7 second per rep pace focused on strengthening supporting musculature for improved body mechanics and functional mobility.  Pt and therapist focused on proper form during treatment to ensure optimal strengthening of each targeted muscle group.  Machines were utilized including torso rotation, leg extension, leg curl, chest press, upright row. Tricep extension, bicep curl, leg press, and hip abduction added on third visit.     Home Exercise Program as follows:   PPT 5 reps  PPT with pillow squeeze 5 reps  Clamshells 10 reps  OB 10 reps    Handouts were given to the patient. Pt demo good understanding of the education provided. Caryl demonstrated good return demonstration of activities.     Lumbar roll use compliance: yes  Additional exercises taught this treatment session: none    Assessment     Pt tolerated treatment well and increased reps on Med X to 16 at 45ft/lbs.  Pt reports she went to ER over the weekend with c/o a  ""buzzing " in her head.  Pt evaluated and released with dx of atypical migraine.  Pt is feeling fine today  Pt will continue to benefit from skilled outpatient physical therapy to address the deficits stated in the impairment chart, provide pt/family education and to maximize pt's level of independence in the home and community environment.     Pt's spiritual, cultural and educational needs considered and pt agreeable to plan of care and goals as stated below:     Medical necessity is demonstrated by the following problem list.    Pt presents with the following impairments:   History  Co-morbidities and personal factors that may impact the plan of care Examination  Body Structures and Functions, activity limitations and participation restrictions that may impact the plan of care Clinical Presentation    Decision Making/ Complexity Score   Co-morbidities:   See precautions           Personal Factors:   lifestyle  sedentary Body Regions:   neck  back  lower extremities     Body Systems:   gross symmetry  ROM  strength  gait  motor control     Activity limitations:   Learning and applying knowledge  no deficits     General Tasks and Commands  no deficits     Communication  no deficits     Mobility  lifting and carrying objects  walking  driving (bike, car, motorcycle)     Self care  dressing  looking after one's health     Domestic Life  shopping  cooking  doing house work (cleaning house, washing dishes, laundry)     Interactions/Relationships  family relationships  intimate relationships     Life Areas  employment     Community and Social Life  community life  recreation and leisure     Participation Restrictions:   Pt unable to work, exercise, or participate in social activities        evolving clinical presentation with changing clinical characteristics    Moderate             GOALS: Pt is in agreement with the following goals.     Short term goals:  6 weeks or 10 visits   1.  Pt will demonstrate increased lumbar " ROM by at least 6 degrees from the initial ROM value with improvements noted in functional ROM and ability to perform ADLs  2.  Pt will demonstrate increased maximum isometric torque value by 10% when compared to the initial value resulting in improved ability to perform bending, lifting, and carrying activities safely, confidently.  3.  Patient report a reduction in worst pain score by 1-2 points for improved tolerance during work and recreational activities  4.  Pt able to perform HEP correctly with minimal cueing or supervision for therapist     Long term goals: 13 weeks or 20 visits   1. Pt will demonstrate increased lumbar ROM by at least 9 degrees from initial ROM value, resulting in improved ability to perform functional fwd bending while standing and sitting.   2. Pt will demonstrate increased maximum isometric torque value by 20% when compared to the initial value resulting in improved ability to perform bending, lifting, and carrying activities safely, confidently.  3. Pt to demonstrate ability to independently control and reduce their pain through posture positioning and mechanical movements throughout a typical day.  4.  Patient will demonstrate improved overall function per FOTO Survey to CK = at least 40% but < 60% impaired, limited or restricted score or less.  5. Pt will toelrate standing for longer than 10 minutes without increased pain by more than 1-2 VAS pain points.         Plan   Outpatient physical therapy 2x week for 13 weeks or 20 visits

## 2019-01-18 ENCOUNTER — OFFICE VISIT (OUTPATIENT)
Dept: GASTROENTEROLOGY | Facility: CLINIC | Age: 39
End: 2019-01-18
Payer: COMMERCIAL

## 2019-01-18 VITALS
SYSTOLIC BLOOD PRESSURE: 138 MMHG | WEIGHT: 192.69 LBS | DIASTOLIC BLOOD PRESSURE: 104 MMHG | BODY MASS INDEX: 30.24 KG/M2 | HEIGHT: 67 IN | HEART RATE: 80 BPM

## 2019-01-18 DIAGNOSIS — R03.0 ELEVATED BLOOD PRESSURE READING: Primary | ICD-10-CM

## 2019-01-18 PROCEDURE — 99999 PR PBB SHADOW E&M-EST. PATIENT-LVL IV: ICD-10-PCS | Mod: PBBFAC,,, | Performed by: INTERNAL MEDICINE

## 2019-01-18 PROCEDURE — 3008F PR BODY MASS INDEX (BMI) DOCUMENTED: ICD-10-PCS | Mod: CPTII,S$GLB,, | Performed by: INTERNAL MEDICINE

## 2019-01-18 PROCEDURE — 99214 PR OFFICE/OUTPT VISIT, EST, LEVL IV, 30-39 MIN: ICD-10-PCS | Mod: S$GLB,,, | Performed by: INTERNAL MEDICINE

## 2019-01-18 PROCEDURE — 3008F BODY MASS INDEX DOCD: CPT | Mod: CPTII,S$GLB,, | Performed by: INTERNAL MEDICINE

## 2019-01-18 PROCEDURE — 99214 OFFICE O/P EST MOD 30 MIN: CPT | Mod: S$GLB,,, | Performed by: INTERNAL MEDICINE

## 2019-01-18 PROCEDURE — 99999 PR PBB SHADOW E&M-EST. PATIENT-LVL IV: CPT | Mod: PBBFAC,,, | Performed by: INTERNAL MEDICINE

## 2019-01-18 NOTE — Clinical Note
Deidra - please tell Caryl she needs to see a primary care MD next week for a recheck and  for follow up of her elevated blood pressure today

## 2019-01-19 NOTE — PATIENT INSTRUCTIONS
VERY IMPORTANT:     Caryl you need to see a primary care MD next week for a recheck and  for follow up of her elevated blood pressure today

## 2019-01-21 ENCOUNTER — DOCUMENTATION ONLY (OUTPATIENT)
Dept: REHABILITATION | Facility: OTHER | Age: 39
End: 2019-01-21
Attending: PSYCHIATRY & NEUROLOGY
Payer: COMMERCIAL

## 2019-01-21 NOTE — PROGRESS NOTES
"Health  Wellness Visit Note    Name: Caryl Cedeno  Clinic Number: [id number]  Physician: Berlin Saleem*  Diagnosis: No diagnosis found.  Past Medical History:   Diagnosis Date    Celiac disease     Fibromyalgia     Migraines     Small fiber neuropathy      Visit Number: 36  Precautions: POTS (postural orthostatic tachycardia syndrome) (Chronic)  Small fiber neuropathy - Primary  Fibromyalgia  Neck sx: disc replacement Nov 2016  6 Month: November 12 Month: May  Time In: 4:25PM  Time Out:5:25PM  Total TreatmentTime:  60 minutes    Subjective:   Patient reports feeling good today, says both neck and back are doing good. She was not feeling good last week, had to skip appointment in Wellness, rested in bed for a few days; thinks the break helped her body "reset". Has put off making appointment with hip specialist. Experienced moderate nerve pain in quad.    .      Objective:   Caryl completed therapeutic stretches (EIL, MATI) and the following MedX exercise machines: core lumbar, torso rotation l/r, leg extension, leg curl, upright row, chest press, biceps curl, triceps extension, leg press    See exercise log in patient folder for rate of exertion and repetitions completed.     Pt did hip abduction upstairs in [facility] after.     Assessment:   Patient tolerated Cervical MedX machine and all peripheral machines with minimal pain, but did have dizziness on the torso rotation and the chest press. Warm up on bike and ice.     Plan:  Continue with established plan of care towards wellness goals. Pt will attend Physical Therapy and Wellness each once a week.     Health  : Osito Lizama  1/21/2019    "

## 2019-01-22 ENCOUNTER — PATIENT MESSAGE (OUTPATIENT)
Dept: GASTROENTEROLOGY | Facility: CLINIC | Age: 39
End: 2019-01-22

## 2019-01-22 ENCOUNTER — TELEPHONE (OUTPATIENT)
Dept: GASTROENTEROLOGY | Facility: CLINIC | Age: 39
End: 2019-01-22

## 2019-01-22 NOTE — TELEPHONE ENCOUNTER
----- Message from Ezra Castañeda MD sent at 1/18/2019  7:00 PM CST -----  Deidra - please tell Caryl she needs to see a primary care MD next week for a recheck and  for follow up of her elevated blood pressure today

## 2019-01-24 ENCOUNTER — OFFICE VISIT (OUTPATIENT)
Dept: OPTOMETRY | Facility: CLINIC | Age: 39
End: 2019-01-24
Payer: COMMERCIAL

## 2019-01-24 ENCOUNTER — CLINICAL SUPPORT (OUTPATIENT)
Dept: REHABILITATION | Facility: OTHER | Age: 39
End: 2019-01-24
Attending: PSYCHIATRY & NEUROLOGY
Payer: COMMERCIAL

## 2019-01-24 DIAGNOSIS — G89.29 CHRONIC BILATERAL LOW BACK PAIN WITHOUT SCIATICA: Primary | ICD-10-CM

## 2019-01-24 DIAGNOSIS — M54.50 CHRONIC BILATERAL LOW BACK PAIN WITHOUT SCIATICA: Primary | ICD-10-CM

## 2019-01-24 DIAGNOSIS — H53.9 VISUAL DISTURBANCES: Primary | ICD-10-CM

## 2019-01-24 DIAGNOSIS — H52.203 MYOPIA OF BOTH EYES WITH ASTIGMATISM: ICD-10-CM

## 2019-01-24 DIAGNOSIS — H52.13 MYOPIA OF BOTH EYES WITH ASTIGMATISM: ICD-10-CM

## 2019-01-24 PROCEDURE — 92004 PR EYE EXAM, NEW PATIENT,COMPREHESV: ICD-10-PCS | Mod: S$GLB,,, | Performed by: OPTOMETRIST

## 2019-01-24 PROCEDURE — 99999 PR PBB SHADOW E&M-EST. PATIENT-LVL II: CPT | Mod: PBBFAC,,, | Performed by: OPTOMETRIST

## 2019-01-24 PROCEDURE — 92004 COMPRE OPH EXAM NEW PT 1/>: CPT | Mod: S$GLB,,, | Performed by: OPTOMETRIST

## 2019-01-24 PROCEDURE — 99999 PR PBB SHADOW E&M-EST. PATIENT-LVL II: ICD-10-PCS | Mod: PBBFAC,,, | Performed by: OPTOMETRIST

## 2019-01-24 PROCEDURE — 92015 PR REFRACTION: ICD-10-PCS | Mod: S$GLB,,, | Performed by: OPTOMETRIST

## 2019-01-24 PROCEDURE — 97110 THERAPEUTIC EXERCISES: CPT

## 2019-01-24 PROCEDURE — 92015 DETERMINE REFRACTIVE STATE: CPT | Mod: S$GLB,,, | Performed by: OPTOMETRIST

## 2019-01-24 NOTE — PROGRESS NOTES
"HPI     Pt is here for an annual eye exam. Pt reports recent "brain/vision   vibrations" that she was seen at the ER for. 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 Pt reports   glare sensitivity especially when working with electronic devices.     SABINE: 5 years ago  Glasses? Yes - readers years ago  Contacts? No  H/o eye surgery: No  H/o eye injury: No  Known eye conditions? Yes - compensated convergence insufficieny - pt   reports under going vision therapy in her 20s    (-) Flashes/Floaters (-) Mucous (-) Tearing (-) Itching (-) Burning  (-) Eye Pain (-) Irritation (-) Redness  (-) Double vision (-) Blurry   vision     Eye gtt? No      Last edited by Mily Bartlett, OD on 1/24/2019  3:49 PM. (History)        ROS     Other comments for: Cardiovascular (Pt )    Last edited by Mily Bartlett, OD on 1/24/2019  3:01 PM. (History)        Assessment /Plan     For exam results, see Encounter Report.    Visual disturbances    Myopia of both eyes with astigmatism        1. Likely associated with migraines. RTC 2 weeks for DFE.  2. Prescribe SRx.Pt sts she sees better with Rx in phoropter rather than without. Discussed screen protectors to help with glare on devices.                 "

## 2019-01-24 NOTE — PROGRESS NOTES
[facility] Physical Therapy Treatment      Name: Caryl Cedeno  Clinic Number: [id number]  Date of Treatment: 2019   Diagnosis:   Encounter Diagnosis   Name Primary?    Chronic bilateral low back pain without sciatica Yes     Physician: Berlin Saleem*    Pain pattern determined: Pattern 1 PEN  Plan of care signed: 18   Time in:100pm  Time Out: 200pm  Total Treatment time: 45 minutes  Precautions: lower back pain  Visit #: 9    POC due: 19   Reassessment due:  2/10/19     Subjective   Caryl reports her LB is feeling good today. She reports increased neck pain (7/10 at worst) a few days ago when she slept on it funny and then maybe pushed a bit too hard on the cervical machine in wellness. It is starting to feel better however (5/10). She's still feeling better overall since starting PT.   She coninues to report pain with lateral leaning and other movements but is much better compared to first starting PT.     Patient reports their pain to be 2/10 on a 0-10 scale with 0 being no pain and 10 being the worst pain imaginable.    Pain Location: lower back and cervical area     Occupation:  On disability    Leisure: Mostly sedentary, watch TV, swimming at home pool                      Pts goals:  Reduce pain, get stronger and more mobile, tolerate standing for longer than 10 minutes without increased pain       Objective       Baseline Isometric Testing on Med X equipment: Testing administered by PT     Baseline IM Testing Results:   Date of testin2018  Femur Restraint 6   Top Dead Center 24   Counterweight 185   Lumbar Flexion 42   Lumbar Extension 6   Lumbar Peak Torque 83   Min Torque 68   Test Percent Below Normative Data 41      FOTO: Focus on Therapeutic Outcomes   Category: lumbar   % Impaired: 78%  Current Score  = CL = least 60% but < 80% impaired, limited or restricted  Goal at Discharge Score = CK = at least 40% but < 60% impaired, limited or  restricted    Treatment    Pt was instructed in and performed the following:       Caryl received therapeutic exercises to develop/improved posture, cardiovascular endurance, muscular endurance, lumbar  ROM, strength and muscular endurance for 60 minutes including the following exercises:   [facility] 1/24/2019   Visit Number 9   VAS Pain Rating 3   Recumbent Bike Seat Pos. -   Time 10   Cervical Stretches - Retraction In Lying -   Retraction in Sitting -   Scapular Retraction -   Manual Therapy -   Cervical Extension Seat Pad -   Seat Adjustment -   Top Dead Center -   Counterweight -   Cervical Flexion -   Cervical Extension -   Cervical Peak Torque -   Cervical Weight -   Repetitions -   Rating of Perceived Exertion -   Lumbar Extension Seat Pad -   Femur Restraint -   Top Dead Center -   Counterweight -   Lumbar Flexion -   Lumbar Extension -   Lumbar Peak Torque -   Min Torque -   Test Percent Below Normative Data -   Test Percent Gain in Strength from Initial  -   Lumbar Weight 45   Repetitions 19   Rating of Perceived Exertion 4   Ice - Z Lie (in min.) 10       Open Books x10  LTR x20  Pelvic Tilts x5--> TrA  +Manual Resisted Hip Flexion x5 ea L/R  Bridging x5 with pelvic tilt   Clams with YTB 10x      Peripheral muscle strengthening which included 1 set of 15-20 repetitions at a slow, controlled 7 second per rep pace focused on strengthening supporting musculature for improved body mechanics and functional mobility.  Pt and therapist focused on proper form during treatment to ensure optimal strengthening of each targeted muscle group.  Machines were utilized including torso rotation, leg extension, leg curl, chest press, upright row. Tricep extension, bicep curl, leg press, and hip abduction added on third visit.     Home Exercise Program as follows:   PPT 5 reps  PPT with pillow squeeze 5 reps  Clamshells 10 reps  OB 10 reps    Handouts were given to the patient. Pt demo good understanding of the  education provided. Caryl demonstrated good return demonstration of activities.     Lumbar roll use compliance: yes  Additional exercises taught this treatment session:        Assessment     Pt tolerated treatment well. Pt reported increased neck pain over the week but is improving overall. She did not report any increased pain with any exercises today. Attempted to progress core and clamshell to include yellow theraband with some increased hip fatigue. Pt able to tolerate Med X lumbar extension exercise at same with with increased reps. Plan to retest next session. She reports feeling stronger overall but still has significant fatigue. She is making fair progress towards treatment goals.   Pt will continue to benefit from skilled outpatient physical therapy to address the deficits stated in the impairment chart, provide pt/family education and to maximize pt's level of independence in the home and community environment.     Pt's spiritual, cultural and educational needs considered and pt agreeable to plan of care and goals as stated below:     Medical necessity is demonstrated by the following problem list.    Pt presents with the following impairments:   History  Co-morbidities and personal factors that may impact the plan of care Examination  Body Structures and Functions, activity limitations and participation restrictions that may impact the plan of care Clinical Presentation    Decision Making/ Complexity Score   Co-morbidities:   See precautions           Personal Factors:   lifestyle  sedentary Body Regions:   neck  back  lower extremities     Body Systems:   gross symmetry  ROM  strength  gait  motor control     Activity limitations:   Learning and applying knowledge  no deficits     General Tasks and Commands  no deficits     Communication  no deficits     Mobility  lifting and carrying objects  walking  driving (bike, car, motorcycle)     Self care  dressing  looking after one's health     Domestic  Life  shopping  cooking  doing house work (cleaning house, washing dishes, laundry)     Interactions/Relationships  family relationships  intimate relationships     Life Areas  employment     Community and Social Life  community life  recreation and leisure     Participation Restrictions:   Pt unable to work, exercise, or participate in social activities        evolving clinical presentation with changing clinical characteristics    Moderate             GOALS: Pt is in agreement with the following goals.     Short term goals:  6 weeks or 10 visits   1.  Pt will demonstrate increased lumbar ROM by at least 6 degrees from the initial ROM value with improvements noted in functional ROM and ability to perform ADLs  2.  Pt will demonstrate increased maximum isometric torque value by 10% when compared to the initial value resulting in improved ability to perform bending, lifting, and carrying activities safely, confidently.  3.  Patient report a reduction in worst pain score by 1-2 points for improved tolerance during work and recreational activities  4.  Pt able to perform HEP correctly with minimal cueing or supervision for therapist     Long term goals: 13 weeks or 20 visits   1. Pt will demonstrate increased lumbar ROM by at least 9 degrees from initial ROM value, resulting in improved ability to perform functional fwd bending while standing and sitting.   2. Pt will demonstrate increased maximum isometric torque value by 20% when compared to the initial value resulting in improved ability to perform bending, lifting, and carrying activities safely, confidently.  3. Pt to demonstrate ability to independently control and reduce their pain through posture positioning and mechanical movements throughout a typical day.  4.  Patient will demonstrate improved overall function per FOTO Survey to CK = at least 40% but < 60% impaired, limited or restricted score or less.  5. Pt will toelrate standing for longer than 10 minutes  without increased pain by more than 1-2 VAS pain points.         Plan   Outpatient physical therapy 2x week for 13 weeks or 20 visits

## 2019-01-28 ENCOUNTER — DOCUMENTATION ONLY (OUTPATIENT)
Dept: REHABILITATION | Facility: OTHER | Age: 39
End: 2019-01-28
Attending: PSYCHIATRY & NEUROLOGY
Payer: COMMERCIAL

## 2019-01-28 NOTE — PROGRESS NOTES
Health  Wellness Visit Note    Name: Caryl Cedeno  Clinic Number: [id number]  Physician: Berlin Saleem*  Diagnosis: No diagnosis found.  Past Medical History:   Diagnosis Date    Celiac disease     Fibromyalgia     Migraines     Small fiber neuropathy      Visit Number: 35  Precautions: POTS (postural orthostatic tachycardia syndrome) (Chronic)  Small fiber neuropathy - Primary  Fibromyalgia  Neck sx: disc replacement Nov 2016  6 Month: November 12 Month: May  Time In: 4:00PM  Time Out:5:00PM  Total TreatmentTime:  60 minutes    Subjective:   Patient reports feeling good today,she did have some migraines over the weekend, just getting over them. While doing cervical machines she could feel weakness in her neck when flexing neck, wanted to stop at 10 reps. She did not have any increase in symptoms. Inquired about coming to wellness 2 times a week and therapy once, wants to try and increase her everyday endurance. Picking up new BP medicine today. HEP compliant.     .      Objective:   Caryl completed therapeutic stretches (EIL, MATI) and the following MedX exercise machines: core lumbar, torso rotation l/r, leg extension, leg curl, upright row, chest press, biceps curl, triceps extension, leg press    See exercise log in patient folder for rate of exertion and repetitions completed.     Pt did hip abduction upstairs in [facility] after.     Assessment:   Patient tolerated Cervical MedX machine and all peripheral machines with minimal pain, but did have dizziness on the torso rotation and the chest press. Warm up on bike and ice.     Plan:  Continue with established plan of care towards wellness goals. Pt will attend Physical Therapy and Wellness each once a week.     Health  : Osito Lizama  1/28/2019

## 2019-01-30 ENCOUNTER — CLINICAL SUPPORT (OUTPATIENT)
Dept: REHABILITATION | Facility: OTHER | Age: 39
End: 2019-01-30
Attending: PSYCHIATRY & NEUROLOGY
Payer: COMMERCIAL

## 2019-01-30 DIAGNOSIS — G89.29 CHRONIC BILATERAL LOW BACK PAIN WITHOUT SCIATICA: Primary | ICD-10-CM

## 2019-01-30 DIAGNOSIS — M54.50 CHRONIC BILATERAL LOW BACK PAIN WITHOUT SCIATICA: Primary | ICD-10-CM

## 2019-01-30 PROCEDURE — 97750 PHYSICAL PERFORMANCE TEST: CPT | Performed by: PHYSICAL THERAPIST

## 2019-01-30 PROCEDURE — 97110 THERAPEUTIC EXERCISES: CPT | Performed by: PHYSICAL THERAPIST

## 2019-01-30 PROCEDURE — G8979 MOBILITY GOAL STATUS: HCPCS | Mod: CJ | Performed by: PHYSICAL THERAPIST

## 2019-01-30 PROCEDURE — G8978 MOBILITY CURRENT STATUS: HCPCS | Mod: CK | Performed by: PHYSICAL THERAPIST

## 2019-01-30 NOTE — PROGRESS NOTES
[facility]      Name: Caryl Cedeno  Clinic Number: [id number]  Date of Treatment: 2019   Diagnosis:   Encounter Diagnosis   Name Primary?    Chronic bilateral low back pain without sciatica Yes     Physician: Berlin Saleem*    Pain pattern determined: Pattern 1 PEN  Plan of care signed: 18   Time in:1230pm  Time Out:1300pm  Total Treatment time: 45 minutes  Precautions: lower back pain  Visit #: 10 [facility] IM testing    POC due: 19   Reassessment due:  2/10/19     Subjective   Caryl reports her LB is feeling good today. Her cervical area is doing better this week as well.  It is starting to feel better (2/10). She's still feeling better overall since starting PT.   She coninues to report pain with lateral leaning and other movements but is much better compared to first starting PT.     Patient reports their pain to be 2/10 on a 0-10 scale with 0 being no pain and 10 being the worst pain imaginable.    Pain Location: lower back and cervical area     Occupation:  On disability    Leisure: Mostly sedentary, watch TV, swimming at home pool                      Pts goals:  Reduce pain, get stronger and more mobile, tolerate standing for longer than 10 minutes without increased pain       Objective       Baseline Isometric Testing on Med X equipment: Testing administered by PT     Baseline IM Testing Results:   Date of testin2018  Femur Restraint 6   Top Dead Center 24   Counterweight 185   Lumbar Flexion 42   Lumbar Extension 6   Lumbar Peak Torque 83   Min Torque 68   Test Percent Below Normative Data 41        [facility] IM Testing Results:   Date of testin2018  Femur Restraint 6   Top Dead Center 24   Counterweight 185   Lumbar Flexion 48   Lumbar Extension 0   Lumbar Peak Torque 110   Min Torque 51   Test Percent Below Normative Data 30       FOTO: Focus on Therapeutic Outcomes   Category: lumbar   % Impaired: 78%  Visit 10:  58%  Current Score  = CL = least 60% but < 80% impaired, limited or restricted  Goal at Discharge Score = CK = at least 40% but < 60% impaired, limited or restricted    Treatment    Pt was instructed in and performed the following:       Caryl received therapeutic exercises to develop/improved posture, cardiovascular endurance, muscular endurance, lumbar  ROM, strength and muscular endurance for 60 minutes including the following exercises:       [facility] 1/30/2019   Visit Number 10   VAS Pain Rating 3   Recumbent Bike Seat Pos. -   Time 10   Lumbar Extension Seat Pad 0   Femur Restraint 6   Top Dead Center 24   Counterweight 185   Lumbar Flexion 48   Lumbar Extension 0   Lumbar Peak Torque 110   Min Torque 51   Test Percent Below Normative Data 30   Ice - Z Lie (in min.) 10         Open Books x10  LTR x20  Pelvic Tilts x5--> TrA  +Manual Resisted Hip Flexion x5 ea L/R  Bridging x5 with pelvic tilt   Clams with YTB 10x      Peripheral muscle strengthening which included 1 set of 15-20 repetitions at a slow, controlled 7 second per rep pace focused on strengthening supporting musculature for improved body mechanics and functional mobility.  Pt and therapist focused on proper form during treatment to ensure optimal strengthening of each targeted muscle group.  Machines were utilized including torso rotation, leg extension, leg curl, chest press, upright row. Tricep extension, bicep curl, leg press, and hip abduction added on third visit.     Home Exercise Program as follows:   PPT 5 reps  PPT with pillow squeeze 5 reps  Clamshells 10 reps  OB 10 reps    Handouts were given to the patient. Pt demo good understanding of the education provided. Caryl demonstrated good return demonstration of activities.     Lumbar roll use compliance: yes  Additional exercises taught this treatment session:        Assessment       Patient has attended 10 visits at [facility] which included MD evaluation, PT  evaluation with isometric testing, and physical therapy treatment including HEP instruction, education, aerobic work, dynamic strengthening on med ex equipment for the spine, and whole body strengthening on med ex equipment with increasing weight loads.  Patient  is demonstrating increased ability to reduce symptoms, improved posture, improved lumbar  ROM 0-48 , and improved lumbar strength on med ex test by 21% average.    Pt tolerated testing well for visit 10. She did not report any increased pain with any exercises today. Continue core and clamshell to include yellow theraband with some increased hip fatigue.  She reports feeling stronger overall but still has significant fatigue. She is making fair progress towards treatment goals.   Pt will continue to benefit from skilled outpatient physical therapy to address the deficits stated in the impairment chart, provide pt/family education and to maximize pt's level of independence in the home and community environment.     Pt's spiritual, cultural and educational needs considered and pt agreeable to plan of care and goals as stated below:     Medical necessity is demonstrated by the following problem list.    Pt presents with the following impairments:   History  Co-morbidities and personal factors that may impact the plan of care Examination  Body Structures and Functions, activity limitations and participation restrictions that may impact the plan of care Clinical Presentation    Decision Making/ Complexity Score   Co-morbidities:   See precautions           Personal Factors:   lifestyle  sedentary Body Regions:   neck  back  lower extremities     Body Systems:   gross symmetry  ROM  strength  gait  motor control     Activity limitations:   Learning and applying knowledge  no deficits     General Tasks and Commands  no deficits     Communication  no deficits     Mobility  lifting and carrying objects  walking  driving (bike, car, motorcycle)     Self  care  dressing  looking after one's health     Domestic Life  shopping  cooking  doing house work (cleaning house, washing dishes, laundry)     Interactions/Relationships  family relationships  intimate relationships     Life Areas  employment     Community and Social Life  community life  recreation and leisure     Participation Restrictions:   Pt unable to work, exercise, or participate in social activities        evolving clinical presentation with changing clinical characteristics    Moderate             GOALS: Pt is in agreement with the following goals.     Short term goals:  6 weeks or 10 visits   1.  Pt will demonstrate increased lumbar ROM by at least 6 degrees from the initial ROM value with improvements noted in functional ROM and ability to perform ADLs MET  2.  Pt will demonstrate increased maximum isometric torque value by 10% when compared to the initial value resulting in improved ability to perform bending, lifting, and carrying activities safely, confidently.MET  3.  Patient report a reduction in worst pain score by 1-2 points for improved tolerance during work and recreational activities MET  4.  Pt able to perform HEP correctly with minimal cueing or supervision for therapist MET     Long term goals: 13 weeks or 20 visits   1. Pt will demonstrate increased lumbar ROM by at least 9 degrees from initial ROM value, resulting in improved ability to perform functional fwd bending while standing and sitting. MET  2. Pt will demonstrate increased maximum isometric torque value by 20% when compared to the initial value resulting in improved ability to perform bending, lifting, and carrying activities safely, confidently. progressing  3. Pt to demonstrate ability to independently control and reduce their pain through posture positioning and mechanical movements throughout a typical day. progressing  4.  Patient will demonstrate improved overall function per FOTO Survey to CK = at least 40% but < 60%  impaired, limited or restricted score or less. progressing  5. Pt will toelrate standing for longer than 10 minutes without increased pain by more than 1-2 VAS pain points. progressing        Plan   Outpatient physical therapy 2x week for 13 weeks or 20 visits

## 2019-02-04 ENCOUNTER — DOCUMENTATION ONLY (OUTPATIENT)
Dept: REHABILITATION | Facility: OTHER | Age: 39
End: 2019-02-04
Attending: PSYCHIATRY & NEUROLOGY
Payer: COMMERCIAL

## 2019-02-04 RX ORDER — MILNACIPRAN HYDROCHLORIDE 100 MG/1
TABLET, FILM COATED ORAL
Qty: 60 TABLET | Refills: 11 | OUTPATIENT
Start: 2019-02-04

## 2019-02-04 NOTE — PROGRESS NOTES
Health  Wellness Visit Note    Name: Caryl Cedeno  Clinic Number: [id number]  Physician: Berlin Saleem*  Diagnosis: No diagnosis found.  Past Medical History:   Diagnosis Date    Celiac disease     Fibromyalgia     Migraines     Small fiber neuropathy      Visit Number: 36  Precautions: POTS (postural orthostatic tachycardia syndrome) (Chronic)  Small fiber neuropathy - Primary  Fibromyalgia  Neck sx: disc replacement Nov 2016  6 Month: November 12 Month: May  Time In: 4:40PM  Time Out:5:30PM  Total TreatmentTime:  50 minutes    Subjective:   Patient reports having a headache on her left side today. Feeling it in her neck to the front of her head. Pt has been doing well upstairs. She just feels like she's missing a day of exercise since she can't swim right now since it's cold. She doesn't like the indoor pool. Right now, two days out of the week she is going to class and then coming to [facility] after. She's trying to form a habit of going to the gym at school that 3rd day of class. She just finds she has a of of excuses. HC encouraged her to try to go to the gym at least 5-10 min that 3rd day. HEP compliant. [place] better after exercising.     Objective:   Caryl completed therapeutic stretches (EIL, MATI) and the following MedX exercise machines: core lumbar, torso rotation l/r, leg extension, leg curl, upright row, chest press, biceps curl, triceps extension, leg press    See exercise log in patient folder for rate of exertion and repetitions completed.     Pt did hip abduction upstairs in [facility] after.     Assessment:   Patient tolerated Cervical MedX machine and all peripheral machines with minimal pain, but did have dizziness on the torso rotation and the chest press. Warm up on bike and ice.     Plan:  Continue with established plan of care towards wellness goals. Pt will attend Physical Therapy and Wellness each once a week.     Health  : Lola Aguilar, PCT  2/4/2019

## 2019-02-06 ENCOUNTER — CLINICAL SUPPORT (OUTPATIENT)
Dept: REHABILITATION | Facility: OTHER | Age: 39
End: 2019-02-06
Attending: PSYCHIATRY & NEUROLOGY
Payer: COMMERCIAL

## 2019-02-06 DIAGNOSIS — M54.50 CHRONIC BILATERAL LOW BACK PAIN WITHOUT SCIATICA: Primary | ICD-10-CM

## 2019-02-06 DIAGNOSIS — G89.29 CHRONIC BILATERAL LOW BACK PAIN WITHOUT SCIATICA: Primary | ICD-10-CM

## 2019-02-06 PROCEDURE — 97110 THERAPEUTIC EXERCISES: CPT | Performed by: PHYSICAL THERAPIST

## 2019-02-06 NOTE — PROGRESS NOTES
[facility]      Name: Caryl Cedeno  Clinic Number: [id number]  Date of Treatment: 2019   Diagnosis:   Encounter Diagnosis   Name Primary?    Chronic bilateral low back pain without sciatica Yes     Physician: Berlin Saleem*    Pain pattern determined: Pattern 1 PEN  Plan of care signed: 18   Time in:415pm ( pt 15 min late, accommodated)  Time Out:500pm  Total Treatment time: 45 minutes  Precautions: lower back pain  Visit #: 11    POC due: 19   Reassessment due:  2/10/19, done 19  Next due 3/5/19     Subjective   Caryl reports her LB is feeling good today, but her Celiac dx is bothering her leading to some stomach discomfort.  She would like to hold bridging and torso rotation today due to stomach upset.  No increased pain after [facility] testing last visit. Her cervical area is doing better this week as well.  It is starting to feel better (2/10). She's still feeling better overall since starting PT.   She coninues to report pain with lateral leaning and other movements but is much better compared to first starting PT.     Patient reports their pain to be 2/10 on a 0-10 scale with 0 being no pain and 10 being the worst pain imaginable.    Pain Location: lower back and cervical area     Occupation:  On disability    Leisure: Mostly sedentary, watch TV, swimming at home pool                      Pts goals:  Reduce pain, get stronger and more mobile, tolerate standing for longer than 10 minutes without increased pain       Objective       Baseline Isometric Testing on Med X equipment: Testing administered by PT     Baseline IM Testing Results:   Date of testin2018  Femur Restraint 6   Top Dead Center 24   Counterweight 185   Lumbar Flexion 42   Lumbar Extension 6   Lumbar Peak Torque 83   Min Torque 68   Test Percent Below Normative Data 41        [facility] IM Testing Results:   Date of testin2018  Femur Restraint 6   Top  Dead Center 24   Counterweight 185   Lumbar Flexion 48   Lumbar Extension 0   Lumbar Peak Torque 110   Min Torque 51   Test Percent Below Normative Data 30       FOTO: Focus on Therapeutic Outcomes   Category: lumbar   % Impaired: 78%  Visit 10: 58%  Current Score  = CL = least 60% but < 80% impaired, limited or restricted  Goal at Discharge Score = CK = at least 40% but < 60% impaired, limited or restricted    Treatment    Pt was instructed in and performed the following:       Caryl received therapeutic exercises to develop/improved posture, cardiovascular endurance, muscular endurance, lumbar  ROM, strength and muscular endurance for 60 minutes including the following exercises:       [facility] 2/6/2019   Visit Number 11   VAS Pain Rating 3   Recumbent Bike Seat Pos. -   Time 10   Lumbar Weight 45   Repetitions 20   Rating of Perceived Exertion 3   Ice - Z Lie (in min.) 10       Open Books x10  LTR x20  Pelvic Tilts x5--> TrA  +Manual Resisted Hip Flexion x5 ea L/R  Bridging x5 with pelvic tilt   Clams with YTB 10x      Peripheral muscle strengthening which included 1 set of 15-20 repetitions at a slow, controlled 7 second per rep pace focused on strengthening supporting musculature for improved body mechanics and functional mobility.  Pt and therapist focused on proper form during treatment to ensure optimal strengthening of each targeted muscle group.  Machines were utilized including torso rotation, leg extension, leg curl, chest press, upright row. Tricep extension, bicep curl, leg press, and hip abduction added on third visit.     Home Exercise Program as follows:   PPT 5 reps  PPT with pillow squeeze 5 reps  Clamshells 10 reps  OB 10 reps    Handouts were given to the patient. Pt demo good understanding of the education provided. Caryl demonstrated good return demonstration of activities.     Lumbar roll use compliance: yes  Additional exercises taught this treatment session:        Assessment      Pt tolerated testing well for visit 11. She did not report any increased pain with any exercises today. Continue core and clamshell to include yellow theraband with some increased hip fatigue.  She reports feeling stronger overall but still has significant fatigue. She is making fair progress towards treatment goals.   Pt will continue to benefit from skilled outpatient physical therapy to address the deficits stated in the impairment chart, provide pt/family education and to maximize pt's level of independence in the home and community environment.     Pt's spiritual, cultural and educational needs considered and pt agreeable to plan of care and goals as stated below:     Medical necessity is demonstrated by the following problem list.    Pt presents with the following impairments:   History  Co-morbidities and personal factors that may impact the plan of care Examination  Body Structures and Functions, activity limitations and participation restrictions that may impact the plan of care Clinical Presentation    Decision Making/ Complexity Score   Co-morbidities:   See precautions           Personal Factors:   lifestyle  sedentary Body Regions:   neck  back  lower extremities     Body Systems:   gross symmetry  ROM  strength  gait  motor control     Activity limitations:   Learning and applying knowledge  no deficits     General Tasks and Commands  no deficits     Communication  no deficits     Mobility  lifting and carrying objects  walking  driving (bike, car, motorcycle)     Self care  dressing  looking after one's health     Domestic Life  shopping  cooking  doing house work (cleaning house, washing dishes, laundry)     Interactions/Relationships  family relationships  intimate relationships     Life Areas  employment     Community and Social Life  community life  recreation and leisure     Participation Restrictions:   Pt unable to work, exercise, or participate in social activities        evolving clinical  presentation with changing clinical characteristics    Moderate             GOALS: Pt is in agreement with the following goals.     Short term goals:  6 weeks or 10 visits   1.  Pt will demonstrate increased lumbar ROM by at least 6 degrees from the initial ROM value with improvements noted in functional ROM and ability to perform ADLs MET  2.  Pt will demonstrate increased maximum isometric torque value by 10% when compared to the initial value resulting in improved ability to perform bending, lifting, and carrying activities safely, confidently.MET  3.  Patient report a reduction in worst pain score by 1-2 points for improved tolerance during work and recreational activities MET  4.  Pt able to perform HEP correctly with minimal cueing or supervision for therapist MET     Long term goals: 13 weeks or 20 visits   1. Pt will demonstrate increased lumbar ROM by at least 9 degrees from initial ROM value, resulting in improved ability to perform functional fwd bending while standing and sitting. MET  2. Pt will demonstrate increased maximum isometric torque value by 20% when compared to the initial value resulting in improved ability to perform bending, lifting, and carrying activities safely, confidently. progressing  3. Pt to demonstrate ability to independently control and reduce their pain through posture positioning and mechanical movements throughout a typical day. progressing  4.  Patient will demonstrate improved overall function per FOTO Survey to CK = at least 40% but < 60% impaired, limited or restricted score or less. progressing  5. Pt will toelrate standing for longer than 10 minutes without increased pain by more than 1-2 VAS pain points. progressing        Plan   Outpatient physical therapy 2x week for 13 weeks or 20 visits

## 2019-02-11 ENCOUNTER — DOCUMENTATION ONLY (OUTPATIENT)
Dept: REHABILITATION | Facility: OTHER | Age: 39
End: 2019-02-11
Attending: PSYCHIATRY & NEUROLOGY
Payer: COMMERCIAL

## 2019-02-11 RX ORDER — MILNACIPRAN HYDROCHLORIDE 100 MG/1
TABLET, FILM COATED ORAL
Qty: 60 TABLET | Refills: 11 | Status: SHIPPED | OUTPATIENT
Start: 2019-02-11 | End: 2020-02-17

## 2019-02-11 NOTE — PROGRESS NOTES
Health  Wellness Visit Note    Name: Caryl Cedeno  Clinic Number: [id number]  Physician: Berlin Saleem*  Diagnosis: No diagnosis found.  Past Medical History:   Diagnosis Date    Celiac disease     Fibromyalgia     Migraines     Small fiber neuropathy      Visit Number: 37  Precautions: POTS (postural orthostatic tachycardia syndrome) (Chronic)  Small fiber neuropathy - Primary  Fibromyalgia  Neck sx: disc replacement Nov 2016  6 Month: November 12 Month: May  Time In: 4:35PM  Time Out:5:25PM  Total TreatmentTime: 50 minutes    Subjective:   Patient reports continued neck soreness and headaches on L side of head and neck. Believes is may be due to her medication change, meeting with Doctor to adjust dosage. She is really interested in working out another day during the week, plans to try doing rowing machine at [employer] this Friday; knows to start off slow and maintain good posture. Making improvements in back therapy she says. HEP compliant. Felt better after exercising.     Objective:   Caryl completed therapeutic stretches (EIL, MATI) and the following MedX exercise machines: core lumbar, torso rotation l/r, leg extension, leg curl, upright row, chest press, biceps curl, triceps extension, leg press    See exercise log in patient folder for rate of exertion and repetitions completed.     Pt did hip abduction upstairs in [facility] after.     Assessment:   Patient tolerated Cervical MedX machine and all peripheral machines with minimal pain, but did have dizziness on the torso rotation and the chest press. Warm up on bike and ice.     Plan:  Continue with established plan of care towards wellness goals. Pt will attend Physical Therapy and Wellness each once a week.     Health  : Osito Lizama  2/11/2019

## 2019-02-20 ENCOUNTER — CLINICAL SUPPORT (OUTPATIENT)
Dept: REHABILITATION | Facility: OTHER | Age: 39
End: 2019-02-20
Attending: PSYCHIATRY & NEUROLOGY
Payer: COMMERCIAL

## 2019-02-20 DIAGNOSIS — G89.29 CHRONIC BILATERAL LOW BACK PAIN WITHOUT SCIATICA: Primary | ICD-10-CM

## 2019-02-20 DIAGNOSIS — M54.50 CHRONIC BILATERAL LOW BACK PAIN WITHOUT SCIATICA: Primary | ICD-10-CM

## 2019-02-20 PROCEDURE — 97140 MANUAL THERAPY 1/> REGIONS: CPT

## 2019-02-20 PROCEDURE — 97110 THERAPEUTIC EXERCISES: CPT

## 2019-02-20 NOTE — PROGRESS NOTES
[facility] /POC     Name: Caryl Cedeno  Clinic Number: [id number]  Date of Treatment: 2019   Diagnosis:   Encounter Diagnosis   Name Primary?    Chronic bilateral low back pain without sciatica Yes     Physician: Berlin Saleem*    Pain pattern determined: Pattern 1 PEN  Plan of care signed: 18   Time in:430pm  Time Out:535  Total Treatment time:65 minutes  Precautions: lower back pain  Visit #: 12    POC due: 3/20/19   Reassessment due:  2/10/19, done 19  Next due 3/5/19     Subjective   Caryl reports her back pain is minimal today, however she hasn't been sleeping well and is overall fatigued.  Pt reports she attempted to watch a parade and although she is feeling somewhat better , had difficulty standing and sitting for > 5 minutes.  Patient reports their pain to be 2/10 on a 0-10 scale with 0 being no pain and 10 being the worst pain imaginable.    Pain Location: lower back and cervical area     Occupation:  On disability    Leisure: Mostly sedentary, watch TV, swimming at home pool                      Pts goals:  Reduce pain, get stronger and more mobile, tolerate standing for longer than 10 minutes without increased pain       Objective       Baseline Isometric Testing on Med X equipment: Testing administered by PT     Baseline IM Testing Results:   Date of testin2018  Femur Restraint 6   Top Dead Center 24   Counterweight 185   Lumbar Flexion 42   Lumbar Extension 6   Lumbar Peak Torque 83   Min Torque 68   Test Percent Below Normative Data 41        [facility] IM Testing Results:   Date of testin2018  Femur Restraint 6   Top Dead Center 24   Counterweight 185   Lumbar Flexion 48   Lumbar Extension 0   Lumbar Peak Torque 110   Min Torque 51   Test Percent Below Normative Data 30       FOTO: Focus on Therapeutic Outcomes   Category: lumbar   % Impaired: 78%  Visit 10: 58%  Current Score  = CL = least 60% but < 80% impaired,  limited or restricted  Goal at Discharge Score = CK = at least 40% but < 60% impaired, limited or restricted    Treatment    Pt was instructed in and performed the following:       Caryl received therapeutic exercises to develop/improved posture, cardiovascular endurance, muscular endurance, lumbar  ROM, strength and muscular endurance for 60 minutes including the following exercises:    [facility] 2/20/2019   Visit Number 12   VAS Pain Rating 3   Recumbent Bike Seat Pos. -   Time 10   Cervical Stretches - Retraction In Lying -   Retraction in Sitting -   Scapular Retraction -   Manual Therapy 15   Cervical Extension Seat Pad -   Seat Adjustment -   Top Dead Center -   Counterweight -   Cervical Flexion -   Cervical Extension -   Cervical Peak Torque -   Cervical Weight -   Repetitions -   Rating of Perceived Exertion -   Lumbar Extension Seat Pad -   Femur Restraint -   Top Dead Center -   Counterweight -   Lumbar Flexion -   Lumbar Extension -   Lumbar Peak Torque -   Min Torque -   Test Percent Below Normative Data -   Test Percent Gain in Strength from Initial  -   Lumbar Weight 45   Repetitions 20   Rating of Perceived Exertion 3   Ice - Z Lie (in min.) 10     Manual: 15 minutes prone STM to B LB region with Jai lotion to facilitate    Open Books x10  LTR x20  Pelvic Tilts x5--> TrA  +Manual Resisted Hip Flexion x5 ea L/R  Bridging x5 with pelvic tilt   Clams with YTB 10x      Peripheral muscle strengthening which included 1 set of 15-20 repetitions at a slow, controlled 7 second per rep pace focused on strengthening supporting musculature for improved body mechanics and functional mobility.  Pt and therapist focused on proper form during treatment to ensure optimal strengthening of each targeted muscle group.  Machines were utilized including torso rotation, leg extension, leg curl, chest press, upright row. Tricep extension, bicep curl, leg press, and hip abduction added on third visit.     Home  Exercise Program as follows:   PPT 5 reps  PPT with pillow squeeze 5 reps  Clamshells 10 reps  OB 10 reps    Handouts were given to the patient. Pt demo good understanding of the education provided. Caryl demonstrated good return demonstration of activities.     Lumbar roll use compliance: yes  Additional exercises taught this treatment session:    none today    Assessment     Pt tolerated treatment well and was able to complete 20 reps at 45 ft/lbs.  Inc 5% next visit.  Pt making slow progress towards goals and does report improved strength for ADL's.  Continue with slow increments of weight increases and stability exercises  Pt will continue to benefit from skilled outpatient physical therapy to address the deficits stated in the impairment chart, provide pt/family education and to maximize pt's level of independence in the home and community environment.     Pt's spiritual, cultural and educational needs considered and pt agreeable to plan of care and goals as stated below:     Medical necessity is demonstrated by the following problem list.    Pt presents with the following impairments:   History  Co-morbidities and personal factors that may impact the plan of care Examination  Body Structures and Functions, activity limitations and participation restrictions that may impact the plan of care Clinical Presentation    Decision Making/ Complexity Score   Co-morbidities:   See precautions           Personal Factors:   lifestyle  sedentary Body Regions:   neck  back  lower extremities     Body Systems:   gross symmetry  ROM  strength  gait  motor control     Activity limitations:   Learning and applying knowledge  no deficits     General Tasks and Commands  no deficits     Communication  no deficits     Mobility  lifting and carrying objects  walking  driving (bike, car, motorcycle)     Self care  dressing  looking after one's health     Domestic Life  shopping  cooking  doing house work (cleaning house, washing  dishes, laundry)     Interactions/Relationships  family relationships  intimate relationships     Life Areas  employment     Community and Social Life  community life  recreation and leisure     Participation Restrictions:   Pt unable to work, exercise, or participate in social activities        evolving clinical presentation with changing clinical characteristics    Moderate             GOALS: Pt is in agreement with the following goals.     Short term goals:  6 weeks or 10 visits   1.  Pt will demonstrate increased lumbar ROM by at least 6 degrees from the initial ROM value with improvements noted in functional ROM and ability to perform ADLs MET  2.  Pt will demonstrate increased maximum isometric torque value by 10% when compared to the initial value resulting in improved ability to perform bending, lifting, and carrying activities safely, confidently.MET  3.  Patient report a reduction in worst pain score by 1-2 points for improved tolerance during work and recreational activities MET  4.  Pt able to perform HEP correctly with minimal cueing or supervision for therapist MET     Long term goals: 13 weeks or 20 visits   1. Pt will demonstrate increased lumbar ROM by at least 9 degrees from initial ROM value, resulting in improved ability to perform functional fwd bending while standing and sitting. MET  2. Pt will demonstrate increased maximum isometric torque value by 20% when compared to the initial value resulting in improved ability to perform bending, lifting, and carrying activities safely, confidently. progressing  3. Pt to demonstrate ability to independently control and reduce their pain through posture positioning and mechanical movements throughout a typical day. progressing  4.  Patient will demonstrate improved overall function per FOTO Survey to CK = at least 40% but < 60% impaired, limited or restricted score or less. progressing  5. Pt will toelrate standing for longer than 10 minutes without  increased pain by more than 1-2 VAS pain points. progressing        Plan   Outpatient physical therapy 2x week for 4 weeks to complete program visits.

## 2019-02-21 RX ORDER — PREGABALIN 100 MG/1
CAPSULE ORAL
Qty: 90 CAPSULE | Refills: 5 | Status: SHIPPED | OUTPATIENT
Start: 2019-02-21 | End: 2019-03-14

## 2019-02-21 NOTE — PLAN OF CARE
[facility] Treatment /POC     Name: Caryl Cedeno  Clinic Number: [id number]  Date of Treatment: 2019   Diagnosis:   Encounter Diagnosis   Name Primary?    Chronic bilateral low back pain without sciatica Yes     Physician: Berlin Saleem*    Pain pattern determined: Pattern 1 PEN  Plan of care signed: 18   Time in:430pm  Time Out:535  Total Treatment time:65 minutes  Precautions: lower back pain  Visit #: 12    POC due: 3/20/19   Reassessment due:  2/10/19, done 19  Next due 3/5/19     Subjective   Caryl reports her back pain is minimal today, however she hasn't been sleeping well and is overall fatigued.  Pt reports she attempted to watch a parade and although she is feeling somewhat better , had difficulty standing and sitting for > 5 minutes.  Patient reports their pain to be 2/10 on a 0-10 scale with 0 being no pain and 10 being the worst pain imaginable.    Pain Location: lower back and cervical area     Occupation:  On disability    Leisure: Mostly sedentary, watch TV, swimming at home pool                      Pts goals:  Reduce pain, get stronger and more mobile, tolerate standing for longer than 10 minutes without increased pain       Objective       Baseline Isometric Testing on Med X equipment: Testing administered by PT     Baseline IM Testing Results:   Date of testin2018  Femur Restraint 6   Top Dead Center 24   Counterweight 185   Lumbar Flexion 42   Lumbar Extension 6   Lumbar Peak Torque 83   Min Torque 68   Test Percent Below Normative Data 41        Midpoint IM Testing Results:   Date of testin2018  Femur Restraint 6   Top Dead Center 24   Counterweight 185   Lumbar Flexion 48   Lumbar Extension 0   Lumbar Peak Torque 110   Min Torque 51   Test Percent Below Normative Data 30       FOTO: Focus on Therapeutic Outcomes   Category: lumbar   % Impaired: 78%  Visit 10: 58%  Current Score  = CL = least 60% but < 80% impaired,  limited or restricted  Goal at Discharge Score = CK = at least 40% but < 60% impaired, limited or restricted    Treatment    Pt was instructed in and performed the following:       Caryl received therapeutic exercises to develop/improved posture, cardiovascular endurance, muscular endurance, lumbar  ROM, strength and muscular endurance for 60 minutes including the following exercises:    [facility] 2/20/2019   Visit Number 12   VAS Pain Rating 3   Recumbent Bike Seat Pos. -   Time 10   Cervical Stretches - Retraction In Lying -   Retraction in Sitting -   Scapular Retraction -   Manual Therapy 15   Cervical Extension Seat Pad -   Seat Adjustment -   Top Dead Center -   Counterweight -   Cervical Flexion -   Cervical Extension -   Cervical Peak Torque -   Cervical Weight -   Repetitions -   Rating of Perceived Exertion -   Lumbar Extension Seat Pad -   Femur Restraint -   Top Dead Center -   Counterweight -   Lumbar Flexion -   Lumbar Extension -   Lumbar Peak Torque -   Min Torque -   Test Percent Below Normative Data -   Test Percent Gain in Strength from Initial  -   Lumbar Weight 45   Repetitions 20   Rating of Perceived Exertion 3   Ice - Z Lie (in min.) 10     Manual: 15 minutes prone STM to B LB region with Jai lotion to facilitate    Open Books x10  LTR x20  Pelvic Tilts x5--> TrA  +Manual Resisted Hip Flexion x5 ea L/R  Bridging x5 with pelvic tilt   Clams with YTB 10x      Peripheral muscle strengthening which included 1 set of 15-20 repetitions at a slow, controlled 7 second per rep pace focused on strengthening supporting musculature for improved body mechanics and functional mobility.  Pt and therapist focused on proper form during treatment to ensure optimal strengthening of each targeted muscle group.  Machines were utilized including torso rotation, leg extension, leg curl, chest press, upright row. Tricep extension, bicep curl, leg press, and hip abduction added on third visit.     Home  Exercise Program as follows:   PPT 5 reps  PPT with pillow squeeze 5 reps  Clamshells 10 reps  OB 10 reps    Handouts were given to the patient. Pt demo good understanding of the education provided. Caryl demonstrated good return demonstration of activities.     Lumbar roll use compliance: yes  Additional exercises taught this treatment session:    none today    Assessment     Pt tolerated treatment well and was able to complete 20 reps at 45 ft/lbs.  Inc 5% next visit.  Pt making slow progress towards goals and does report improved strength for ADL's.  Continue with slow increments of weight increases and stability exercises  Pt will continue to benefit from skilled outpatient physical therapy to address the deficits stated in the impairment chart, provide pt/family education and to maximize pt's level of independence in the home and community environment.     Pt's spiritual, cultural and educational needs considered and pt agreeable to plan of care and goals as stated below:     Medical necessity is demonstrated by the following problem list.    Pt presents with the following impairments:   History  Co-morbidities and personal factors that may impact the plan of care Examination  Body Structures and Functions, activity limitations and participation restrictions that may impact the plan of care Clinical Presentation    Decision Making/ Complexity Score   Co-morbidities:   See precautions           Personal Factors:   lifestyle  sedentary Body Regions:   neck  back  lower extremities     Body Systems:   gross symmetry  ROM  strength  gait  motor control     Activity limitations:   Learning and applying knowledge  no deficits     General Tasks and Commands  no deficits     Communication  no deficits     Mobility  lifting and carrying objects  walking  driving (bike, car, motorcycle)     Self care  dressing  looking after one's health     Domestic Life  shopping  cooking  doing house work (cleaning house, washing  dishes, laundry)     Interactions/Relationships  family relationships  intimate relationships     Life Areas  employment     Community and Social Life  community life  recreation and leisure     Participation Restrictions:   Pt unable to work, exercise, or participate in social activities        evolving clinical presentation with changing clinical characteristics    Moderate             GOALS: Pt is in agreement with the following goals.     Short term goals:  6 weeks or 10 visits   1.  Pt will demonstrate increased lumbar ROM by at least 6 degrees from the initial ROM value with improvements noted in functional ROM and ability to perform ADLs MET  2.  Pt will demonstrate increased maximum isometric torque value by 10% when compared to the initial value resulting in improved ability to perform bending, lifting, and carrying activities safely, confidently.MET  3.  Patient report a reduction in worst pain score by 1-2 points for improved tolerance during work and recreational activities MET  4.  Pt able to perform HEP correctly with minimal cueing or supervision for therapist MET     Long term goals: 13 weeks or 20 visits   1. Pt will demonstrate increased lumbar ROM by at least 9 degrees from initial ROM value, resulting in improved ability to perform functional fwd bending while standing and sitting. MET  2. Pt will demonstrate increased maximum isometric torque value by 20% when compared to the initial value resulting in improved ability to perform bending, lifting, and carrying activities safely, confidently. progressing  3. Pt to demonstrate ability to independently control and reduce their pain through posture positioning and mechanical movements throughout a typical day. progressing  4.  Patient will demonstrate improved overall function per FOTO Survey to CK = at least 40% but < 60% impaired, limited or restricted score or less. progressing  5. Pt will toelrate standing for longer than 10 minutes without  increased pain by more than 1-2 VAS pain points. progressing        Plan   Outpatient physical therapy 2x week for 4 weeks to complete program visits.

## 2019-02-25 ENCOUNTER — DOCUMENTATION ONLY (OUTPATIENT)
Dept: REHABILITATION | Facility: OTHER | Age: 39
End: 2019-02-25
Attending: PSYCHIATRY & NEUROLOGY
Payer: COMMERCIAL

## 2019-02-25 NOTE — PROGRESS NOTES
Health  Wellness Visit Note    Name: Caryl Cedeno  Clinic Number: [id number]  Physician: Berlin Saleem*  Diagnosis: No diagnosis found.  Past Medical History:   Diagnosis Date    Celiac disease     Fibromyalgia     Migraines     Small fiber neuropathy      Visit Number: 37  Precautions: POTS (postural orthostatic tachycardia syndrome) (Chronic)  Small fiber neuropathy - Primary  Fibromyalgia  Neck sx: disc replacement Nov 2016  6 Month: November 12 Month: May  Time In: 4:35PM  Time Out:5:25PM  Total TreatmentTime: 40 minutes    Subjective:   Patient reports having a good day today. She really wasn't having any neck or back pain that was too unusual. Her doctor increased her BP meds, so hopefully that reduces her headaches. She is really interested in working out another day during the week. She has been doing better with trying to get in another cardio day with either the bike or the row machine at [facility].   HEP compliant. [place] better after exercising.     Objective:   Caryl completed therapeutic stretches (EIL, MATI) and the following MedX exercise machines: core lumbar, torso rotation l/r, leg extension, leg curl, upright row, chest press, biceps curl, triceps extension, leg press    See exercise log in patient folder for rate of exertion and repetitions completed.     Pt did hip abduction upstairs in [facility] after.     Assessment:   Patient tolerated Cervical MedX machine and all peripheral machines with minimal pain, but did have dizziness on the torso rotation and the chest press. Warm up on bike and ice.     Plan:  Continue with established plan of care towards wellness goals. Pt will attend Physical Therapy and Wellness each once a week.     Health  : Lola Aguilar, PCT  2/25/2019

## 2019-03-07 ENCOUNTER — CLINICAL SUPPORT (OUTPATIENT)
Dept: REHABILITATION | Facility: OTHER | Age: 39
End: 2019-03-07
Attending: PSYCHIATRY & NEUROLOGY
Payer: COMMERCIAL

## 2019-03-07 DIAGNOSIS — M54.50 CHRONIC BILATERAL LOW BACK PAIN WITHOUT SCIATICA: Primary | ICD-10-CM

## 2019-03-07 DIAGNOSIS — G89.29 CHRONIC BILATERAL LOW BACK PAIN WITHOUT SCIATICA: Primary | ICD-10-CM

## 2019-03-07 PROCEDURE — 97110 THERAPEUTIC EXERCISES: CPT

## 2019-03-07 NOTE — PROGRESS NOTES
[facility] Treatment /POC     Name: Caryl Cedeno  Clinic Number: [id number]  Date of Treatment: 2019   Diagnosis:   Encounter Diagnosis   Name Primary?    Chronic bilateral low back pain without sciatica Yes     Physician: Berlin Slaeem*    Pain pattern determined: Pattern 1 PEN  Plan of care signed: 18   Time in: 5:05  Time Out: 6:00  Total Treatment time: 55 minutes  Precautions: lower back pain  Visit #: 13    POC due: 3/20/19   Reassessment due: 19     Subjective   Caryl reports her back pain is minimal today. She reports she was able to travel to [place] and even sleep on a sofa bed without increased symptoms. SHe reports she has been sleeping but but usually only has poor sleep when she is flared up. Pt reports she still has difficulty standing and sitting for > 5 minutes.      Patient reports their pain to be 2/10 on a 0-10 scale with 0 being no pain and 10 being the worst pain imaginable.    Pain Location: lower back and cervical area     Occupation:  On disability    Leisure: Mostly sedentary, watch TV, swimming at home pool                      Pts goals:  Reduce pain, get stronger and more mobile, tolerate standing for longer than 10 minutes without increased pain       Objective       Baseline Isometric Testing on Med X equipment: Testing administered by PT     Baseline IM Testing Results:   Date of testin2018  Femur Restraint 6   Top Dead Center 24   Counterweight 185   Lumbar Flexion 42   Lumbar Extension 6   Lumbar Peak Torque 83   Min Torque 68   Test Percent Below Normative Data 41        Midpoint IM Testing Results:   Date of testin2018  Femur Restraint 6   Top Dead Center 24   Counterweight 185   Lumbar Flexion 48   Lumbar Extension 0   Lumbar Peak Torque 110   Min Torque 51   Test Percent Below Normative Data 30       FOTO: Focus on Therapeutic Outcomes   Category: lumbar   % Impaired: 78%  Visit 10: 58%  Current  Score  = CL = least 60% but < 80% impaired, limited or restricted  Goal at Discharge Score = CK = at least 40% but < 60% impaired, limited or restricted    Treatment    Pt was instructed in and performed the following:       Caryl received therapeutic exercises to develop/improved posture, cardiovascular endurance, muscular endurance, lumbar  ROM, strength and muscular endurance for 60 minutes including the following exercises:    [facility] 2/20/2019   Visit Number 12   VAS Pain Rating 3   Recumbent Bike Seat Pos. -   Time 10   Cervical Stretches - Retraction In Lying -   Retraction in Sitting -   Scapular Retraction -   Manual Therapy 15   Cervical Extension Seat Pad -   Seat Adjustment -   Top Dead Center -   Counterweight -   Cervical Flexion -   Cervical Extension -   Cervical Peak Torque -   Cervical Weight -   Repetitions -   Rating of Perceived Exertion -   Lumbar Extension Seat Pad -   Femur Restraint -   Top Dead Center -   Counterweight -   Lumbar Flexion -   Lumbar Extension -   Lumbar Peak Torque -   Min Torque -   Test Percent Below Normative Data -   Test Percent Gain in Strength from Initial  -   Lumbar Weight 45   Repetitions 20   Rating of Perceived Exertion 3   Ice - Z Lie (in min.) 10     Manual: 15 minutes prone STM to B LB region with Jai lotion to facilitate    Open Books x10  LTR x20  Pelvic Tilts x5--> TrA  +Manual Resisted Hip Flexion x5 ea L/R  PPT alternating marches with yellow TB  15x  Seated PPT and alternating hip flexion 10x   Bridging x5 with pelvic tilt and yellow TB    Clams with YTB 15x      Peripheral muscle strengthening which included 1 set of 15-20 repetitions at a slow, controlled 7 second per rep pace focused on strengthening supporting musculature for improved body mechanics and functional mobility.  Pt and therapist focused on proper form during treatment to ensure optimal strengthening of each targeted muscle group.  Machines were utilized including torso  rotation, leg extension, leg curl, chest press, upright row. Tricep extension, bicep curl, leg press, and hip abduction added on third visit.     Home Exercise Program as follows:   PPT 5 reps  PPT with pillow squeeze 5 reps  Pelvic Tilts x5--> TrA  +Manual Resisted Hip Flexion, bridges x5 ea L/R  Clamshells 10 reps  OB 10 reps  Seated PPT with alt marching 15x     Handouts were given to the patient. Pt demo good understanding of the education provided. Caryl demonstrated good return demonstration of activities.     Lumbar roll use compliance: yes  Additional exercises taught this treatment session:   Seated PPT with alt marching 15x     Assessment     Pt tolerated treatment well. Pt able to progress core exercise to sitting position with minimal low back pain with full PPT.  None with v/c to reduce to pain-free ROM to pelvic neutral. Pt is happy to begin core exercise in seated positions. Pt making slow progress towards goals and does report improved strength for ADL's.  Continue with slow increments of weight increases and stability exercises. She was able to tolerate Med X lumbar exercise with 5% weight increase to 15 reps with 5/10 exertion rating. Plan to maintain weights next session.   Pt will continue to benefit from skilled outpatient physical therapy to address the deficits stated in the impairment chart, provide pt/family education and to maximize pt's level of independence in the home and community environment.     Pt's spiritual, cultural and educational needs considered and pt agreeable to plan of care and goals as stated below:     Medical necessity is demonstrated by the following problem list.    Pt presents with the following impairments:   History  Co-morbidities and personal factors that may impact the plan of care Examination  Body Structures and Functions, activity limitations and participation restrictions that may impact the plan of care Clinical Presentation    Decision Making/ Complexity  Score   Co-morbidities:   See precautions           Personal Factors:   lifestyle  sedentary Body Regions:   neck  back  lower extremities     Body Systems:   gross symmetry  ROM  strength  gait  motor control     Activity limitations:   Learning and applying knowledge  no deficits     General Tasks and Commands  no deficits     Communication  no deficits     Mobility  lifting and carrying objects  walking  driving (bike, car, motorcycle)     Self care  dressing  looking after one's health     Domestic Life  shopping  cooking  doing house work (cleaning house, washing dishes, laundry)     Interactions/Relationships  family relationships  intimate relationships     Life Areas  employment     Community and Social Life  community life  recreation and leisure     Participation Restrictions:   Pt unable to work, exercise, or participate in social activities        evolving clinical presentation with changing clinical characteristics    Moderate             GOALS: Pt is in agreement with the following goals.     Short term goals:  6 weeks or 10 visits   1.  Pt will demonstrate increased lumbar ROM by at least 6 degrees from the initial ROM value with improvements noted in functional ROM and ability to perform ADLs MET  2.  Pt will demonstrate increased maximum isometric torque value by 10% when compared to the initial value resulting in improved ability to perform bending, lifting, and carrying activities safely, confidently.MET  3.  Patient report a reduction in worst pain score by 1-2 points for improved tolerance during work and recreational activities MET  4.  Pt able to perform HEP correctly with minimal cueing or supervision for therapist MET     Long term goals: 13 weeks or 20 visits   1. Pt will demonstrate increased lumbar ROM by at least 9 degrees from initial ROM value, resulting in improved ability to perform functional fwd bending while standing and sitting. MET  2. Pt will demonstrate increased maximum  isometric torque value by 20% when compared to the initial value resulting in improved ability to perform bending, lifting, and carrying activities safely, confidently. progressing  3. Pt to demonstrate ability to independently control and reduce their pain through posture positioning and mechanical movements throughout a typical day. progressing  4.  Patient will demonstrate improved overall function per FOTO Survey to CK = at least 40% but < 60% impaired, limited or restricted score or less. progressing  5. Pt will toelrate standing for longer than 10 minutes without increased pain by more than 1-2 VAS pain points. progressing        Plan   Outpatient physical therapy 2x week for 4 weeks to complete program visits.

## 2019-03-07 NOTE — PROGRESS NOTES
Health  Wellness Visit Note    Name: Caryl Cedeno  Clinic Number: [id number]  Physician: Berlin Saleem*  Diagnosis: No diagnosis found.  Past Medical History:   Diagnosis Date    Celiac disease     Fibromyalgia     Migraines     Small fiber neuropathy      Visit Number: 38  Precautions: POTS (postural orthostatic tachycardia syndrome) (Chronic)  Small fiber neuropathy - Primary  Fibromyalgia  Neck sx: disc replacement Nov 2016  6 Month: November 12 Month: May  Time In: 4:40PM  Time Out:5:10PM  Total TreatmentTime: 30 minutes    Subjective:   Patient reports feeling ok so far today. She has a little headache but she's not sure if it's from flying yesterday or from her neck. Pt only did the neck machine today because she also had a PT appt scheduled in [facility]. She is really interested in working out another day during the week. She has been doing better with trying to get in another cardio day with either the bike or the row machine at [facility].   HEP compliant. [place] better after exercising.     Objective:   Caryl completed therapeutic stretches (EIL, MATI) and the following MedX exercise machines: core lumbar, torso rotation l/r, leg extension, leg curl, upright row, chest press, biceps curl, triceps extension, leg press    See exercise log in patient folder for rate of exertion and repetitions completed.     Pt did hip abduction upstairs in [facility] after.     Assessment:   Patient tolerated Cervical MedX machine and all peripheral machines with minimal pain, but did have dizziness on the torso rotation and the chest press. Warm up on bike and ice.     Plan:  Continue with established plan of care towards wellness goals. Pt will attend Physical Therapy and Wellness each once a week.     Health  : Lola Aguilar, PCT  3/7/2019

## 2019-03-12 ENCOUNTER — CLINICAL SUPPORT (OUTPATIENT)
Dept: REHABILITATION | Facility: OTHER | Age: 39
End: 2019-03-12
Attending: PSYCHIATRY & NEUROLOGY
Payer: COMMERCIAL

## 2019-03-12 ENCOUNTER — TELEPHONE (OUTPATIENT)
Dept: NEUROLOGY | Facility: CLINIC | Age: 39
End: 2019-03-12

## 2019-03-12 DIAGNOSIS — G89.29 CHRONIC BILATERAL LOW BACK PAIN WITHOUT SCIATICA: Primary | ICD-10-CM

## 2019-03-12 DIAGNOSIS — M54.50 CHRONIC BILATERAL LOW BACK PAIN WITHOUT SCIATICA: Primary | ICD-10-CM

## 2019-03-12 PROCEDURE — 97110 THERAPEUTIC EXERCISES: CPT

## 2019-03-12 NOTE — PROGRESS NOTES
[facility] Therapy Treatment /POC     Name: Caryl Cedeno  Clinic Number: [id number]  Date of Treatment: 2019   Diagnosis:   Encounter Diagnosis   Name Primary?    Chronic bilateral low back pain without sciatica Yes     Physician: Berlin Saleem*    Pain pattern determined: Pattern 1 PEN  Plan of care signed: 18   Time in: 335pm  Time Out: 435  Total Treatment time: 60 minutes  Precautions: lower back pain  Visit #: 14    POC due: 3/20/19   Reassessment due: 19     Subjective   Caryl reports she is feeling better than last week.  Pt reports she went away for several days and was sleeping on a different surface, which irritated her pain level    Patient reports their pain to be 2/10 on a 0-10 scale with 0 being no pain and 10 being the worst pain imaginable.    Pain Location: lower back and cervical area     Occupation:  On disability    Leisure: Mostly sedentary, watch TV, swimming at home pool                      Pts goals:  Reduce pain, get stronger and more mobile, tolerate standing for longer than 10 minutes without increased pain       Objective       Baseline Isometric Testing on Med X equipment: Testing administered by PT     Baseline IM Testing Results:   Date of testin2018  Femur Restraint 6   Top Dead Center 24   Counterweight 185   Lumbar Flexion 42   Lumbar Extension 6   Lumbar Peak Torque 83   Min Torque 68   Test Percent Below Normative Data 41        Midpoint IM Testing Results:   Date of testin2018  Femur Restraint 6   Top Dead Center 24   Counterweight 185   Lumbar Flexion 48   Lumbar Extension 0   Lumbar Peak Torque 110   Min Torque 51   Test Percent Below Normative Data 30       FOTO: Focus on Therapeutic Outcomes   Category: lumbar   % Impaired: 78%  Visit 10: 58%  Current Score  = CL = least 60% but < 80% impaired, limited or restricted  Goal at Discharge Score = CK = at least 40% but < 60% impaired, limited or  restricted    Treatment    Pt was instructed in and performed the following:       Caryl received therapeutic exercises to develop/improved posture, cardiovascular endurance, muscular endurance, lumbar  ROM, strength and muscular endurance for 60 minutes including the following exercises:    [facility] Therapy 3/12/2019   Visit Number 14   VAS Pain Rating 2   Recumbent Bike Seat Pos. -   Time 10   Cervical Stretches - Retraction In Lying -   Retraction in Sitting -   Scapular Retraction -   Manual Therapy -   Cervical Extension Seat Pad -   Seat Adjustment -   Top Dead Center -   Counterweight -   Cervical Flexion -   Cervical Extension -   Cervical Peak Torque -   Cervical Weight -   Repetitions -   Rating of Perceived Exertion -   Lumbar Extension Seat Pad -   Femur Restraint -   Top Dead Center -   Counterweight -   Lumbar Flexion -   Lumbar Extension -   Lumbar Peak Torque -   Min Torque -   Test Percent Below Normative Data -   Test Percent Gain in Strength from Initial  -   Lumbar Weight 48   Repetitions 20   Rating of Perceived Exertion 5   Ice - Z Lie (in min.) 10       Open Books x10  LTR x20  Pelvic Tilts x5--> TrA  +Manual Resisted Hip Flexion x5 ea L/R  PPT alternating marches with yellow TB  15x  Seated PPT and alternating hip flexion 10x   Bridging x5 with pelvic tilt and yellow TB    Clams with YTB 15x      Peripheral muscle strengthening which included 1 set of 15-20 repetitions at a slow, controlled 7 second per rep pace focused on strengthening supporting musculature for improved body mechanics and functional mobility.  Pt and therapist focused on proper form during treatment to ensure optimal strengthening of each targeted muscle group.  Machines were utilized including torso rotation, leg extension, leg curl, chest press, upright row. Tricep extension, bicep curl, leg press, and hip abduction added on third visit.     Home Exercise Program as follows:   PPT 5 reps  PPT with pillow squeeze 5  reps  Pelvic Tilts x5--> TrA  +Manual Resisted Hip Flexion, bridges x5 ea L/R  Clamshells 10 reps  OB 10 reps  Seated PPT with alt marching 15x     Handouts were given to the patient. Pt demo good understanding of the education provided. Caryl demonstrated good return demonstration of activities.     Lumbar roll use compliance: yes  Additional exercises taught this treatment session:   Seated PPT with alt marching 15x     Assessment     Pt tolerated treatment well. Pt performing core stability exercises well and demonstrates proper form .  Pt completed 20 reps today on Med X without 5/10 exertion.  Pt is progressing towards goals and is improving strength which is enabling her to tolerate ADL's.  Inc 5% next visit  Pt will continue to benefit from skilled outpatient physical therapy to address the deficits stated in the impairment chart, provide pt/family education and to maximize pt's level of independence in the home and community environment.     Pt's spiritual, cultural and educational needs considered and pt agreeable to plan of care and goals as stated below:     Medical necessity is demonstrated by the following problem list.    Pt presents with the following impairments:   History  Co-morbidities and personal factors that may impact the plan of care Examination  Body Structures and Functions, activity limitations and participation restrictions that may impact the plan of care Clinical Presentation    Decision Making/ Complexity Score   Co-morbidities:   See precautions           Personal Factors:   lifestyle  sedentary Body Regions:   neck  back  lower extremities     Body Systems:   gross symmetry  ROM  strength  gait  motor control     Activity limitations:   Learning and applying knowledge  no deficits     General Tasks and Commands  no deficits     Communication  no deficits     Mobility  lifting and carrying objects  walking  driving (bike, car, motorcycle)     Self care  dressing  looking after one's  health     Domestic Life  shopping  cooking  doing house work (cleaning house, washing dishes, laundry)     Interactions/Relationships  family relationships  intimate relationships     Life Areas  employment     Community and Social Life  community life  recreation and leisure     Participation Restrictions:   Pt unable to work, exercise, or participate in social activities        evolving clinical presentation with changing clinical characteristics    Moderate             GOALS: Pt is in agreement with the following goals.     Short term goals:  6 weeks or 10 visits   1.  Pt will demonstrate increased lumbar ROM by at least 6 degrees from the initial ROM value with improvements noted in functional ROM and ability to perform ADLs MET  2.  Pt will demonstrate increased maximum isometric torque value by 10% when compared to the initial value resulting in improved ability to perform bending, lifting, and carrying activities safely, confidently.MET  3.  Patient report a reduction in worst pain score by 1-2 points for improved tolerance during work and recreational activities MET  4.  Pt able to perform HEP correctly with minimal cueing or supervision for therapist MET     Long term goals: 13 weeks or 20 visits   1. Pt will demonstrate increased lumbar ROM by at least 9 degrees from initial ROM value, resulting in improved ability to perform functional fwd bending while standing and sitting. MET  2. Pt will demonstrate increased maximum isometric torque value by 20% when compared to the initial value resulting in improved ability to perform bending, lifting, and carrying activities safely, confidently. progressing  3. Pt to demonstrate ability to independently control and reduce their pain through posture positioning and mechanical movements throughout a typical day. progressing  4.  Patient will demonstrate improved overall function per FOTO Survey to CK = at least 40% but < 60% impaired, limited or restricted score or  less. progressing  5. Pt will toelrate standing for longer than 10 minutes without increased pain by more than 1-2 VAS pain points. progressing        Plan   Outpatient physical therapy 2x week for 4 weeks to complete program visits.

## 2019-03-12 NOTE — TELEPHONE ENCOUNTER
----- Message from Geeta Cueva sent at 3/12/2019 12:42 PM CDT -----  Contact: pt @ [phone]  Asking if Dr. Saleem' has any cancellations on today's schedule. Says she can be there as soon as possible and it would help her schedule. Please call.

## 2019-03-14 ENCOUNTER — OFFICE VISIT (OUTPATIENT)
Dept: NEUROLOGY | Facility: CLINIC | Age: 39
End: 2019-03-14
Payer: COMMERCIAL

## 2019-03-14 VITALS
HEIGHT: 66 IN | SYSTOLIC BLOOD PRESSURE: 134 MMHG | BODY MASS INDEX: 30.97 KG/M2 | DIASTOLIC BLOOD PRESSURE: 94 MMHG | WEIGHT: 192.69 LBS | HEART RATE: 87 BPM

## 2019-03-14 DIAGNOSIS — G90.1 DYSAUTONOMIA: ICD-10-CM

## 2019-03-14 DIAGNOSIS — G43.109 MIGRAINE WITH AURA AND WITHOUT STATUS MIGRAINOSUS, NOT INTRACTABLE: Chronic | ICD-10-CM

## 2019-03-14 DIAGNOSIS — G62.9 SMALL FIBER NEUROPATHY: Primary | ICD-10-CM

## 2019-03-14 DIAGNOSIS — R20.2 NUMBNESS AND TINGLING OF BOTH LOWER EXTREMITIES: ICD-10-CM

## 2019-03-14 DIAGNOSIS — R20.0 NUMBNESS AND TINGLING OF BOTH LOWER EXTREMITIES: ICD-10-CM

## 2019-03-14 PROCEDURE — 99999 PR PBB SHADOW E&M-EST. PATIENT-LVL III: ICD-10-PCS | Mod: PBBFAC,,, | Performed by: PSYCHIATRY & NEUROLOGY

## 2019-03-14 PROCEDURE — 3008F BODY MASS INDEX DOCD: CPT | Mod: CPTII,S$GLB,, | Performed by: PSYCHIATRY & NEUROLOGY

## 2019-03-14 PROCEDURE — 99999 PR PBB SHADOW E&M-EST. PATIENT-LVL III: CPT | Mod: PBBFAC,,, | Performed by: PSYCHIATRY & NEUROLOGY

## 2019-03-14 PROCEDURE — 3008F PR BODY MASS INDEX (BMI) DOCUMENTED: ICD-10-PCS | Mod: CPTII,S$GLB,, | Performed by: PSYCHIATRY & NEUROLOGY

## 2019-03-14 PROCEDURE — 99214 PR OFFICE/OUTPT VISIT, EST, LEVL IV, 30-39 MIN: ICD-10-PCS | Mod: S$GLB,,, | Performed by: PSYCHIATRY & NEUROLOGY

## 2019-03-14 PROCEDURE — 99214 OFFICE O/P EST MOD 30 MIN: CPT | Mod: S$GLB,,, | Performed by: PSYCHIATRY & NEUROLOGY

## 2019-03-14 RX ORDER — PREGABALIN 75 MG/1
75 CAPSULE ORAL 2 TIMES DAILY
Qty: 60 CAPSULE | Refills: 5 | Status: SHIPPED | OUTPATIENT
Start: 2019-03-14 | End: 2019-09-11 | Stop reason: SDUPTHER

## 2019-03-15 RX ORDER — PREGABALIN 100 MG/1
CAPSULE ORAL
Qty: 120 CAPSULE | OUTPATIENT
Start: 2019-03-15

## 2019-03-17 NOTE — PROGRESS NOTES
"Subjective:     Chief Complaint:  Follow-up and small fiber neuropathy    Interval History 314/2019 - I have reviewed all relevant medical records in Epic. Symptoms of SFN are pretty much unchanged and at baseline> IVIg appeal was apparently declined by [employer]. Her symptoms are only partially controlled by Lyrica (currently on 100 Q12 and Savella). She wants to taper Lyrica to OFF since she is trying to become pregnant. Her dysautonomia has been managed by Dr. Cardoza who recently started her on Methyldopa (since she is trying to get pregnant), which inadvertently seems to have improved her her migraines. She now has less than 1/week and takes Zomig nasal 3-4 times monthly. Improvements in gait and balance continue with PT.    Interval History 8/20/2018 - Symptoms of tingling and numbness in the LEs is modestly improved since I last saw her. She is followed by Dr. Cardoza and has recently started Losartan, which has improved her cardiac symptoms and helped to normalize blood pressure. Therapath biopsy assessment for small fiber neuropathy was positive in both distal and proximal sites. Dizziness is minimally improved. She uses Savella to address FM and Lyrica to address neuropathic sensory changes. We have submitted orders for IVIg, but [employer] has declined coverage pending appeal (use of Ig cited as "investigational" in small fiber neuropathy).    History of Present Illness:  Caryl Cedeno is a 38 y.o. female who presents today for initial evaluation in my clinic for multiple complaints. [facility] and reports dissatisfaction that she was never seen by "a specialist." She moved to [place] from [place] and much of her medical care has been completed in [place] and we have scant records aside from some pertinent lab work.    Her first complaint is small fiber neuropathy, which she reports has been worked up extensively in [place] and included normal NCS studies but no biopsies as " of yet. There has been an extensive battery of lab work done and she has shown me results in her [place] EMR with included normal values of: SPEP with Immunofixation, B12, folate, ferritin, ESR, voltage gated K channels, N-type Ca channels, ACh-R Ab, ACh-binding Ab, P/Q Ca channels, striated Ab, PCA 1-2, Anti-Magalis, SSA and SSB, Lyme, Gliadin, ARELY-1 and ARELY-2. It's unclear why myasthenia panels were run as she has no MG type complaints. Her ELIGIO was minimally positive (1:160). She has been fairly well-controlled on Lyrica and was recently started on Savella and had Lyrica reduced from 100/200 to 100/100 with an increase in symptoms since the reduction in Lyrica.    She has complaints of POTS and reports formal diagnostic testing was done at [facility] with impaired sweating in the hands and feet. She has not been treated medically and does not have a cardiologist here in [place]. She does not want to start Midodrine until seen by an expert here. She does not have any pertinent records available from the [facility] for my review today.    She has chronic migraine headaches, which occur twice monthly and are not catamenial. They are preceded by visual aura and have pain onset usually in the occipital region with anterior spread. Pain is sharp and stabbing / pounding and severe in intensity. There is associated photo and phonophobia and N/V. She is not on any ppx medications and uses only NSAIDs as abortive. Duration of headaches is 4 hours to 3 days and they are debilitating.    Review of Systems  Review of Systems   Constitutional: Positive for activity change and fatigue. Negative for fever.   HENT: Negative for congestion, dental problem, facial swelling, sinus pressure, sinus pain and tinnitus.    Eyes: Positive for photophobia and visual disturbance.   Respiratory: Negative for chest tightness and shortness of breath.    Cardiovascular: Positive for palpitations. Negative for chest pain.   Gastrointestinal:  "Positive for nausea and vomiting. Negative for abdominal pain.   Endocrine: Negative for cold intolerance and heat intolerance.   Musculoskeletal: Negative for arthralgias, back pain, neck pain and neck stiffness.   Skin: Negative for color change.   Allergic/Immunologic: Negative for environmental allergies and food allergies.   Neurological: Positive for light-headedness and headaches. Negative for dizziness, seizures, syncope, weakness and numbness.   Psychiatric/Behavioral: Negative.         Objective:     Vitals:    03/14/19 1356   BP: (!) 134/94   Pulse: 87   Weight: 87.4 kg (192 lb 10.9 oz)   Height: 5' 6" (1.676 m)   PainSc:   4   PainLoc: Leg       Neurologic Exam     Mental Status   Oriented to person, place, and time.   Attention: normal.   Speech: speech is normal   Level of consciousness: alert  Knowledge: good.     Cranial Nerves     CN II   Visual fields full to confrontation.     CN III, IV, VI   Pupils are equal, round, and reactive to light.  Extraocular motions are normal.     CN V   Facial sensation intact.     CN VII   Facial expression full, symmetric.     CN VIII   Hearing: intact    CN IX, X   CN IX normal.     CN XI   CN XI normal.     CN XII   CN XII normal.     Motor Exam   Muscle bulk: normal  Overall muscle tone: normal    Strength   Strength 5/5 throughout.     Sensory Exam   Right leg light touch: decreased from ankle  Left leg light touch: decreased from ankle  Vibration normal.   Right leg pinprick: decreased from ankle  Left leg pinprick: decreased from ankle    Gait, Coordination, and Reflexes     Gait  Gait: normal    Tremor   Resting tremor: absent  Intention tremor: absent  Action tremor: absent    Reflexes   Right brachioradialis: 2+  Left brachioradialis: 2+  Right biceps: 2+  Left biceps: 2+  Right triceps: 2+  Left triceps: 2+  Right patellar: 2+  Left patellar: 2+      Physical Exam   Constitutional: She is oriented to person, place, and time. She appears well-developed and " well-nourished.   HENT:   Head: Normocephalic and atraumatic.   Eyes: EOM are normal. Pupils are equal, round, and reactive to light.   Neck: Normal range of motion. Neck supple. No thyromegaly present.   Cardiovascular: Normal rate.   Pulmonary/Chest: Effort normal.   Abdominal: Soft.   Lymphadenopathy:     She has no cervical adenopathy.   Neurological: She is oriented to person, place, and time. She has normal strength. Gait normal.   Reflex Scores:       Tricep reflexes are 2+ on the right side and 2+ on the left side.       Bicep reflexes are 2+ on the right side and 2+ on the left side.       Brachioradialis reflexes are 2+ on the right side and 2+ on the left side.       Patellar reflexes are 2+ on the right side and 2+ on the left side.  Skin: Skin is warm and dry.   Psychiatric: She has a normal mood and affect. Her speech is normal and behavior is normal. Thought content normal.   Vitals reviewed.        Lab Results   Component Value Date    WBC 7.04 11/22/2017    HGB 13.9 11/22/2017    HCT 41.4 11/22/2017     11/22/2017    ALT 47 (H) 11/22/2017    AST 35 11/22/2017     08/20/2018    K 3.7 08/20/2018     08/20/2018    CREATININE 1.0 08/20/2018    BUN 11 08/20/2018    CO2 27 08/20/2018    TSH 1.536 11/27/2017    MFVFNQVQ29 580 11/22/2017    VITAMINB6 8 11/22/2017       Tissue Biopsy 7/26/2018 - Tissue biopsy consistent with a small fiber neuropathy / idiopathic peripheral autonomic neuropathy.     Assessment and Plan:     Problem List Items Addressed This Visit     Migraine with aura and without status migrainosus, not intractable (Chronic)    Current Assessment & Plan     Two per month with visual aura, improved somewhat with anti-hypertensives. She has considerable nausea with vomiting during the events and so pill form abortive therapy is a poor option. She is using Zomig nasal spray 3-4 times per month.              - Zomig nasal spray 5 mg prn migraine. Use immediately at headache /  aura onset and she can repeat after two hours if severe headache persists but no more than two applications per day. No history of cerebrovascular or cardiovascular disease.              - Compazine and Zofran offered and patient declined.              - PPX medications offered and patient declined.         Small fiber neuropathy - Primary    Current Assessment & Plan     Burning sensation mainly in the lower extremities with normal NCS. On Lyrica 100 Q12 and Savella 100 Q12. She wants to reduce Lyrica to OFF since she is trying to get pregnant. She has noted an modest improvement in lower extremity sensory derangements. Diagnosis confirmed by biopsy. Results are in chart and can be found on [facility] web site. IVIg orders were denied by [employer] as investigational and appeal failed. She is interested in enrollement into a clinical trial that is treating small fiber neuropathy in patients with + TS-HDS and FGFR3 autoantibodies with IVIg (sponsor is [facility], details on ClinicalTrials.gov)              - Reduce Lyrica to 75 Q12 x one month, then 75 daily x 2 weeks then OFF              - Blood draw for the clinical trial needs to be sent to a specific lab. I will try to coordinate with our lab and then get back to the patient with proposed plan.         Relevant Medications    pregabalin (LYRICA) 75 MG capsule    Dysautonomia    Numbness and tingling of both lower extremities    Current Assessment & Plan     Lyrica wean as per Plan in SFN             RTC 4 months    Berlin Saleem MD  [facility]

## 2019-03-17 NOTE — ASSESSMENT & PLAN NOTE
Two per month with visual aura, improved somewhat with anti-hypertensives. She has considerable nausea with vomiting during the events and so pill form abortive therapy is a poor option. She is using Zomig nasal spray 3-4 times per month.              - Zomig nasal spray 5 mg prn migraine. Use immediately at headache / aura onset and she can repeat after two hours if severe headache persists but no more than two applications per day. No history of cerebrovascular or cardiovascular disease.              - Compazine and Zofran offered and patient declined.              - PPX medications offered and patient declined.

## 2019-03-17 NOTE — ASSESSMENT & PLAN NOTE
Burning sensation mainly in the lower extremities with normal NCS. On Lyrica 100 Q12 and Savella 100 Q12. She wants to reduce Lyrica to OFF since she is trying to get pregnant. She has noted an modest improvement in lower extremity sensory derangements. Diagnosis confirmed by biopsy. Results are in chart and can be found on [facility] web site. IVIg orders were denied by [employer] as investigational and appeal failed. She is interested in enrollement into a clinical trial that is treating small fiber neuropathy in patients with + TS-HDS and FGFR3 autoantibodies with IVIg (sponsor is [facility], details on ClinicalTrials.gov)              - Reduce Lyrica to 75 Q12 x one month, then 75 daily x 2 weeks then OFF              - Blood draw for the clinical trial needs to be sent to a specific lab. I will try to coordinate with our lab and then get back to the patient with proposed plan.

## 2019-03-18 ENCOUNTER — DOCUMENTATION ONLY (OUTPATIENT)
Dept: REHABILITATION | Facility: OTHER | Age: 39
End: 2019-03-18
Attending: PSYCHIATRY & NEUROLOGY
Payer: COMMERCIAL

## 2019-03-18 NOTE — PROGRESS NOTES
Health  Wellness Visit Note    Name: Caryl Cedeno  Clinic Number: [id number]  Physician: Berlin Saleem*  Diagnosis: No diagnosis found.  Past Medical History:   Diagnosis Date    Celiac disease     Fibromyalgia     Migraines     Small fiber neuropathy      Visit Number: 42  Precautions: POTS (postural orthostatic tachycardia syndrome) (Chronic)  Small fiber neuropathy - Primary  Fibromyalgia  Neck sx: disc replacement Nov 2016  6 Month: November 12 Month: May  Time In: 4:40PM  Time Out:5:40PM  Total TreatmentTime: 60 minutes    Subjective:   Patient reports a increase in her neck symptoms, says she has not really started to feel better since returning from [place]. She was exhausted from traveling so stayed in bed for 3 days, which caused the increase in neck pain. Says usually she gets better as the days go on, slowly gains her mobility and strength back.  She is really interested in working out another day during the week. She has been doing better with trying to get in another cardio day with either the bike or the row machine at [facility].   HEP compliant.    Objective:   Caryl completed therapeutic stretches (EIL, MATI) and the following MedX exercise machines: core lumbar, torso rotation l/r, leg extension, leg curl, upright row, chest press, biceps curl, triceps extension, leg press    See exercise log in patient folder for rate of exertion and repetitions completed.     Pt did hip abduction upstairs in [facility] after.     Assessment:   Patient tolerated Cervical MedX machine and all peripheral machines with minimal pain, but did have dizziness on the torso rotation and the chest press. Warm up on bike and ice.     Plan:  Continue with established plan of care towards wellness goals. Pt will attend Physical Therapy and Wellness each once a week.     Health  : Osito Lizama  3/18/2019

## 2019-03-21 ENCOUNTER — CLINICAL SUPPORT (OUTPATIENT)
Dept: REHABILITATION | Facility: OTHER | Age: 39
End: 2019-03-21
Attending: PSYCHIATRY & NEUROLOGY
Payer: COMMERCIAL

## 2019-03-21 DIAGNOSIS — G89.29 CHRONIC BILATERAL LOW BACK PAIN WITHOUT SCIATICA: Primary | ICD-10-CM

## 2019-03-21 DIAGNOSIS — M54.50 CHRONIC BILATERAL LOW BACK PAIN WITHOUT SCIATICA: Primary | ICD-10-CM

## 2019-03-21 PROCEDURE — 97110 THERAPEUTIC EXERCISES: CPT

## 2019-03-21 NOTE — PROGRESS NOTES
[facility] Treatment /POC     Name: Caryl Cedeno  Clinic Number: [id number]  Date of Treatment: 2019   Diagnosis:   Encounter Diagnosis   Name Primary?    Chronic bilateral low back pain without sciatica Yes     Physician: Berlin Saleem*    Pain pattern determined: Pattern 1 PEN  Plan of care signed: 18   Time in: 3:12pm  Time Out: 4:05  Total Treatment time: 50 minutes  Precautions: lower back pain  Visit #: 15    POC due: 3/20/19   Reassessment due: 19     Face to Face discussion of patient was done between PT and PTA.     Subjective   Caryl reports her LB is feeling better today. She is complaint with her HEP.   Patient reports their pain to be 2/10 on a 0-10 scale with 0 being no pain and 10 being the worst pain imaginable.    Pain Location: lower back and cervical area     Occupation:  On disability    Leisure: Mostly sedentary, watch TV, swimming at home pool                      Pts goals:  Reduce pain, get stronger and more mobile, tolerate standing for longer than 10 minutes without increased pain       Objective       Baseline Isometric Testing on Med X equipment: Testing administered by PT     Baseline IM Testing Results:   Date of testin2018  Femur Restraint 6   Top Dead Center 24   Counterweight 185   Lumbar Flexion 42   Lumbar Extension 6   Lumbar Peak Torque 83   Min Torque 68   Test Percent Below Normative Data 41        Midpoint IM Testing Results:   Date of testin2018  Femur Restraint 6   Top Dead Center 24   Counterweight 185   Lumbar Flexion 48   Lumbar Extension 0   Lumbar Peak Torque 110   Min Torque 51   Test Percent Below Normative Data 30       FOTO: Focus on Therapeutic Outcomes   Category: lumbar   % Impaired: 78%  Visit 10: 58%  Current Score  = CL = least 60% but < 80% impaired, limited or restricted  Goal at Discharge Score = CK = at least 40% but < 60% impaired, limited or restricted    Treatment    Pt was  instructed in and performed the following:       Caryl received therapeutic exercises to develop/improved posture, cardiovascular endurance, muscular endurance, lumbar  ROM, strength and muscular endurance for 60 minutes including the following exercises:    [facility] 3/21/2019   Visit Number 15   VAS Pain Rating 2   Recumbent Bike Seat Pos. -   Time 10   Cervical Stretches - Retraction In Lying -   Retraction in Sitting -   Scapular Retraction -   Manual Therapy -   Cervical Extension Seat Pad -   Seat Adjustment -   Top Dead Center -   Counterweight -   Cervical Flexion -   Cervical Extension -   Cervical Peak Torque -   Cervical Weight -   Repetitions -   Rating of Perceived Exertion -   Lumbar Extension Seat Pad -   Femur Restraint -   Top Dead Center -   Counterweight -   Lumbar Flexion -   Lumbar Extension -   Lumbar Peak Torque -   Min Torque -   Test Percent Below Normative Data -   Test Percent Gain in Strength from Initial  -   Lumbar Weight 51   Repetitions 15   Rating of Perceived Exertion 3   Ice - Z Lie (in min.) 10       Open Books x10  LTR x20  Pelvic Tilts x5--> TrA  +Manual Resisted Hip Flexion x5 ea L/R  PPT alternating marches with yellow TB  15x  Seated PPT and alternating hip flexion 10x with R TB   Bridging x5 with pelvic tilt and yellow TB    Clams with YTB 15x      Peripheral muscle strengthening which included 1 set of 15-20 repetitions at a slow, controlled 7 second per rep pace focused on strengthening supporting musculature for improved body mechanics and functional mobility.  Pt and therapist focused on proper form during treatment to ensure optimal strengthening of each targeted muscle group.  Machines were utilized including torso rotation, leg extension, leg curl, chest press, upright row. Tricep extension, bicep curl, leg press, and hip abduction added on third visit.     Home Exercise Program as follows:   PPT 5 reps  PPT with pillow squeeze 5 reps  Pelvic Tilts x5-->  TrA  +Manual Resisted Hip Flexion, bridges x5 ea L/R  Clamshells 10 reps  OB 10 reps  Seated PPT with alt marching 15x     Handouts were given to the patient. Pt demo good understanding of the education provided. Caryl demonstrated good return demonstration of activities.     Lumbar roll use compliance: yes  Additional exercises taught this treatment session:   Seated PPT with alt marching 15x     Assessment     Pt tolerated treatment well. Pt performing core stability exercises well and demonstrates proper form .Added R TB with seated marching that was challenging, but pt liked and demo well.   Pt completed 11 reps,but really 15 but PTA accidentally stopped machine. She tolerated exercise well. She is progressing well.   Pt will continue to benefit from skilled outpatient physical therapy to address the deficits stated in the impairment chart, provide pt/family education and to maximize pt's level of independence in the home and community environment.     Pt's spiritual, cultural and educational needs considered and pt agreeable to plan of care and goals as stated below:     Medical necessity is demonstrated by the following problem list.    Pt presents with the following impairments:   History  Co-morbidities and personal factors that may impact the plan of care Examination  Body Structures and Functions, activity limitations and participation restrictions that may impact the plan of care Clinical Presentation    Decision Making/ Complexity Score   Co-morbidities:   See precautions           Personal Factors:   lifestyle  sedentary Body Regions:   neck  back  lower extremities     Body Systems:   gross symmetry  ROM  strength  gait  motor control     Activity limitations:   Learning and applying knowledge  no deficits     General Tasks and Commands  no deficits     Communication  no deficits     Mobility  lifting and carrying objects  walking  driving (bike, car, motorcycle)     Self care  dressing  looking after  one's health     Domestic Life  shopping  cooking  doing house work (cleaning house, washing dishes, laundry)     Interactions/Relationships  family relationships  intimate relationships     Life Areas  employment     Community and Social Life  community life  recreation and leisure     Participation Restrictions:   Pt unable to work, exercise, or participate in social activities        evolving clinical presentation with changing clinical characteristics    Moderate             GOALS: Pt is in agreement with the following goals.     Short term goals:  6 weeks or 10 visits   1.  Pt will demonstrate increased lumbar ROM by at least 6 degrees from the initial ROM value with improvements noted in functional ROM and ability to perform ADLs MET  2.  Pt will demonstrate increased maximum isometric torque value by 10% when compared to the initial value resulting in improved ability to perform bending, lifting, and carrying activities safely, confidently.MET  3.  Patient report a reduction in worst pain score by 1-2 points for improved tolerance during work and recreational activities MET  4.  Pt able to perform HEP correctly with minimal cueing or supervision for therapist MET     Long term goals: 13 weeks or 20 visits   1. Pt will demonstrate increased lumbar ROM by at least 9 degrees from initial ROM value, resulting in improved ability to perform functional fwd bending while standing and sitting. MET  2. Pt will demonstrate increased maximum isometric torque value by 20% when compared to the initial value resulting in improved ability to perform bending, lifting, and carrying activities safely, confidently. progressing  3. Pt to demonstrate ability to independently control and reduce their pain through posture positioning and mechanical movements throughout a typical day. progressing  4.  Patient will demonstrate improved overall function per FOTO Survey to CK = at least 40% but < 60% impaired, limited or restricted  score or less. progressing  5. Pt will toelrate standing for longer than 10 minutes without increased pain by more than 1-2 VAS pain points. progressing        Plan   Outpatient physical therapy 2x week for 4 weeks to complete program visits.

## 2019-03-21 NOTE — PROGRESS NOTES
[facility]     Name: Caryl Cedeno  Clinic Number: [id number]  Date of Treatment: 2019   Diagnosis:   Encounter Diagnosis   Name Primary?    Chronic bilateral low back pain without sciatica Yes     Physician: Berlin Saleem*    Pain pattern determined: Pattern 1 PEN  Plan of care signed: 18   Time in: 335pm  Time Out: 435  Total Treatment time: 60 minutes  Precautions: lower back pain  Visit #: 14    POC due: 3/20/19   Reassessment due: 19     Subjective   Caryl reports she is feeling better than last week.  Pt reports she went away for several days and was sleeping on a different surface, which irritated her pain level    Patient reports their pain to be 2/10 on a 0-10 scale with 0 being no pain and 10 being the worst pain imaginable.    Pain Location: lower back and cervical area     Occupation:  On disability    Leisure: Mostly sedentary, watch TV, swimming at home pool                      Pts goals:  Reduce pain, get stronger and more mobile, tolerate standing for longer than 10 minutes without increased pain       Objective       Baseline Isometric Testing on Med X equipment: Testing administered by PT     Baseline IM Testing Results:   Date of testin2018  Femur Restraint 6   Top Dead Center 24   Counterweight 185   Lumbar Flexion 42   Lumbar Extension 6   Lumbar Peak Torque 83   Min Torque 68   Test Percent Below Normative Data 41        Midpoint IM Testing Results:   Date of testin2018  Femur Restraint 6   Top Dead Center 24   Counterweight 185   Lumbar Flexion 48   Lumbar Extension 0   Lumbar Peak Torque 110   Min Torque 51   Test Percent Below Normative Data 30       FOTO: Focus on Therapeutic Outcomes   Category: lumbar   % Impaired: 78%  Visit 10: 58%  Current Score  = CL = least 60% but < 80% impaired, limited or restricted  Goal at Discharge Score = CK = at least 40% but < 60% impaired, limited or  restricted    Treatment    Pt was instructed in and performed the following:       Caryl received therapeutic exercises to develop/improved posture, cardiovascular endurance, muscular endurance, lumbar  ROM, strength and muscular endurance for 60 minutes including the following exercises:    [facility] 3/12/2019   Visit Number 14   VAS Pain Rating 2   Recumbent Bike Seat Pos. -   Time 10   Cervical Stretches - Retraction In Lying -   Retraction in Sitting -   Scapular Retraction -   Manual Therapy -   Cervical Extension Seat Pad -   Seat Adjustment -   Top Dead Center -   Counterweight -   Cervical Flexion -   Cervical Extension -   Cervical Peak Torque -   Cervical Weight -   Repetitions -   Rating of Perceived Exertion -   Lumbar Extension Seat Pad -   Femur Restraint -   Top Dead Center -   Counterweight -   Lumbar Flexion -   Lumbar Extension -   Lumbar Peak Torque -   Min Torque -   Test Percent Below Normative Data -   Test Percent Gain in Strength from Initial  -   Lumbar Weight 48   Repetitions 20   Rating of Perceived Exertion 5   Ice - Z Lie (in min.) 10       Open Books x10  LTR x20  Pelvic Tilts x5--> TrA  +Manual Resisted Hip Flexion x5 ea L/R  PPT alternating marches with yellow TB  15x  Seated PPT and alternating hip flexion 10x   Bridging x5 with pelvic tilt and yellow TB    Clams with YTB 15x      Peripheral muscle strengthening which included 1 set of 15-20 repetitions at a slow, controlled 7 second per rep pace focused on strengthening supporting musculature for improved body mechanics and functional mobility.  Pt and therapist focused on proper form during treatment to ensure optimal strengthening of each targeted muscle group.  Machines were utilized including torso rotation, leg extension, leg curl, chest press, upright row. Tricep extension, bicep curl, leg press, and hip abduction added on third visit.     Home Exercise Program as follows:   PPT 5 reps  PPT with pillow squeeze 5  reps  Pelvic Tilts x5--> TrA  +Manual Resisted Hip Flexion, bridges x5 ea L/R  Clamshells 10 reps  OB 10 reps  Seated PPT with alt marching 15x     Handouts were given to the patient. Pt demo good understanding of the education provided. Caryl demonstrated good return demonstration of activities.     Lumbar roll use compliance: yes  Additional exercises taught this treatment session:   Seated PPT with alt marching 15x     Assessment     Pt tolerated treatment well. Pt performing core stability exercises well and demonstrates proper form .  Pt completed 20 reps today on Med X without 5/10 exertion.  Pt is progressing towards goals and is improving strength which is enabling her to tolerate ADL's.  Inc 5% next visit  Pt will continue to benefit from skilled outpatient physical therapy to address the deficits stated in the impairment chart, provide pt/family education and to maximize pt's level of independence in the home and community environment.     Pt's spiritual, cultural and educational needs considered and pt agreeable to plan of care and goals as stated below:     Medical necessity is demonstrated by the following problem list.    Pt presents with the following impairments:   History  Co-morbidities and personal factors that may impact the plan of care Examination  Body Structures and Functions, activity limitations and participation restrictions that may impact the plan of care Clinical Presentation    Decision Making/ Complexity Score   Co-morbidities:   See precautions           Personal Factors:   lifestyle  sedentary Body Regions:   neck  back  lower extremities     Body Systems:   gross symmetry  ROM  strength  gait  motor control     Activity limitations:   Learning and applying knowledge  no deficits     General Tasks and Commands  no deficits     Communication  no deficits     Mobility  lifting and carrying objects  walking  driving (bike, car, motorcycle)     Self care  dressing  looking after one's  health     Domestic Life  shopping  cooking  doing house work (cleaning house, washing dishes, laundry)     Interactions/Relationships  family relationships  intimate relationships     Life Areas  employment     Community and Social Life  community life  recreation and leisure     Participation Restrictions:   Pt unable to work, exercise, or participate in social activities        evolving clinical presentation with changing clinical characteristics    Moderate             GOALS: Pt is in agreement with the following goals.     Short term goals:  6 weeks or 10 visits   1.  Pt will demonstrate increased lumbar ROM by at least 6 degrees from the initial ROM value with improvements noted in functional ROM and ability to perform ADLs MET  2.  Pt will demonstrate increased maximum isometric torque value by 10% when compared to the initial value resulting in improved ability to perform bending, lifting, and carrying activities safely, confidently.MET  3.  Patient report a reduction in worst pain score by 1-2 points for improved tolerance during work and recreational activities MET  4.  Pt able to perform HEP correctly with minimal cueing or supervision for therapist MET     Long term goals: 13 weeks or 20 visits   1. Pt will demonstrate increased lumbar ROM by at least 9 degrees from initial ROM value, resulting in improved ability to perform functional fwd bending while standing and sitting. MET  2. Pt will demonstrate increased maximum isometric torque value by 20% when compared to the initial value resulting in improved ability to perform bending, lifting, and carrying activities safely, confidently. progressing  3. Pt to demonstrate ability to independently control and reduce their pain through posture positioning and mechanical movements throughout a typical day. progressing  4.  Patient will demonstrate improved overall function per FOTO Survey to CK = at least 40% but < 60% impaired, limited or restricted score or  less. progressing  5. Pt will toelrate standing for longer than 10 minutes without increased pain by more than 1-2 VAS pain points. progressing        Plan   Outpatient physical therapy 2x week for 4 weeks to complete program visits.

## 2019-03-25 ENCOUNTER — DOCUMENTATION ONLY (OUTPATIENT)
Dept: REHABILITATION | Facility: OTHER | Age: 39
End: 2019-03-25
Attending: PSYCHIATRY & NEUROLOGY
Payer: COMMERCIAL

## 2019-03-27 ENCOUNTER — PATIENT MESSAGE (OUTPATIENT)
Dept: NEUROLOGY | Facility: CLINIC | Age: 39
End: 2019-03-27

## 2019-03-27 ENCOUNTER — PATIENT MESSAGE (OUTPATIENT)
Dept: GASTROENTEROLOGY | Facility: CLINIC | Age: 39
End: 2019-03-27

## 2019-03-27 ENCOUNTER — CLINICAL SUPPORT (OUTPATIENT)
Dept: REHABILITATION | Facility: OTHER | Age: 39
End: 2019-03-27
Attending: PSYCHIATRY & NEUROLOGY
Payer: COMMERCIAL

## 2019-03-27 DIAGNOSIS — G89.29 CHRONIC BILATERAL LOW BACK PAIN WITHOUT SCIATICA: Primary | ICD-10-CM

## 2019-03-27 DIAGNOSIS — M54.50 CHRONIC BILATERAL LOW BACK PAIN WITHOUT SCIATICA: Primary | ICD-10-CM

## 2019-03-27 PROCEDURE — 97110 THERAPEUTIC EXERCISES: CPT

## 2019-03-27 NOTE — PROGRESS NOTES
[facility]     Name: Caryl Cedeno  Clinic Number: [id number]  Date of Treatment: 2019   Diagnosis:   Encounter Diagnosis   Name Primary?    Chronic bilateral low back pain without sciatica Yes     Physician: Berlin Saleem*    Pain pattern determined: Pattern 1 PEN  Plan of care signed: 18   Time in: 335  Time Out: 445  Total Treatment time: 50 minutes  Precautions: lower back pain  Visit #: 16    POC due: 19  Reassessment due: 19     Face to Face discussion of patient was done between PT and PTA.     Subjective   Caryl reports she is weaning her mg of Lyrica and increasing BP meds so she feels she is off a little  Patient reports their pain to be 2/10 on a 0-10 scale with 0 being no pain and 10 being the worst pain imaginable.    Pain Location: lower back and cervical area     Occupation:  On disability    Leisure: Mostly sedentary, watch TV, swimming at home pool                      Pts goals:  Reduce pain, get stronger and more mobile, tolerate standing for longer than 10 minutes without increased pain       Objective       Baseline Isometric Testing on Med X equipment: Testing administered by PT     Baseline IM Testing Results:   Date of testin2018  Femur Restraint 6   Top Dead Center 24   Counterweight 185   Lumbar Flexion 42   Lumbar Extension 6   Lumbar Peak Torque 83   Min Torque 68   Test Percent Below Normative Data 41        [facility]point IM Testing Results:   Date of testin2018  Femur Restraint 6   Top Dead Center 24   Counterweight 185   Lumbar Flexion 48   Lumbar Extension 0   Lumbar Peak Torque 110   Min Torque 51   Test Percent Below Normative Data 30       FOTO: Focus on Therapeutic Outcomes   Category: lumbar   % Impaired: 78%  Visit 10: 58%  Current Score  = CL = least 60% but < 80% impaired, limited or restricted  Goal at Discharge Score = CK = at least 40% but < 60% impaired, limited or  restricted    Treatment    Pt was instructed in and performed the following:       Caryl received therapeutic exercises to develop/improved posture, cardiovascular endurance, muscular endurance, lumbar  ROM, strength and muscular endurance for 60 minutes including the following exercises:  [facility] 3/27/2019   Visit Number 16   VAS Pain Rating 3   Recumbent Bike Seat Pos. -   Time 10   Cervical Stretches - Retraction In Lying -   Retraction in Sitting -   Scapular Retraction -   Manual Therapy 10   Cervical Extension Seat Pad -   Seat Adjustment -   Top Dead Center -   Counterweight -   Cervical Flexion -   Cervical Extension -   Cervical Peak Torque -   Cervical Weight -   Repetitions -   Rating of Perceived Exertion -   Lumbar Extension Seat Pad -   Femur Restraint -   Top Dead Center -   Counterweight -   Lumbar Flexion -   Lumbar Extension -   Lumbar Peak Torque -   Min Torque -   Test Percent Below Normative Data -   Test Percent Gain in Strength from Initial  -   Lumbar Weight 51   Repetitions 20   Rating of Perceived Exertion 3   Ice - Z Lie (in min.) 10         Open Books x10  LTR x20  Pelvic Tilts x5--> TrA  +Manual Resisted Hip Flexion x5 ea L/R  PPT alternating marches with yellow TB  15x  Seated PPT and alternating hip flexion 10x with R TB   Bridging x5 with pelvic tilt and yellow TB    Clams with YTB 15x      Peripheral muscle strengthening which included 1 set of 15-20 repetitions at a slow, controlled 7 second per rep pace focused on strengthening supporting musculature for improved body mechanics and functional mobility.  Pt and therapist focused on proper form during treatment to ensure optimal strengthening of each targeted muscle group.  Machines were utilized including torso rotation, leg extension, leg curl, chest press, upright row. Tricep extension, bicep curl, leg press, and hip abduction added on third visit.     Home Exercise Program as follows:   PPT 5 reps  PPT with pillow  squeeze 5 reps  Pelvic Tilts x5--> TrA  +Manual Resisted Hip Flexion, bridges x5 ea L/R  Clamshells 10 reps  OB 10 reps  Seated PPT with alt marching 15x     Handouts were given to the patient. Pt demo good understanding of the education provided. Caryl demonstrated good return demonstration of activities.     Lumbar roll use compliance: yes  Additional exercises taught this treatment session:   Seated PPT with alt marching 15x     Assessment     Pt tolerated treatment well and was able to complete 20 reps at 51ft/lbs despite c/o fatigue.  Pt able to complete session without increase discomfort.  Pt is making slow steady progress, inc 5% next visit  Pt will continue to benefit from skilled outpatient physical therapy to address the deficits stated in the impairment chart, provide pt/family education and to maximize pt's level of independence in the home and community environment.     Pt's spiritual, cultural and educational needs considered and pt agreeable to plan of care and goals as stated below:     Medical necessity is demonstrated by the following problem list.    Pt presents with the following impairments:   History  Co-morbidities and personal factors that may impact the plan of care Examination  Body Structures and Functions, activity limitations and participation restrictions that may impact the plan of care Clinical Presentation    Decision Making/ Complexity Score   Co-morbidities:   See precautions           Personal Factors:   lifestyle  sedentary Body Regions:   neck  back  lower extremities     Body Systems:   gross symmetry  ROM  strength  gait  motor control     Activity limitations:   Learning and applying knowledge  no deficits     General Tasks and Commands  no deficits     Communication  no deficits     Mobility  lifting and carrying objects  walking  driving (bike, car, motorcycle)     Self care  dressing  looking after one's health     Domestic Life  shopping  cooking  doing house work  (cleaning house, washing dishes, laundry)     Interactions/Relationships  family relationships  intimate relationships     Life Areas  employment     Community and Social Life  community life  recreation and leisure     Participation Restrictions:   Pt unable to work, exercise, or participate in social activities        evolving clinical presentation with changing clinical characteristics    Moderate             GOALS: Pt is in agreement with the following goals.     Short term goals:  6 weeks or 10 visits   1.  Pt will demonstrate increased lumbar ROM by at least 6 degrees from the initial ROM value with improvements noted in functional ROM and ability to perform ADLs MET  2.  Pt will demonstrate increased maximum isometric torque value by 10% when compared to the initial value resulting in improved ability to perform bending, lifting, and carrying activities safely, confidently.MET  3.  Patient report a reduction in worst pain score by 1-2 points for improved tolerance during work and recreational activities MET  4.  Pt able to perform HEP correctly with minimal cueing or supervision for therapist MET     Long term goals: 13 weeks or 20 visits   1. Pt will demonstrate increased lumbar ROM by at least 9 degrees from initial ROM value, resulting in improved ability to perform functional fwd bending while standing and sitting. MET  2. Pt will demonstrate increased maximum isometric torque value by 20% when compared to the initial value resulting in improved ability to perform bending, lifting, and carrying activities safely, confidently. progressing  3. Pt to demonstrate ability to independently control and reduce their pain through posture positioning and mechanical movements throughout a typical day. progressing  4.  Patient will demonstrate improved overall function per FOTO Survey to CK = at least 40% but < 60% impaired, limited or restricted score or less. progressing  5. Pt will toelrate standing for longer  than 10 minutes without increased pain by more than 1-2 VAS pain points. progressing        Plan   Continue with [facility] 1-2 x wk.

## 2019-03-28 ENCOUNTER — OFFICE VISIT (OUTPATIENT)
Dept: SURGERY | Facility: CLINIC | Age: 39
End: 2019-03-28
Payer: COMMERCIAL

## 2019-03-28 VITALS
HEART RATE: 89 BPM | HEIGHT: 66 IN | BODY MASS INDEX: 29.92 KG/M2 | DIASTOLIC BLOOD PRESSURE: 92 MMHG | SYSTOLIC BLOOD PRESSURE: 122 MMHG | WEIGHT: 186.19 LBS | TEMPERATURE: 99 F

## 2019-03-28 DIAGNOSIS — Z80.3 FAMILY HISTORY OF BREAST CANCER: ICD-10-CM

## 2019-03-28 DIAGNOSIS — Z13.79 GENETIC SCREENING: ICD-10-CM

## 2019-03-28 DIAGNOSIS — Z91.89 AT HIGH RISK FOR BREAST CANCER: Primary | ICD-10-CM

## 2019-03-28 PROCEDURE — 99999 PR PBB SHADOW E&M-EST. PATIENT-LVL III: ICD-10-PCS | Mod: PBBFAC,,, | Performed by: NURSE PRACTITIONER

## 2019-03-28 PROCEDURE — 99203 OFFICE O/P NEW LOW 30 MIN: CPT | Mod: S$GLB,,, | Performed by: NURSE PRACTITIONER

## 2019-03-28 PROCEDURE — 99999 PR PBB SHADOW E&M-EST. PATIENT-LVL III: CPT | Mod: PBBFAC,,, | Performed by: NURSE PRACTITIONER

## 2019-03-28 PROCEDURE — 3008F PR BODY MASS INDEX (BMI) DOCUMENTED: ICD-10-PCS | Mod: CPTII,S$GLB,, | Performed by: NURSE PRACTITIONER

## 2019-03-28 PROCEDURE — 3008F BODY MASS INDEX DOCD: CPT | Mod: CPTII,S$GLB,, | Performed by: NURSE PRACTITIONER

## 2019-03-28 PROCEDURE — 99203 PR OFFICE/OUTPT VISIT, NEW, LEVL III, 30-44 MIN: ICD-10-PCS | Mod: S$GLB,,, | Performed by: NURSE PRACTITIONER

## 2019-03-28 RX ORDER — METHYLDOPA 500 MG/1
500 TABLET, FILM COATED ORAL 3 TIMES DAILY
Refills: 1 | COMMUNITY
Start: 2019-03-24 | End: 2019-06-29

## 2019-03-28 RX ORDER — MONTELUKAST SODIUM 10 MG/1
10 TABLET ORAL DAILY
Refills: 0 | COMMUNITY
Start: 2019-02-13 | End: 2019-06-20

## 2019-03-28 NOTE — PROGRESS NOTES
"Patient ID: Caryl Cedeno is a 38 y.o. female.    Chief Complaint: Consult (New Patient High Risk TC 22.87 %)        HPI:  New patient to the breast center presents today for increased risk of breast cancer.    Patient denies palpable breast mass, breast pain, breast-related skin changes, nipple discharge and nipple retraction.      Breast History:    Personal history of breast cancer:  no  Personal history of breast biopsy:  no  Personal history of breast surgery:  no    Breast Imaging    18 bilat mmg report reviewed today:  heterogeneously dense breast tissue  BI-RADS 1  TC lifetime risk 22.87%    GYN History:  Age of menarche:  10 y/o  Uterus and ovaries:  intact  LMP:  3 wks ago  HRT usage:  never   (elective )    Other Oncologic History:  Personal history of other cancer not abovementioned:  no  Personal history of genetic testing:  no    Social History:  Tobacco use:  quit smoking around   Alcohol use:  "almost never"  Exercise:  PT 2x/wk, swims when warm    Family Oncologic History:    Ashkenazi Baptist ancestry:  no    Family History   Problem Relation Age of Onset    Cancer Maternal Grandfather         rectal ca, intestinal ca, brain ca    Breast cancer Paternal Grandmother 30        had breast ca 3x, unknown if unilat vs bilat, thinks unilat    Cancer Mother         non-melanoma skin ca    Cancer Other         pat great-aunt with ca of unk origin    Ovarian cancer Neg Hx      History of genetic testing in relatives:  no      Patient's Breast Cancer Risk Assessment Scores:  Taking into account the above information, the patient's breast cancer risk assessment scores were calculated today as follows:  Tyrer-Cuzick lifetime risk:  25.4% (PGM with unilat breast ca) - 36.4% (PGM with bilat breast ca)  Robina model 5-year risk:  0.8%       Review of Systems - See HPI.  Objective:   Physical Exam   Pulmonary/Chest: Effort normal. No respiratory distress. She exhibits no mass, no " edema and no deformity. Right breast exhibits no inverted nipple, no mass, no nipple discharge, no skin change and no tenderness. Left breast exhibits no inverted nipple, no mass, no nipple discharge, no skin change and no tenderness. No breast swelling. Breasts are symmetrical.   Genitourinary: No breast swelling.   Lymphadenopathy:     She has no cervical adenopathy.     She has no axillary adenopathy.        Right: No supraclavicular adenopathy present.        Left: No supraclavicular adenopathy present.     Assessment:       1. At high risk for breast cancer    2. Family history of breast cancer    3. Genetic screening          Plan:     HIGH-RISK COUNSELING    Counseled patient regarding her being at increased risk for breast cancer based on risk factors which patient and I discussed today and which were factored into the Tyrer-Cuzick risk assessment model, which estimated a lifetime risk of breast cancer of 25.4-%36.4% for this patient as of today.  Discussed with patient that there are several models available for stratifying breast cancer risk, and Tyrer-Cuzick is presently the model utilized by [facility] and is a model recommended per current NCCN guidelines.      Counseled patient regarding modifiable risk factors for breast cancer as recommended by NCCN, including exercising, maintaining a healthy BMI, limiting alcohol consumption to less than one drink per day, and refraining from smoking.      Discussed with patient current NCCN guideline recommendations for increased breast cancer screening in individuals with a lifetime risk of breast cancer >20%, to include semiannual CBE, along with annual mammogram and annual breast MRI, which would alternate.  Risks and benefits of increased screening with breast MRI were discussed.    Discussed with patient option for genetic counseling through [facility].    Discussed with patient option of speaking with a breast surgeon regarding risk-reducing  mastectomy, though this is generally only considered in women with a genetic mutation conferring a high risk for breast cancer or with a compelling family history.    Discussed with patient option of speaking with Medical Oncology regarding taking a pharmacologic agent such as tamoxifen or an AI to reduce breast cancer risk.     PLAN    After a thorough discussion, patient elects to proceed with alternating annual mammogram and annual breast MRI along with semiannual CBEs and a referral to [facility] for genetic counseling.    Order will be placed for bilateral breast MRI, to occur around 7/3/19 along with CBE, once records have been received from Dr. Land's office in [place] regarding pt's neck fusion implant (pt thinks it is titanium), if breast MRI is deemed safe with this implant; pt is obtaining records and will have them sent here.  Advised patient to contact the pricing transparency line (phone number provided), prior to undergoing breast MRI, to determine her out-of-pocket cost.  Pt will need a UPT on the day of the breast MRI, just prior to the MRI.    Pt to RTC for repeat CBE around 7/3/19.    Patient declines an appointment in Medical Oncology to discuss the possibility of chemoprevention and an appointment with a breast surgeon to discuss the possibility of prophylactic mastectomy at this time and was advised to contact clinic with any changes in her decision.    Offered pt referral to Nutrition secondary to her increased risk of breast cancer along with her BMI.  Pt declined.    Pt requested referral to genetics specialist for non-cancer-related genetic counseling, citing that she has diagnosis/es c/w MTHFR gene mutation.  Referral placed to Tiffany Regan in Genetics; provided pt with phone # to call to schedule her visit.    Encouraged breast awareness, including monthly breast self-exams.  Advised patient to RTC with any interval changes or concerns.  Questions were encouraged and answered to patient's  satisfaction, and patient verbalized understanding of information and agreement with the plan.    Time in counselin minutes  Total time:  45 minutes  >50% of time was spent in face-to-face counseling.

## 2019-04-04 ENCOUNTER — OFFICE VISIT (OUTPATIENT)
Dept: DERMATOLOGY | Facility: CLINIC | Age: 39
End: 2019-04-04
Payer: COMMERCIAL

## 2019-04-04 DIAGNOSIS — D22.62 MELANOCYTIC NEVUS OF LEFT UPPER EXTREMITY: ICD-10-CM

## 2019-04-04 DIAGNOSIS — D22.61 MELANOCYTIC NEVUS OF RIGHT UPPER EXTREMITY: ICD-10-CM

## 2019-04-04 DIAGNOSIS — D22.5 MELANOCYTIC NEVI OF TRUNK: ICD-10-CM

## 2019-04-04 DIAGNOSIS — Z78.9 ATTEMPTING TO CONCEIVE: ICD-10-CM

## 2019-04-04 DIAGNOSIS — L82.1 SEBORRHEIC KERATOSIS: ICD-10-CM

## 2019-04-04 DIAGNOSIS — L70.0 ACNE VULGARIS: Primary | ICD-10-CM

## 2019-04-04 PROCEDURE — 99203 OFFICE O/P NEW LOW 30 MIN: CPT | Mod: S$GLB,,, | Performed by: DERMATOLOGY

## 2019-04-04 PROCEDURE — 99203 PR OFFICE/OUTPT VISIT, NEW, LEVL III, 30-44 MIN: ICD-10-PCS | Mod: S$GLB,,, | Performed by: DERMATOLOGY

## 2019-04-04 RX ORDER — AZELAIC ACID 0.15 G/G
GEL TOPICAL
Qty: 60 G | Refills: 3 | Status: SHIPPED | OUTPATIENT
Start: 2019-04-04 | End: 2021-09-13

## 2019-04-04 NOTE — PROGRESS NOTES
Subjective:       Patient ID:  Caryl Cedeno is a 38 y.o. female who presents for   Chief Complaint   Patient presents with    Acne     face and scalp    Growth     R torso     Acne  - Initial  Affected locations: face  Duration: 15 years  Signs / symptoms: redness, tender, inflamed and irritated  Severity: moderate  Timing: intermittent  Aggravated by: nothing  Relieving factors/Treatments tried: OTC acne medication  Improvement on treatment: no relief    Growth  - Initial  Affected locations: torso  Duration: 6 years  Signs / symptoms: rough and growing  Severity: mild to moderate  Timing: constant  Aggravated by: friction and pressure  Relieving factors/Treatments tried: nothing      Review of Systems   Musculoskeletal: Positive for myalgias. Negative for joint swelling and arthralgias.   Skin: Negative for itching, rash and recent sunburn.   Hematologic/Lymphatic: Does not bruise/bleed easily.        Objective:    Physical Exam   Constitutional: She appears well-developed and well-nourished. No distress.   HENT:   Mouth/Throat: Lips normal.    Eyes: Lids are normal.  No conjunctival no injection.   Neurological: She is alert and oriented to person, place, and time. She is not disoriented.   Psychiatric: She has a normal mood and affect.   Skin:   Areas Examined (abnormalities noted in diagram):   Scalp / Hair Palpated and Inspected  Head / Face Inspection Performed  Neck Inspection Performed  Chest / Axilla Inspection Performed  Abdomen Inspection Performed  Back Inspection Performed  RUE Inspected  LUE Inspection Performed                   Diagram Legend     Erythematous scaling macule/papule c/w actinic keratosis       Vascular papule c/w angioma      Pigmented verrucoid papule/plaque c/w seborrheic keratosis      Yellow umbilicated papule c/w sebaceous hyperplasia      Irregularly shaped tan macule c/w lentigo     1-2 mm smooth white papules consistent with Milia      Movable subcutaneous cyst  with punctum c/w epidermal inclusion cyst      Subcutaneous movable cyst c/w pilar cyst      Firm pink to brown papule c/w dermatofibroma      Pedunculated fleshy papule(s) c/w skin tag(s)      Evenly pigmented macule c/w junctional nevus     Mildly variegated pigmented, slightly irregular-bordered macule c/w mildly atypical nevus      Flesh colored to evenly pigmented papule c/w intradermal nevus       Pink pearly papule/plaque c/w basal cell carcinoma      Erythematous hyperkeratotic cursted plaque c/w SCC      Surgical scar with no sign of skin cancer recurrence      Open and closed comedones      Inflammatory papules and pustules      Verrucoid papule consistent consistent with wart     Erythematous eczematous patches and plaques     Dystrophic onycholytic nail with subungual debris c/w onychomycosis     Umbilicated papule    Erythematous-base heme-crusted tan verrucoid plaque consistent with inflamed seborrheic keratosis     Erythematous Silvery Scaling Plaque c/w Psoriasis     See annotation      Assessment / Plan:        Acne vulgaris  Attempting to conceive  -     FINACEA 15 % gel; AAA face qam - bid  Dispense: 60 g; Refill: 3    Seborrheic keratosis  These are benign, inherited growths without a malignant potential. Reassurance given to patient. No treatment is necessary. Handout was provided.    Melanocytic nevi of trunk  Melanocytic nevus of left upper extremity  Melanocytic nevus of right upper extremity  Multiple benign-appearing nevi present on exam today. Reassurance provided. Counseled patient to periodically examine moles and return to clinic if any changes in size, shape, or color are noted or if it becomes symptomatic (bleeding, itching, pain, etc).    Follow up in about 3 months (around 7/4/2019), or if symptoms worsen or fail to improve.

## 2019-04-08 ENCOUNTER — DOCUMENTATION ONLY (OUTPATIENT)
Dept: REHABILITATION | Facility: OTHER | Age: 39
End: 2019-04-08
Attending: PSYCHIATRY & NEUROLOGY
Payer: COMMERCIAL

## 2019-04-08 NOTE — PROGRESS NOTES
Health  Wellness Visit Note    Name: Caryl Cedeno  Clinic Number: [id number]  Physician: Berlin Saleem*  Diagnosis: No diagnosis found.  Past Medical History:   Diagnosis Date    Allergy     Celiac disease     Fibromyalgia     Hypertension     Migraines     Small fiber neuropathy      Visit Number: 44  Precautions: POTS (postural orthostatic tachycardia syndrome) (Chronic)  Small fiber neuropathy - Primary  Fibromyalgia  Neck sx: disc replacement Nov 2016  6 Month: November 12 Month: May  Time In: 4:50PM  Time Out:5:30PM  Total TreatmentTime: 40 minutes    Subjective:   Patient reports feeling better today, but had a big flare up last Wednesday. She had to cancel her Wellness, therapy and classes. She stayed in the bed until Sunday. She would try to do her stretches at lease twice a day. Believes this came from a change in her BP medicine, has started a new regimen and BP has been under control. She did feel her neck working more on Cervical machine, felt ok after session. HEP compliant.    Objective:   Caryl completed therapeutic stretches (EIL, MATI) and the following MedX exercise machines: core lumbar, torso rotation l/r, leg extension, leg curl, upright row, chest press, biceps curl, triceps extension, leg press    See exercise log in patient folder for rate of exertion and repetitions completed.     Pt did hip abduction upstairs in [facility] after.     Assessment:   Patient tolerated Cervical MedX machine and all peripheral machines with minimal pain, but did have dizziness on the torso rotation and the chest press. Warm up on bike and ice.     Plan:  Continue with established plan of care towards wellness goals. Pt will attend Physical Therapy and Wellness each once a week.     Health  : Osito Lizama  4/8/2019

## 2019-04-09 ENCOUNTER — OFFICE VISIT (OUTPATIENT)
Dept: OPTOMETRY | Facility: CLINIC | Age: 39
End: 2019-04-09
Payer: COMMERCIAL

## 2019-04-09 DIAGNOSIS — H53.9 VISUAL DISTURBANCES: ICD-10-CM

## 2019-04-09 DIAGNOSIS — G43.109 OCULAR MIGRAINE: Primary | ICD-10-CM

## 2019-04-09 PROCEDURE — 92014 COMPRE OPH EXAM EST PT 1/>: CPT | Mod: S$GLB,,, | Performed by: OPTOMETRIST

## 2019-04-09 PROCEDURE — 99999 PR PBB SHADOW E&M-EST. PATIENT-LVL II: CPT | Mod: PBBFAC,,, | Performed by: OPTOMETRIST

## 2019-04-09 PROCEDURE — 99999 PR PBB SHADOW E&M-EST. PATIENT-LVL II: ICD-10-PCS | Mod: PBBFAC,,, | Performed by: OPTOMETRIST

## 2019-04-09 PROCEDURE — 92014 PR EYE EXAM, EST PATIENT,COMPREHESV: ICD-10-PCS | Mod: S$GLB,,, | Performed by: OPTOMETRIST

## 2019-04-09 NOTE — PROGRESS NOTES
"HPI     Concerns About Ocular Health      Additional comments: Visual disturbances              Comments     DLS:01/24/2019   Patient here for overdue follow up           Last edited by Bethany Hercules MA on 4/9/2019  3:00 PM. (History)        ROS     Negative for: Constitutional, Gastrointestinal, Neurological, Skin,   Genitourinary, Musculoskeletal, HENT, Endocrine, Cardiovascular, Eyes,   Respiratory, Psychiatric, Allergic/Imm, Heme/Lymph    Last edited by Mily Bartlett, OD on 4/9/2019  3:23 PM. (History)        Assessment /Plan     For exam results, see Encounter Report.    Ocular migraine    Visual disturbances      No e/o ocular pathology in relation to "vibrations of the brain". Pt advised to cont to f/u with PCP and neuro. Normal ocular health. RTC Jan 2020 Routine                 "

## 2019-04-10 ENCOUNTER — CLINICAL SUPPORT (OUTPATIENT)
Dept: REHABILITATION | Facility: OTHER | Age: 39
End: 2019-04-10
Attending: PSYCHIATRY & NEUROLOGY
Payer: COMMERCIAL

## 2019-04-10 DIAGNOSIS — G89.29 CHRONIC BILATERAL LOW BACK PAIN WITHOUT SCIATICA: Primary | ICD-10-CM

## 2019-04-10 DIAGNOSIS — M54.50 CHRONIC BILATERAL LOW BACK PAIN WITHOUT SCIATICA: Primary | ICD-10-CM

## 2019-04-10 PROCEDURE — 97110 THERAPEUTIC EXERCISES: CPT

## 2019-04-10 NOTE — PLAN OF CARE
[facility] Treatment/POC     Name: Caryl Cedeno  Clinic Number: [id number]  Date of Treatment: 04/10/2019   Diagnosis:   Encounter Diagnosis   Name Primary?    Chronic bilateral low back pain without sciatica Yes     Physician: Berlin Saleem*    Pain pattern determined: Pattern 1 PEN  Plan of care signed: 18   Time in: 415  Time Out: 515pm  Total Treatment time: 60 minutes  Precautions: lower back pain  Visit #: 17    POC due: 19  Reassessment due: 5/10/19    Face to Face discussion of patient was done between PT and PTA.     Subjective   Caryl reports her LB is feeling better with the exercises.  Pt states she feels challenged with Med X weights  Patient reports their pain to be 2/10 on a 0-10 scale with 0 being no pain and 10 being the worst pain imaginable.    Pain Location: lower back and cervical area     Occupation:  On disability    Leisure: Mostly sedentary, watch TV, swimming at home pool                      Pts goals:  Reduce pain, get stronger and more mobile, tolerate standing for longer than 10 minutes without increased pain       Objective       Baseline Isometric Testing on Med X equipment: Testing administered by PT     Baseline IM Testing Results:   Date of testin2018  Femur Restraint 6   Top Dead Center 24   Counterweight 185   Lumbar Flexion 42   Lumbar Extension 6   Lumbar Peak Torque 83   Min Torque 68   Test Percent Below Normative Data 41        Midpoint IM Testing Results:   Date of testin2018  Femur Restraint 6   Top Dead Center 24   Counterweight 185   Lumbar Flexion 48   Lumbar Extension 0   Lumbar Peak Torque 110   Min Torque 51   Test Percent Below Normative Data 30       FOTO: Focus on Therapeutic Outcomes   Category: lumbar   % Impaired: 78%  Visit 10: 58%  Current Score  = CL = least 60% but < 80% impaired, limited or restricted  Goal at Discharge Score = CK = at least 40% but < 60% impaired, limited or  restricted    Treatment    Pt was instructed in and performed the following:       Caryl received therapeutic exercises to develop/improved posture, cardiovascular endurance, muscular endurance, lumbar  ROM, strength and muscular endurance for 60 minutes including the following exercises:  [facility] 4/10/2019   Visit Number 17   VAS Pain Rating 3   Recumbent Bike Seat Pos. -   Time 10   Cervical Stretches - Retraction In Lying -   Retraction in Sitting -   Scapular Retraction -   Manual Therapy 10   Cervical Extension Seat Pad -   Seat Adjustment -   Top Dead Center -   Counterweight -   Cervical Flexion -   Cervical Extension -   Cervical Peak Torque -   Cervical Weight -   Repetitions -   Rating of Perceived Exertion -   Lumbar Extension Seat Pad -   Femur Restraint -   Top Dead Center -   Counterweight -   Lumbar Flexion -   Lumbar Extension -   Lumbar Peak Torque -   Min Torque -   Test Percent Below Normative Data -   Test Percent Gain in Strength from Initial  -   Lumbar Weight 53   Repetitions 16   Rating of Perceived Exertion 3   Ice - Z Lie (in min.) 10     MANUAL: STM B UT in supine x 10minutes  Open Books x10  LTR x20  Pelvic Tilts x5--> TrA  +Manual Resisted Hip Flexion x5 ea L/R  PPT alternating marches with yellow TB  15x  Seated PPT and alternating hip flexion 10x with R TB   Bridging x5 with pelvic tilt and yellow TB    Clams with YTB 15x      Peripheral muscle strengthening which included 1 set of 15-20 repetitions at a slow, controlled 7 second per rep pace focused on strengthening supporting musculature for improved body mechanics and functional mobility.  Pt and therapist focused on proper form during treatment to ensure optimal strengthening of each targeted muscle group.  Machines were utilized including torso rotation, leg extension, leg curl, chest press, upright row. Tricep extension, bicep curl, leg press, and hip abduction added on third visit.     Home Exercise Program as  follows:   PPT 5 reps  PPT with pillow squeeze 5 reps  Pelvic Tilts x5--> TrA  +Manual Resisted Hip Flexion, bridges x5 ea L/R  Clamshells 10 reps  OB 10 reps  Seated PPT with alt marching 15x     Handouts were given to the patient. Pt demo good understanding of the education provided. Caryl demonstrated good return demonstration of activities.     Lumbar roll use compliance: yes  Additional exercises taught this treatment session:   Seated PPT with alt marching 15x     Assessment     Pt tolerated treatment well and was able to complete 16 reps at a 5% increase today.  Pt is slowly gaining endurance and strength, despite increased pain level due to fibromyalgia.  Pt is slowly reporting ability to tolerate increase in activity  Pt will continue to benefit from skilled outpatient physical therapy to address the deficits stated in the impairment chart, provide pt/family education and to maximize pt's level of independence in the home and community environment.     Pt's spiritual, cultural and educational needs considered and pt agreeable to plan of care and goals as stated below:     Medical necessity is demonstrated by the following problem list.    Pt presents with the following impairments:   History  Co-morbidities and personal factors that may impact the plan of care Examination  Body Structures and Functions, activity limitations and participation restrictions that may impact the plan of care Clinical Presentation    Decision Making/ Complexity Score   Co-morbidities:   See precautions           Personal Factors:   lifestyle  sedentary Body Regions:   neck  back  lower extremities     Body Systems:   gross symmetry  ROM  strength  gait  motor control     Activity limitations:   Learning and applying knowledge  no deficits     General Tasks and Commands  no deficits     Communication  no deficits     Mobility  lifting and carrying objects  walking  driving (bike, car, motorcycle)     Self care  dressing  looking  after one's health     Domestic Life  shopping  cooking  doing house work (cleaning house, washing dishes, laundry)     Interactions/Relationships  family relationships  intimate relationships     Life Areas  employment     Community and Social Life  community life  recreation and leisure     Participation Restrictions:   Pt unable to work, exercise, or participate in social activities        evolving clinical presentation with changing clinical characteristics    Moderate             GOALS: Pt is in agreement with the following goals.     Short term goals:  6 weeks or 10 visits   1.  Pt will demonstrate increased lumbar ROM by at least 6 degrees from the initial ROM value with improvements noted in functional ROM and ability to perform ADLs MET  2.  Pt will demonstrate increased maximum isometric torque value by 10% when compared to the initial value resulting in improved ability to perform bending, lifting, and carrying activities safely, confidently.MET  3.  Patient report a reduction in worst pain score by 1-2 points for improved tolerance during work and recreational activities MET  4.  Pt able to perform HEP correctly with minimal cueing or supervision for therapist MET     Long term goals: 13 weeks or 20 visits   1. Pt will demonstrate increased lumbar ROM by at least 9 degrees from initial ROM value, resulting in improved ability to perform functional fwd bending while standing and sitting. MET  2. Pt will demonstrate increased maximum isometric torque value by 20% when compared to the initial value resulting in improved ability to perform bending, lifting, and carrying activities safely, confidently. progressing  3. Pt to demonstrate ability to independently control and reduce their pain through posture positioning and mechanical movements throughout a typical day. progressing  4.  Patient will demonstrate improved overall function per FOTO Survey to CK = at least 40% but < 60% impaired, limited or  restricted score or less. progressing  5. Pt will toelrate standing for longer than 10 minutes without increased pain by more than 1-2 VAS pain points. progressing        Plan   Continue with [facility] 1-2 x wk.x 4 wks to complete visits

## 2019-04-15 ENCOUNTER — DOCUMENTATION ONLY (OUTPATIENT)
Dept: SURGERY | Facility: CLINIC | Age: 39
End: 2019-04-15

## 2019-04-15 ENCOUNTER — PATIENT MESSAGE (OUTPATIENT)
Dept: SURGERY | Facility: CLINIC | Age: 39
End: 2019-04-15

## 2019-04-15 NOTE — PROGRESS NOTES
Received fax from [employer] [facility] advising that pt has not returned [facility]'s multiple phone calls to schedule genetic counseling appt and that pt can contact their Patient Care team at [phone] if she wishes to initiate services again.      Pt has been messaged via portal with this info (see email encounter).    See Media for full notice.

## 2019-04-16 ENCOUNTER — DOCUMENTATION ONLY (OUTPATIENT)
Dept: REHABILITATION | Facility: OTHER | Age: 39
End: 2019-04-16
Attending: PSYCHIATRY & NEUROLOGY
Payer: COMMERCIAL

## 2019-04-16 NOTE — PROGRESS NOTES
Health  Wellness Visit Note    Name: Caryl Cedeno  Clinic Number: [id number]  Physician: Berlin Saleem*  Diagnosis: No diagnosis found.  Past Medical History:   Diagnosis Date    Allergy     Celiac disease     Fibromyalgia     Hypertension     Migraines     Small fiber neuropathy      Visit Number: 44  Precautions: POTS (postural orthostatic tachycardia syndrome) (Chronic)  Small fiber neuropathy - Primary  Fibromyalgia  Neck sx: disc replacement Nov 2016  6 Month: November 12 Month: May  Time In: 4:50PM  Time Out:5:30PM  Total TreatmentTime: 40 minutes    Subjective:   Patient reports feeling better today, but had a big flare up over the weekend. She had to cancel her Wellness, therapy and classes. She stayed in the bed until Sunday. She would try to do her stretches at lease twice a day. Believes this came from a change in her BP medicine, has started a new regimen and BP has been under control. She did feel her neck working more on Cervical machine, felt ok after session. HEP compliant.    Objective:   Caryl completed therapeutic stretches (EIL, MATI) and the following MedX exercise machines: core lumbar, torso rotation l/r, leg extension, leg curl, upright row, chest press, biceps curl, triceps extension, leg press    See exercise log in patient folder for rate of exertion and repetitions completed.     Pt did hip abduction upstairs in [facility] after.     Assessment:   Patient tolerated Cervical MedX machine and all peripheral machines with minimal pain, but did have dizziness on the torso rotation and the chest press. Warm up on bike and ice.     Plan:  Continue with established plan of care towards wellness goals. Pt will attend Physical Therapy and Wellness each once a week.     Health  : Maico Shearer, PCT  4/16/2019

## 2019-04-18 ENCOUNTER — DOCUMENTATION ONLY (OUTPATIENT)
Dept: GASTROENTEROLOGY | Facility: CLINIC | Age: 39
End: 2019-04-18

## 2019-04-18 NOTE — PROGRESS NOTES
Dear of Caryl is a colonoscopy report dated October 4, 2016 who was a complete colonoscopy all the way up to the terminal ileum good bowel prep random biopsies of the TI right and left colon were obtained pathology was normal no evidence of inflammatory bowel disease no evidence of microscopic colitis no polyps.  Colonoscopy was done [place], [place] at [facility]

## 2019-04-22 ENCOUNTER — DOCUMENTATION ONLY (OUTPATIENT)
Dept: REHABILITATION | Facility: OTHER | Age: 39
End: 2019-04-22
Attending: PSYCHIATRY & NEUROLOGY
Payer: COMMERCIAL

## 2019-04-22 NOTE — PROGRESS NOTES
Health  Wellness Visit Note    Name: Caryl Cedeno  Clinic Number: [id number]  Physician: Berlin Saleem*  Diagnosis: No diagnosis found.  Past Medical History:   Diagnosis Date    Allergy     Celiac disease     Fibromyalgia     Hypertension     Migraines     Small fiber neuropathy      Visit Number: 45  Precautions: POTS (postural orthostatic tachycardia syndrome) (Chronic)  Small fiber neuropathy - Primary  Fibromyalgia  Neck sx: disc replacement Nov 2016  6 Month: November 12 Month: May  Time In: 4:50PM  Time Out: 5:30PM  Total TreatmentTime:  40minutes    Subjective:   Patient reports some leg soreness and pain today, says it is coming from her back. She went on a short walk yesterday, her neck did not bother her too much. Once starting cervical exercise she did feel a tightness in the middle of her neck and wanted to stop at 10 reps. She stayed in bed all day before session. Did feel slightly better after exercising.       Objective:   Caryl completed therapeutic stretches (Scap retractions, clams, open book) and the following MedX exercise machines: core lumbar, torso rotation l/r, leg extension, leg curl, upright row, chest press, biceps curl, triceps extension, leg press    See exercise log in patient folder for rate of exertion and repetitions completed.     Pt did hip abduction upstairs in [facility] after.     Assessment:   Patient tolerated Cervical MedX machine and all peripheral machines with minimal pain, but did have dizziness on the torso rotation and the chest press. Warm up on bike and ice.     Plan:  Continue with established plan of care towards wellness goals. Pt will attend Physical Therapy and Wellness each once a week.     Health  : Osito Lizama  4/22/2019    
I will START or STAY ON the medications listed below when I get home from the hospital:    ibuprofen 600 mg oral tablet  -- 1 tab(s) by mouth every 6 hours, As needed, Mild pain or headache  -- Indication: For pain

## 2019-04-24 ENCOUNTER — CLINICAL SUPPORT (OUTPATIENT)
Dept: REHABILITATION | Facility: OTHER | Age: 39
End: 2019-04-24
Attending: PSYCHIATRY & NEUROLOGY
Payer: COMMERCIAL

## 2019-04-24 DIAGNOSIS — G89.29 CHRONIC BILATERAL LOW BACK PAIN WITHOUT SCIATICA: Primary | ICD-10-CM

## 2019-04-24 DIAGNOSIS — M54.50 CHRONIC BILATERAL LOW BACK PAIN WITHOUT SCIATICA: Primary | ICD-10-CM

## 2019-04-24 PROCEDURE — 97110 THERAPEUTIC EXERCISES: CPT

## 2019-04-24 NOTE — PROGRESS NOTES
[facility] Physical Therapy Treatment/POC      Name: Caryl Cedeno  Clinic Number: [id number]  Date of Treatment: 04/10/2019   Diagnosis:        Encounter Diagnosis   Name Primary?    Chronic bilateral low back pain without sciatica Yes      Physician: Berlin Saleem*     Pain pattern determined: Pattern 1 PEN  Plan of care signed: 18            Time in: 410  Time Out: 510pm  Total Treatment time: 60 minutes  Precautions: lower back pain  Visit #: 18     POC due: 19  Reassessment due: 5/10/19     Face to Face discussion of patient was done between PT and PTA.      Subjective   Caryl reports her LB is feeling pretty good today, but she feels inc pain on outside of both thighs  Patient reports their pain to be 2/10 on a 0-10 scale with 0 being no pain and 10 being the worst pain imaginable.     Pain Location: lower back and cervical area     Occupation:  On disability    Leisure: Mostly sedentary, watch TV, swimming at home pool                      Pts goals:  Reduce pain, get stronger and more mobile, tolerate standing for longer than 10 minutes without increased pain         Objective         Baseline Isometric Testing on Med X equipment: Testing administered by PT     Baseline IM Testing Results:   Date of testin2018  Femur Restraint 6   Top Dead Center 24   Counterweight 185   Lumbar Flexion 42   Lumbar Extension 6   Lumbar Peak Torque 83   Min Torque 68   Test Percent Below Normative Data 41         Midpoint IM Testing Results:   Date of testin2018  Femur Restraint 6   Top Dead Center 24   Counterweight 185   Lumbar Flexion 48   Lumbar Extension 0   Lumbar Peak Torque 110   Min Torque 51   Test Percent Below Normative Data 30         FOTO: Focus on Therapeutic Outcomes   Category: lumbar   % Impaired: 78%  Visit 10: 58%  Current Score  = CL = least 60% but < 80% impaired, limited or restricted  Goal at Discharge Score = CK = at least 40%  but < 60% impaired, limited or restricted     Treatment    Pt was instructed in and performed the following:         Caryl received therapeutic exercises to develop/improved posture, cardiovascular endurance, muscular endurance, lumbar  ROM, strength and muscular endurance for 60 minutes including the following exercises:        [facility] 4/24/2019   Visit Number 18   VAS Pain Rating 2   Recumbent Bike Seat Pos. -   Time 10   Cervical Stretches - Retraction In Lying -   Retraction in Sitting -   Scapular Retraction -   Manual Therapy 10   Cervical Extension Seat Pad -   Seat Adjustment -   Top Dead Center -   Counterweight -   Cervical Flexion -   Cervical Extension -   Cervical Peak Torque -   Cervical Weight -   Repetitions -   Rating of Perceived Exertion -   Lumbar Extension Seat Pad -   Femur Restraint -   Top Dead Center -   Counterweight -   Lumbar Flexion -   Lumbar Extension -   Lumbar Peak Torque -   Min Torque -   Test Percent Below Normative Data -   Test Percent Gain in Strength from Initial  -   Lumbar Weight 53   Repetitions 20   Rating of Perceived Exertion 4   Ice - Z Lie (in min.) 10           MANUAL: STM B UT in supine x 10minutes  Open Books x10  LTR x20  Pelvic Tilts x5--> TrA  +Manual Resisted Hip Flexion x5 ea L/R  PPT alternating marches with yellow TB  15x  Seated PPT and alternating hip flexion 10x with R TB   Bridging x5 with pelvic tilt and yellow TB    Clams with YTB 15x       Peripheral muscle strengthening which included 1 set of 15-20 repetitions at a slow, controlled 7 second per rep pace focused on strengthening supporting musculature for improved body mechanics and functional mobility.  Pt and therapist focused on proper form during treatment to ensure optimal strengthening of each targeted muscle group.  Machines were utilized including torso rotation, leg extension, leg curl, chest press, upright row. Tricep extension, bicep curl, leg press, and hip abduction added on  third visit.      Home Exercise Program as follows:   PPT 5 reps  PPT with pillow squeeze 5 reps  Pelvic Tilts x5--> TrA  +Manual Resisted Hip Flexion, bridges x5 ea L/R  Clamshells 10 reps  OB 10 reps  Seated PPT with alt marching 15x      Handouts were given to the patient. Pt demo good understanding of the education provided. Caryl demonstrated good return demonstration of activities.      Lumbar roll use compliance: yes  Additional exercises taught this treatment session:   Seated PPT with alt marching 15x      Assessment      Caryl tolerated treatment well and was able to complete 20 reps at 53ft/lbs.  Pt able to maintain good speed with Med X and improved motor control.  Pt will continue to benefit from skilled outpatient physical therapy to address the deficits stated in the impairment chart, provide pt/family education and to maximize pt's level of independence in the home and community environment.      Pt's spiritual, cultural and educational needs considered and pt agreeable to plan of care and goals as stated below:      Medical necessity is demonstrated by the following problem list.    Pt presents with the following impairments:   History  Co-morbidities and personal factors that may impact the plan of care Examination  Body Structures and Functions, activity limitations and participation restrictions that may impact the plan of care Clinical Presentation    Decision Making/ Complexity Score   Co-morbidities:   See precautions           Personal Factors:   lifestyle  sedentary Body Regions:   neck  back  lower extremities     Body Systems:   gross symmetry  ROM  strength  gait  motor control     Activity limitations:   Learning and applying knowledge  no deficits     General Tasks and Commands  no deficits     Communication  no deficits     Mobility  lifting and carrying objects  walking  driving (bike, car, motorcycle)     Self care  dressing  looking after one's health     Domestic  Life  shopping  cooking  doing house work (cleaning house, washing dishes, laundry)     Interactions/Relationships  family relationships  intimate relationships     Life Areas  employment     Community and Social Life  community life  recreation and leisure     Participation Restrictions:   Pt unable to work, exercise, or participate in social activities        evolving clinical presentation with changing clinical characteristics    Moderate             GOALS: Pt is in agreement with the following goals.     Short term goals:  6 weeks or 10 visits   1.  Pt will demonstrate increased lumbar ROM by at least 6 degrees from the initial ROM value with improvements noted in functional ROM and ability to perform ADLs MET  2.  Pt will demonstrate increased maximum isometric torque value by 10% when compared to the initial value resulting in improved ability to perform bending, lifting, and carrying activities safely, confidently.MET  3.  Patient report a reduction in worst pain score by 1-2 points for improved tolerance during work and recreational activities MET  4.  Pt able to perform HEP correctly with minimal cueing or supervision for therapist MET     Long term goals: 13 weeks or 20 visits   1. Pt will demonstrate increased lumbar ROM by at least 9 degrees from initial ROM value, resulting in improved ability to perform functional fwd bending while standing and sitting. MET  2. Pt will demonstrate increased maximum isometric torque value by 20% when compared to the initial value resulting in improved ability to perform bending, lifting, and carrying activities safely, confidently. progressing  3. Pt to demonstrate ability to independently control and reduce their pain through posture positioning and mechanical movements throughout a typical day. progressing  4.  Patient will demonstrate improved overall function per FOTO Survey to CK = at least 40% but < 60% impaired, limited or restricted score or less.  progressing  5. Pt will toelrate standing for longer than 10 minutes without increased pain by more than 1-2 VAS pain points. progressing        Plan   Continue with [facility] 1-2 x wk.x 4 wks to complete visits             Cosigned by: Berlin Saleem MD at 4/11/2019  1:48 PM   Electronically signed by Jaylin Dyson PT at 4/10/2019  5:09 PM  Electronically signed by Berlin Saleem MD at 4/11/2019  1:48 PM       Clinical Support on 4/10/2019            Revision History            Detailed Report

## 2019-04-29 ENCOUNTER — DOCUMENTATION ONLY (OUTPATIENT)
Dept: REHABILITATION | Facility: OTHER | Age: 39
End: 2019-04-29
Attending: PSYCHIATRY & NEUROLOGY
Payer: COMMERCIAL

## 2019-05-09 ENCOUNTER — CLINICAL SUPPORT (OUTPATIENT)
Dept: REHABILITATION | Facility: OTHER | Age: 39
End: 2019-05-09
Attending: PSYCHIATRY & NEUROLOGY
Payer: COMMERCIAL

## 2019-05-09 DIAGNOSIS — M54.50 CHRONIC BILATERAL LOW BACK PAIN WITHOUT SCIATICA: Primary | ICD-10-CM

## 2019-05-09 DIAGNOSIS — G89.29 CHRONIC BILATERAL LOW BACK PAIN WITHOUT SCIATICA: Primary | ICD-10-CM

## 2019-05-09 PROCEDURE — 97750 PHYSICAL PERFORMANCE TEST: CPT

## 2019-05-09 PROCEDURE — 97110 THERAPEUTIC EXERCISES: CPT

## 2019-05-09 NOTE — PROGRESS NOTES
[facility] Physical Therapy Treatment/POC      Name: Caryl Cedeno  Clinic Number: [id number]  Date of Treatment: 04/10/2019   Diagnosis:        Encounter Diagnosis   Name Primary?    Chronic bilateral low back pain without sciatica Yes      Physician: Berlin Saleem*     Pain pattern determined: Pattern 1 PEN  Plan of care signed: 18            Time in: 1:20 (Pt arrived 20 minutes late, accommodated)   Time Out: 2:20  Total Treatment time: 30 minutes  Precautions: lower back pain  Visit #: 19     POC due: 19  Reassessment due: 5/10/19     Face to Face discussion of patient was done between PT and PTA.      Subjective   Caryl reports her LB is feeling pretty good today. She reports right low back and hip pain that can refer down to the right of side of leg. She reports she feels better overall since starting PT but is anxious to initiate independent gym activity at this time. She wishes she was able to come to PT longer but acknowledges she has learned a lot to manage her symptoms and is able to continue to progress to wellness.     Patient reports their pain to be 4/10 on a 0-10 scale with 0 being no pain and 10 being the worst pain imaginable.     Pain Location: lower back and cervical area     Occupation:  On disability    Leisure: Mostly sedentary, watch TV, swimming at home pool                      Pts goals:  Reduce pain, get stronger and more mobile, tolerate standing for longer than 10 minutes without increased pain         Objective         Baseline Isometric Testing on Med X equipment: Testing administered by PT     Baseline IM Testing Results:   Date of testin2018  Femur Restraint 6   Top Dead Center 24   Counterweight 185   Lumbar Flexion 42   Lumbar Extension 6   Lumbar Peak Torque 83   Min Torque 68   Test Percent Below Normative Data 41         Midpoint IM Testing Results:   Date of testin2018  Femur Restraint 6   Top Dead  Center 24   Counterweight 185   Lumbar Flexion 48   Lumbar Extension 0   Lumbar Peak Torque 110   Min Torque 51   Test Percent Below Normative Data 30         DC IM Testing Results:   Date of testin2019  Lumbar Flexion 48   Lumbar Extension 0   Lumbar Peak Torque 94   Min Torque 59   Test Percent Below Normative Data 36   Test Percent Gain in Strength from Initial  9       FOTO: Focus on Therapeutic Outcomes   Category: lumbar   % Impaired: 78%  Visit 10: 58%  Current Score  = CL = least 60% but < 80% impaired, limited or restricted  Goal at Discharge Score = CK = at least 40% but < 60% impaired, limited or restricted     Treatment    Pt was instructed in and performed the following:         Caryl received therapeutic exercises to develop/improved posture, cardiovascular endurance, muscular endurance, lumbar  ROM, strength and muscular endurance for 60 minutes including the following exercises:        [facility] 2019   Visit Number 19   VAS Pain Rating 3   Recumbent Bike Seat Pos. -   Time 10   Cervical Stretches - Retraction In Lying -   Retraction in Sitting -   Scapular Retraction -   Manual Therapy -   Cervical Extension Seat Pad -   Seat Adjustment -   Top Dead Center -   Counterweight -   Cervical Flexion -   Cervical Extension -   Cervical Peak Torque -   Cervical Weight -   Repetitions -   Rating of Perceived Exertion -   Lumbar Extension Seat Pad -   Femur Restraint -   Top Dead Center -   Counterweight -   Lumbar Flexion 48   Lumbar Extension 0   Lumbar Peak Torque 94   Min Torque 59   Test Percent Below Normative Data 36   Test Percent Gain in Strength from Initial  9   Lumbar Weight -   Repetitions -   Rating of Perceived Exertion -   Ice - Z Lie (in min.) 10           MANUAL: STM B UT in supine x 10minutes  Open Books x10  LTR x20  Pelvic Tilts x5--> TrA  +Manual Resisted Hip Flexion x5 ea L/R   PPT alternating marches with yellow TB  15x NT  Seated PPT and alternating hip flexion  10x with R TB NT  Bridging x5 with pelvic tilt and yellow TB  NT  Clams  15x NT      Peripheral muscle strengthening which included 1 set of 15-20 repetitions at a slow, controlled 7 second per rep pace focused on strengthening supporting musculature for improved body mechanics and functional mobility.  Pt and therapist focused on proper form during treatment to ensure optimal strengthening of each targeted muscle group.  Machines were utilized including torso rotation, leg extension, leg curl, chest press, upright row. Tricep extension, bicep curl, leg press, and hip abduction added on third visit.      Home Exercise Program as follows:   PPT 5 reps  PPT with pillow squeeze 5 reps  Pelvic Tilts x5--> TrA  +Manual Resisted Hip Flexion, bridges x5 ea L/R  Clamshells 10 reps  OB 10 reps  Seated PPT with alt marching 15x      Handouts were given to the patient. Pt demo good understanding of the education provided. Caryl demonstrated good return demonstration of activities.      Lumbar roll use compliance: yes  Additional exercises taught this treatment session:   Seated PPT with alt marching 15x      Assessment      Patient has attended 19 visits at [facility] which included MD evaluation, PT evaluation with isometric testing, and physical therapy treatment including HEP instruction, education, aerobic work, dynamic strengthening on med ex equipment for the spine, and whole body strengthening on med ex equipment with increasing weight loads.  Patient  is demonstrating increased ability to reduce symptoms, improved posture, improved lumbar ROM by 12 degrees, and improved lumbar strength on med ex test by  9 average. Pt demonstrates improved functional mobility and activity tolerance, reduced pain severity and irritability, and is fairly independent with HEP. Pt has met most personal and PT goals at this time and agrees to DC from skilled PT care. Pt expresses anxiety about discharging to independent gym  activity at this itme but agrees she has learned appropriate strategies to self-managment  symptoms and continue to progress in wellness with ability to transition to independent gym activity.        Pt's spiritual, cultural and educational needs considered and pt agreeable to plan of care and goals as stated below:      Medical necessity is demonstrated by the following problem list.    Pt presents with the following impairments:   History  Co-morbidities and personal factors that may impact the plan of care Examination  Body Structures and Functions, activity limitations and participation restrictions that may impact the plan of care Clinical Presentation    Decision Making/ Complexity Score   Co-morbidities:   See precautions           Personal Factors:   lifestyle  sedentary Body Regions:   neck  back  lower extremities     Body Systems:   gross symmetry  ROM  strength  gait  motor control     Activity limitations:   Learning and applying knowledge  no deficits     General Tasks and Commands  no deficits     Communication  no deficits     Mobility  lifting and carrying objects  walking  driving (bike, car, motorcycle)     Self care  dressing  looking after one's health     Domestic Life  shopping  cooking  doing house work (cleaning house, washing dishes, laundry)     Interactions/Relationships  family relationships  intimate relationships     Life Areas  employment     Community and Social Life  community life  recreation and leisure     Participation Restrictions:   Pt unable to work, exercise, or participate in social activities        evolving clinical presentation with changing clinical characteristics    Moderate             GOALS: Pt is in agreement with the following goals.     Short term goals:  6 weeks or 10 visits   1.  Pt will demonstrate increased lumbar ROM by at least 6 degrees from the initial ROM value with improvements noted in functional ROM and ability to perform ADLs MET  2.  Pt will  demonstrate increased maximum isometric torque value by 10% when compared to the initial value resulting in improved ability to perform bending, lifting, and carrying activities safely, confidently.MET  3.  Patient report a reduction in worst pain score by 1-2 points for improved tolerance during work and recreational activities MET  4.  Pt able to perform HEP correctly with minimal cueing or supervision for therapist MET     Long term goals: 13 weeks or 20 visits   1. Pt will demonstrate increased lumbar ROM by at least 9 degrees from initial ROM value, resulting in improved ability to perform functional fwd bending while standing and sitting. MET  2. Pt will demonstrate increased maximum isometric torque value by 20% when compared to the initial value resulting in improved ability to perform bending, lifting, and carrying activities safely, confidently. Progressing Not met  3. Pt to demonstrate ability to independently control and reduce their pain through posture positioning and mechanical movements throughout a typical day. Partially met.   4.  Patient will demonstrate improved overall function per FOTO Survey to CK = at least 40% but < 60% impaired, limited or restricted score or less. Partially met (60% limitation)  5. Pt will toelrate standing for longer than 10 minutes without increased pain by more than 1-2 VAS pain points. Partially met.         Plan   DC

## 2019-05-13 ENCOUNTER — DOCUMENTATION ONLY (OUTPATIENT)
Dept: REHABILITATION | Facility: OTHER | Age: 39
End: 2019-05-13
Attending: PSYCHIATRY & NEUROLOGY
Payer: COMMERCIAL

## 2019-05-13 NOTE — PROGRESS NOTES
Health  Wellness Visit Note    Name: Caryl Cedeno  Clinic Number: [id number]  Physician: Berlin Saleem*  Diagnosis: No diagnosis found.  Past Medical History:   Diagnosis Date    Allergy     Celiac disease     Fibromyalgia     Hypertension     Migraines     Small fiber neuropathy      Visit Number: 46  Precautions: POTS (postural orthostatic tachycardia syndrome) (Chronic)  Small fiber neuropathy - Primary  Fibromyalgia  Neck sx: disc replacement Nov 2016  6 Month: November 12 Month: May  Time In: 4:00PM   Time Out: 5:00PM  Total TreatmentTime: 60minutes    Subjective:   Patient reports feeling sore, achy and tired today. Flared up neck from studying for finals last Friday. Spent majority of the weekend in her bed, just starting to feel better. Will be traveling a lot the next few months, wants to switch to Package plan. Just finished her last visit for Back therapy. Did feel slightly better after exercising.       Objective:   Caryl completed therapeutic stretches (Scap retractions, clams, open book) and the following MedX exercise machines: core lumbar, torso rotation l/r, leg extension, leg curl, upright row, chest press, biceps curl, triceps extension, leg press    See exercise log in patient folder for rate of exertion and repetitions completed.     Pt did hip abduction upstairs in [facility] after.     Assessment:   Patient tolerated Cervical MedX machine and all peripheral machines with minimal pain, but did have dizziness on the torso rotation and the chest press. Warm up on bike and ice.     Plan:  Continue with established plan of care towards wellness goals. Pt will attend Physical Therapy and Wellness each once a week.     Health  : Osito Lizama  5/13/2019

## 2019-05-14 ENCOUNTER — PATIENT MESSAGE (OUTPATIENT)
Dept: GASTROENTEROLOGY | Facility: CLINIC | Age: 39
End: 2019-05-14

## 2019-05-14 RX ORDER — ZOLMITRIPTAN 5 MG/1
SPRAY, METERED NASAL
Qty: 6 EACH | Refills: 3 | Status: SHIPPED | OUTPATIENT
Start: 2019-05-14 | End: 2020-06-01 | Stop reason: SDUPTHER

## 2019-05-18 ENCOUNTER — NURSE TRIAGE (OUTPATIENT)
Dept: ADMINISTRATIVE | Facility: CLINIC | Age: 39
End: 2019-05-18

## 2019-05-23 ENCOUNTER — DOCUMENTATION ONLY (OUTPATIENT)
Dept: REHABILITATION | Facility: OTHER | Age: 39
End: 2019-05-23
Attending: PSYCHIATRY & NEUROLOGY
Payer: COMMERCIAL

## 2019-05-23 NOTE — PROGRESS NOTES
Health  Wellness Visit Note    Name: Caryl Cedeno  Clinic Number: [id number]  Physician: Berlin Saleem*  Diagnosis: No diagnosis found.  Past Medical History:   Diagnosis Date    Allergy     Celiac disease     Fibromyalgia     Hypertension     Migraines     Small fiber neuropathy      Visit Number: 51  Precautions: POTS (postural orthostatic tachycardia syndrome) (Chronic)  Small fiber neuropathy - Primary  Fibromyalgia  Neck sx: disc replacement Nov 2016  6 Month: November 12 Month: May  Time In: 11:23 AM  Time Out: 12 Noon  Total TreatmentTime: 37 minutes    Subjective:   Patient reports that her right hip is hurting today after snorklin. Her neck feels good and experiencing some pain on her lower back.  PT has been stretching and icing neck and lower back 2 times per week each.  I reminded her of the importance of staying compliant with stretching and icing.  Caryl tries to get in the pool at least 5 times per week.  She does exercises in pool and does some moving around while in the water.        Objective:   Caryl completed therapeutic stretches (Scap retractions, clams, open book) and the following MedX exercise machines: core lumbar, torso rotation l/r, leg extension, leg curl, upright row, chest press, biceps curl, triceps extension.  PT skipped the Leg press today due to right hip pain.      See exercise log in patient folder for rate of exertion and repetitions completed.     Pt did hip abduction and iced upstairs in [facility] after.     Assessment:   Patient tolerated Cervical MedX machine and all peripheral machines with minimal pain, but did have dizziness on the torso rotation and the chest press. Warmed up on bike and did stretches before workout.     Plan:  Continue with established plan of care towards wellness goals. Pt will attend Physical Therapy and Wellness each once a week.     Health  : Yaz Campbell  5/23/2019

## 2019-05-28 ENCOUNTER — PATIENT MESSAGE (OUTPATIENT)
Dept: GASTROENTEROLOGY | Facility: CLINIC | Age: 39
End: 2019-05-28

## 2019-05-28 ENCOUNTER — OFFICE VISIT (OUTPATIENT)
Dept: SPORTS MEDICINE | Facility: CLINIC | Age: 39
End: 2019-05-28
Payer: COMMERCIAL

## 2019-05-28 ENCOUNTER — HOSPITAL ENCOUNTER (OUTPATIENT)
Dept: RADIOLOGY | Facility: HOSPITAL | Age: 39
Discharge: HOME | End: 2019-05-28
Attending: FAMILY MEDICINE
Payer: COMMERCIAL

## 2019-05-28 VITALS — WEIGHT: 186 LBS | BODY MASS INDEX: 29.89 KG/M2 | HEIGHT: 66 IN | TEMPERATURE: 98 F

## 2019-05-28 DIAGNOSIS — G89.29 CHRONIC BILATERAL LOW BACK PAIN, WITH SCIATICA PRESENCE UNSPECIFIED: ICD-10-CM

## 2019-05-28 DIAGNOSIS — M54.5 CHRONIC BILATERAL LOW BACK PAIN, WITH SCIATICA PRESENCE UNSPECIFIED: ICD-10-CM

## 2019-05-28 DIAGNOSIS — M70.61 GREATER TROCHANTERIC BURSITIS, RIGHT: Primary | ICD-10-CM

## 2019-05-28 DIAGNOSIS — M25.551 RIGHT HIP PAIN: ICD-10-CM

## 2019-05-28 PROCEDURE — 73502 XR HIP 2 VIEW RIGHT: ICD-10-PCS | Mod: 26,RT,, | Performed by: RADIOLOGY

## 2019-05-28 PROCEDURE — 99204 OFFICE O/P NEW MOD 45 MIN: CPT | Mod: 25,S$GLB,, | Performed by: FAMILY MEDICINE

## 2019-05-28 PROCEDURE — 20611 LARGE JOINT ASPIRATION/INJECTION: R GREATER TROCHANTERIC BURSA: ICD-10-PCS | Mod: RT,S$GLB,, | Performed by: FAMILY MEDICINE

## 2019-05-28 PROCEDURE — 73502 X-RAY EXAM HIP UNI 2-3 VIEWS: CPT | Mod: 26,RT,, | Performed by: RADIOLOGY

## 2019-05-28 PROCEDURE — 73502 X-RAY EXAM HIP UNI 2-3 VIEWS: CPT | Mod: TC,FY,PO,RT

## 2019-05-28 PROCEDURE — 99999 PR PBB SHADOW E&M-EST. PATIENT-LVL III: CPT | Mod: PBBFAC,,, | Performed by: FAMILY MEDICINE

## 2019-05-28 PROCEDURE — 20611 DRAIN/INJ JOINT/BURSA W/US: CPT | Mod: RT,S$GLB,, | Performed by: FAMILY MEDICINE

## 2019-05-28 PROCEDURE — 99999 PR PBB SHADOW E&M-EST. PATIENT-LVL III: ICD-10-PCS | Mod: PBBFAC,,, | Performed by: FAMILY MEDICINE

## 2019-05-28 PROCEDURE — 3008F PR BODY MASS INDEX (BMI) DOCUMENTED: ICD-10-PCS | Mod: CPTII,S$GLB,, | Performed by: FAMILY MEDICINE

## 2019-05-28 PROCEDURE — 99204 PR OFFICE/OUTPT VISIT, NEW, LEVL IV, 45-59 MIN: ICD-10-PCS | Mod: 25,S$GLB,, | Performed by: FAMILY MEDICINE

## 2019-05-28 PROCEDURE — 3008F BODY MASS INDEX DOCD: CPT | Mod: CPTII,S$GLB,, | Performed by: FAMILY MEDICINE

## 2019-05-28 RX ORDER — TRIAMCINOLONE ACETONIDE 40 MG/ML
40 INJECTION, SUSPENSION INTRA-ARTICULAR; INTRAMUSCULAR
Status: DISCONTINUED | OUTPATIENT
Start: 2019-05-28 | End: 2019-05-28 | Stop reason: HOSPADM

## 2019-05-28 RX ADMIN — TRIAMCINOLONE ACETONIDE 40 MG: 40 INJECTION, SUSPENSION INTRA-ARTICULAR; INTRAMUSCULAR at 05:05

## 2019-05-28 NOTE — PROGRESS NOTES
Caryl Cedeno, a 38 y.o. female, is here for evaluation of Right hip.     HISTORY OF PRESENT ILLNESS   Location: lateral thigh, right  Onset: Chronic,   Palliative:    Relative rest   Oral analgesics   fPT, @[facility] (back and spine), minimal Improvement  Provocative:   ADLs   Prior: Sees Neuro for LBP  Progression: worsening discomfort   Quality:    sharp  Radiation: none  Severity: per nursing documentation  Timing: intermittent with use  Trauma: none specific     Review of systems (ROS):  A 10+ review of systems was performed with pertinent positives and negatives noted above in the history of present illness. Other systems were negative unless otherwise specified.      PHYSICAL EXAMINATION  General:  The patient is alert and oriented x 3.  Mood is pleasant.  Observation of ears, eyes and nose reveal no gross abnormalities.  HEENT: NCAT, sclera nonicteric  Lungs: Respirations are equal and unlabored.   Gait is coordinated. Patient can toe walk and heel walk without difficulty.    HIP/PELVIS EXAMINATION    Observation/Inspection  Gait:   Nonantalgic   Alignment:  Neutral   Scars:   None   Muscle atrophy: None   Effusion:  None   Warmth:  None   Discoloration:   None   Leg lengths:   Equal   Pelvis:    Level     Tenderness/Crepitus (T/C):      T / C  Trochanteric bursa   + / -  Piriformis    - / -  SI joint    - / -  Psoas tendon   - / -  Rectus insertion  - / -  Adductor insertion  - / -  Pubic symphysis  - / -    ROM: (* = pain)    Flexion:      120 degrees  External rotation:   40 degrees*  Internal rotation with axial load:  30 degrees  Internal rotation without axial load:  40 degrees  Abduction:    45 degrees  Adduction:     20 degrees    Special Tests:  Pain w/ forced internal rotation (FADIR):  -   Pain w/ forced external rotation (MARISELA):  +   Circumduction test:     -  Stinchfield test:     -   Log roll:       -   Snapping hip (internal):    -   Sit-up pain:      -   Resisted sit-up pain:     -    Resisted sit-up with adductor contraction pain:  -   Step-down test:     +  Trendelenburg test:     -  Bridge test      +     Extremity Neuro-vascular Examination:   Sensation:  Grossly intact to light touch all dermatomal regions.   Motor Function:  Fully intact motor function at hip, knee, foot and ankle    DTRs;  quadriceps and  achilles 2+.  No clonus and downgoing Babinski.    Vascular status:  DP and PT pulses 2+, brisk capillary refill, symmetric.    Skin:  intact, compartments soft.    Other Findings:    ASSESSMENT & PLAN  Assessment:   #1 chronic history of low back pain   Healthy Back in the past  #2 Tonnis Grade I osteoarthritis of hip, right   W/ greater trochanteric bursitis  #3 prior fibromyalgia diagnosis    No evidence of neurologic pathology  No evidence of vascular pathology    Imaging studies reviewed:   X-ray pelvis and hip, right 19.05    Plan:    We discussed the importance of appropriate diet, weight, and regular exercise including quadriceps strengthening     We discussed options including:  #1 watchful waiting  #2 physical therapy aimed at:   Core stability   RoM hip   Strengthening quadriceps   Gait training   #3 injection therapy:   CSI GTB    Right,     Left,    CSI iaHip    Right,     Left,    Orthobiologics   #4     The patient chooses #2 and #3 csi gtb right    Pain management: handout given  Bracing:   Physical therapy: fPT, @ [facility], begin as above   Activity (e.g. sports, work) restrictions: as tolerated   School/vocation: from [place]    Follow up in 12 w  I = csi iahip right  Should symptoms worsen or fail to resolve, consider:  Revisiting the above options

## 2019-06-19 ENCOUNTER — DOCUMENTATION ONLY (OUTPATIENT)
Dept: REHABILITATION | Facility: OTHER | Age: 39
End: 2019-06-19
Attending: PSYCHIATRY & NEUROLOGY
Payer: COMMERCIAL

## 2019-06-19 ENCOUNTER — TELEPHONE (OUTPATIENT)
Dept: NEUROLOGY | Facility: CLINIC | Age: 39
End: 2019-06-19

## 2019-06-19 NOTE — TELEPHONE ENCOUNTER
----- Message from Berlin Saleem MD sent at 6/18/2019  8:28 PM CDT -----  Contact: Pt. [phone]  Please call Mrs. Metcalf to let her know that Lidocaine cream is available without a prescription. If she wants me to order a compounded cream with lidocaine from [facility] I can do that but it will not be as cheap as the OTC cream. Thanks.    ----- Message -----  From: Ross Javier MA  Sent: 5/31/2019   3:13 PM  To: Berlin Saleem MD        ----- Message -----  From: Dony Todd  Sent: 5/31/2019   2:51 PM  To: Minesh Slade Staff    Rx Refill/Request     New Rx:      Rx Name and Strength:  Lidocaine cream     Preferred Pharmacy with phone number:   [facility]/pharmacy #[id number] - [place], [place] - [address]  [address] [address]  Phone: [phone] Fax: [phone]      Communication Preference:PHONE     Additional Information:

## 2019-06-19 NOTE — PROGRESS NOTES
Health  Wellness Visit Note    Name: Caryl Cedeno  Clinic Number: [id number]  Physician: Berlin Saleem*  Diagnosis: No diagnosis found.  Past Medical History:   Diagnosis Date    Allergy     Celiac disease     Fibromyalgia     Hypertension     Migraines     Small fiber neuropathy      Visit Number: [id number]  Precautions: POTS (postural orthostatic tachycardia syndrome) (Chronic)  Small fiber neuropathy - Primary  Fibromyalgia  Neck sx: disc replacement Nov 2016  6 Month: November 12 Month: May  Time In: 4:30PM  Time Out: 5:20PM  Total TreatmentTime:  50minutes    Subjective:   Patient reports to wellness after missing almost a month, traveled to [place] by car. She lays down in the car so she did not have that much back and neck pain. Her R hip is really starting to bother her, met with doctor about it but felt it was a wasted visit. She did not do any exercising while gone, only stretching. She will try to get into the pool this weekend. Had to reduce some weights in session but felt good exercising.        Objective:   Caryl completed therapeutic stretches (Scap retractions, clams, open book) and the following MedX exercise machines: core lumbar, torso rotation l/r, leg extension, leg curl, upright row, chest press, biceps curl, triceps extension.  PT skipped the Leg press today due to right hip pain.      See exercise log in patient folder for rate of exertion and repetitions completed.     Pt did hip abduction and iced upstairs in [facility] after.     Assessment:   Patient tolerated Cervical MedX machine and all peripheral machines with minimal pain. Warmed up on bike and did stretches before workout.     Plan:  Continue with established plan of care towards wellness goals. Package Plan  Health  : Osito Lizama  6/19/2019

## 2019-06-20 ENCOUNTER — OFFICE VISIT (OUTPATIENT)
Dept: INTERNAL MEDICINE | Facility: CLINIC | Age: 39
End: 2019-06-20
Attending: INTERNAL MEDICINE
Payer: COMMERCIAL

## 2019-06-20 ENCOUNTER — HOSPITAL ENCOUNTER (EMERGENCY)
Facility: OTHER | Age: 39
Discharge: HOME | End: 2019-06-20
Attending: EMERGENCY MEDICINE
Payer: COMMERCIAL

## 2019-06-20 VITALS — HEART RATE: 77 BPM | HEIGHT: 67 IN | OXYGEN SATURATION: 98 % | WEIGHT: 190.25 LBS | BODY MASS INDEX: 29.86 KG/M2

## 2019-06-20 VITALS
HEIGHT: 67 IN | OXYGEN SATURATION: 98 % | HEART RATE: 92 BPM | TEMPERATURE: 99 F | RESPIRATION RATE: 18 BRPM | BODY MASS INDEX: 30.61 KG/M2 | DIASTOLIC BLOOD PRESSURE: 104 MMHG | SYSTOLIC BLOOD PRESSURE: 149 MMHG | WEIGHT: 195 LBS

## 2019-06-20 DIAGNOSIS — E87.6 HYPOKALEMIA: ICD-10-CM

## 2019-06-20 DIAGNOSIS — I10 HYPERTENSION, UNSPECIFIED TYPE: ICD-10-CM

## 2019-06-20 DIAGNOSIS — R10.84 COLICKY ABDOMINAL PAIN: Primary | ICD-10-CM

## 2019-06-20 DIAGNOSIS — R10.11 RUQ PAIN: Primary | ICD-10-CM

## 2019-06-20 LAB
ALBUMIN SERPL BCP-MCNC: 4.1 G/DL (ref 3.5–5.2)
ALP SERPL-CCNC: 67 U/L (ref 55–135)
ALT SERPL W/O P-5'-P-CCNC: 46 U/L (ref 10–44)
ANION GAP SERPL CALC-SCNC: 7 MMOL/L (ref 8–16)
AST SERPL-CCNC: 23 U/L (ref 10–40)
B-HCG UR QL: NEGATIVE
BACTERIA #/AREA URNS HPF: ABNORMAL /HPF
BASOPHILS # BLD AUTO: 0.02 K/UL (ref 0–0.2)
BASOPHILS NFR BLD: 0.2 % (ref 0–1.9)
BILIRUB SERPL-MCNC: 0.3 MG/DL (ref 0.1–1)
BILIRUB UR QL STRIP: NEGATIVE
BUN SERPL-MCNC: 13 MG/DL (ref 6–20)
CALCIUM SERPL-MCNC: 9.1 MG/DL (ref 8.7–10.5)
CHLORIDE SERPL-SCNC: 102 MMOL/L (ref 95–110)
CLARITY UR: ABNORMAL
CO2 SERPL-SCNC: 32 MMOL/L (ref 23–29)
COLOR UR: YELLOW
CREAT SERPL-MCNC: 1 MG/DL (ref 0.5–1.4)
CTP QC/QA: YES
DIFFERENTIAL METHOD: ABNORMAL
EOSINOPHIL # BLD AUTO: 0.1 K/UL (ref 0–0.5)
EOSINOPHIL NFR BLD: 0.5 % (ref 0–8)
ERYTHROCYTE [DISTWIDTH] IN BLOOD BY AUTOMATED COUNT: 14 % (ref 11.5–14.5)
EST. GFR  (AFRICAN AMERICAN): >60 ML/MIN/1.73 M^2
EST. GFR  (NON AFRICAN AMERICAN): >60 ML/MIN/1.73 M^2
GLUCOSE SERPL-MCNC: 105 MG/DL (ref 70–110)
GLUCOSE UR QL STRIP: NEGATIVE
HCT VFR BLD AUTO: 40.8 % (ref 37–48.5)
HGB BLD-MCNC: 13.8 G/DL (ref 12–16)
HGB UR QL STRIP: ABNORMAL
KETONES UR QL STRIP: NEGATIVE
LEUKOCYTE ESTERASE UR QL STRIP: NEGATIVE
LIPASE SERPL-CCNC: 30 U/L (ref 4–60)
LYMPHOCYTES # BLD AUTO: 1.2 K/UL (ref 1–4.8)
LYMPHOCYTES NFR BLD: 11.8 % (ref 18–48)
MCH RBC QN AUTO: 28.8 PG (ref 27–31)
MCHC RBC AUTO-ENTMCNC: 33.8 G/DL (ref 32–36)
MCV RBC AUTO: 85 FL (ref 82–98)
MICROSCOPIC COMMENT: ABNORMAL
MONOCYTES # BLD AUTO: 0.9 K/UL (ref 0.3–1)
MONOCYTES NFR BLD: 8.6 % (ref 4–15)
NEUTROPHILS # BLD AUTO: 7.8 K/UL (ref 1.8–7.7)
NEUTROPHILS NFR BLD: 78.7 % (ref 38–73)
NITRITE UR QL STRIP: NEGATIVE
PH UR STRIP: 7 [PH] (ref 5–8)
PLATELET # BLD AUTO: 252 K/UL (ref 150–350)
PMV BLD AUTO: 9.5 FL (ref 9.2–12.9)
POTASSIUM SERPL-SCNC: 3.3 MMOL/L (ref 3.5–5.1)
PROT SERPL-MCNC: 7 G/DL (ref 6–8.4)
PROT UR QL STRIP: NEGATIVE
RBC # BLD AUTO: 4.79 M/UL (ref 4–5.4)
RBC #/AREA URNS HPF: 5 /HPF (ref 0–4)
SODIUM SERPL-SCNC: 141 MMOL/L (ref 136–145)
SP GR UR STRIP: 1.01 (ref 1–1.03)
SQUAMOUS #/AREA URNS HPF: 5 /HPF
URN SPEC COLLECT METH UR: ABNORMAL
UROBILINOGEN UR STRIP-ACNC: NEGATIVE EU/DL
WBC # BLD AUTO: 9.88 K/UL (ref 3.9–12.7)
WBC #/AREA URNS HPF: 2 /HPF (ref 0–5)

## 2019-06-20 PROCEDURE — 80053 COMPREHEN METABOLIC PANEL: CPT

## 2019-06-20 PROCEDURE — 99999 PR PBB SHADOW E&M-EST. PATIENT-LVL III: ICD-10-PCS | Mod: PBBFAC,,, | Performed by: INTERNAL MEDICINE

## 2019-06-20 PROCEDURE — 3008F PR BODY MASS INDEX (BMI) DOCUMENTED: ICD-10-PCS | Mod: CPTII,S$GLB,, | Performed by: INTERNAL MEDICINE

## 2019-06-20 PROCEDURE — 99214 OFFICE O/P EST MOD 30 MIN: CPT | Mod: S$GLB,,, | Performed by: INTERNAL MEDICINE

## 2019-06-20 PROCEDURE — 3008F BODY MASS INDEX DOCD: CPT | Mod: CPTII,S$GLB,, | Performed by: INTERNAL MEDICINE

## 2019-06-20 PROCEDURE — 81025 URINE PREGNANCY TEST: CPT | Performed by: EMERGENCY MEDICINE

## 2019-06-20 PROCEDURE — 99285 EMERGENCY DEPT VISIT HI MDM: CPT | Mod: 25

## 2019-06-20 PROCEDURE — 99999 PR PBB SHADOW E&M-EST. PATIENT-LVL III: CPT | Mod: PBBFAC,,, | Performed by: INTERNAL MEDICINE

## 2019-06-20 PROCEDURE — 81000 URINALYSIS NONAUTO W/SCOPE: CPT

## 2019-06-20 PROCEDURE — 99214 PR OFFICE/OUTPT VISIT, EST, LEVL IV, 30-39 MIN: ICD-10-PCS | Mod: S$GLB,,, | Performed by: INTERNAL MEDICINE

## 2019-06-20 PROCEDURE — 36000 PLACE NEEDLE IN VEIN: CPT

## 2019-06-20 PROCEDURE — 85025 COMPLETE CBC W/AUTO DIFF WBC: CPT

## 2019-06-20 PROCEDURE — 25500020 PHARM REV CODE 255: Performed by: EMERGENCY MEDICINE

## 2019-06-20 PROCEDURE — 83690 ASSAY OF LIPASE: CPT

## 2019-06-20 RX ORDER — ONDANSETRON 4 MG/1
4 TABLET, ORALLY DISINTEGRATING ORAL EVERY 6 HOURS PRN
Qty: 21 TABLET | Refills: 0 | Status: SHIPPED | OUTPATIENT
Start: 2019-06-20 | End: 2019-06-28

## 2019-06-20 RX ORDER — DICYCLOMINE HYDROCHLORIDE 20 MG/1
20 TABLET ORAL 2 TIMES DAILY PRN
Qty: 20 TABLET | Refills: 0 | Status: SHIPPED | OUTPATIENT
Start: 2019-06-20 | End: 2019-06-28

## 2019-06-20 RX ORDER — DOCUSATE SODIUM 100 MG/1
100 CAPSULE, LIQUID FILLED ORAL 2 TIMES DAILY PRN
Qty: 30 CAPSULE | Refills: 0 | Status: SHIPPED | OUTPATIENT
Start: 2019-06-20 | End: 2021-05-04

## 2019-06-20 RX ADMIN — IOHEXOL 100 ML: 350 INJECTION, SOLUTION INTRAVENOUS at 02:06

## 2019-06-20 NOTE — ED PROVIDER NOTES
Encounter Date: 6/20/2019    SCRIBE #1 NOTE: Gretchen PICKERING am scribing for, and in the presence of, Dr. Leonardo.       History     Chief Complaint   Patient presents with    Abdominal Pain     pt with c/o abdominal pain and diarrhea and vomiting x one day.  pt sent from md office for work up.     Time seen by provider: 1:02 PM    This is a 38 y.o. female with hx of HTN, fibromyalgia, and celiac disease who presents with complaint of RUQ abdominal pain since last night, worsening at 5:00 AM today. She went to Dr. Yoon's office today and was sent here for further evaluation. She had to strain with two bowel movements this morning. She reports one episode of vomiting this morning, loose stool, and bloody stool with second bowel movement today. No fever. Last ibuprofen was taken around 5:30 AM. She reports improvement when taking apriso before she moved here, though her doctors here will not start her on it. She denies hx of kidney stones.    The history is provided by the patient.     Review of patient's allergies indicates:   Allergen Reactions    Latex, natural rubber     Gluten protein Rash    Latex Rash     Past Medical History:   Diagnosis Date    Allergy     Celiac disease     Fibromyalgia     Hypertension     Migraines     Small fiber neuropathy      Past Surgical History:   Procedure Laterality Date    CERVICAL SPINE SURGERY  2016    SPINE SURGERY       Family History   Problem Relation Age of Onset    Autoimmune disease Father     Autoimmune disease Sister     Celiac disease Maternal Grandfather     Cancer Maternal Grandfather         rectal ca, intestinal ca, brain ca    Breast cancer Paternal Grandmother 30        had breast ca 3x, unknown if unilat vs bilat    Cancer Mother         non-melanoma skin ca    Cancer Other         pat great-aunt with ca of unk origin    Colon cancer Neg Hx     Esophageal cancer Neg Hx     Ovarian cancer Neg Hx     Melanoma Neg Hx      Social  History     Tobacco Use    Smoking status: Former Smoker    Smokeless tobacco: Never Used   Substance Use Topics    Alcohol use: Yes     Comment: rarely     Drug use: No     Review of Systems   Constitutional: Negative for fever.   HENT: Negative for sore throat.    Respiratory: Negative for shortness of breath.    Cardiovascular: Negative for chest pain.   Gastrointestinal: Positive for abdominal pain, blood in stool, diarrhea (loose stool) and vomiting.   Genitourinary: Negative for dysuria.   Musculoskeletal: Negative for back pain.   Skin: Negative for rash.   Neurological: Negative for weakness.   Hematological: Does not bruise/bleed easily.       Physical Exam     Initial Vitals   BP Pulse Resp Temp SpO2   06/20/19 1159 06/20/19 1159 06/20/19 1159 06/20/19 1538 06/20/19 1159   (!) 223/123 73 18 99.1 °F (37.3 °C) 100 %      MAP       --                Physical Exam    Nursing note and vitals reviewed.  Constitutional: She appears well-developed and well-nourished. She is not diaphoretic. No distress.   Appears uncomfortable, non-toxic.   HENT:   Head: Normocephalic and atraumatic.   Eyes: EOM are normal.   Neck: Normal range of motion. Neck supple.   Cardiovascular: Normal rate, regular rhythm and normal heart sounds.   Pulmonary/Chest: Breath sounds normal. No respiratory distress.   Abdominal: There is no CVA tenderness.   Obese abdomen, mild tenderness on deep palpation to LUQ and LLQ, mild suprapubic tenderness.   Musculoskeletal: Normal range of motion.   Neurological: She is alert.   Skin: Skin is warm and dry.         ED Course   Procedures  Labs Reviewed   CBC W/ AUTO DIFFERENTIAL - Abnormal; Notable for the following components:       Result Value    Gran # (ANC) 7.8 (*)     Gran% 78.7 (*)     Lymph% 11.8 (*)     All other components within normal limits   COMPREHENSIVE METABOLIC PANEL - Abnormal; Notable for the following components:    Potassium 3.3 (*)     CO2 32 (*)     ALT 46 (*)     Anion Gap  7 (*)     All other components within normal limits   URINALYSIS, REFLEX TO URINE CULTURE - Abnormal; Notable for the following components:    Appearance, UA Hazy (*)     Occult Blood UA 2+ (*)     All other components within normal limits    Narrative:     Preferred Collection Type->Urine, Clean Catch   URINALYSIS MICROSCOPIC - Abnormal; Notable for the following components:    RBC, UA 5 (*)     All other components within normal limits    Narrative:     Preferred Collection Type->Urine, Clean Catch   LIPASE   POCT URINE PREGNANCY          Imaging Results          CT Abdomen Pelvis With Contrast (Final result)  Result time 06/20/19 15:06:32    Final result by Hamzah Finley MD (06/20/19 15:06:32)                 Impression:      No acute process or CT findings identified to explain patient's symptoms of abdominal distension and left lower quadrant pain.  Specifically, no evidence of significant colonic diverticular disease or diverticulitis.    Few additional incidental findings as above.      Electronically signed by: Hamzah Finley MD  Date:    06/20/2019  Time:    15:06             Narrative:    EXAMINATION:  CT ABDOMEN PELVIS WITH CONTRAST    CLINICAL HISTORY:  Abdominal distension;LLQ pain, suspect diverticulitis;    TECHNIQUE:  Low dose axial images, sagittal and coronal reformations were obtained from the lung bases to the pubic symphysis following the IV administration of 75 mL of Omnipaque 350 and the oral administration of 30 mL of Omnipaque 350.    COMPARISON:  Right hip series 05/28/2019    FINDINGS:  Imaged lung bases show minimal dependent atelectasis.  Base of the heart is within normal limits.    Liver, gallbladder, pancreas, spleen, stomach, duodenum and bilateral adrenal glands are within normal limits.  Small accessory splenule noted.  No biliary ductal dilatation.    Bilateral kidneys are normal in size, shape and location with relatively symmetric enhancement.  Urinary bladder is within normal  limits.  Uterus and bilateral adnexal regions are within normal limits.  Trace free fluid within the pelvis, nonspecific but commonly physiologic in this age group.    No ascites, free air or lymphadenopathy.  No significant atherosclerosis.  No aortic aneurysm or dissection.    Appendix and terminal ileum are within normal limits.  No evidence of bowel obstruction or inflammation.  No significant diverticular disease identified.  Mild amount of scattered colonic fecal material.  No pneumatosis or portal venous gas.  Tiny fat containing umbilical hernia.    Osseous structures show age-related mild degenerative change without acute or destructive process seen.                                 Medical Decision Making:   Initial Assessment:   Urgent evaluation of 38-year-old female with small fiber neuropathy, fibromyalgia, celiac here with complaint of abdominal pain. She has a questionable history of Crohn's and had been on mesalamine, but not currently taking here after moved from [place].  Patient denies history of abdominal surgeries, or kidney stones, denies dysuria.  On exam patient has mild upper abdominal and left lower quadrant tenderness. Differential includes kidney stone, gallbladder pathology, SBO, diverticulitis, inflammatory bowel disease flare.  Will obtain imaging and reassess.  Initial blood pressure improved, likely secondary to pain, and patient does have history of chronic hypertension, currently on lower dose of methyldopa given side effects and  likely contributing to initial elevation.  Clinical Tests:   Lab Tests: Ordered and Reviewed  Radiological Study: Ordered and Reviewed  ED Management:  Labs with mild hypokalemia, no UTI, imaging without acute abnormality, lipase 30  BP improved with pain relief  On reexamination patient feeling improved, had a bowel movement in the emergency department.  Patient instructed to follow with GI given complicated history, bland diet, discharge home with  Bentyl and follow up PCP.            Scribe Attestation:   Scribe #1: I performed the above scribed service and the documentation accurately describes the services I performed. I attest to the accuracy of the note.    Attending Attestation:           Physician Attestation for Scribe:  Physician Attestation Statement for Scribe #1: I, Dr. Leonardo, reviewed documentation, as scribed by Gretchen Ryan in my presence, and it is both accurate and complete.                    Clinical Impression:     1. Colicky abdominal pain    2. Hypertension, unspecified type    3. Hypokalemia          Disposition:   Disposition: Discharged  Condition: Mary Leonardo MD  06/20/19 5069

## 2019-06-20 NOTE — PROGRESS NOTES
"Subjective:       Patient ID: Caryl Cedeno is a 38 y.o. female.    Chief Complaint: Abdominal Pain    Here for urgent visit  Patient is new to me. I have reviewed patient's past medical, surgical, and social history in addition to MAR and allergies.     37 yo F with PMHx of celiac disease, small fiber neuropathy, fibromyalgia, POTS presents for urgent appt for sudden onset of RUQ last night. 4-5 hours after having pizza she developed sudden onset of a constant RUQ pain in the setting of constipation. She was having diffuse cramping pains that were different in nature to her RUQ pain and more similar to her frequent celiac related pains. She reports GI in [place] told her she was "Crohn's like" and started on mesalamine but is not currently stating no one here will Rx for her. Per [facility] " colonoscopy report dated October 4, 2016 who was a complete colonoscopy all the way up to the terminal ileum good bowel prep random biopsies of the TI right and left colon were obtained pathology was normal no evidence of inflammatory bowel disease no evidence of microscopic colitis no polyps.  Colonoscopy was done [place], [place] at [facility]' she reports urinary hesitancy and urgency at baseline that is unchanged. She denies dysuria or vaginal discharge. She has nausea and had emesis just prior to our visit. No change in diet. Recently traveled to [facility] nothing foreign.       Review of Systems   Constitutional: Negative for chills, fatigue, fever and unexpected weight change.   HENT: Negative for ear pain, hearing loss, postnasal drip, tinnitus, trouble swallowing and voice change.    Respiratory: Negative for cough, chest tightness, shortness of breath and wheezing.    Cardiovascular: Negative for chest pain, palpitations and leg swelling.   Gastrointestinal: Positive for abdominal pain and blood in stool. Negative for diarrhea, nausea and vomiting.   Endocrine: Negative for polydipsia, " "polyphagia and polyuria.   Genitourinary: Positive for difficulty urinating and frequency. Negative for dysuria, hematuria and vaginal bleeding.   Skin: Negative for rash.   Allergic/Immunologic: Negative for food allergies.   Neurological: Negative for dizziness, numbness and headaches.   Hematological: Does not bruise/bleed easily.   Psychiatric/Behavioral: The patient is not nervous/anxious.        Objective:      Vitals:    06/20/19 1123   Pulse: 77   SpO2: 98%   Weight: 86.3 kg (190 lb 4.1 oz)   Height: 5' 6.5" (1.689 m)      Physical Exam   Constitutional: She is oriented to person, place, and time. She appears well-developed and well-nourished. She appears distressed.   HENT:   Head: Normocephalic and atraumatic.   Mouth/Throat: Oropharynx is clear and moist. No oropharyngeal exudate.   Eyes: Pupils are equal, round, and reactive to light. Conjunctivae and EOM are normal. No scleral icterus.   Neck: No thyromegaly present.   Cardiovascular: Normal rate, regular rhythm and normal heart sounds.   No murmur heard.  Pulmonary/Chest: Effort normal and breath sounds normal. She has no wheezes. She has no rales.   Abdominal: Soft. Normal appearance and bowel sounds are normal. She exhibits no distension. There is no hepatosplenomegaly. There is tenderness in the right upper quadrant. There is no rigidity, no rebound, no guarding, no CVA tenderness and negative Duncan's sign. No hernia.   Musculoskeletal: She exhibits no edema or tenderness.   Lymphadenopathy:     She has no cervical adenopathy.   Neurological: She is alert and oriented to person, place, and time.   Skin: Skin is warm. She is diaphoretic.   Psychiatric: She has a normal mood and affect. Her behavior is normal.       Assessment:       1. RUQ pain        Plan:       Caryl was seen today for abdominal pain.    Diagnoses and all orders for this visit:    RUQ pain  Discussed likely differential of cholecystitis/lithiasis vs colitis vs diverticulitis vs " less threatening constipation, celiac/IBD flare up. Due to pt distress, potential etiology I recommend she be evaluated in ED for pain control and imaging and monitoring. Her and  agree. Pt provided with wheelchair to ED.            Aurelio Flores MD  Internal Medicine-[facility]        Side effects of medication(s) were discussed in detail and patient voiced understanding.  Patient will call back for any issues or complications.

## 2019-06-21 ENCOUNTER — NURSE TRIAGE (OUTPATIENT)
Dept: ADMINISTRATIVE | Facility: CLINIC | Age: 39
End: 2019-06-21

## 2019-06-21 NOTE — TELEPHONE ENCOUNTER
Patient called to report the following:     -sensitivity at the base of head   -had bunched up sheet as a pillow   -woke up with migraine since 530 am   -constant 7/10   -vomiting x 6 this am   -has tried nasal spray, no relief from pain   -advised to f/u with provider within 4 hours and home care for headache     Reason for Disposition   [1] Vomiting AND [2] 2 or more times (Exception: similar to previous migraines)    Protocols used: ST HEADACHE-A-AH

## 2019-06-24 ENCOUNTER — TELEPHONE (OUTPATIENT)
Dept: INTERNAL MEDICINE | Facility: CLINIC | Age: 39
End: 2019-06-24

## 2019-06-24 NOTE — TELEPHONE ENCOUNTER
----- Message from Reynaldo Galloway sent at 6/21/2019  3:14 PM CDT -----  Contact: self  Type:  Patient Returning Call    Who Called:Pt   Who Left Message for Patient: office  Does the patient know what this is regarding?:  Would the patient rather a call back or a response via [facility]? callback  Best Call Back Number: [phone]  Additional Information: Pt called in about wanting to speak with office. Pt having bad migraine and would like some medication

## 2019-06-26 ENCOUNTER — TELEPHONE (OUTPATIENT)
Dept: GASTROENTEROLOGY | Facility: CLINIC | Age: 39
End: 2019-06-26

## 2019-06-26 NOTE — TELEPHONE ENCOUNTER
----- Message from Arabella Wu MA sent at 6/26/2019  9:38 AM CDT -----  Contact: Self   Hi, I have nothing to offer her with .  Recent ED visit, 6/20, for abd pain.  She stated she was told to be seen by  right away for a follow up.  Thank you!  Soledad  ----- Message -----  From: Felicia Mcgill  Sent: 6/26/2019   9:18 AM  To: [facility] Staff    Pt is returning a call to Staff.    She can be reached at [phone].    Thank you.

## 2019-06-26 NOTE — TELEPHONE ENCOUNTER
----- Message from Radha Shearer sent at 6/25/2019  6:33 PM CDT -----  Contact: Pt   Pt is requesting a call back in regards to scheduling an urgent hospital f/u visit.     Pt can be contacted at [phone]

## 2019-06-26 NOTE — TELEPHONE ENCOUNTER
Attempt to call patient to schedule earlier appointment. Unable to leave message because mail box is full.

## 2019-06-27 ENCOUNTER — TELEPHONE (OUTPATIENT)
Dept: GASTROENTEROLOGY | Facility: CLINIC | Age: 39
End: 2019-06-27

## 2019-06-27 NOTE — TELEPHONE ENCOUNTER
Patient very emotional and yelling at me on the phone. She states that the nurses are blocking her from getting an appointment with Dr Castañeda. Explained to patient our scheduling process and that no one is trying to prevent her from getting an appointment. Dr Castañeda offered appointment for 1200 tomorrow. Patient accepted.

## 2019-06-28 ENCOUNTER — TELEPHONE (OUTPATIENT)
Dept: ENDOSCOPY | Facility: HOSPITAL | Age: 39
End: 2019-06-28

## 2019-06-28 ENCOUNTER — TELEPHONE (OUTPATIENT)
Dept: GASTROENTEROLOGY | Facility: CLINIC | Age: 39
End: 2019-06-28

## 2019-06-28 ENCOUNTER — OFFICE VISIT (OUTPATIENT)
Dept: GASTROENTEROLOGY | Facility: CLINIC | Age: 39
End: 2019-06-28
Payer: COMMERCIAL

## 2019-06-28 ENCOUNTER — PATIENT MESSAGE (OUTPATIENT)
Dept: GASTROENTEROLOGY | Facility: CLINIC | Age: 39
End: 2019-06-28

## 2019-06-28 VITALS
HEART RATE: 78 BPM | WEIGHT: 189.81 LBS | HEIGHT: 67 IN | BODY MASS INDEX: 29.79 KG/M2 | DIASTOLIC BLOOD PRESSURE: 106 MMHG | SYSTOLIC BLOOD PRESSURE: 154 MMHG

## 2019-06-28 DIAGNOSIS — Z12.11 SPECIAL SCREENING FOR MALIGNANT NEOPLASMS, COLON: Primary | ICD-10-CM

## 2019-06-28 DIAGNOSIS — K90.0 CELIAC SPRUE: ICD-10-CM

## 2019-06-28 DIAGNOSIS — R79.89 LFT ELEVATION: Primary | ICD-10-CM

## 2019-06-28 DIAGNOSIS — M79.7 FIBROMYALGIA: ICD-10-CM

## 2019-06-28 DIAGNOSIS — R93.5 ABNORMAL ABDOMINAL CT SCAN: ICD-10-CM

## 2019-06-28 DIAGNOSIS — R10.11 RUQ PAIN: Primary | ICD-10-CM

## 2019-06-28 DIAGNOSIS — K59.09 CONSTIPATION, CHRONIC: ICD-10-CM

## 2019-06-28 DIAGNOSIS — R10.11 COLICKY RUQ ABDOMINAL PAIN: ICD-10-CM

## 2019-06-28 DIAGNOSIS — R74.8 ALKALINE PHOSPHATASE ELEVATION: ICD-10-CM

## 2019-06-28 PROCEDURE — 3077F PR MOST RECENT SYSTOLIC BLOOD PRESSURE >= 140 MM HG: ICD-10-PCS | Mod: CPTII,S$GLB,, | Performed by: INTERNAL MEDICINE

## 2019-06-28 PROCEDURE — 3080F DIAST BP >= 90 MM HG: CPT | Mod: CPTII,S$GLB,, | Performed by: INTERNAL MEDICINE

## 2019-06-28 PROCEDURE — 99215 OFFICE O/P EST HI 40 MIN: CPT | Mod: S$GLB,,, | Performed by: INTERNAL MEDICINE

## 2019-06-28 PROCEDURE — 3080F PR MOST RECENT DIASTOLIC BLOOD PRESSURE >= 90 MM HG: ICD-10-PCS | Mod: CPTII,S$GLB,, | Performed by: INTERNAL MEDICINE

## 2019-06-28 PROCEDURE — 3008F BODY MASS INDEX DOCD: CPT | Mod: CPTII,S$GLB,, | Performed by: INTERNAL MEDICINE

## 2019-06-28 PROCEDURE — 99999 PR PBB SHADOW E&M-EST. PATIENT-LVL IV: ICD-10-PCS | Mod: PBBFAC,,, | Performed by: INTERNAL MEDICINE

## 2019-06-28 PROCEDURE — 99999 PR PBB SHADOW E&M-EST. PATIENT-LVL IV: CPT | Mod: PBBFAC,,, | Performed by: INTERNAL MEDICINE

## 2019-06-28 PROCEDURE — 3008F PR BODY MASS INDEX (BMI) DOCUMENTED: ICD-10-PCS | Mod: CPTII,S$GLB,, | Performed by: INTERNAL MEDICINE

## 2019-06-28 PROCEDURE — 3077F SYST BP >= 140 MM HG: CPT | Mod: CPTII,S$GLB,, | Performed by: INTERNAL MEDICINE

## 2019-06-28 PROCEDURE — 99215 PR OFFICE/OUTPT VISIT, EST, LEVL V, 40-54 MIN: ICD-10-PCS | Mod: S$GLB,,, | Performed by: INTERNAL MEDICINE

## 2019-06-28 RX ORDER — POLYETHYLENE GLYCOL 3350, SODIUM SULFATE ANHYDROUS, SODIUM BICARBONATE, SODIUM CHLORIDE, POTASSIUM CHLORIDE 236; 22.74; 6.74; 5.86; 2.97 G/4L; G/4L; G/4L; G/4L; G/4L
4 POWDER, FOR SOLUTION ORAL ONCE
Qty: 4000 ML | Refills: 0 | Status: SHIPPED | OUTPATIENT
Start: 2019-06-28 | End: 2019-06-28

## 2019-06-28 NOTE — PROGRESS NOTES
[facility] Consultation Note    Reason for Consult:  The primary encounter diagnosis was RUQ pain. Diagnoses of Abnormal abdominal CT scan, Celiac sprue, Constipation, chronic, and Fibromyalgia were also pertinent to this visit.    PCP:   Primary Doctor No       Referring MD:  No referring provider defined for this encounter.    Initial History of Present Illness (HPI):  This is a 38 y.o. female here for evaluation of of acute onset of right upper quadrant pain. Patient states she went to the ER on 06/20/2019 for right upper quadrant pain. She states she was having some nausea as well no fever chills no weight loss at all she had a CT scan of her abdomen pelvis in the ER was read as normal however reviewing the films with Radiology today personally walked down to Radiology gallbladder wall does look normal however there is a little afshin cholecystic fluid.  Recommend follow-up ultrasound.  Patient states she is feeling good today no abdominal pain no fever no chills no weight loss.  She does have chronic constipation. She does have reflux symptoms occasionally.  No dysphagia no odynophagia no blood in her stool she does average a bowel movement several times a week though.  She would like further evaluation for her symptoms.  She also had some elevated liver enzymes not on a statin.    Abdominal pain - no  Reflux - no  Dysphagia - no   Bowel habits - normal  GI bleeding - none  NSAID usage - none    Interval HPI 06/28/2019:  The patient's last visit with me was on 1/18/2019.      ROS:  Constitutional: No fevers, chills, No weight loss  ENT:  No heartburn no dysphagia no odynophagia no hoarseness  CV: No chest pain, no palpitation  Pulm: No cough, No shortness of breath, no wheezing  Ophtho: No vision changes  GI: see HPI  Derm: No rash, no itching  Heme: No lymphadenopathy, No easy bruising  MSK: No significant arthritis  : No dysuria, No hematuria  Endo: No hot or cold intolerance  Neuro:  No syncope, No seizure, no strokes  Psych: No uncontrolled anxiety, No uncontrolled depression    Medical History:  has a past medical history of Allergy, Celiac disease, Fibromyalgia, Hypertension, Migraines, and Small fiber neuropathy.    Surgical History:  has a past surgical history that includes Cervical spine surgery (2016) and Spine surgery.    Family History: family history includes Autoimmune disease in her father and sister; Breast cancer (age of onset: 30) in her paternal grandmother; Cancer in her maternal grandfather, mother, and other; Celiac disease in her maternal grandfather..     Social History:  reports that she has quit smoking. She has never used smokeless tobacco. She reports that she drinks alcohol. She reports that she does not use drugs.    Review of patient's allergies indicates:   Allergen Reactions    Latex, natural rubber     Gluten protein Rash    Latex Rash       Medication List with Changes/Refills   New Medications    POLYETHYLENE GLYCOL (GOLYTELY,NULYTELY) 236-22.74-6.74 -5.86 GRAM SUSPENSION    Take 4,000 mLs (4 L total) by mouth once. for 1 dose   Current Medications    CETIRIZINE (ZYRTEC) 10 MG TABLET    Take 10 mg by mouth once daily.    CYCLOBENZAPRINE (FLEXERIL) 10 MG TABLET    Take 10 mg by mouth continuous prn for Muscle spasms.    DOCUSATE SODIUM (COLACE) 100 MG CAPSULE    Take 1 capsule (100 mg total) by mouth 2 (two) times daily as needed for Constipation.    FINACEA 15 % GEL    AAA face qam - bid    IBUPROFEN (ADVIL,MOTRIN) 600 MG TABLET    Take 600 mg by mouth daily as needed for Pain.    METHYLDOPA (ALDOMET) 500 MG TABLET    Take 500 mg by mouth 3 (three) times daily.    PREGABALIN (LYRICA) 75 MG CAPSULE    Take 1 capsule (75 mg total) by mouth 2 (two) times daily.    SAVELLA 100 MG TAB    TAKE 1 TABLET BY MOUTH TWICE A DAY    WATER LIQD 150 ML WITH MILK OF MAGNESIA 400 MG/5 ML SUSP 400 MG, DIPHENHYDRAMINE 12.5 MG/5 ML ELIX 60 MG, NYSTATIN 100,000 UNIT/ML SUSP  "500,000 UNITS    SWISH AND SPIT 10ML'S BY MOUTH EVERY 4 HOURS    ZOMIG 5 MG SPRY    USE 1 SPRAY IN EACH NOSTRIL ONCE DAILY.   Discontinued Medications    DICYCLOMINE (BENTYL) 20 MG TABLET    Take 1 tablet (20 mg total) by mouth 2 (two) times daily as needed (abdominal pain).    MESALAMINE (APRISO) 0.375 GRAM CP24    Take 375 mg by mouth.    ONDANSETRON (ZOFRAN-ODT) 4 MG TBDL    Take 1 tablet (4 mg total) by mouth every 6 (six) hours as needed (for vomiting or nausea).         Objective Findings:    Vital Signs:  BP (!) 154/106 (BP Location: Right arm)   Pulse 78   Ht 5' 6.5" (1.689 m)   Wt 86.1 kg (189 lb 13.1 oz)   BMI 30.18 kg/m²   Body mass index is 30.18 kg/m².    Physical Exam:  General Appearance: Well appearing in no acute distress  Eyes:    No scleral icterus  ENT:  No lesions or masses   Lungs: CTA bilaterally, no wheezes, no rhonchi, no rales  Heart:  S1, S2 normal, no murmurs heard  Abdomen:  Non distended, soft, no guarding, no rebound, no tenderness, no appreciated ascites, no bruits, no hepatosplenomegaly,  No CVA tenderness, no appreciated hernias  Musculoskeletal:  No major joint deformities  Skin: No petechiae or rash on exposed skin areas  Neurologic:  Alert and oriented x4  Psychiatric:  Normal speech mentation and affect    Labs:  Lab Results   Component Value Date    WBC 9.88 06/20/2019    HGB 13.8 06/20/2019    HCT 40.8 06/20/2019     06/20/2019    ALT 46 (H) 06/20/2019    AST 23 06/20/2019     06/20/2019    K 3.3 (L) 06/20/2019     06/20/2019    CREATININE 1.0 06/20/2019    BUN 13 06/20/2019    CO2 32 (H) 06/20/2019    TSH 1.536 11/27/2017           Medical Decision Making:    CT scan images personally reviewed by myself a person walk down to radiology reviewed the film with Radiology.  Images discussed with patient.  Lab reviewed ER notes reviewed EGD colonoscopy talk given split bowel prep talk given.  Ultrasound talk given.    Assessment:  1. RUQ pain    2. Abnormal " abdominal CT scan    3. Celiac sprue    4. Constipation, chronic    5. Fibromyalgia         Recommendations:   1.  Right upper quadrant abdominal pain with abnormal CT scan showing afshin cholecystic fluid will get  EGD and ultrasound the right upper quadrant. Repeat labs.  2.  Change in bowel habits constipation the history of nonspecific colitis in the past will schedule patient for colonoscopy as well.  3.  Return GI clinic in 3 months for follow-up.    No follow-ups on file.      Order summary:  Orders Placed This Encounter    US Abdomen Limited    Hepatic function panel    Basic metabolic panel    TISSUE TRANSGLUTAMINASE (TTG), IGA    IgA    Alpha 1 Antitrypsin Phenotype    ELIGIO Screen w/Reflex    Antimitochondrial antibody    Anti-smooth muscle antibody    Ceruloplasmin    IgG    Hepatitis B Surface Antibody, Qual/Quant    Hepatitis A antibody, IgG    Hepatitis panel, acute    Iron and TIBC    Ferritin    TSH    Case request GI: EGD (ESOPHAGOGASTRODUODENOSCOPY), COLONOSCOPY         Thank you so much for allowing me to participate in the care of Caryl Castañeda MD

## 2019-06-28 NOTE — TELEPHONE ENCOUNTER
Dr. Castañeda would like pt to be seen on Monday for    please call hepatology clinic today to see if they can work Caryl in Monday for evaluation of significant acute elevation in her liver transaminases and alk phos. Referral placed.     Please call pt ASAP

## 2019-06-28 NOTE — TELEPHONE ENCOUNTER
----- Message from Ezra Castañeda MD sent at 6/28/2019  2:29 PM CDT -----  Follow-up with me in GI fellows clinic 3 months.

## 2019-06-29 ENCOUNTER — OFFICE VISIT (OUTPATIENT)
Dept: INTERNAL MEDICINE | Facility: CLINIC | Age: 39
End: 2019-06-29
Payer: COMMERCIAL

## 2019-06-29 ENCOUNTER — LAB VISIT (OUTPATIENT)
Dept: LAB | Facility: HOSPITAL | Age: 39
End: 2019-06-29
Attending: INTERNAL MEDICINE
Payer: COMMERCIAL

## 2019-06-29 ENCOUNTER — TELEPHONE (OUTPATIENT)
Dept: OBSTETRICS AND GYNECOLOGY | Facility: HOSPITAL | Age: 39
End: 2019-06-29

## 2019-06-29 ENCOUNTER — NURSE TRIAGE (OUTPATIENT)
Dept: ADMINISTRATIVE | Facility: CLINIC | Age: 39
End: 2019-06-29

## 2019-06-29 VITALS
DIASTOLIC BLOOD PRESSURE: 88 MMHG | OXYGEN SATURATION: 98 % | SYSTOLIC BLOOD PRESSURE: 162 MMHG | BODY MASS INDEX: 30.72 KG/M2 | WEIGHT: 191.13 LBS | HEIGHT: 66 IN | HEART RATE: 94 BPM

## 2019-06-29 DIAGNOSIS — R74.8 ELEVATED LIVER ENZYMES: ICD-10-CM

## 2019-06-29 DIAGNOSIS — I10 ESSENTIAL HYPERTENSION: Primary | ICD-10-CM

## 2019-06-29 DIAGNOSIS — R79.89 LFT ELEVATION: ICD-10-CM

## 2019-06-29 DIAGNOSIS — R74.8 ALKALINE PHOSPHATASE ELEVATION: ICD-10-CM

## 2019-06-29 DIAGNOSIS — R10.11 COLICKY RUQ ABDOMINAL PAIN: ICD-10-CM

## 2019-06-29 LAB
ALBUMIN SERPL BCP-MCNC: 3.8 G/DL (ref 3.5–5.2)
ALP SERPL-CCNC: 212 U/L (ref 55–135)
ALT SERPL W/O P-5'-P-CCNC: 571 U/L (ref 10–44)
AST SERPL-CCNC: 84 U/L (ref 10–40)
BILIRUB DIRECT SERPL-MCNC: 0.2 MG/DL (ref 0.1–0.3)
BILIRUB SERPL-MCNC: 0.3 MG/DL (ref 0.1–1)
INR PPP: 0.9 (ref 0.8–1.2)
INR PPP: 0.9 (ref 0.8–1.2)
PROT SERPL-MCNC: 7.4 G/DL (ref 6–8.4)
PROTHROMBIN TIME: 9.7 SEC (ref 9–12.5)
PROTHROMBIN TIME: 9.7 SEC (ref 9–12.5)

## 2019-06-29 PROCEDURE — 3077F SYST BP >= 140 MM HG: CPT | Mod: CPTII,S$GLB,, | Performed by: INTERNAL MEDICINE

## 2019-06-29 PROCEDURE — 3079F DIAST BP 80-89 MM HG: CPT | Mod: CPTII,S$GLB,, | Performed by: INTERNAL MEDICINE

## 2019-06-29 PROCEDURE — 3008F PR BODY MASS INDEX (BMI) DOCUMENTED: ICD-10-PCS | Mod: CPTII,S$GLB,, | Performed by: INTERNAL MEDICINE

## 2019-06-29 PROCEDURE — 99214 OFFICE O/P EST MOD 30 MIN: CPT | Mod: S$GLB,,, | Performed by: INTERNAL MEDICINE

## 2019-06-29 PROCEDURE — 3008F BODY MASS INDEX DOCD: CPT | Mod: CPTII,S$GLB,, | Performed by: INTERNAL MEDICINE

## 2019-06-29 PROCEDURE — 99999 PR PBB SHADOW E&M-EST. PATIENT-LVL IV: ICD-10-PCS | Mod: PBBFAC,,, | Performed by: INTERNAL MEDICINE

## 2019-06-29 PROCEDURE — 3077F PR MOST RECENT SYSTOLIC BLOOD PRESSURE >= 140 MM HG: ICD-10-PCS | Mod: CPTII,S$GLB,, | Performed by: INTERNAL MEDICINE

## 2019-06-29 PROCEDURE — 99214 PR OFFICE/OUTPT VISIT, EST, LEVL IV, 30-39 MIN: ICD-10-PCS | Mod: S$GLB,,, | Performed by: INTERNAL MEDICINE

## 2019-06-29 PROCEDURE — 80076 HEPATIC FUNCTION PANEL: CPT

## 2019-06-29 PROCEDURE — 3079F PR MOST RECENT DIASTOLIC BLOOD PRESSURE 80-89 MM HG: ICD-10-PCS | Mod: CPTII,S$GLB,, | Performed by: INTERNAL MEDICINE

## 2019-06-29 PROCEDURE — 85610 PROTHROMBIN TIME: CPT

## 2019-06-29 PROCEDURE — 36415 COLL VENOUS BLD VENIPUNCTURE: CPT

## 2019-06-29 PROCEDURE — 99999 PR PBB SHADOW E&M-EST. PATIENT-LVL IV: CPT | Mod: PBBFAC,,, | Performed by: INTERNAL MEDICINE

## 2019-06-29 RX ORDER — LOSARTAN POTASSIUM 50 MG/1
50 TABLET ORAL DAILY
Qty: 90 TABLET | Refills: 3 | COMMUNITY
Start: 2019-06-29 | End: 2021-04-08

## 2019-06-29 NOTE — PROGRESS NOTES
"Subjective:       Patient ID: Caryl Cedeno is a 38 y.o. female.    Chief Complaint: Hypertension (patient Advice)    HPI   37 yo F here for evaluation of elevated blood pressure and elevated liver enzymes.   She saw Dr. Castañeda yesterday for RUQ pain and found to have elevated liver enzymes. Repeat labs today in process. She is scheduled to see hepatology on 7/17 (call in to clinic to see sooner).  Methyldopa as well as zyrtec and flexeril stopped due to liver enzyme elevation.       ALk Phos 255  Acute hepatitis panel negative.   INR pending.  Ceruloplasmin normal.   Patsy, antibodies processing.     Liver appeared normal on CT done 6/20/19. US is ordered.     Hx celiac sprue on gluten free diet.  POTS, HTN   No PCP in our system.  Was taking ibuprofen about 600mg twice a day.    She was taking excedrin for migraine and zomig.     Review of Systems   Constitutional: Negative for fever.   Respiratory: Negative for shortness of breath.    Cardiovascular: Negative for chest pain.   Musculoskeletal: Negative.    Skin: Negative.        Objective:   BP (!) 162/88   Pulse 94   Ht 5' 6" (1.676 m)   Wt 86.7 kg (191 lb 2.2 oz)   SpO2 98%   BMI 30.85 kg/m²      Physical Exam   Constitutional: She is oriented to person, place, and time. She appears well-developed and well-nourished. No distress.   HENT:   Head: Normocephalic and atraumatic.   Cardiovascular: Normal rate and regular rhythm.   Pulmonary/Chest: Effort normal. No respiratory distress. She has no wheezes. She has no rales.   Abdominal: Soft. She exhibits no distension. There is no tenderness. There is no guarding.   Neurological: She is alert and oriented to person, place, and time.   Skin: Skin is warm and dry. She is not diaphoretic.   Psychiatric: She has a normal mood and affect. Her behavior is normal.       Assessment:       1. Essential hypertension    2. Elevated liver enzymes        Plan:       Caryl was seen today for " hypertension.    Diagnoses and all orders for this visit:    Essential hypertension  Elevated liver enzymes  Restart losartan she has at home (believes is 50mg, will check bottle); follow up with her outside cardiologist re: blood pressure  Refrain from conceiving while on this  Stop methyldopa, flexeril, zyrtec, apap  Schedule with hepatology (telephone note says they will book her in urgent slot Monday) - gave pt phone number to hepatology clinic

## 2019-06-29 NOTE — TELEPHONE ENCOUNTER
Reason for Disposition   Caller has urgent medication question about med that PCP prescribed and triager unable to answer question    Protocols used: MEDICATION QUESTION CALL-A-  pt called re yest had labs done. Resent for more labs today. told to stop BP med. pt states she has a cardiologist outside of [facility]. rec to contact cardiology for alternative BP med. pt requests to contact MD on call for alternative in case her BP were to get dangerously high. Pt seen for check up by OB in Jan. Spoke with dr matta - zen to contact PCP or ED for urgent concern. Pt notified.

## 2019-07-01 ENCOUNTER — DOCUMENTATION ONLY (OUTPATIENT)
Dept: TRANSPLANT | Facility: CLINIC | Age: 39
End: 2019-07-01

## 2019-07-01 ENCOUNTER — HOSPITAL ENCOUNTER (OUTPATIENT)
Dept: RADIOLOGY | Facility: HOSPITAL | Age: 39
Discharge: HOME | End: 2019-07-01
Attending: INTERNAL MEDICINE
Payer: COMMERCIAL

## 2019-07-01 ENCOUNTER — TELEPHONE (OUTPATIENT)
Dept: GASTROENTEROLOGY | Facility: CLINIC | Age: 39
End: 2019-07-01

## 2019-07-01 DIAGNOSIS — R10.11 COLICKY RIGHT UPPER QUADRANT PAIN: ICD-10-CM

## 2019-07-01 DIAGNOSIS — R10.11 RUQ PAIN: ICD-10-CM

## 2019-07-01 DIAGNOSIS — R10.11 RIGHT UPPER QUADRANT PAIN: ICD-10-CM

## 2019-07-01 DIAGNOSIS — R93.5 ABNORMAL ABDOMINAL CT SCAN: ICD-10-CM

## 2019-07-01 DIAGNOSIS — M79.7 FIBROMYALGIA: ICD-10-CM

## 2019-07-01 DIAGNOSIS — K59.09 CONSTIPATION, CHRONIC: ICD-10-CM

## 2019-07-01 DIAGNOSIS — R74.8 ELEVATED LIVER ENZYMES: ICD-10-CM

## 2019-07-01 DIAGNOSIS — R93.2 ABNORMAL FINDINGS ON DIAGNOSTIC IMAGING OF GALLBLADDER: ICD-10-CM

## 2019-07-01 DIAGNOSIS — K80.10 CALCULUS OF GALLBLADDER WITH CHRONIC CHOLECYSTITIS WITHOUT OBSTRUCTION: Primary | ICD-10-CM

## 2019-07-01 DIAGNOSIS — K90.0 CELIAC SPRUE: ICD-10-CM

## 2019-07-01 PROCEDURE — 76705 US ABDOMEN LIMITED: ICD-10-PCS | Mod: 26,,, | Performed by: RADIOLOGY

## 2019-07-01 PROCEDURE — 76705 ECHO EXAM OF ABDOMEN: CPT | Mod: 26,,, | Performed by: RADIOLOGY

## 2019-07-01 PROCEDURE — 76705 ECHO EXAM OF ABDOMEN: CPT | Mod: TC

## 2019-07-01 NOTE — LETTER
July 1, 2019    Caryl Cedeno  [address] [address]      Dear Caryl Cedeno:    Your doctor has referred you to the [facility]. We are sending this letter to remind you to make an appointment with us to complete the referral process.     Please call us at [phone] to schedule an appointment. We look forward to seeing you soon.     If you received a call and have been scheduled, please disregard this letter.       Sincerely,        [facility]   [address] [place] [address] [phone]

## 2019-07-01 NOTE — TELEPHONE ENCOUNTER
Ma called pt , this is ma second attempt on contacting pt with test results and appt   No answer again   Left vm

## 2019-07-01 NOTE — TELEPHONE ENCOUNTER
MA called patient schedule her appointment to see Dr. An 7/2/19 at 11:00 am. Patient need to be seen ASAP per referring MD. Patient accepted the appt.

## 2019-07-01 NOTE — TELEPHONE ENCOUNTER
----- Message from Ezra Castañeda MD sent at 6/30/2019  2:08 PM CDT -----  Kirit - please try to get Caryl in 2 hepatology Clinic early next week.    Please let her know to avoid all medications that have Tylenol or acetaminophen in them since that can cause injury to the liver.    She should also avoid the medications already discussed and communicated to her from os and urgent care and primary care.    She should avoid all alcohol and avoid eating raw oysters and raw clams.    Please let her know that her liver enzymes looks slightly improved over the other day.

## 2019-07-01 NOTE — NURSING
Pt records reviewed.   Pt will be referred to Hepatology.  LFT elevation  Alkaline phosphatase elevation  Initial referral received  from the [facility]que.   Referring Provider/diagnosis  Ezra Castañeda MD      Referral letter sent to patient.

## 2019-07-01 NOTE — TELEPHONE ENCOUNTER
Christine,  Because hepatology was closed on Friday , I sent an urgent message and The NP asked you to use an urgent slot for today , Pt was never contacted and dr henriquez states it was stat

## 2019-07-02 ENCOUNTER — PATIENT MESSAGE (OUTPATIENT)
Dept: GASTROENTEROLOGY | Facility: CLINIC | Age: 39
End: 2019-07-02

## 2019-07-02 ENCOUNTER — TELEPHONE (OUTPATIENT)
Dept: GASTROENTEROLOGY | Facility: CLINIC | Age: 39
End: 2019-07-02

## 2019-07-02 ENCOUNTER — TELEPHONE (OUTPATIENT)
Dept: SURGERY | Facility: CLINIC | Age: 39
End: 2019-07-02

## 2019-07-02 ENCOUNTER — OFFICE VISIT (OUTPATIENT)
Dept: HEPATOLOGY | Facility: CLINIC | Age: 39
End: 2019-07-02
Payer: COMMERCIAL

## 2019-07-02 VITALS
TEMPERATURE: 98 F | SYSTOLIC BLOOD PRESSURE: 161 MMHG | BODY MASS INDEX: 30.22 KG/M2 | HEIGHT: 66 IN | WEIGHT: 188.06 LBS | HEART RATE: 114 BPM | RESPIRATION RATE: 16 BRPM | DIASTOLIC BLOOD PRESSURE: 108 MMHG

## 2019-07-02 DIAGNOSIS — R79.89 ELEVATED LIVER FUNCTION TESTS: Primary | ICD-10-CM

## 2019-07-02 PROCEDURE — 3080F PR MOST RECENT DIASTOLIC BLOOD PRESSURE >= 90 MM HG: ICD-10-PCS | Mod: CPTII,S$GLB,, | Performed by: INTERNAL MEDICINE

## 2019-07-02 PROCEDURE — 99999 PR PBB SHADOW E&M-EST. PATIENT-LVL IV: CPT | Mod: PBBFAC,,, | Performed by: INTERNAL MEDICINE

## 2019-07-02 PROCEDURE — 3008F PR BODY MASS INDEX (BMI) DOCUMENTED: ICD-10-PCS | Mod: CPTII,S$GLB,, | Performed by: INTERNAL MEDICINE

## 2019-07-02 PROCEDURE — 3080F DIAST BP >= 90 MM HG: CPT | Mod: CPTII,S$GLB,, | Performed by: INTERNAL MEDICINE

## 2019-07-02 PROCEDURE — 99204 OFFICE O/P NEW MOD 45 MIN: CPT | Mod: S$GLB,,, | Performed by: INTERNAL MEDICINE

## 2019-07-02 PROCEDURE — 99204 PR OFFICE/OUTPT VISIT, NEW, LEVL IV, 45-59 MIN: ICD-10-PCS | Mod: S$GLB,,, | Performed by: INTERNAL MEDICINE

## 2019-07-02 PROCEDURE — 3077F PR MOST RECENT SYSTOLIC BLOOD PRESSURE >= 140 MM HG: ICD-10-PCS | Mod: CPTII,S$GLB,, | Performed by: INTERNAL MEDICINE

## 2019-07-02 PROCEDURE — 3077F SYST BP >= 140 MM HG: CPT | Mod: CPTII,S$GLB,, | Performed by: INTERNAL MEDICINE

## 2019-07-02 PROCEDURE — 99999 PR PBB SHADOW E&M-EST. PATIENT-LVL IV: ICD-10-PCS | Mod: PBBFAC,,, | Performed by: INTERNAL MEDICINE

## 2019-07-02 PROCEDURE — 3008F BODY MASS INDEX DOCD: CPT | Mod: CPTII,S$GLB,, | Performed by: INTERNAL MEDICINE

## 2019-07-02 RX ORDER — POLYETHYLENE GLYCOL-3350 AND ELECTROLYTES 236; 6.74; 5.86; 2.97; 22.74 G/274.31G; G/274.31G; G/274.31G; G/274.31G; G/274.31G
POWDER, FOR SOLUTION ORAL
COMMUNITY
Start: 2019-06-28 | End: 2021-05-04

## 2019-07-02 NOTE — LETTER
July 2, 2019      Ezra Rubio, DO  [address] [facility]  [facility] [address]           [facility]  [address]  [facility] [address]  Phone: [phone]  Fax: [phone]          Patient: Caryl Cedeno   MR Number: [id number]   YOB: 1980   Date of Visit: 7/2/2019       Dear Dr. Ezra Rubio:    Thank you for referring Caryl Cedeno to me for evaluation. Attached you will find relevant portions of my assessment and plan of care.    If you have questions, please do not hesitate to call me. I look forward to following Caryl Cedeno along with you.    Sincerely,    Saige An MD    Enclosure  CC:  No Recipients    If you would like to receive this communication electronically, please contact [email or web address] or [phone] to request more information on [employer] access.    For providers and/or their staff who would like to refer a patient to [facility], please contact us through our one-stop-shop provider referral line, [facility], at [phone].    If you feel you have received this communication in error or would no longer like to receive these types of communications, please e-mail [email or web address]

## 2019-07-02 NOTE — PATIENT INSTRUCTIONS
Your recent liver tests are improving.  Elevation from 6/28 looks consistent with gallbladder disease.    I will ask Dr. Martinez about liver biopsy at time of surgery.    No evidence of underlying liver disease at this time.    No issue with proceeding the gallbladder removal.      We will review your liver biopsy and determine if there is any underlying liver disease following surgery.  Low suspicion at this time.    Return to clinic in 3 months

## 2019-07-02 NOTE — Clinical Note
Good afternoon Dr. Martinez,You'll see this patient tomorrow for consultation of cholecystectomy.  She has no liver issues with surgery.  Wanted to request an intraoperative liver biopsy if she moves forward with surgery.Thanks,Saige

## 2019-07-02 NOTE — PROGRESS NOTES
Hepatology Consult Note    Referring provider: Dr. Ezra Rubio    Chief complaint: elevated liver tests    HPI:  Caryl Cedeno is a 39 y.o. female that presents to hepatology clinic for consultation of elevated liver tests.  She is alone.    Patient reports no known history of chronic liver disease but states that her AST/ALT have been mildly elevated for a few years.  She has never experienced symptoms of chronic liver disease.  The patient does have a history of celiac disease and is followed in GI clinic.  On 6/20/19, experienced persistent, severe RUQ pain.  Seen in urgent care and referred to ED.  Unremarkable labs and cross sectional imaging.  Followed up in GI clinic.  Significant rise in liver tests on 6/28/2019 with full serologic work-up performed and normal.  One day later, repeat liver tests starting to come down.    Risk factors for WALSH include BMI 30 and hypertension.  No steatosis seen on imaging.  No excessive alcohol use associated with labs  Medication list reviewed and no concerning toxin exposure.      Patient Active Problem List   Diagnosis    Celiac disease    Small fiber neuropathy    Fibromyalgia    Throat papilloma    POTS (postural orthostatic tachycardia syndrome)    Migraine with aura and without status migrainosus, not intractable    Dysautonomia    Numbness and tingling of both lower extremities    Chronic bilateral low back pain without sciatica       Past Medical History:   Diagnosis Date    Allergy     Celiac disease     Fibromyalgia     Hypertension     Migraines     Small fiber neuropathy        Past Surgical History:   Procedure Laterality Date    CERVICAL SPINE SURGERY  2016    SPINE SURGERY         Family History   Problem Relation Age of Onset    Autoimmune disease Father     Autoimmune disease Sister     Celiac disease Maternal Grandfather     Cancer Maternal Grandfather         rectal ca, intestinal ca, brain ca    Breast cancer Paternal  Grandmother 30        had breast ca 3x, unknown if unilat vs bilat    Cancer Mother         non-melanoma skin ca    Cancer Other         pat great-aunt with ca of unk origin    Colon cancer Neg Hx     Esophageal cancer Neg Hx     Ovarian cancer Neg Hx     Melanoma Neg Hx        Social History     Socioeconomic History    Marital status:      Spouse name: Not on file    Number of children: Not on file    Years of education: Not on file    Highest education level: Not on file   Occupational History    Not on file   Social Needs    Financial resource strain: Not on file    Food insecurity:     Worry: Not on file     Inability: Not on file    Transportation needs:     Medical: Not on file     Non-medical: Not on file   Tobacco Use    Smoking status: Former Smoker    Smokeless tobacco: Never Used   Substance and Sexual Activity    Alcohol use: Yes     Comment: rarely     Drug use: No    Sexual activity: Yes     Partners: Male     Birth control/protection: None   Lifestyle    Physical activity:     Days per week: Not on file     Minutes per session: Not on file    Stress: Not on file   Relationships    Social connections:     Talks on phone: Not on file     Gets together: Not on file     Attends Voodoo service: Not on file     Active member of club or organization: Not on file     Attends meetings of clubs or organizations: Not on file     Relationship status: Not on file   Other Topics Concern    Are you pregnant or think you may be? No    Breast-feeding No   Social History Narrative    Not on file       Current Outpatient Medications   Medication Sig Dispense Refill    losartan (COZAAR) 50 MG tablet Take 1 tablet (50 mg total) by mouth once daily. 90 tablet 3    pregabalin (LYRICA) 75 MG capsule Take 1 capsule (75 mg total) by mouth 2 (two) times daily. 60 capsule 5    SAVELLA 100 mg Tab TAKE 1 TABLET BY MOUTH TWICE A DAY 60 tablet 11    water Liqd 150 mL with MILK OF MAGNESIA 400  mg/5 mL Susp 400 mg, diphenhydrAMINE 12.5 mg/5 mL Elix 60 mg, nystatin 100,000 unit/mL Susp 500,000 Units SWISH AND SPIT 10ML'S BY MOUTH EVERY 4 HOURS  1    ZOMIG 5 mg Spry USE 1 SPRAY IN EACH NOSTRIL ONCE DAILY. 6 each 3    cetirizine (ZYRTEC) 10 MG tablet Take 10 mg by mouth once daily.      docusate sodium (COLACE) 100 MG capsule Take 1 capsule (100 mg total) by mouth 2 (two) times daily as needed for Constipation. 30 capsule 0    FINACEA 15 % gel AAA face qam - bid 60 g 3    GAVILYTE-G 236-22.74-6.74 -5.86 gram suspension       ibuprofen (ADVIL,MOTRIN) 600 MG tablet Take 600 mg by mouth daily as needed for Pain.       No current facility-administered medications for this visit.        Review of patient's allergies indicates:   Allergen Reactions    Latex, natural rubber     Gluten protein Rash    Latex Rash       Review of Systems   Constitutional: Negative for chills, fever, malaise/fatigue and weight loss.   Eyes: Negative.    Respiratory: Negative for cough and shortness of breath.    Cardiovascular: Negative for chest pain and leg swelling.   Gastrointestinal: Positive for abdominal pain. Negative for heartburn, nausea and vomiting.   Musculoskeletal: Positive for back pain. Negative for joint pain and myalgias.   Skin: Negative for itching and rash.   Neurological: Positive for dizziness (intermittent). Negative for focal weakness and headaches.   Endo/Heme/Allergies: Does not bruise/bleed easily.   Psychiatric/Behavioral: Negative for depression.       Vitals:    07/02/19 1119   BP: (!) 161/108   Pulse: (!) 114   Resp: 16   Temp: 98 °F (36.7 °C)   Weight: 85.3 kg (188 lb 0.8 oz)   Height: 6' (1.829 m)       Physical Exam   Constitutional: She is oriented to person, place, and time. She appears well-developed and well-nourished. No distress.   HENT:   Head: Normocephalic and atraumatic.   Mouth/Throat: Oropharynx is clear and moist. No oropharyngeal exudate.   Eyes: Pupils are equal, round, and  reactive to light. EOM are normal. No scleral icterus.   Neck: Normal range of motion. Neck supple. No thyromegaly present.   Cardiovascular: Normal rate, regular rhythm and normal heart sounds. Exam reveals no gallop and no friction rub.   No murmur heard.  Pulmonary/Chest: Effort normal. No respiratory distress. She has no wheezes. She has no rales.   Abdominal: Soft. Bowel sounds are normal. She exhibits no distension. There is no tenderness.   Musculoskeletal: Normal range of motion. She exhibits no edema.   Lymphadenopathy:     She has no cervical adenopathy.   Neurological: She is alert and oriented to person, place, and time. No cranial nerve deficit.   Skin: Skin is warm and dry. No rash noted.   Psychiatric: She has a normal mood and affect. Her behavior is normal.   Vitals reviewed.      Lab Results   Component Value Date     (H) 06/29/2019    AST 84 (H) 06/29/2019    ALKPHOS 212 (H) 06/29/2019    BILITOT 0.3 06/29/2019       Lab Results   Component Value Date    WBC 9.88 06/20/2019    HGB 13.8 06/20/2019    HCT 40.8 06/20/2019    MCV 85 06/20/2019     06/20/2019       Lab Results   Component Value Date     06/28/2019    K 3.3 (L) 06/28/2019     06/28/2019    CO2 30 (H) 06/28/2019    BUN 9 06/28/2019    CREATININE 1.0 06/28/2019    CALCIUM 10.3 06/28/2019    ANIONGAP 10 06/28/2019    ESTGFRAFRICA >60.0 06/28/2019    EGFRNONAA >60.0 06/28/2019       Imaging: EMR and external imaging available reviewed     Assessment:  39 y.o. female presenting with significant rise in liver tests, improving without intervention and negative serologic work-up for chronic liver disease    Plan:  *  Suspect that recent elevated liver tests and RUQ pain are associated with biliary disease.  Planned to have consultation with Dr. Martinez tomorrow for consideration of cholecystectomy  *  Will request intraoperative liver biopsy if possible  *  Repeat liver tests 1 month following surgery given history  of mildly elevated ALT to determine if there is any underlying liver disease  *  Discussed appropriate medications and avoidance of unnecessary medications    RTC in 3 months

## 2019-07-02 NOTE — TELEPHONE ENCOUNTER
----- Message from Ezra Castañeda MD sent at 7/1/2019  6:27 PM CDT -----  Ab - very important please see if you can schedule Caryl for general surgery clinic with Dr. Jagjit Martinez for evaluation of her history of elevated right upper quadrant pain abnormal 6 ultrasound of her gallbladder and elevated liver enzymes worrisome for symptomatic biliary colic and chronic cholecystitis.      Caryl your ultrasound was read as follows:    Impression      The gallbladder is filled with stones and sludge, with gallbladder wall thickening, favored to represent chronic cholecystitis.  Confirmation can be obtained with nuclear medicine HIDA scan.    This report was flagged in Epic as abnormal.    Electronically signed by resident: Kaiden Robert  Date: 07/01/2019  Time: 16:33

## 2019-07-02 NOTE — TELEPHONE ENCOUNTER
----- Message from Layla Hopper sent at 7/2/2019 10:13 AM CDT -----  Contact: Pt  Pt is requesting an appt due to patient has a referral in [facility]. Patient was referred by Dr. Castañeda. Patient stated that she would to be seen today if possible. Thank you        Patient can be reached at [phone]

## 2019-07-03 ENCOUNTER — OFFICE VISIT (OUTPATIENT)
Dept: SURGERY | Facility: CLINIC | Age: 39
End: 2019-07-03
Payer: COMMERCIAL

## 2019-07-03 VITALS
SYSTOLIC BLOOD PRESSURE: 182 MMHG | HEIGHT: 66 IN | WEIGHT: 187.75 LBS | BODY MASS INDEX: 30.17 KG/M2 | DIASTOLIC BLOOD PRESSURE: 113 MMHG | HEART RATE: 122 BPM | TEMPERATURE: 98 F

## 2019-07-03 DIAGNOSIS — K81.1 CHRONIC CHOLECYSTITIS: ICD-10-CM

## 2019-07-03 PROCEDURE — 3077F PR MOST RECENT SYSTOLIC BLOOD PRESSURE >= 140 MM HG: ICD-10-PCS | Mod: CPTII,S$GLB,, | Performed by: SURGERY

## 2019-07-03 PROCEDURE — 3080F DIAST BP >= 90 MM HG: CPT | Mod: CPTII,S$GLB,, | Performed by: SURGERY

## 2019-07-03 PROCEDURE — 99999 PR PBB SHADOW E&M-EST. PATIENT-LVL III: CPT | Mod: PBBFAC,,, | Performed by: SURGERY

## 2019-07-03 PROCEDURE — 3008F PR BODY MASS INDEX (BMI) DOCUMENTED: ICD-10-PCS | Mod: CPTII,S$GLB,, | Performed by: SURGERY

## 2019-07-03 PROCEDURE — 3077F SYST BP >= 140 MM HG: CPT | Mod: CPTII,S$GLB,, | Performed by: SURGERY

## 2019-07-03 PROCEDURE — 99999 PR PBB SHADOW E&M-EST. PATIENT-LVL III: ICD-10-PCS | Mod: PBBFAC,,, | Performed by: SURGERY

## 2019-07-03 PROCEDURE — 3080F PR MOST RECENT DIASTOLIC BLOOD PRESSURE >= 90 MM HG: ICD-10-PCS | Mod: CPTII,S$GLB,, | Performed by: SURGERY

## 2019-07-03 PROCEDURE — 3008F BODY MASS INDEX DOCD: CPT | Mod: CPTII,S$GLB,, | Performed by: SURGERY

## 2019-07-03 PROCEDURE — 99214 PR OFFICE/OUTPT VISIT, EST, LEVL IV, 30-39 MIN: ICD-10-PCS | Mod: S$GLB,,, | Performed by: SURGERY

## 2019-07-03 PROCEDURE — 99214 OFFICE O/P EST MOD 30 MIN: CPT | Mod: S$GLB,,, | Performed by: SURGERY

## 2019-07-03 RX ORDER — LIDOCAINE HYDROCHLORIDE 10 MG/ML
1 INJECTION, SOLUTION EPIDURAL; INFILTRATION; INTRACAUDAL; PERINEURAL ONCE
Status: CANCELLED | OUTPATIENT
Start: 2019-07-03 | End: 2019-07-03

## 2019-07-03 NOTE — LETTER
July 3, 2019      Ezra Castañeda MD  [address]  [place] [address]           [facility] - General Surgery  [address]  [place] [address]  Phone: [phone]          Patient: Caryl Cedeno   MR Number: [id number]   YOB: 1980   Date of Visit: 7/3/2019       Dear Dr. Ezra Castañeda:    Thank you for referring Caryl Cedeno to me for evaluation. Attached you will find relevant portions of my assessment and plan of care.    If you have questions, please do not hesitate to call me. I look forward to following Caryl Cedeno along with you.    Sincerely,    Jagjit Martinez Jr., MD    Enclosure  CC:  No Recipients    If you would like to receive this communication electronically, please contact [email or web address] or [phone] to request more information on [employer] access.    For providers and/or their staff who would like to refer a patient to [facility], please contact us through our one-stop-shop provider referral line, [facility] , at [phone].    If you feel you have received this communication in error or would no longer like to receive these types of communications, please e-mail [email or web address]

## 2019-07-03 NOTE — TELEPHONE ENCOUNTER
MD called on overnight emergency call line by triage nurse requesting recommendations on blood pressure medications that patient was told to stop taking.     Per nurse report patient was told to discontinue BP medication by cardiology. Nurse on call contacted on call obgyn for recommendations due to patient being seen in January by an obgyn. Patient has been seen by multiple physicians and specialties since that time.     Declined to give recommendations as this is not an obstetric/gynecologic issue. Patient was seen for routine annual exam in January. Her BP medications are not managed by obgyn. Encourage triage nurse to reach out to patient's PCP or the provider who prescribes or manages her BP medications. Not an appropriate inquiry for on call obgyn service.     Asia Lopez MD  OBGYN, PGY-2

## 2019-07-03 NOTE — PROGRESS NOTES
[facility]  General Surgery  History & Physical    Patient Name: Caryl Cedeno  MRN: [id number]    Subjective:     Chief Complaint: Chronic cholecystitis    History of Present Illness:  Patient is a 39 y.o. female with Hx of HTN, fibromyalgia, migraines, celiac disease who presents to clinic today for evaluation for cholecystectomy. Patient reports sudden onset severe abdominal pain 2 weeks ago (6/20/19). She was seen by Internal Medicine at [facility] and then the ED where a CT abdomen/pelvis was unrevealing. She follow up with her established GI physician (Dr. Castañeda). Abdominal ultrasound obtained showing biliary sludge and some wall thickening consistent with chronic cholecystitis. Patient reports long history of migrating abdominal pain but this episode was difficulty.    Additionally, she reports a long history of mild transaminitis but had an acute elevation on labs at the time of the above. She was seen by Hepatology (Dr. An) for evaluation who felt that this was likely related to biliary disease rather than intrinsic liver dysfunction, as the LFTs were downtrending.      Current Outpatient Medications on File Prior to Visit   Medication Sig    cetirizine (ZYRTEC) 10 MG tablet Take 10 mg by mouth once daily.    docusate sodium (COLACE) 100 MG capsule Take 1 capsule (100 mg total) by mouth 2 (two) times daily as needed for Constipation.    FINACEA 15 % gel AAA face qam - bid    GAVILYTE-G 236-22.74-6.74 -5.86 gram suspension     ibuprofen (ADVIL,MOTRIN) 600 MG tablet Take 600 mg by mouth daily as needed for Pain.    losartan (COZAAR) 50 MG tablet Take 1 tablet (50 mg total) by mouth once daily.    pregabalin (LYRICA) 75 MG capsule Take 1 capsule (75 mg total) by mouth 2 (two) times daily.    SAVELLA 100 mg Tab TAKE 1 TABLET BY MOUTH TWICE A DAY    water Liqd 150 mL with MILK OF MAGNESIA 400 mg/5 mL Susp 400 mg, diphenhydrAMINE 12.5 mg/5 mL Elix 60 mg, nystatin 100,000  unit/mL Susp 500,000 Units SWISH AND SPIT 10ML'S BY MOUTH EVERY 4 HOURS    ZOMIG 5 mg Spry USE 1 SPRAY IN EACH NOSTRIL ONCE DAILY.     No current facility-administered medications on file prior to visit.      Review of patient's allergies indicates:   Allergen Reactions    Latex, natural rubber     Gluten protein Rash    Latex Rash     Past Medical History:   Diagnosis Date    Allergy     Celiac disease     Fibromyalgia     Hypertension     Migraines     Small fiber neuropathy      Past Surgical History:   Procedure Laterality Date    CERVICAL SPINE SURGERY  2016    SPINE SURGERY       Family History     Problem Relation (Age of Onset)    Autoimmune disease Father, Sister    Breast cancer Paternal Grandmother (30)    Cancer Maternal Grandfather, Mother, Other    Celiac disease Maternal Grandfather        Tobacco Use    Smoking status: Former Smoker    Smokeless tobacco: Never Used   Substance and Sexual Activity    Alcohol use: Yes     Comment: rarely     Drug use: No    Sexual activity: Yes     Partners: Male     Birth control/protection: None     Review of Systems   Constitutional: Negative for activity change, chills, fatigue and fever.   HENT: Negative for congestion, rhinorrhea and sore throat.    Eyes: Negative for visual disturbance.   Respiratory: Negative for cough, shortness of breath and wheezing.    Cardiovascular: Negative for chest pain, palpitations and leg swelling.   Gastrointestinal: Positive for abdominal pain and constipation. Negative for abdominal distention, diarrhea, nausea and vomiting.   Genitourinary: Negative for dysuria, frequency and hematuria.   Musculoskeletal: Negative for arthralgias and myalgias.   Skin: Negative for color change, rash and wound.   Neurological: Negative for syncope, weakness and numbness.   Hematological: Does not bruise/bleed easily.   Psychiatric/Behavioral: Negative for behavioral problems and confusion.     Objective:     Vital Signs (Most  Recent):  Temp: 98.4 °F (36.9 °C) (07/03/19 1141)  Pulse: (!) 122 (07/03/19 1141)  BP: (!) 182/113 (07/03/19 1141)     Weight: 85.2 kg (187 lb 11.6 oz)  Body mass index is 30.3 kg/m².    Physical Exam   Constitutional: She is oriented to person, place, and time. She appears well-developed and well-nourished. No distress.   HENT:   Head: Normocephalic and atraumatic.   Eyes: EOM are normal.   Neck: Normal range of motion.   Cardiovascular: Normal rate, regular rhythm and intact distal pulses.   Pulmonary/Chest: Effort normal. No respiratory distress. She has no wheezes.   Abdominal: Soft. She exhibits no distension. There is no tenderness.   Musculoskeletal: She exhibits no edema or deformity.   Neurological: She is alert and oriented to person, place, and time.   Skin: Skin is warm and dry. No rash noted. She is not diaphoretic. No erythema.   Psychiatric: She has a normal mood and affect. Her behavior is normal.   Vitals reviewed.      Significant Labs:  CMP: Reviewed    Significant Diagnostics:  US Abdomen (7/1/19): The gallbladder is filled with stones and sludge, with gallbladder wall thickening. There is no sonographic Duncan's sign. The common duct is not dilated, measuring 3 mm. No intrahepatic ductal dilatation.      Assessment/Plan:   40 y/o female with symptomatic cholelithiasis, chronic cholecystitis, and resolving transaminitis    - Relevant notes, imaging, and laboratory studies reviewed  - Plan for laparoscopic cholecystectomy and liver biopsy  - Risks, benefits, alternatives to the procedure discussed with the patient who wishes to proceed  - All questions and concerns addressed  - Informed consent obtained  - Repeat LFTs prior to elective surgery to ensure continued downward trend      Carlos Webb MD  Surgery Resident, PGY-IV  Pager: [phone]  7/3/2019 12:32 PM    I have personally taken the history and examined this patient and agree with the resident's note as stated above.         Jagjit  MD Juan

## 2019-07-06 ENCOUNTER — HOSPITAL ENCOUNTER (INPATIENT)
Facility: HOSPITAL | Age: 39
LOS: 4 days | Discharge: HOME | DRG: 417 | End: 2019-07-10
Attending: EMERGENCY MEDICINE | Admitting: SURGERY
Payer: COMMERCIAL

## 2019-07-06 ENCOUNTER — TELEPHONE (OUTPATIENT)
Dept: SURGERY | Facility: HOSPITAL | Age: 39
End: 2019-07-06

## 2019-07-06 DIAGNOSIS — K80.20 CHOLELITHIASIS: ICD-10-CM

## 2019-07-06 DIAGNOSIS — K85.10 ACUTE GALLSTONE PANCREATITIS: ICD-10-CM

## 2019-07-06 DIAGNOSIS — K81.2 ACUTE ON CHRONIC CHOLECYSTITIS: Primary | ICD-10-CM

## 2019-07-06 DIAGNOSIS — R00.0 TACHYCARDIA: ICD-10-CM

## 2019-07-06 DIAGNOSIS — K81.1 CHRONIC CHOLECYSTITIS: ICD-10-CM

## 2019-07-06 LAB
ABO + RH BLD: NORMAL
ALBUMIN SERPL BCP-MCNC: 4.5 G/DL (ref 3.5–5.2)
ALP SERPL-CCNC: 216 U/L (ref 55–135)
ALT SERPL W/O P-5'-P-CCNC: 663 U/L (ref 10–44)
ANION GAP SERPL CALC-SCNC: 11 MMOL/L (ref 8–16)
AST SERPL-CCNC: 633 U/L (ref 10–40)
B-HCG UR QL: NEGATIVE
BACTERIA #/AREA URNS AUTO: ABNORMAL /HPF
BASOPHILS # BLD AUTO: 0.07 K/UL (ref 0–0.2)
BASOPHILS NFR BLD: 0.4 % (ref 0–1.9)
BILIRUB SERPL-MCNC: 1.3 MG/DL (ref 0.1–1)
BILIRUB UR QL STRIP: NEGATIVE
BLD GP AB SCN CELLS X3 SERPL QL: NORMAL
BUN SERPL-MCNC: 16 MG/DL (ref 6–20)
CALCIUM SERPL-MCNC: 11.3 MG/DL (ref 8.7–10.5)
CHLORIDE SERPL-SCNC: 96 MMOL/L (ref 95–110)
CLARITY UR REFRACT.AUTO: ABNORMAL
CO2 SERPL-SCNC: 33 MMOL/L (ref 23–29)
COLOR UR AUTO: YELLOW
CREAT SERPL-MCNC: 1.2 MG/DL (ref 0.5–1.4)
CTP QC/QA: YES
DIFFERENTIAL METHOD: ABNORMAL
EOSINOPHIL # BLD AUTO: 0.1 K/UL (ref 0–0.5)
EOSINOPHIL NFR BLD: 0.6 % (ref 0–8)
ERYTHROCYTE [DISTWIDTH] IN BLOOD BY AUTOMATED COUNT: 13.5 % (ref 11.5–14.5)
EST. GFR  (AFRICAN AMERICAN): >60 ML/MIN/1.73 M^2
EST. GFR  (NON AFRICAN AMERICAN): 56.3 ML/MIN/1.73 M^2
GLUCOSE SERPL-MCNC: 143 MG/DL (ref 70–110)
GLUCOSE UR QL STRIP: NEGATIVE
HCT VFR BLD AUTO: 46.4 % (ref 37–48.5)
HGB BLD-MCNC: 15.6 G/DL (ref 12–16)
HGB UR QL STRIP: ABNORMAL
IMM GRANULOCYTES # BLD AUTO: 0.1 K/UL (ref 0–0.04)
IMM GRANULOCYTES NFR BLD AUTO: 0.6 % (ref 0–0.5)
INR PPP: 1.1 (ref 0.8–1.2)
KETONES UR QL STRIP: NEGATIVE
LEUKOCYTE ESTERASE UR QL STRIP: NEGATIVE
LIPASE SERPL-CCNC: >1000 U/L (ref 4–60)
LYMPHOCYTES # BLD AUTO: 1.4 K/UL (ref 1–4.8)
LYMPHOCYTES NFR BLD: 8.3 % (ref 18–48)
MCH RBC QN AUTO: 28.2 PG (ref 27–31)
MCHC RBC AUTO-ENTMCNC: 33.6 G/DL (ref 32–36)
MCV RBC AUTO: 84 FL (ref 82–98)
MICROSCOPIC COMMENT: ABNORMAL
MONOCYTES # BLD AUTO: 1.2 K/UL (ref 0.3–1)
MONOCYTES NFR BLD: 7.2 % (ref 4–15)
NEUTROPHILS # BLD AUTO: 14.1 K/UL (ref 1.8–7.7)
NEUTROPHILS NFR BLD: 82.9 % (ref 38–73)
NITRITE UR QL STRIP: NEGATIVE
NRBC BLD-RTO: 0 /100 WBC
PH UR STRIP: 8 [PH] (ref 5–8)
PLATELET # BLD AUTO: 396 K/UL (ref 150–350)
PMV BLD AUTO: 10.1 FL (ref 9.2–12.9)
POTASSIUM SERPL-SCNC: 3.2 MMOL/L (ref 3.5–5.1)
PROT SERPL-MCNC: 8.3 G/DL (ref 6–8.4)
PROT UR QL STRIP: NEGATIVE
PROTHROMBIN TIME: 10.8 SEC (ref 9–12.5)
RBC # BLD AUTO: 5.53 M/UL (ref 4–5.4)
RBC #/AREA URNS AUTO: 18 /HPF (ref 0–4)
SODIUM SERPL-SCNC: 140 MMOL/L (ref 136–145)
SP GR UR STRIP: 1.01 (ref 1–1.03)
SQUAMOUS #/AREA URNS AUTO: 2 /HPF
URN SPEC COLLECT METH UR: ABNORMAL
WBC # BLD AUTO: 17.04 K/UL (ref 3.9–12.7)
WBC #/AREA URNS AUTO: 10 /HPF (ref 0–5)

## 2019-07-06 PROCEDURE — 99285 PR EMERGENCY DEPT VISIT,LEVEL V: ICD-10-PCS | Mod: ,,, | Performed by: EMERGENCY MEDICINE

## 2019-07-06 PROCEDURE — 96375 TX/PRO/DX INJ NEW DRUG ADDON: CPT

## 2019-07-06 PROCEDURE — 63600175 PHARM REV CODE 636 W HCPCS: Performed by: STUDENT IN AN ORGANIZED HEALTH CARE EDUCATION/TRAINING PROGRAM

## 2019-07-06 PROCEDURE — 99284 EMERGENCY DEPT VISIT MOD MDM: CPT

## 2019-07-06 PROCEDURE — 25000003 PHARM REV CODE 250: Performed by: STUDENT IN AN ORGANIZED HEALTH CARE EDUCATION/TRAINING PROGRAM

## 2019-07-06 PROCEDURE — S0030 INJECTION, METRONIDAZOLE: HCPCS | Performed by: STUDENT IN AN ORGANIZED HEALTH CARE EDUCATION/TRAINING PROGRAM

## 2019-07-06 PROCEDURE — 11000001 HC ACUTE MED/SURG PRIVATE ROOM

## 2019-07-06 PROCEDURE — 96374 THER/PROPH/DIAG INJ IV PUSH: CPT

## 2019-07-06 PROCEDURE — 85610 PROTHROMBIN TIME: CPT

## 2019-07-06 PROCEDURE — 81025 URINE PREGNANCY TEST: CPT | Performed by: EMERGENCY MEDICINE

## 2019-07-06 PROCEDURE — 86901 BLOOD TYPING SEROLOGIC RH(D): CPT

## 2019-07-06 PROCEDURE — 85025 COMPLETE CBC W/AUTO DIFF WBC: CPT

## 2019-07-06 PROCEDURE — 63600175 PHARM REV CODE 636 W HCPCS: Performed by: EMERGENCY MEDICINE

## 2019-07-06 PROCEDURE — 80053 COMPREHEN METABOLIC PANEL: CPT

## 2019-07-06 PROCEDURE — 81001 URINALYSIS AUTO W/SCOPE: CPT

## 2019-07-06 PROCEDURE — 83690 ASSAY OF LIPASE: CPT

## 2019-07-06 PROCEDURE — 99285 EMERGENCY DEPT VISIT HI MDM: CPT | Mod: ,,, | Performed by: EMERGENCY MEDICINE

## 2019-07-06 RX ORDER — SODIUM CHLORIDE 0.9 % (FLUSH) 0.9 %
10 SYRINGE (ML) INJECTION
Status: DISCONTINUED | OUTPATIENT
Start: 2019-07-06 | End: 2019-07-10 | Stop reason: HOSPADM

## 2019-07-06 RX ORDER — ACETAMINOPHEN 325 MG/1
650 TABLET ORAL EVERY 8 HOURS PRN
Status: DISCONTINUED | OUTPATIENT
Start: 2019-07-06 | End: 2019-07-10 | Stop reason: HOSPADM

## 2019-07-06 RX ORDER — SODIUM CHLORIDE 9 MG/ML
INJECTION, SOLUTION INTRAVENOUS CONTINUOUS
Status: DISCONTINUED | OUTPATIENT
Start: 2019-07-06 | End: 2019-07-09

## 2019-07-06 RX ORDER — HYDROCHLOROTHIAZIDE 25 MG/1
25 TABLET ORAL DAILY
COMMUNITY
End: 2021-09-13

## 2019-07-06 RX ORDER — CIPROFLOXACIN 2 MG/ML
400 INJECTION, SOLUTION INTRAVENOUS
Status: DISCONTINUED | OUTPATIENT
Start: 2019-07-06 | End: 2019-07-07

## 2019-07-06 RX ORDER — METRONIDAZOLE 500 MG/100ML
500 INJECTION, SOLUTION INTRAVENOUS
Status: DISCONTINUED | OUTPATIENT
Start: 2019-07-06 | End: 2019-07-07

## 2019-07-06 RX ORDER — MORPHINE SULFATE 2 MG/ML
3 INJECTION, SOLUTION INTRAMUSCULAR; INTRAVENOUS EVERY 4 HOURS PRN
Status: DISCONTINUED | OUTPATIENT
Start: 2019-07-06 | End: 2019-07-10 | Stop reason: HOSPADM

## 2019-07-06 RX ORDER — MORPHINE SULFATE 4 MG/ML
4 INJECTION, SOLUTION INTRAMUSCULAR; INTRAVENOUS
Status: COMPLETED | OUTPATIENT
Start: 2019-07-06 | End: 2019-07-06

## 2019-07-06 RX ORDER — ONDANSETRON 2 MG/ML
4 INJECTION INTRAMUSCULAR; INTRAVENOUS
Status: COMPLETED | OUTPATIENT
Start: 2019-07-06 | End: 2019-07-06

## 2019-07-06 RX ORDER — ONDANSETRON 2 MG/ML
8 INJECTION INTRAMUSCULAR; INTRAVENOUS EVERY 12 HOURS PRN
Status: DISCONTINUED | OUTPATIENT
Start: 2019-07-06 | End: 2019-07-07

## 2019-07-06 RX ORDER — LIDOCAINE HYDROCHLORIDE 10 MG/ML
1 INJECTION, SOLUTION EPIDURAL; INFILTRATION; INTRACAUDAL; PERINEURAL ONCE
Status: DISCONTINUED | OUTPATIENT
Start: 2019-07-06 | End: 2019-07-10 | Stop reason: HOSPADM

## 2019-07-06 RX ADMIN — SODIUM CHLORIDE: 0.9 INJECTION, SOLUTION INTRAVENOUS at 06:07

## 2019-07-06 RX ADMIN — METRONIDAZOLE 500 MG: 500 INJECTION, SOLUTION INTRAVENOUS at 10:07

## 2019-07-06 RX ADMIN — ONDANSETRON 4 MG: 2 INJECTION INTRAMUSCULAR; INTRAVENOUS at 05:07

## 2019-07-06 RX ADMIN — MORPHINE SULFATE 4 MG: 4 INJECTION INTRAVENOUS at 05:07

## 2019-07-06 RX ADMIN — METRONIDAZOLE 500 MG: 500 INJECTION, SOLUTION INTRAVENOUS at 03:07

## 2019-07-06 RX ADMIN — ONDANSETRON 8 MG: 2 INJECTION INTRAMUSCULAR; INTRAVENOUS at 09:07

## 2019-07-06 RX ADMIN — CIPROFLOXACIN 400 MG: 2 INJECTION, SOLUTION INTRAVENOUS at 08:07

## 2019-07-06 RX ADMIN — METRONIDAZOLE 500 MG: 500 INJECTION, SOLUTION INTRAVENOUS at 06:07

## 2019-07-06 NOTE — ED TRIAGE NOTES
"Caryl Cedeno, a 39 y.o. female presents to the ED w/ complaint of abdominal pain, right upper quadrant. The pain has been ongoing. The pain is described as a pressure/fullness and also a achy pain. The pain is sometimes sharp. The Pain is better with some positioning.     Patient also reports some back pain as well    Patient tried eating and vomited       Patient scheduled for gall bladder removal in August, reports that the pain is " too bad" now       Triage note:  Chief Complaint   Patient presents with    Abdominal Pain     4 episodes of vomitting tonight, "scheduled to have gallblader removed in August but the pain is bad".      Review of patient's allergies indicates:   Allergen Reactions    Latex, natural rubber     Gluten protein Rash    Latex Rash     Past Medical History:   Diagnosis Date    Allergy     Celiac disease     Fibromyalgia     Hypertension     Migraines     Small fiber neuropathy        "

## 2019-07-06 NOTE — SUBJECTIVE & OBJECTIVE
No current facility-administered medications on file prior to encounter.      Current Outpatient Medications on File Prior to Encounter   Medication Sig    cetirizine (ZYRTEC) 10 MG tablet Take 10 mg by mouth once daily.    docusate sodium (COLACE) 100 MG capsule Take 1 capsule (100 mg total) by mouth 2 (two) times daily as needed for Constipation.    hydroCHLOROthiazide (HYDRODIURIL) 25 MG tablet Take 25 mg by mouth once daily.    ibuprofen (ADVIL,MOTRIN) 600 MG tablet Take 600 mg by mouth daily as needed for Pain.    losartan (COZAAR) 50 MG tablet Take 1 tablet (50 mg total) by mouth once daily.    pregabalin (LYRICA) 75 MG capsule Take 1 capsule (75 mg total) by mouth 2 (two) times daily.    SAVELLA 100 mg Tab TAKE 1 TABLET BY MOUTH TWICE A DAY    ZOMIG 5 mg Spry USE 1 SPRAY IN EACH NOSTRIL ONCE DAILY.    FINACEA 15 % gel AAA face qam - bid    GAVILYTE-G 236-22.74-6.74 -5.86 gram suspension     water Liqd 150 mL with MILK OF MAGNESIA 400 mg/5 mL Susp 400 mg, diphenhydrAMINE 12.5 mg/5 mL Elix 60 mg, nystatin 100,000 unit/mL Susp 500,000 Units SWISH AND SPIT 10ML'S BY MOUTH EVERY 4 HOURS       Review of patient's allergies indicates:   Allergen Reactions    Latex, natural rubber     Gluten protein Rash    Latex Rash       Past Medical History:   Diagnosis Date    Allergy     Celiac disease     Fibromyalgia     Hypertension     Migraines     Small fiber neuropathy      Past Surgical History:   Procedure Laterality Date    CERVICAL SPINE SURGERY  2016    SPINE SURGERY       Family History     Problem Relation (Age of Onset)    Autoimmune disease Father, Sister    Breast cancer Paternal Grandmother (30)    Cancer Maternal Grandfather, Mother, Other    Celiac disease Maternal Grandfather        Tobacco Use    Smoking status: Former Smoker    Smokeless tobacco: Never Used   Substance and Sexual Activity    Alcohol use: Yes     Comment: rarely     Drug use: No    Sexual activity: Yes      Partners: Male     Birth control/protection: None     Review of Systems   Constitutional: Positive for appetite change. Negative for chills, fatigue and fever.   HENT: Negative for congestion and sneezing.    Eyes: Negative for pain and redness.   Respiratory: Negative for cough and shortness of breath.    Cardiovascular: Negative for chest pain and palpitations.   Gastrointestinal: Positive for abdominal pain, nausea and vomiting. Negative for abdominal distention, constipation and diarrhea.   Genitourinary: Negative for flank pain and hematuria.   Musculoskeletal: Negative for back pain.   Skin: Negative for pallor and wound.   Neurological: Negative for light-headedness, numbness and headaches.   Hematological: Does not bruise/bleed easily.   Psychiatric/Behavioral: Negative for confusion. The patient is not nervous/anxious.      Objective:     Vital Signs (Most Recent):  Temp: 98.1 °F (36.7 °C) (07/06/19 0424)  Pulse: 92 (07/06/19 0632)  Resp: 20 (07/06/19 0520)  BP: 131/87 (07/06/19 0632)  SpO2: 98 % (07/06/19 0601) Vital Signs (24h Range):  Temp:  [98.1 °F (36.7 °C)] 98.1 °F (36.7 °C)  Pulse:  [] 92  Resp:  [12-20] 20  SpO2:  [98 %-100 %] 98 %  BP: (128-158)/(80-90) 131/87     Weight: 83.9 kg (185 lb)  Body mass index is 29.86 kg/m².    Physical Exam   Constitutional: She is oriented to person, place, and time. She appears well-developed and well-nourished.   Eyes: EOM are normal.   Neck: Normal range of motion. No tracheal deviation present.   Cardiovascular: Normal rate and regular rhythm.   Pulmonary/Chest: Effort normal. No respiratory distress.   Abdominal: Soft. She exhibits no distension. There is tenderness (RUQ).   Negative Duncan sign  Mild epigastric pain on deep palpation   Neurological: She is alert and oriented to person, place, and time.   Psychiatric: She has a normal mood and affect. Her behavior is normal.   Vitals reviewed.      Significant Labs:  Reviewed    Significant  Diagnostics:  F/u RUQ u/s

## 2019-07-06 NOTE — ED NOTES
Patient identifiers verified and correct for Caryl Cedeno.    LOC: The patient is awake, alert and aware of environment with an appropriate affect, the patient is oriented x 3 and speaking appropriately.    APPEARANCE: Patient resting comfortably and in no acute distress, patient is clean and well groomed, patient's clothing is properly fastened.    SKIN: The skin is warm and dry, color consistent with ethnicity, patient has normal skin turgor and moist mucus membranes, skin intact, no breakdown or bruising noted.    MUSCULOSKELETAL: Patient moving all extremities spontaneously, no obvious swelling or deformities noted.    RESPIRATORY: Airway is open and patent, respirations are spontaneous, patient has a normal effort and rate, no accessory muscle use noted, bilateral breath sounds clear    CARDIAC: Patient has a normal rate and regular rhythm, no periphreal edema noted, capillary refill < 3 seconds.    ABDOMEN: Right upper quadrant tender to palpation, no distention noted, normoactive bowel sounds present in all four quadrants.     NEUROLOGIC: PERRLA, 3 mm bilaterally, eyes open spontaneously, behavior appropriate to situation, follows commands, facial expression symmetrical, bilateral hand grasp equal and even, purposeful motor response noted, normal sensation in all extremities when touched with a finger.

## 2019-07-06 NOTE — ED NOTES
Floor unable to take report until dc pt's are dc'd. Staffing at capacity despite room ready. Charge nurse aware.

## 2019-07-06 NOTE — TELEPHONE ENCOUNTER
Patient called because she is experiencing another episode of abdominal pain similar to that she experienced on 6/20/19. Patient states pain begin earlier/mid-day on 070/5 and has been progressive since onset. She has had N/V and feelings of abdominal bloating. She measured her temperature at home and is afebrile. She is schedule for laparoscopic cholecystectomy on 08/05 with Dr. Martinez. Patient advised that given her reported severity of symptoms, PO intolerance and unremitting course she may come into the ED for further evaluation.

## 2019-07-06 NOTE — ED PROVIDER NOTES
"Encounter Date: 7/6/2019    SCRIBE #1 NOTE: I, Millie Luciot, am scribing for, and in the presence of,  Dr. Jimenez. I have scribed the entire note.       History     Chief Complaint   Patient presents with    Abdominal Pain     4 episodes of vomitting tonight, "scheduled to have gallblader removed in August but the pain is bad".      Time patient was seen by the provider: 5:20 AM      The patient is a 39 y.o. female with co-morbidities including: hypertension and Celiac disease who presents to the ED with a complaint of abdominal pain since last night. She states that her pain is constant and that "it comes and goes" in terms of intensity. Pt admits to nausea and vomiting. She states that she has vomited four times. Pt states that her nausea has improved since she vomited. She describes her pain as "radiating through to her back."    The history is provided by the patient and medical records.     Review of patient's allergies indicates:   Allergen Reactions    Latex, natural rubber     Gluten protein Rash    Latex Rash     Past Medical History:   Diagnosis Date    Allergy     Celiac disease     Fibromyalgia     Hypertension     Migraines     Small fiber neuropathy      Past Surgical History:   Procedure Laterality Date    BIOPSY, LIVER  7/9/2019    Performed by Jagjit Martinez Jr., MD at [facility] OR [address] [place] [address]    CERVICAL SPINE SURGERY  2016    CHOLECYSTECTOMY, LAPAROSCOPIC N/A 7/9/2019    Performed by Jagjit Martinez Jr., MD at [facility] OR [address] [place] [address]    SPINE SURGERY       Family History   Problem Relation Age of Onset    Autoimmune disease Father     Autoimmune disease Sister     Celiac disease Maternal Grandfather     Cancer Maternal Grandfather         rectal ca, intestinal ca, brain ca    Breast cancer Paternal Grandmother 30        had breast ca 3x, unknown if unilat vs bilat    Cancer Mother         non-melanoma skin ca    Cancer Other         pat great-aunt with ca of unk origin    Colon " cancer Neg Hx     Esophageal cancer Neg Hx     Ovarian cancer Neg Hx     Melanoma Neg Hx      Social History     Tobacco Use    Smoking status: Former Smoker    Smokeless tobacco: Never Used   Substance Use Topics    Alcohol use: Yes     Comment: rarely     Drug use: No     Review of Systems   Constitutional: Negative for chills and fever.   HENT: Negative for hearing loss.    Eyes: Negative for visual disturbance.        Neg vision changes   Respiratory: Negative for cough and shortness of breath.    Cardiovascular: Negative for chest pain and leg swelling.   Gastrointestinal: Positive for abdominal pain, nausea and vomiting. Negative for diarrhea.        Neg changes in stool   Genitourinary:        Neg changes in urination   Musculoskeletal: Negative for arthralgias and joint swelling.   Skin: Negative for rash.   Allergic/Immunologic: Negative for immunocompromised state.   Neurological: Negative for headaches.       Physical Exam     Initial Vitals [07/06/19 0424]   BP Pulse Resp Temp SpO2   (!) 131/90 108 18 98.1 °F (36.7 °C) 99 %      MAP       --         Physical Exam    Nursing note and vitals reviewed.  Constitutional: She appears well-developed and well-nourished. She is not diaphoretic. No distress.   HENT:   Head: Normocephalic and atraumatic.   Nose: Nose normal.   Eyes: EOM are normal. Pupils are equal, round, and reactive to light.   Neck: Normal range of motion. Neck supple.   Cardiovascular: Normal rate and regular rhythm.   Pulmonary/Chest: Breath sounds normal. No respiratory distress. She has no wheezes. She has no rhonchi. She has no rales. She exhibits no tenderness.   Abdominal: Soft. Bowel sounds are normal. She exhibits no distension. There is tenderness (very tender in epigastrum, moderately tender in RUQ). There is no rebound.   No CVA tenderness. Normal bowel sounds in all four quadrants.   Musculoskeletal: Normal range of motion.   Neurological: She is alert and oriented to  person, place, and time. She has normal strength.   Skin: Skin is warm and dry. No rash noted.   Psychiatric: She has a normal mood and affect. Her behavior is normal. Thought content normal.         ED Course   Procedures  Labs Reviewed   CBC W/ AUTO DIFFERENTIAL - Abnormal; Notable for the following components:       Result Value    WBC 17.04 (*)     RBC 5.53 (*)     Platelets 396 (*)     Immature Granulocytes 0.6 (*)     Gran # (ANC) 14.1 (*)     Immature Grans (Abs) 0.10 (*)     Mono # 1.2 (*)     Gran% 82.9 (*)     Lymph% 8.3 (*)     All other components within normal limits   COMPREHENSIVE METABOLIC PANEL - Abnormal; Notable for the following components:    Potassium 3.2 (*)     CO2 33 (*)     Glucose 143 (*)     Calcium 11.3 (*)     Total Bilirubin 1.3 (*)     Alkaline Phosphatase 216 (*)      (*)      (*)     eGFR if non  56.3 (*)     All other components within normal limits   LIPASE - Abnormal; Notable for the following components:    Lipase >1000 (*)     All other components within normal limits   URINALYSIS, REFLEX TO URINE CULTURE - Abnormal; Notable for the following components:    Appearance, UA Cloudy (*)     Occult Blood UA 2+ (*)     All other components within normal limits    Narrative:     Preferred Collection Type->Urine, Clean Catch   URINALYSIS MICROSCOPIC - Abnormal; Notable for the following components:    RBC, UA 18 (*)     WBC, UA 10 (*)     Bacteria Many (*)     All other components within normal limits    Narrative:     Preferred Collection Type->Urine, Clean Catch   PROTIME-INR   POCT URINE PREGNANCY   TYPE & SCREEN        ECG Results    None       Imaging Results          US Abdomen Limited (Gallbladder) (Final result)  Result time 07/06/19 08:34:31    Final result by Marlys Colin MD (07/06/19 08:34:31)                 Impression:      Cholelithiasis and probable stones with persistent gallbladder wall thickening, findings remain suspicious for  possible chronic cholecystitis and overall similar to prior study.  Further evaluation could be obtained with nuclear medicine HIDA scan if clinically warranted.    Electronically signed by resident: Natanael Crawford  Date:    07/06/2019  Time:    08:21    Electronically signed by: Marlys Colin MD  Date:    07/06/2019  Time:    08:34             Narrative:    EXAMINATION:  US ABDOMEN LIMITED    CLINICAL HISTORY:  Calculus of gallbladder without cholecystitis without obstruction    TECHNIQUE:  Limited ultrasound of the right upper quadrant of the abdomen focused on the biliary system was performed.    COMPARISON:  Limited abdominal ultrasound from 07/01/2019.  CT abdomen/pelvis from 06/20/2019.    FINDINGS:  Gallbladder: Multiple calcified gallstones and probable sludge, largest stone measures 1.4 cm in the gallbladder neck.  Persistent circumferential gallbladder wall thickening measuring up to 6 mm, previously 7 mm.  No significant pericholecystic fluid or hypervascularity.  Negative Duncan's sign.    Biliary system: The common duct is not dilated, measuring 3 mm. No intrahepatic ductal dilatation.    Pancreas: The visualized portions of pancreas appear normal.    Miscellaneous: No upper abdominal ascites and no abnormalities of the visualized liver.                                 Medical Decision Making:   History:   Old Medical Records: I decided to obtain old medical records.  Initial Assessment:   39-year-old female with known cholelithiasis and celiac disease presenting with worsening right upper quadrant/epigastric pain and nausea in the past several days.  Scheduled for cholecystectomy on August 5th, however patient unable to tolerate symptoms.  Denies fever or chills. On exam patient is in no distress, however she is very tender in right upper quadrant/epigastrium without rebound   Differential Diagnosis:   Cholelithiasis, cholecystitis, choledocholithiasis, liver failure, pancreatitis, gastritis,  GERD  Clinical Tests:   Lab Tests: Ordered and Reviewed  Radiological Study: Ordered and Reviewed  ED Management:  New leukocytosis to 17, as well as newly elevated AST/ALT to 633/663, lipase over 1000, T bili 1.3.  Gen Surgery will admit patient however right upper quadrant ultrasound requested given new transaminitis.   Other:   I have discussed this case with another health care provider.            Scribe Attestation:   Scribe #1: I performed the above scribed service and the documentation accurately describes the services I performed. I attest to the accuracy of the note.               Clinical Impression:       ICD-10-CM ICD-9-CM   1. Acute on chronic cholecystitis K81.2 575.12   2. Cholelithiasis K80.20 574.20   3. Chronic cholecystitis K81.1 575.11   4. Tachycardia R00.0 785.0   5. Acute gallstone pancreatitis K85.10 577.0     574.20                                Itzel Jimenez MD  07/15/19 [id number]

## 2019-07-06 NOTE — ASSESSMENT & PLAN NOTE
40 y/o F with hx of cholelithiasis presents with gallstone pancreatitis     Admit to General Surgery  NPO  mIVF  IV cipro/flagyl  Prn pain/nausea control  F/u RUQ ultrasound  Surgical consent obtained prior to lipase resulting; will likely defer cholecystectomy until improvement in abdominal pain +/- AES intervention pending U/s  Hold home meds for essential HTN    Discussed with Dr. Gaffney

## 2019-07-06 NOTE — NURSING
Paged Dr Gaffney's team, patient requesting home medications savella and lyrica bid, also requesting ice chips if possible, currently NPO.

## 2019-07-06 NOTE — HPI
38 y/o F with hx of cholelithiasis, HTN, fibromyalgia, migraines, celiac disease presents to the ED with acute onset of RUQ pain. Pt was recently diagnosed and tentatively scheduled for a cholecystectomy in 8/19 but had an acute onset of RUQ along with nausea and small volume non-bilious, non-bloody emesis. Denies fever and chills. Reports similar pain to last ED presentation. Rates pain as 10/10 and does not radiate. Also having epigastric bloating/fullness over the past week. No previous abdominal surgery.

## 2019-07-06 NOTE — ED NOTES
Pt remains on cardiac monitor, continuous pulse ox, cycling blood pressures. Side rails up x2, call bell in reach, bed in low position with brake engaged.  at bedside. Pt aware of NPO status. Waiting for ultrasound. IV NS infusing at 125ml/hr, flagyl infusing per pump. Site asymptomatic.  Skin w/d, respirations even and unlabored. Offers no c/o's at this tome. VS's stable.Will continue to monitor.

## 2019-07-06 NOTE — PLAN OF CARE
Problem: Adult Inpatient Plan of Care  Goal: Plan of Care Review  Patient AAOX4, call light and belongings in reach, siderails up x2, spouse at bedside.  Patient remains NPO at this time, no complaints of N/V, denies pain at this time requiring pain medication.  IV site infusing and clean, dry and intact.  Patient remains free from falls and safety maintained this shift.

## 2019-07-06 NOTE — H&P
[facility]  General Surgery  History & Physical    Patient Name: Caryl Cedeno  MRN: [id number]  Admission Date: 7/6/2019  Attending Physician:Ramírez Gaffney MD  Primary Care Provider: Primary Doctor No    Patient information was obtained from patient, spouse/SO and ER records.     Subjective:     Chief Complaint/Reason for Admission: Gallstone pancreatitis     History of Present Illness: 38 y/o F with hx of cholelithiasis, HTN, fibromyalgia, migraines, celiac disease presents to the ED with acute onset of RUQ pain. Pt was recently diagnosed and tentatively scheduled for a cholecystectomy in 8/19 but had an acute onset of RUQ along with nausea and small volume non-bilious, non-bloody emesis. Denies fever and chills. Reports similar pain to last ED presentation. Rates pain as 10/10 and does not radiate. Also having epigastric bloating/fullness over the past week. No previous abdominal surgery.     No current facility-administered medications on file prior to encounter.      Current Outpatient Medications on File Prior to Encounter   Medication Sig    cetirizine (ZYRTEC) 10 MG tablet Take 10 mg by mouth once daily.    docusate sodium (COLACE) 100 MG capsule Take 1 capsule (100 mg total) by mouth 2 (two) times daily as needed for Constipation.    hydroCHLOROthiazide (HYDRODIURIL) 25 MG tablet Take 25 mg by mouth once daily.    ibuprofen (ADVIL,MOTRIN) 600 MG tablet Take 600 mg by mouth daily as needed for Pain.    losartan (COZAAR) 50 MG tablet Take 1 tablet (50 mg total) by mouth once daily.    pregabalin (LYRICA) 75 MG capsule Take 1 capsule (75 mg total) by mouth 2 (two) times daily.    SAVELLA 100 mg Tab TAKE 1 TABLET BY MOUTH TWICE A DAY    ZOMIG 5 mg Spry USE 1 SPRAY IN EACH NOSTRIL ONCE DAILY.    FINACEA 15 % gel AAA face qam - bid    GAVILYTE-G 236-22.74-6.74 -5.86 gram suspension     water Liqd 150 mL with MILK OF MAGNESIA 400 mg/5 mL Susp 400 mg, diphenhydrAMINE 12.5 mg/5  mL Elix 60 mg, nystatin 100,000 unit/mL Susp 500,000 Units SWISH AND SPIT 10ML'S BY MOUTH EVERY 4 HOURS       Review of patient's allergies indicates:   Allergen Reactions    Latex, natural rubber     Gluten protein Rash    Latex Rash       Past Medical History:   Diagnosis Date    Allergy     Celiac disease     Fibromyalgia     Hypertension     Migraines     Small fiber neuropathy      Past Surgical History:   Procedure Laterality Date    CERVICAL SPINE SURGERY  2016    SPINE SURGERY       Family History     Problem Relation (Age of Onset)    Autoimmune disease Father, Sister    Breast cancer Paternal Grandmother (30)    Cancer Maternal Grandfather, Mother, Other    Celiac disease Maternal Grandfather        Tobacco Use    Smoking status: Former Smoker    Smokeless tobacco: Never Used   Substance and Sexual Activity    Alcohol use: Yes     Comment: rarely     Drug use: No    Sexual activity: Yes     Partners: Male     Birth control/protection: None     Review of Systems   Constitutional: Positive for appetite change. Negative for chills, fatigue and fever.   HENT: Negative for congestion and sneezing.    Eyes: Negative for pain and redness.   Respiratory: Negative for cough and shortness of breath.    Cardiovascular: Negative for chest pain and palpitations.   Gastrointestinal: Positive for abdominal pain, nausea and vomiting. Negative for abdominal distention, constipation and diarrhea.   Genitourinary: Negative for flank pain and hematuria.   Musculoskeletal: Negative for back pain.   Skin: Negative for pallor and wound.   Neurological: Negative for light-headedness, numbness and headaches.   Hematological: Does not bruise/bleed easily.   Psychiatric/Behavioral: Negative for confusion. The patient is not nervous/anxious.      Objective:     Vital Signs (Most Recent):  Temp: 98.1 °F (36.7 °C) (07/06/19 0424)  Pulse: 92 (07/06/19 0632)  Resp: 20 (07/06/19 0520)  BP: 131/87 (07/06/19 0632)  SpO2: 98  % (07/06/19 0601) Vital Signs (24h Range):  Temp:  [98.1 °F (36.7 °C)] 98.1 °F (36.7 °C)  Pulse:  [] 92  Resp:  [12-20] 20  SpO2:  [98 %-100 %] 98 %  BP: (128-158)/(80-90) 131/87     Weight: 83.9 kg (185 lb)  Body mass index is 29.86 kg/m².    Physical Exam   Constitutional: She is oriented to person, place, and time. She appears well-developed and well-nourished.   Eyes: EOM are normal.   Neck: Normal range of motion. No tracheal deviation present.   Cardiovascular: Normal rate and regular rhythm.   Pulmonary/Chest: Effort normal. No respiratory distress.   Abdominal: Soft. She exhibits no distension. There is tenderness (RUQ).   Negative Duncan sign  Mild epigastric pain on deep palpation   Neurological: She is alert and oriented to person, place, and time.   Psychiatric: She has a normal mood and affect. Her behavior is normal.   Vitals reviewed.      Significant Labs:  Reviewed    Significant Diagnostics:  F/u RUQ u/s    Assessment/Plan:     Acute gallstone pancreatitis  38 y/o F with hx of cholelithiasis presents with gallstone pancreatitis     Admit to General Surgery  NPO  mIVF  IV cipro/flagyl  Prn pain/nausea control  F/u RUQ ultrasound  Surgical consent obtained prior to lipase resulting; will likely defer cholecystectomy until improvement in abdominal pain +/- AES intervention pending U/s  Hold home meds for essential HTN    Discussed with Dr. Gaffney      VTE Risk Mitigation (From admission, onward)        Ordered     Place sequential compression device  Until discontinued      07/06/19 0628     IP VTE HIGH RISK PATIENT  Once      07/06/19 0628          Justino Le MD  General Surgery  [facility]

## 2019-07-07 ENCOUNTER — PATIENT MESSAGE (OUTPATIENT)
Dept: SURGERY | Facility: HOSPITAL | Age: 39
End: 2019-07-07

## 2019-07-07 PROBLEM — R17 TOTAL BILIRUBIN, ELEVATED: Status: ACTIVE | Noted: 2019-07-07

## 2019-07-07 LAB
ALBUMIN SERPL BCP-MCNC: 3.6 G/DL (ref 3.5–5.2)
ALP SERPL-CCNC: 225 U/L (ref 55–135)
ALT SERPL W/O P-5'-P-CCNC: 1065 U/L (ref 10–44)
ANION GAP SERPL CALC-SCNC: 12 MMOL/L (ref 8–16)
AST SERPL-CCNC: 492 U/L (ref 10–40)
BASOPHILS # BLD AUTO: 0.03 K/UL (ref 0–0.2)
BASOPHILS NFR BLD: 0.5 % (ref 0–1.9)
BILIRUB SERPL-MCNC: 1.8 MG/DL (ref 0.1–1)
BUN SERPL-MCNC: 10 MG/DL (ref 6–20)
CALCIUM SERPL-MCNC: 8.8 MG/DL (ref 8.7–10.5)
CHLORIDE SERPL-SCNC: 102 MMOL/L (ref 95–110)
CO2 SERPL-SCNC: 26 MMOL/L (ref 23–29)
CREAT SERPL-MCNC: 0.8 MG/DL (ref 0.5–1.4)
DIFFERENTIAL METHOD: NORMAL
EOSINOPHIL # BLD AUTO: 0.1 K/UL (ref 0–0.5)
EOSINOPHIL NFR BLD: 1.7 % (ref 0–8)
ERYTHROCYTE [DISTWIDTH] IN BLOOD BY AUTOMATED COUNT: 13.6 % (ref 11.5–14.5)
EST. GFR  (AFRICAN AMERICAN): >60 ML/MIN/1.73 M^2
EST. GFR  (NON AFRICAN AMERICAN): >60 ML/MIN/1.73 M^2
GLUCOSE SERPL-MCNC: 78 MG/DL (ref 70–110)
HCT VFR BLD AUTO: 40.8 % (ref 37–48.5)
HGB BLD-MCNC: 13.4 G/DL (ref 12–16)
IMM GRANULOCYTES # BLD AUTO: 0.02 K/UL (ref 0–0.04)
IMM GRANULOCYTES NFR BLD AUTO: 0.3 % (ref 0–0.5)
LYMPHOCYTES # BLD AUTO: 1.2 K/UL (ref 1–4.8)
LYMPHOCYTES NFR BLD: 18.4 % (ref 18–48)
MCH RBC QN AUTO: 28.6 PG (ref 27–31)
MCHC RBC AUTO-ENTMCNC: 32.8 G/DL (ref 32–36)
MCV RBC AUTO: 87 FL (ref 82–98)
MONOCYTES # BLD AUTO: 0.6 K/UL (ref 0.3–1)
MONOCYTES NFR BLD: 8.9 % (ref 4–15)
NEUTROPHILS # BLD AUTO: 4.6 K/UL (ref 1.8–7.7)
NEUTROPHILS NFR BLD: 70.2 % (ref 38–73)
NRBC BLD-RTO: 0 /100 WBC
PLATELET # BLD AUTO: 329 K/UL (ref 150–350)
PMV BLD AUTO: 10.8 FL (ref 9.2–12.9)
POTASSIUM SERPL-SCNC: 3.1 MMOL/L (ref 3.5–5.1)
PROT SERPL-MCNC: 6.5 G/DL (ref 6–8.4)
RBC # BLD AUTO: 4.69 M/UL (ref 4–5.4)
SODIUM SERPL-SCNC: 140 MMOL/L (ref 136–145)
WBC # BLD AUTO: 6.51 K/UL (ref 3.9–12.7)

## 2019-07-07 PROCEDURE — 25000003 PHARM REV CODE 250: Performed by: STUDENT IN AN ORGANIZED HEALTH CARE EDUCATION/TRAINING PROGRAM

## 2019-07-07 PROCEDURE — 85025 COMPLETE CBC W/AUTO DIFF WBC: CPT

## 2019-07-07 PROCEDURE — A9585 GADOBUTROL INJECTION: HCPCS | Performed by: SURGERY

## 2019-07-07 PROCEDURE — S0030 INJECTION, METRONIDAZOLE: HCPCS | Performed by: STUDENT IN AN ORGANIZED HEALTH CARE EDUCATION/TRAINING PROGRAM

## 2019-07-07 PROCEDURE — 63600175 PHARM REV CODE 636 W HCPCS: Performed by: STUDENT IN AN ORGANIZED HEALTH CARE EDUCATION/TRAINING PROGRAM

## 2019-07-07 PROCEDURE — 36415 COLL VENOUS BLD VENIPUNCTURE: CPT

## 2019-07-07 PROCEDURE — 25500020 PHARM REV CODE 255: Performed by: SURGERY

## 2019-07-07 PROCEDURE — 11000001 HC ACUTE MED/SURG PRIVATE ROOM

## 2019-07-07 PROCEDURE — 80053 COMPREHEN METABOLIC PANEL: CPT

## 2019-07-07 RX ORDER — DOCUSATE SODIUM 100 MG/1
100 CAPSULE, LIQUID FILLED ORAL 2 TIMES DAILY PRN
Status: DISCONTINUED | OUTPATIENT
Start: 2019-07-07 | End: 2019-07-10 | Stop reason: HOSPADM

## 2019-07-07 RX ORDER — PREGABALIN 75 MG/1
75 CAPSULE ORAL 2 TIMES DAILY
Status: DISCONTINUED | OUTPATIENT
Start: 2019-07-07 | End: 2019-07-10 | Stop reason: HOSPADM

## 2019-07-07 RX ORDER — ONDANSETRON 2 MG/ML
8 INJECTION INTRAMUSCULAR; INTRAVENOUS EVERY 6 HOURS PRN
Status: DISCONTINUED | OUTPATIENT
Start: 2019-07-07 | End: 2019-07-10 | Stop reason: HOSPADM

## 2019-07-07 RX ORDER — CETIRIZINE HYDROCHLORIDE 5 MG/1
10 TABLET ORAL DAILY
Status: DISCONTINUED | OUTPATIENT
Start: 2019-07-07 | End: 2019-07-10 | Stop reason: HOSPADM

## 2019-07-07 RX ORDER — HYDRALAZINE HYDROCHLORIDE 25 MG/1
25 TABLET, FILM COATED ORAL EVERY 6 HOURS PRN
Status: DISCONTINUED | OUTPATIENT
Start: 2019-07-07 | End: 2019-07-10 | Stop reason: HOSPADM

## 2019-07-07 RX ORDER — GADOBUTROL 604.72 MG/ML
10 INJECTION INTRAVENOUS
Status: COMPLETED | OUTPATIENT
Start: 2019-07-07 | End: 2019-07-07

## 2019-07-07 RX ADMIN — GADOBUTROL 10 ML: 604.72 INJECTION INTRAVENOUS at 09:07

## 2019-07-07 RX ADMIN — PREGABALIN 75 MG: 75 CAPSULE ORAL at 08:07

## 2019-07-07 RX ADMIN — SODIUM CHLORIDE: 0.9 INJECTION, SOLUTION INTRAVENOUS at 01:07

## 2019-07-07 RX ADMIN — ONDANSETRON 8 MG: 2 INJECTION INTRAMUSCULAR; INTRAVENOUS at 06:07

## 2019-07-07 RX ADMIN — ACETAMINOPHEN 650 MG: 325 TABLET ORAL at 05:07

## 2019-07-07 RX ADMIN — HYDRALAZINE HYDROCHLORIDE 25 MG: 25 TABLET, FILM COATED ORAL at 09:07

## 2019-07-07 RX ADMIN — MORPHINE SULFATE 3 MG: 2 INJECTION, SOLUTION INTRAMUSCULAR; INTRAVENOUS at 08:07

## 2019-07-07 RX ADMIN — PREGABALIN 75 MG: 75 CAPSULE ORAL at 09:07

## 2019-07-07 RX ADMIN — MORPHINE SULFATE 3 MG: 2 INJECTION, SOLUTION INTRAMUSCULAR; INTRAVENOUS at 01:07

## 2019-07-07 RX ADMIN — METRONIDAZOLE 500 MG: 500 INJECTION, SOLUTION INTRAVENOUS at 06:07

## 2019-07-07 RX ADMIN — SODIUM CHLORIDE: 0.9 INJECTION, SOLUTION INTRAVENOUS at 08:07

## 2019-07-07 NOTE — CONSULTS
[facility]  Gastroenterology  Consult Note    Patient Name: Caryl Cedeno  MRN: [id number]  Admission Date: 7/6/2019  Hospital Length of Stay: 1 days  Code Status: Full Code   Attending Provider: Ramírez Gaffney MD   Consulting Provider: Ramsey Terrazas MD  Primary Care Physician: Primary Doctor No  Principal Problem:<principal problem not specified>    Inpatient consult to [facility]  Consult performed by: Ramsey Terrazas MD  Consult ordered by: Flaco Patino MD        Subjective:     HPI:  Ms. Cedeno is a 40yo F w/PMH of cholelithiasis, chronic cholecystitis, HTN, fibromyalgia, migraines, and celiac disease who presented yesterday with RUQ pain. Initially presented to the ED 6/20 with RUQ pain but unremarkable workup. Later saw GI outpatient, and review of CT a/p again suggested slight pericholecystic fluid. Abdominal ultrasound obtained showing biliary sludge and some wall thickening consistent with chronic cholecystitis. Saw Dr. Martinez outpatient and scheduled for elective lap trace. However, again had RUQ pain with nausea and NBNB emesis. Here Tbili increased to 1.3 -> 1.8. Had MRCP done today with no biliary dilatation and no choledocholithiasis.    Past Medical History:   Diagnosis Date    Allergy     Celiac disease     Fibromyalgia     Hypertension     Migraines     Small fiber neuropathy        Past Surgical History:   Procedure Laterality Date    CERVICAL SPINE SURGERY  2016    SPINE SURGERY         Review of patient's allergies indicates:   Allergen Reactions    Latex, natural rubber     Gluten protein Rash    Latex Rash     Family History     Problem Relation (Age of Onset)    Autoimmune disease Father, Sister    Breast cancer Paternal Grandmother (30)    Cancer Maternal Grandfather, Mother, Other    Celiac disease Maternal Grandfather        Tobacco Use    Smoking status: Former Smoker    Smokeless tobacco: Never Used   Substance  and Sexual Activity    Alcohol use: Yes     Comment: rarely     Drug use: No    Sexual activity: Yes     Partners: Male     Birth control/protection: None     Review of Systems   Constitutional: Negative for chills and fever.   HENT: Negative for trouble swallowing.    Respiratory: Negative for cough and shortness of breath.    Cardiovascular: Negative for chest pain.   Gastrointestinal: Positive for abdominal pain, nausea and vomiting. Negative for blood in stool, constipation and diarrhea.   Genitourinary: Negative for dysuria.   Skin: Negative for rash.   Neurological: Negative for weakness and light-headedness.   Psychiatric/Behavioral: Negative for behavioral problems and confusion.     Objective:     Vital Signs (Most Recent):  Temp: 97.4 °F (36.3 °C) (07/07/19 1134)  Pulse: 110 (07/07/19 1134)  Resp: 20 (07/07/19 1134)  BP: (!) 162/98 (07/07/19 1209)  SpO2: 96 % (07/07/19 1134) Vital Signs (24h Range):  Temp:  [96.5 °F (35.8 °C)-98.3 °F (36.8 °C)] 97.4 °F (36.3 °C)  Pulse:  [] 110  Resp:  [18-20] 20  SpO2:  [94 %-98 %] 96 %  BP: (138-169)/() 162/98     Weight: 83.9 kg (185 lb) (07/06/19 1245)  Body mass index is 29.86 kg/m².      Intake/Output Summary (Last 24 hours) at 7/7/2019 1448  Last data filed at 7/7/2019 1426  Gross per 24 hour   Intake 4098.75 ml   Output --   Net 4098.75 ml       Lines/Drains/Airways     Peripheral Intravenous Line                 Peripheral IV - Single Lumen 07/06/19 0455 18 G Left Antecubital 1 day                Physical Exam   Constitutional: She appears well-developed and well-nourished. No distress.   HENT:   Mouth/Throat: Oropharynx is clear and moist.   Neck: Neck supple.   Cardiovascular: Normal rate and regular rhythm.   Pulmonary/Chest: Effort normal and breath sounds normal.   Abdominal: Soft. Bowel sounds are normal. She exhibits no distension. There is no tenderness. There is no rebound.   Neurological: She is alert.   Skin: Skin is warm and dry.    Psychiatric: She has a normal mood and affect. Her behavior is normal.   Nursing note and vitals reviewed.      Significant Labs:  All pertinent lab results from the last 24 hours have been reviewed.    Significant Imaging:  Imaging results within the past 24 hours have been reviewed.    Assessment/Plan:     Total bilirubin, elevated  Tbili 1.3 -> 1.8. MRCP completed and no evidence of biliary dilatation. Rise in Tbili appears to be more of a hepatic process as bile ducts not elevated.    - No role for ERCP at this time  - Continue care per General Surgery        Thank you for your consult. I will sign off. Please contact us if you have any additional questions.    Ramsey Terrazas MD  Gastroenterology  [facility]

## 2019-07-07 NOTE — NURSING
Paged Dr Gaffney's team with surgery as patient is requesting less frequent blood pressures than every 4 hours, as her arm hurts from the blood pressure cuff inflating, of note her Bps have been elevated all day with latest reading at 145/96.

## 2019-07-07 NOTE — PLAN OF CARE
Problem: Adult Inpatient Plan of Care  Goal: Plan of Care Review  Patient AAOX4, call light and belongings in reach, siderails up x2, spouse at bedside.  Patient continues to deny pain, n/v this shift.  Tolerating clear liquid diet well with no concerns.  IV site clean, dry and intact, infusing with no concerns.  Patient remains free from falls and safety maintained this shift.

## 2019-07-07 NOTE — HPI
Ms. Cedeno is a 40yo F w/PMH of cholelithiasis, chronic cholecystitis, HTN, fibromyalgia, migraines, and celiac disease who presented yesterday with RUQ pain. Initially presented to the ED 6/20 with RUQ pain but unremarkable workup. Later saw GI outpatient, and review of CT a/p again suggested slight pericholecystic fluid. Abdominal ultrasound obtained showing biliary sludge and some wall thickening consistent with chronic cholecystitis. Saw Dr. Martinez outpatient and scheduled for elective lap trace. However, again had RUQ pain with nausea and NBNB emesis. Here Tbili increased to 1.3 -> 1.8. Had MRCP done today with no biliary dilatation and no choledocholithiasis.

## 2019-07-07 NOTE — ASSESSMENT & PLAN NOTE
Tbili 1.3 -> 1.8. MRCP completed and no evidence of biliary dilatation. Rise in Tbili appears to be more of a hepatic process as bile ducts not elevated.    - No role for ERCP at this time  - Continue care per General Surgery

## 2019-07-07 NOTE — SUBJECTIVE & OBJECTIVE
Interval History: Pain improved.  LFTs increased    Medications:  Continuous Infusions:   sodium chloride 0.9% 125 mL/hr at 07/07/19 0128     Scheduled Meds:   lidocaine (PF) 10 mg/ml (1%)  1 mL Other Once     PRN Meds:acetaminophen, morphine, ondansetron, sodium chloride 0.9%     Review of patient's allergies indicates:   Allergen Reactions    Latex, natural rubber     Gluten protein Rash    Latex Rash     Objective:     Vital Signs (Most Recent):  Temp: 97.1 °F (36.2 °C) (07/07/19 0725)  Pulse: 92 (07/07/19 0725)  Resp: 18 (07/07/19 0725)  BP: (!) 163/97 (07/07/19 0725)  SpO2: 97 % (07/07/19 0725) Vital Signs (24h Range):  Temp:  [96.5 °F (35.8 °C)-98.3 °F (36.8 °C)] 97.1 °F (36.2 °C)  Pulse:  [] 92  Resp:  [16-19] 18  SpO2:  [94 %-98 %] 97 %  BP: (134-163)/(74-97) 163/97     Weight: 83.9 kg (185 lb)  Body mass index is 29.86 kg/m².    Intake/Output - Last 3 Shifts       07/05 0700 - 07/06 0659 07/06 0700 - 07/07 0659 07/07 0700 - 07/08 0659    P.O.  0     I.V. (mL/kg)  1393.8 (16.6)     Total Intake(mL/kg)  1393.8 (16.6)     Net  +1393.8                  Physical Exam   NAD  Abd soft    Significant Labs:  CBC:   Recent Labs   Lab 07/07/19  0342   WBC 6.51   RBC 4.69   HGB 13.4   HCT 40.8      MCV 87   MCH 28.6   MCHC 32.8     CMP:   Recent Labs   Lab 07/07/19  0342   GLU 78   CALCIUM 8.8   ALBUMIN 3.6   PROT 6.5      K 3.1*   CO2 26      BUN 10   CREATININE 0.8   ALKPHOS 225*   ALT 1,065*   *   BILITOT 1.8*

## 2019-07-07 NOTE — PROGRESS NOTES
[facility]  General Surgery  Progress Note    Subjective:     History of Present Illness:  40 y/o F with hx of cholelithiasis, HTN, fibromyalgia, migraines, celiac disease presents to the ED with acute onset of RUQ pain. Pt was recently diagnosed and tentatively scheduled for a cholecystectomy in 8/19 but had an acute onset of RUQ along with nausea and small volume non-bilious, non-bloody emesis. Denies fever and chills. Reports similar pain to last ED presentation. Rates pain as 10/10 and does not radiate. Also having epigastric bloating/fullness over the past week. No previous abdominal surgery.     Post-Op Info:  Procedure(s) (LRB):  CHOLECYSTECTOMY, LAPAROSCOPIC (N/A)         Interval History: Pain improved.  LFTs increased    Medications:  Continuous Infusions:   sodium chloride 0.9% 125 mL/hr at 07/07/19 0128     Scheduled Meds:   lidocaine (PF) 10 mg/ml (1%)  1 mL Other Once     PRN Meds:acetaminophen, morphine, ondansetron, sodium chloride 0.9%     Review of patient's allergies indicates:   Allergen Reactions    Latex, natural rubber     Gluten protein Rash    Latex Rash     Objective:     Vital Signs (Most Recent):  Temp: 97.1 °F (36.2 °C) (07/07/19 0725)  Pulse: 92 (07/07/19 0725)  Resp: 18 (07/07/19 0725)  BP: (!) 163/97 (07/07/19 0725)  SpO2: 97 % (07/07/19 0725) Vital Signs (24h Range):  Temp:  [96.5 °F (35.8 °C)-98.3 °F (36.8 °C)] 97.1 °F (36.2 °C)  Pulse:  [] 92  Resp:  [16-19] 18  SpO2:  [94 %-98 %] 97 %  BP: (134-163)/(74-97) 163/97     Weight: 83.9 kg (185 lb)  Body mass index is 29.86 kg/m².    Intake/Output - Last 3 Shifts       07/05 0700 - 07/06 0659 07/06 0700 - 07/07 0659 07/07 0700 - 07/08 0659    P.O.  0     I.V. (mL/kg)  1393.8 (16.6)     Total Intake(mL/kg)  1393.8 (16.6)     Net  +1393.8                  Physical Exam   NAD  Abd soft    Significant Labs:  CBC:   Recent Labs   Lab 07/07/19  0342   WBC 6.51   RBC 4.69   HGB 13.4   HCT 40.8      MCV 87    MCH 28.6   MCHC 32.8     CMP:   Recent Labs   Lab 07/07/19  0342   GLU 78   CALCIUM 8.8   ALBUMIN 3.6   PROT 6.5      K 3.1*   CO2 26      BUN 10   CREATININE 0.8   ALKPHOS 225*   ALT 1,065*   *   BILITOT 1.8*           Assessment/Plan:     Acute gallstone pancreatitis  38 y/o F with hx of cholelithiasis presents with gallstone pancreatitis     LFTs increased with bili increased and dark urine  MRCP ordered  Pending results may need ERCP  CLD ok after MRI  NPO midnight incase ERCP needed  Will f/u AES recs  Clincally improving from pancreatitis perspective, may be ready for lap trace Tuesday  WBC count normal, acute trace felt less likely.  Abx stopped        Flaco Patino MD  General Surgery  [facility]

## 2019-07-07 NOTE — ASSESSMENT & PLAN NOTE
38 y/o F with hx of cholelithiasis presents with gallstone pancreatitis     LFTs increased with bili increased and dark urine  MRCP ordered  Pending results may need ERCP  CLD ok after MRI  NPO midnight incase ERCP needed  Will f/u AES recs  Clincally improving from pancreatitis perspective, may be ready for lap trace Tuesday  WBC count normal, acute trace felt less likely.  Abx stopped

## 2019-07-08 ENCOUNTER — ANESTHESIA EVENT (OUTPATIENT)
Dept: SURGERY | Facility: HOSPITAL | Age: 39
DRG: 417 | End: 2019-07-08
Payer: COMMERCIAL

## 2019-07-08 PROBLEM — E87.6 HYPOKALEMIA: Status: ACTIVE | Noted: 2019-07-08

## 2019-07-08 LAB
ALBUMIN SERPL BCP-MCNC: 3.8 G/DL (ref 3.5–5.2)
ALP SERPL-CCNC: 203 U/L (ref 55–135)
ALT SERPL W/O P-5'-P-CCNC: 694 U/L (ref 10–44)
ANION GAP SERPL CALC-SCNC: 10 MMOL/L (ref 8–16)
AST SERPL-CCNC: 151 U/L (ref 10–40)
BASOPHILS # BLD AUTO: 0.04 K/UL (ref 0–0.2)
BASOPHILS NFR BLD: 0.7 % (ref 0–1.9)
BILIRUB SERPL-MCNC: 0.6 MG/DL (ref 0.1–1)
BUN SERPL-MCNC: 7 MG/DL (ref 6–20)
CALCIUM SERPL-MCNC: 8.5 MG/DL (ref 8.7–10.5)
CHLORIDE SERPL-SCNC: 105 MMOL/L (ref 95–110)
CO2 SERPL-SCNC: 25 MMOL/L (ref 23–29)
CREAT SERPL-MCNC: 0.9 MG/DL (ref 0.5–1.4)
DIFFERENTIAL METHOD: NORMAL
EOSINOPHIL # BLD AUTO: 0.1 K/UL (ref 0–0.5)
EOSINOPHIL NFR BLD: 2.2 % (ref 0–8)
ERYTHROCYTE [DISTWIDTH] IN BLOOD BY AUTOMATED COUNT: 13.5 % (ref 11.5–14.5)
EST. GFR  (AFRICAN AMERICAN): >60 ML/MIN/1.73 M^2
EST. GFR  (NON AFRICAN AMERICAN): >60 ML/MIN/1.73 M^2
GLUCOSE SERPL-MCNC: 92 MG/DL (ref 70–110)
HCT VFR BLD AUTO: 39.7 % (ref 37–48.5)
HGB BLD-MCNC: 13 G/DL (ref 12–16)
IMM GRANULOCYTES # BLD AUTO: 0.01 K/UL (ref 0–0.04)
IMM GRANULOCYTES NFR BLD AUTO: 0.2 % (ref 0–0.5)
LIPASE SERPL-CCNC: 53 U/L (ref 4–60)
LYMPHOCYTES # BLD AUTO: 1.8 K/UL (ref 1–4.8)
LYMPHOCYTES NFR BLD: 29.9 % (ref 18–48)
MCH RBC QN AUTO: 29 PG (ref 27–31)
MCHC RBC AUTO-ENTMCNC: 32.7 G/DL (ref 32–36)
MCV RBC AUTO: 89 FL (ref 82–98)
MONOCYTES # BLD AUTO: 0.6 K/UL (ref 0.3–1)
MONOCYTES NFR BLD: 10.3 % (ref 4–15)
NEUTROPHILS # BLD AUTO: 3.4 K/UL (ref 1.8–7.7)
NEUTROPHILS NFR BLD: 56.7 % (ref 38–73)
NRBC BLD-RTO: 0 /100 WBC
PLATELET # BLD AUTO: 336 K/UL (ref 150–350)
PMV BLD AUTO: 10.1 FL (ref 9.2–12.9)
POTASSIUM SERPL-SCNC: 2.9 MMOL/L (ref 3.5–5.1)
PROT SERPL-MCNC: 6.9 G/DL (ref 6–8.4)
RBC # BLD AUTO: 4.48 M/UL (ref 4–5.4)
SODIUM SERPL-SCNC: 140 MMOL/L (ref 136–145)
WBC # BLD AUTO: 5.92 K/UL (ref 3.9–12.7)

## 2019-07-08 PROCEDURE — 83690 ASSAY OF LIPASE: CPT

## 2019-07-08 PROCEDURE — 25000003 PHARM REV CODE 250: Performed by: STUDENT IN AN ORGANIZED HEALTH CARE EDUCATION/TRAINING PROGRAM

## 2019-07-08 PROCEDURE — 85025 COMPLETE CBC W/AUTO DIFF WBC: CPT

## 2019-07-08 PROCEDURE — 11000001 HC ACUTE MED/SURG PRIVATE ROOM

## 2019-07-08 PROCEDURE — 80053 COMPREHEN METABOLIC PANEL: CPT

## 2019-07-08 PROCEDURE — 36415 COLL VENOUS BLD VENIPUNCTURE: CPT

## 2019-07-08 PROCEDURE — 63600175 PHARM REV CODE 636 W HCPCS: Performed by: PHYSICIAN ASSISTANT

## 2019-07-08 RX ORDER — POTASSIUM CHLORIDE 7.45 MG/ML
10 INJECTION INTRAVENOUS
Status: COMPLETED | OUTPATIENT
Start: 2019-07-08 | End: 2019-07-08

## 2019-07-08 RX ORDER — LABETALOL 100 MG/1
100 TABLET, FILM COATED ORAL ONCE
Status: COMPLETED | OUTPATIENT
Start: 2019-07-08 | End: 2019-07-08

## 2019-07-08 RX ADMIN — MILNACIPRAN HYDROCHLORIDE 100 MG: 25 TABLET, FILM COATED ORAL at 12:07

## 2019-07-08 RX ADMIN — POTASSIUM CHLORIDE 10 MEQ: 10 INJECTION, SOLUTION INTRAVENOUS at 12:07

## 2019-07-08 RX ADMIN — SODIUM CHLORIDE: 0.9 INJECTION, SOLUTION INTRAVENOUS at 09:07

## 2019-07-08 RX ADMIN — PREGABALIN 75 MG: 75 CAPSULE ORAL at 09:07

## 2019-07-08 RX ADMIN — HYDRALAZINE HYDROCHLORIDE 25 MG: 25 TABLET, FILM COATED ORAL at 12:07

## 2019-07-08 RX ADMIN — ACETAMINOPHEN 650 MG: 325 TABLET ORAL at 07:07

## 2019-07-08 RX ADMIN — POTASSIUM CHLORIDE 10 MEQ: 10 INJECTION, SOLUTION INTRAVENOUS at 09:07

## 2019-07-08 RX ADMIN — SODIUM CHLORIDE: 0.9 INJECTION, SOLUTION INTRAVENOUS at 04:07

## 2019-07-08 RX ADMIN — LABETALOL HYDROCHLORIDE 100 MG: 100 TABLET, FILM COATED ORAL at 10:07

## 2019-07-08 RX ADMIN — HYDRALAZINE HYDROCHLORIDE 25 MG: 25 TABLET, FILM COATED ORAL at 04:07

## 2019-07-08 RX ADMIN — MILNACIPRAN HYDROCHLORIDE 100 MG: 25 TABLET, FILM COATED ORAL at 09:07

## 2019-07-08 NOTE — ANESTHESIA PREPROCEDURE EVALUATION
[facility]  Anesthesia Pre-Operative Evaluation         Patient Name: Caryl Cedeno  YOB: 1980  MRN: [id number]    SUBJECTIVE:     Pre-operative evaluation for Procedure(s) (LRB):  CHOLECYSTECTOMY, LAPAROSCOPIC (N/A)     07/08/2019    Caryl Cedeno is a 39 y.o. female w/ a significant PMHx of cholelithiasis, HTN, fibromyalgia, migraines, celiac disease     Pt presented to the ED on 7/6 with acute onset of RUQ pain. Pt was recently diagnosed and tentatively scheduled for a cholecystectomy on 8/19 but had an acute onset of RUQ along with nausea and small volume non-bilious, non-bloody emesis. Pt reports no fever and chills. Pt reported pain as 10/10 on admission. Pt reports stomach fullness for the past week. Patient now presents for the above procedure(s).    Pt Initially presented to the ED 6/20 with RUQ pain but unremarkable workup. Pt was later evaluated by GI as outpatient. Abdominal ultrasound obtained showing biliary sludge and some wall thickening consistent with chronic cholecystitis.       LDA:        Peripheral IV - Single Lumen 07/06/19 0455 18 G Left Antecubital (Active)   Site Assessment Clean;Dry;Intact 7/7/2019  8:00 PM   Line Status Infusing 7/7/2019  8:00 PM   Dressing Status Clean;Dry;Intact 7/7/2019  8:00 PM   Number of days: 2       Prev airway: None documented.    Drips:    sodium chloride 0.9% 125 mL/hr at 07/08/19 0430       Patient Active Problem List   Diagnosis    Celiac disease    Small fiber neuropathy    Fibromyalgia    Throat papilloma    POTS (postural orthostatic tachycardia syndrome)    Migraine with aura and without status migrainosus, not intractable    Dysautonomia    Numbness and tingling of both lower extremities    Chronic bilateral low back pain without sciatica    Acute on chronic cholecystitis    Acute gallstone pancreatitis    Total bilirubin, elevated    Hypokalemia       Review of patient's allergies indicates:    Allergen Reactions    Latex, natural rubber     Gluten protein Rash    Latex Rash       Current Inpatient Medications:   cetirizine  10 mg Oral Daily    lidocaine (PF) 10 mg/ml (1%)  1 mL Other Once    milnacipran  100 mg Oral BID    potassium chloride in water  10 mEq Intravenous Q1H    pregabalin  75 mg Oral BID       No current facility-administered medications on file prior to encounter.      Current Outpatient Medications on File Prior to Encounter   Medication Sig Dispense Refill    cetirizine (ZYRTEC) 10 MG tablet Take 10 mg by mouth once daily.      docusate sodium (COLACE) 100 MG capsule Take 1 capsule (100 mg total) by mouth 2 (two) times daily as needed for Constipation. 30 capsule 0    hydroCHLOROthiazide (HYDRODIURIL) 25 MG tablet Take 25 mg by mouth once daily.      ibuprofen (ADVIL,MOTRIN) 600 MG tablet Take 600 mg by mouth daily as needed for Pain.      losartan (COZAAR) 50 MG tablet Take 1 tablet (50 mg total) by mouth once daily. 90 tablet 3    pregabalin (LYRICA) 75 MG capsule Take 1 capsule (75 mg total) by mouth 2 (two) times daily. 60 capsule 5    SAVELLA 100 mg Tab TAKE 1 TABLET BY MOUTH TWICE A DAY 60 tablet 11    ZOMIG 5 mg Spry USE 1 SPRAY IN EACH NOSTRIL ONCE DAILY. 6 each 3    FINACEA 15 % gel AAA face qam - bid 60 g 3    GAVILYTE-G 236-22.74-6.74 -5.86 gram suspension       water Liqd 150 mL with MILK OF MAGNESIA 400 mg/5 mL Susp 400 mg, diphenhydrAMINE 12.5 mg/5 mL Elix 60 mg, nystatin 100,000 unit/mL Susp 500,000 Units SWISH AND SPIT 10ML'S BY MOUTH EVERY 4 HOURS  1       Past Surgical History:   Procedure Laterality Date    CERVICAL SPINE SURGERY  2016    SPINE SURGERY         Social History     Socioeconomic History    Marital status:      Spouse name: Not on file    Number of children: Not on file    Years of education: Not on file    Highest education level: Not on file   Occupational History    Not on file   Social Needs    Financial resource  strain: Not on file    Food insecurity:     Worry: Not on file     Inability: Not on file    Transportation needs:     Medical: Not on file     Non-medical: Not on file   Tobacco Use    Smoking status: Former Smoker    Smokeless tobacco: Never Used   Substance and Sexual Activity    Alcohol use: Yes     Comment: rarely     Drug use: No    Sexual activity: Yes     Partners: Male     Birth control/protection: None   Lifestyle    Physical activity:     Days per week: Not on file     Minutes per session: Not on file    Stress: Not on file   Relationships    Social connections:     Talks on phone: Not on file     Gets together: Not on file     Attends Anglican service: Not on file     Active member of club or organization: Not on file     Attends meetings of clubs or organizations: Not on file     Relationship status: Not on file   Other Topics Concern    Are you pregnant or think you may be? No    Breast-feeding No   Social History Narrative    Not on file       OBJECTIVE:     Vital Signs Range (Last 24H):  Temp:  [36.4 °C (97.5 °F)-37.2 °C (98.9 °F)]   Pulse:  [79-99]   Resp:  [16-20]   BP: (142-181)/()   SpO2:  [95 %-99 %]       Significant Labs:  Lab Results   Component Value Date    WBC 5.92 07/08/2019    HGB 13.0 07/08/2019    HCT 39.7 07/08/2019     07/08/2019     (H) 07/08/2019     (H) 07/08/2019     07/08/2019    K 2.9 (L) 07/08/2019     07/08/2019    CREATININE 0.9 07/08/2019    BUN 7 07/08/2019    CO2 25 07/08/2019    TSH 7.349 (H) 06/28/2019    INR 1.1 07/06/2019       Diagnostic Studies: MRI abdomen 7/7/2019: The gallbladder is distended with multiple gallstones.  There is diffuse enhancement of the gallbladder wall which may be seen with cholecystitis, either acute or chronic. The cystic duct is tortuous.  No intrahepatic biliary ductal dilatation.  The common duct is mildly prominent measuring 6 mm.  The pancreatic duct is normal in caliber measuring up  to 3 mm.  Normal anatomy.  No choledocholithiasis.    EKG: No recent studies available.    2D ECHO:  No results found for this or any previous visit.      ASSESSMENT/PLAN:                                                                                                             07/08/2019  Caryl Cedeno is a 39 y.o., female.    Anesthesia Evaluation    I have reviewed the Patient Summary Reports.    I have reviewed the Nursing Notes.   I have reviewed the Medications.     Review of Systems  Anesthesia Hx:  No problems with previous Anesthesia Denies Hx of Anesthetic complications  History of prior surgery of interest to airway management or planning: (spinal fusion, EGD, Colonoscopy) Previous anesthesia: General Denies Family Hx of Anesthesia complications.   Denies Personal Hx of Anesthesia complications.   Social:  Non-Smoker, Social Alcohol Use    EENT/Dental:   Denies Otitis Media Denies Chronic Tonsillitis   Cardiovascular:   Hypertension    Pulmonary:   Denies Pneumonia Denies Shortness of breath.    Hepatic/GI:   Denies PUD.    Musculoskeletal:   Denies Arthritis.     Neurological:   Neuromuscular Disease, (fibromylagia, ) Headaches Denies Seizures.    Endocrine:   Denies Diabetes.        Physical Exam  General:  Well nourished, Obesity    Airway/Jaw/Neck:  Airway Findings: Mouth Opening: Normal Tongue: Normal  General Airway Assessment: Adult  Mallampati: I  TM Distance: Normal, at least 6 cm  Jaw/Neck Findings: (mild decreased extension 2/2 spinal fusion surgery)  Neck ROM: Extension Decreased, Mild  Neck Findings:     Eyes/Ears/Nose:  EYES/EARS/NOSE FINDINGS: Normal   Dental:  Dental Findings: (pt reports one prior chipped tooth. does not remember location)   Chest/Lungs:  Chest/Lungs Clear    Heart/Vascular:  Heart Findings: Normal    Abdomen:  Abdomen Findings: (RUQ but improved from prior documented exams)  Tenderness     Musculoskeletal:  Musculoskeletal Findings: Normal   Skin:  Skin  Findings: Normal    Mental Status:  Mental Status Findings:  Cooperative, Alert and Oriented         Anesthesia Plan  Type of Anesthesia, risks & benefits discussed:  Anesthesia Type:  general  Patient's Preference:   Intra-op Monitoring Plan: standard ASA monitors  Intra-op Monitoring Plan Comments:   Post Op Pain Control Plan: multimodal analgesia, IV/PO Opioids PRN and per primary service following discharge from PACU  Post Op Pain Control Plan Comments:   Induction:   IV  Beta Blocker:  Patient is on a Beta-Blocker and has received one dose within the past 24 hours (No further documentation required).       Informed Consent: Patient understands risks and agrees with Anesthesia plan.  Questions answered. Anesthesia consent signed with patient.  ASA Score: 3     Day of Surgery Review of History & Physical:    H&P update referred to the surgeon.         Ready For Surgery From Anesthesia Perspective.

## 2019-07-08 NOTE — SUBJECTIVE & OBJECTIVE
Interval History:   Patient seen and examined, no acute events overnight  Pain well controlled  Has been NPO, denies N/V  Hypertensive, afebrile      Medications:  Continuous Infusions:   sodium chloride 0.9% 125 mL/hr at 07/08/19 0430     Scheduled Meds:   cetirizine  10 mg Oral Daily    lidocaine (PF) 10 mg/ml (1%)  1 mL Other Once    pregabalin  75 mg Oral BID     PRN Meds:acetaminophen, docusate sodium, hydrALAZINE, morphine, ondansetron, sodium chloride 0.9%     Review of patient's allergies indicates:   Allergen Reactions    Latex, natural rubber     Gluten protein Rash    Latex Rash     Objective:     Vital Signs (Most Recent):  Temp: (P) 98.9 °F (37.2 °C) (07/08/19 0448)  Pulse: (P) 92 (07/08/19 0448)  Resp: (P) 18 (07/08/19 0448)  BP: (!) (P) 156/102 (07/08/19 0448)  SpO2: (P) 99 % (07/08/19 0448) Vital Signs (24h Range):  Temp:  [97.4 °F (36.3 °C)-98.9 °F (37.2 °C)] (P) 98.9 °F (37.2 °C)  Pulse:  [] (P) 92  Resp:  [18-20] (P) 18  SpO2:  [96 %-99 %] (P) 99 %  BP: (142-169)/() (P) 156/102     Weight: 83.9 kg (185 lb)  Body mass index is 29.86 kg/m².    Intake/Output - Last 3 Shifts       07/06 0700 - 07/07 0659 07/07 0700 - 07/08 0659 07/08 0700 - 07/09 0659    P.O. 0 1020     I.V. (mL/kg) 1393.8 (16.6) 3625 (43.2)     IV Piggyback  100     Total Intake(mL/kg) 1393.8 (16.6) 4745 (56.6)     Net +1393.8 +4745            Urine Occurrence  4 x           Physical Exam   Constitutional: She appears well-developed and well-nourished.   HENT:   Head: Normocephalic and atraumatic.   Cardiovascular: Normal rate and regular rhythm.   Pulmonary/Chest: Effort normal. No respiratory distress.   Abdominal:   Soft, minimal TTP  nondistended       Significant Labs:  CBC:   Recent Labs   Lab 07/08/19  0547   WBC 5.92   RBC 4.48   HGB 13.0   HCT 39.7      MCV 89   MCH 29.0   MCHC 32.7     BMP:   Recent Labs   Lab 07/08/19  0547   GLU 92      K 2.9*      CO2 25   BUN 7   CREATININE 0.9    CALCIUM 8.5*     CMP:   Recent Labs   Lab 07/08/19  0547   GLU 92   CALCIUM 8.5*   ALBUMIN 3.8   PROT 6.9      K 2.9*   CO2 25      BUN 7   CREATININE 0.9   ALKPHOS 203*   *   *   BILITOT 0.6     LFTs:   Recent Labs   Lab 07/08/19  0547   *   *   ALKPHOS 203*   BILITOT 0.6   PROT 6.9   ALBUMIN 3.8     Coagulation:   Recent Labs   Lab 07/06/19  0454   LABPROT 10.8   INR 1.1

## 2019-07-08 NOTE — PLAN OF CARE
Problem: Adult Inpatient Plan of Care  Goal: Plan of Care Review  Quiet hours. Npo maintained for procedure today. Med x1 for c/o abd pain with relief. Iv fluids infusing. bp elevated, hydralazine po given x1 this shift. Safety maintained.

## 2019-07-08 NOTE — PLAN OF CARE
Problem: Adult Inpatient Plan of Care  Goal: Plan of Care Review  Outcome: Ongoing (interventions implemented as appropriate)  Plan of care reviewed and updated. Pt AA+O. Pt's pain is managed with the medication ordered at this time. Pt's VS are as charted.  No falls this shift. Pt is oriented to room and call system. Will continue to [facility].

## 2019-07-08 NOTE — PLAN OF CARE
Unable to complete discharge planning assessment due to patient is not in the hospital room.     [facility] Packet left at BS.        07/08/19 1340   Discharge Assessment   Assessment Type Discharge Planning Assessment

## 2019-07-08 NOTE — ASSESSMENT & PLAN NOTE
40 y/o F with hx of cholelithiasis presents with gallstone pancreatitis     -regular diet, NPO p MN  -IVF   -appreciate AES recs  -Clincally improving from pancreatitis perspective, plan for lap trace Tuesday

## 2019-07-08 NOTE — PROGRESS NOTES
[facility]  General Surgery  Progress Note    Subjective:     History of Present Illness:  40 y/o F with hx of cholelithiasis, HTN, fibromyalgia, migraines, celiac disease presents to the ED with acute onset of RUQ pain. Pt was recently diagnosed and tentatively scheduled for a cholecystectomy in 8/19 but had an acute onset of RUQ along with nausea and small volume non-bilious, non-bloody emesis. Denies fever and chills. Reports similar pain to last ED presentation. Rates pain as 10/10 and does not radiate. Also having epigastric bloating/fullness over the past week. No previous abdominal surgery.     Post-Op Info:  Procedure(s) (LRB):  CHOLECYSTECTOMY, LAPAROSCOPIC (N/A)         Interval History:   Patient seen and examined, no acute events overnight  Pain well controlled  Has been NPO, denies N/V  Hypertensive, afebrile      Medications:  Continuous Infusions:   sodium chloride 0.9% 125 mL/hr at 07/08/19 0430     Scheduled Meds:   cetirizine  10 mg Oral Daily    lidocaine (PF) 10 mg/ml (1%)  1 mL Other Once    pregabalin  75 mg Oral BID     PRN Meds:acetaminophen, docusate sodium, hydrALAZINE, morphine, ondansetron, sodium chloride 0.9%     Review of patient's allergies indicates:   Allergen Reactions    Latex, natural rubber     Gluten protein Rash    Latex Rash     Objective:     Vital Signs (Most Recent):  Temp: (P) 98.9 °F (37.2 °C) (07/08/19 0448)  Pulse: (P) 92 (07/08/19 0448)  Resp: (P) 18 (07/08/19 0448)  BP: (!) (P) 156/102 (07/08/19 0448)  SpO2: (P) 99 % (07/08/19 0448) Vital Signs (24h Range):  Temp:  [97.4 °F (36.3 °C)-98.9 °F (37.2 °C)] (P) 98.9 °F (37.2 °C)  Pulse:  [] (P) 92  Resp:  [18-20] (P) 18  SpO2:  [96 %-99 %] (P) 99 %  BP: (142-169)/() (P) 156/102     Weight: 83.9 kg (185 lb)  Body mass index is 29.86 kg/m².    Intake/Output - Last 3 Shifts       07/06 0700 - 07/07 0659 07/07 0700 - 07/08 0659 07/08 0700 - 07/09 0659    P.O. 0 1020     I.V. (mL/kg)  1393.8 (16.6) 3625 (43.2)     IV Piggyback  100     Total Intake(mL/kg) 1393.8 (16.6) 4745 (56.6)     Net +1393.8 +4745            Urine Occurrence  4 x           Physical Exam   Constitutional: She appears well-developed and well-nourished.   HENT:   Head: Normocephalic and atraumatic.   Cardiovascular: Normal rate and regular rhythm.   Pulmonary/Chest: Effort normal. No respiratory distress.   Abdominal:   Soft, minimal TTP  nondistended       Significant Labs:  CBC:   Recent Labs   Lab 07/08/19  0547   WBC 5.92   RBC 4.48   HGB 13.0   HCT 39.7      MCV 89   MCH 29.0   MCHC 32.7     BMP:   Recent Labs   Lab 07/08/19  0547   GLU 92      K 2.9*      CO2 25   BUN 7   CREATININE 0.9   CALCIUM 8.5*     CMP:   Recent Labs   Lab 07/08/19  0547   GLU 92   CALCIUM 8.5*   ALBUMIN 3.8   PROT 6.9      K 2.9*   CO2 25      BUN 7   CREATININE 0.9   ALKPHOS 203*   *   *   BILITOT 0.6     LFTs:   Recent Labs   Lab 07/08/19  0547   *   *   ALKPHOS 203*   BILITOT 0.6   PROT 6.9   ALBUMIN 3.8     Coagulation:   Recent Labs   Lab 07/06/19  0454   LABPROT 10.8   INR 1.1     Assessment/Plan:     * Acute gallstone pancreatitis  40 y/o F with hx of cholelithiasis presents with gallstone pancreatitis     -regular diet, NPO p MN  -IVF   -appreciate AES recs  -Clincally improving from pancreatitis perspective, plan for lap trace Tuesday      Hypokalemia  Replace         Millie Martinez PA-C   [id number]  General Surgery  [facility]

## 2019-07-09 ENCOUNTER — ANESTHESIA (OUTPATIENT)
Dept: SURGERY | Facility: HOSPITAL | Age: 39
DRG: 417 | End: 2019-07-09
Payer: COMMERCIAL

## 2019-07-09 PROBLEM — K81.1 CHRONIC CHOLECYSTITIS: Status: ACTIVE | Noted: 2019-07-09

## 2019-07-09 PROBLEM — E87.6 HYPOKALEMIA: Status: RESOLVED | Noted: 2019-07-08 | Resolved: 2019-07-09

## 2019-07-09 LAB
ALBUMIN SERPL BCP-MCNC: 3.9 G/DL (ref 3.5–5.2)
ALBUMIN SERPL BCP-MCNC: 4.1 G/DL (ref 3.5–5.2)
ALP SERPL-CCNC: 192 U/L (ref 55–135)
ALP SERPL-CCNC: 195 U/L (ref 55–135)
ALT SERPL W/O P-5'-P-CCNC: 542 U/L (ref 10–44)
ALT SERPL W/O P-5'-P-CCNC: 586 U/L (ref 10–44)
ANION GAP SERPL CALC-SCNC: 10 MMOL/L (ref 8–16)
AST SERPL-CCNC: 187 U/L (ref 10–40)
AST SERPL-CCNC: 81 U/L (ref 10–40)
BASOPHILS # BLD AUTO: 0.05 K/UL (ref 0–0.2)
BASOPHILS NFR BLD: 0.7 % (ref 0–1.9)
BILIRUB DIRECT SERPL-MCNC: 0.3 MG/DL (ref 0.1–0.3)
BILIRUB SERPL-MCNC: 0.4 MG/DL (ref 0.1–1)
BILIRUB SERPL-MCNC: 0.5 MG/DL (ref 0.1–1)
BUN SERPL-MCNC: 11 MG/DL (ref 6–20)
CALCIUM SERPL-MCNC: 8.9 MG/DL (ref 8.7–10.5)
CHLORIDE SERPL-SCNC: 105 MMOL/L (ref 95–110)
CO2 SERPL-SCNC: 26 MMOL/L (ref 23–29)
CREAT SERPL-MCNC: 0.8 MG/DL (ref 0.5–1.4)
DIFFERENTIAL METHOD: ABNORMAL
EOSINOPHIL # BLD AUTO: 0.1 K/UL (ref 0–0.5)
EOSINOPHIL NFR BLD: 1.3 % (ref 0–8)
ERYTHROCYTE [DISTWIDTH] IN BLOOD BY AUTOMATED COUNT: 13.5 % (ref 11.5–14.5)
EST. GFR  (AFRICAN AMERICAN): >60 ML/MIN/1.73 M^2
EST. GFR  (NON AFRICAN AMERICAN): >60 ML/MIN/1.73 M^2
GLUCOSE SERPL-MCNC: 101 MG/DL (ref 70–110)
HCT VFR BLD AUTO: 42.5 % (ref 37–48.5)
HGB BLD-MCNC: 14 G/DL (ref 12–16)
IMM GRANULOCYTES # BLD AUTO: 0.02 K/UL (ref 0–0.04)
IMM GRANULOCYTES NFR BLD AUTO: 0.3 % (ref 0–0.5)
LYMPHOCYTES # BLD AUTO: 1.6 K/UL (ref 1–4.8)
LYMPHOCYTES NFR BLD: 22.6 % (ref 18–48)
MCH RBC QN AUTO: 28.9 PG (ref 27–31)
MCHC RBC AUTO-ENTMCNC: 32.9 G/DL (ref 32–36)
MCV RBC AUTO: 88 FL (ref 82–98)
MONOCYTES # BLD AUTO: 0.8 K/UL (ref 0.3–1)
MONOCYTES NFR BLD: 10.7 % (ref 4–15)
NEUTROPHILS # BLD AUTO: 4.6 K/UL (ref 1.8–7.7)
NEUTROPHILS NFR BLD: 64.4 % (ref 38–73)
NRBC BLD-RTO: 0 /100 WBC
PLATELET # BLD AUTO: 390 K/UL (ref 150–350)
PMV BLD AUTO: 10.5 FL (ref 9.2–12.9)
POTASSIUM SERPL-SCNC: 3.1 MMOL/L (ref 3.5–5.1)
PROT SERPL-MCNC: 7.3 G/DL (ref 6–8.4)
PROT SERPL-MCNC: 7.5 G/DL (ref 6–8.4)
RBC # BLD AUTO: 4.84 M/UL (ref 4–5.4)
SODIUM SERPL-SCNC: 141 MMOL/L (ref 136–145)
WBC # BLD AUTO: 7.17 K/UL (ref 3.9–12.7)

## 2019-07-09 PROCEDURE — 25000003 PHARM REV CODE 250: Performed by: STUDENT IN AN ORGANIZED HEALTH CARE EDUCATION/TRAINING PROGRAM

## 2019-07-09 PROCEDURE — 63600175 PHARM REV CODE 636 W HCPCS: Performed by: STUDENT IN AN ORGANIZED HEALTH CARE EDUCATION/TRAINING PROGRAM

## 2019-07-09 PROCEDURE — 88307 TISSUE EXAM BY PATHOLOGIST: CPT | Mod: 26,,, | Performed by: PATHOLOGY

## 2019-07-09 PROCEDURE — 37000008 HC ANESTHESIA 1ST 15 MINUTES: Performed by: SURGERY

## 2019-07-09 PROCEDURE — 36415 COLL VENOUS BLD VENIPUNCTURE: CPT

## 2019-07-09 PROCEDURE — 37000009 HC ANESTHESIA EA ADD 15 MINS: Performed by: SURGERY

## 2019-07-09 PROCEDURE — 63600175 PHARM REV CODE 636 W HCPCS: Performed by: GENERAL PRACTICE

## 2019-07-09 PROCEDURE — 88313 TISSUE SPECIMEN TO PATHOLOGY - SURGERY: ICD-10-PCS | Mod: 26,,, | Performed by: PATHOLOGY

## 2019-07-09 PROCEDURE — 27000221 HC OXYGEN, UP TO 24 HOURS

## 2019-07-09 PROCEDURE — 47562 LAPAROSCOPIC CHOLECYSTECTOMY: CPT | Mod: ,,, | Performed by: SURGERY

## 2019-07-09 PROCEDURE — 27201423 OPTIME MED/SURG SUP & DEVICES STERILE SUPPLY: Performed by: SURGERY

## 2019-07-09 PROCEDURE — 36000708 HC OR TIME LEV III 1ST 15 MIN: Performed by: SURGERY

## 2019-07-09 PROCEDURE — 85025 COMPLETE CBC W/AUTO DIFF WBC: CPT

## 2019-07-09 PROCEDURE — 94761 N-INVAS EAR/PLS OXIMETRY MLT: CPT

## 2019-07-09 PROCEDURE — D9220A PRA ANESTHESIA: Mod: ,,, | Performed by: ANESTHESIOLOGY

## 2019-07-09 PROCEDURE — 80053 COMPREHEN METABOLIC PANEL: CPT

## 2019-07-09 PROCEDURE — 88307 TISSUE SPECIMEN TO PATHOLOGY - SURGERY: ICD-10-PCS | Mod: 26,,, | Performed by: PATHOLOGY

## 2019-07-09 PROCEDURE — 88313 SPECIAL STAINS GROUP 2: CPT | Mod: 26,,, | Performed by: PATHOLOGY

## 2019-07-09 PROCEDURE — 47379 UNLISTED LAPS PX LIVER: CPT | Mod: ,,, | Performed by: SURGERY

## 2019-07-09 PROCEDURE — 80076 HEPATIC FUNCTION PANEL: CPT

## 2019-07-09 PROCEDURE — 36000709 HC OR TIME LEV III EA ADD 15 MIN: Performed by: SURGERY

## 2019-07-09 PROCEDURE — 11000001 HC ACUTE MED/SURG PRIVATE ROOM

## 2019-07-09 PROCEDURE — 25000003 PHARM REV CODE 250: Performed by: SURGERY

## 2019-07-09 PROCEDURE — 88307 TISSUE EXAM BY PATHOLOGIST: CPT | Performed by: PATHOLOGY

## 2019-07-09 PROCEDURE — 88304 TISSUE EXAM BY PATHOLOGIST: CPT | Mod: 26,,, | Performed by: PATHOLOGY

## 2019-07-09 PROCEDURE — 71000033 HC RECOVERY, INTIAL HOUR: Performed by: SURGERY

## 2019-07-09 PROCEDURE — 47379 PR LAPAROSCOPIC WEDGE LIVER BIOPSY: ICD-10-PCS | Mod: ,,, | Performed by: SURGERY

## 2019-07-09 PROCEDURE — 88304 TISSUE SPECIMEN TO PATHOLOGY - SURGERY: ICD-10-PCS | Mod: 26,,, | Performed by: PATHOLOGY

## 2019-07-09 PROCEDURE — 47562 PR LAP,CHOLECYSTECTOMY: ICD-10-PCS | Mod: ,,, | Performed by: SURGERY

## 2019-07-09 PROCEDURE — 63600175 PHARM REV CODE 636 W HCPCS: Performed by: ANESTHESIOLOGY

## 2019-07-09 PROCEDURE — D9220A PRA ANESTHESIA: ICD-10-PCS | Mod: ,,, | Performed by: ANESTHESIOLOGY

## 2019-07-09 RX ORDER — HYDROCODONE BITARTRATE AND ACETAMINOPHEN 5; 325 MG/1; MG/1
1 TABLET ORAL EVERY 6 HOURS PRN
Status: DISCONTINUED | OUTPATIENT
Start: 2019-07-09 | End: 2019-07-10 | Stop reason: HOSPADM

## 2019-07-09 RX ORDER — GLYCOPYRROLATE 0.2 MG/ML
INJECTION INTRAMUSCULAR; INTRAVENOUS
Status: DISCONTINUED | OUTPATIENT
Start: 2019-07-09 | End: 2019-07-09

## 2019-07-09 RX ORDER — FENTANYL CITRATE 50 UG/ML
25 INJECTION, SOLUTION INTRAMUSCULAR; INTRAVENOUS EVERY 5 MIN PRN
Status: DISCONTINUED | OUTPATIENT
Start: 2019-07-09 | End: 2019-07-09 | Stop reason: HOSPADM

## 2019-07-09 RX ORDER — HYDROMORPHONE HYDROCHLORIDE 1 MG/ML
0.2 INJECTION, SOLUTION INTRAMUSCULAR; INTRAVENOUS; SUBCUTANEOUS EVERY 5 MIN PRN
Status: DISCONTINUED | OUTPATIENT
Start: 2019-07-09 | End: 2019-07-09 | Stop reason: HOSPADM

## 2019-07-09 RX ORDER — NEOSTIGMINE METHYLSULFATE 1 MG/ML
INJECTION, SOLUTION INTRAVENOUS
Status: DISCONTINUED | OUTPATIENT
Start: 2019-07-09 | End: 2019-07-09

## 2019-07-09 RX ORDER — LIDOCAINE HYDROCHLORIDE 10 MG/ML
1 INJECTION, SOLUTION EPIDURAL; INFILTRATION; INTRACAUDAL; PERINEURAL ONCE
Status: DISCONTINUED | OUTPATIENT
Start: 2019-07-09 | End: 2019-07-10 | Stop reason: HOSPADM

## 2019-07-09 RX ORDER — DEXAMETHASONE SODIUM PHOSPHATE 4 MG/ML
INJECTION, SOLUTION INTRA-ARTICULAR; INTRALESIONAL; INTRAMUSCULAR; INTRAVENOUS; SOFT TISSUE
Status: DISCONTINUED | OUTPATIENT
Start: 2019-07-09 | End: 2019-07-09

## 2019-07-09 RX ORDER — LIDOCAINE HYDROCHLORIDE AND EPINEPHRINE 10; 10 MG/ML; UG/ML
INJECTION, SOLUTION INFILTRATION; PERINEURAL
Status: DISCONTINUED | OUTPATIENT
Start: 2019-07-09 | End: 2019-07-09 | Stop reason: HOSPADM

## 2019-07-09 RX ORDER — ACETAMINOPHEN 10 MG/ML
INJECTION, SOLUTION INTRAVENOUS
Status: DISCONTINUED | OUTPATIENT
Start: 2019-07-09 | End: 2019-07-09

## 2019-07-09 RX ORDER — KETAMINE HCL IN 0.9 % NACL 50 MG/5 ML
SYRINGE (ML) INTRAVENOUS
Status: DISCONTINUED | OUTPATIENT
Start: 2019-07-09 | End: 2019-07-09

## 2019-07-09 RX ORDER — FENTANYL CITRATE 50 UG/ML
INJECTION, SOLUTION INTRAMUSCULAR; INTRAVENOUS
Status: DISCONTINUED | OUTPATIENT
Start: 2019-07-09 | End: 2019-07-09

## 2019-07-09 RX ORDER — ONDANSETRON 2 MG/ML
4 INJECTION INTRAMUSCULAR; INTRAVENOUS ONCE AS NEEDED
Status: DISCONTINUED | OUTPATIENT
Start: 2019-07-09 | End: 2019-07-09 | Stop reason: HOSPADM

## 2019-07-09 RX ORDER — CEFAZOLIN SODIUM 1 G/3ML
2 INJECTION, POWDER, FOR SOLUTION INTRAMUSCULAR; INTRAVENOUS
Status: COMPLETED | OUTPATIENT
Start: 2019-07-09 | End: 2019-07-09

## 2019-07-09 RX ORDER — HYDROCODONE BITARTRATE AND ACETAMINOPHEN 5; 325 MG/1; MG/1
1 TABLET ORAL EVERY 8 HOURS PRN
Qty: 15 TABLET | Refills: 0 | Status: SHIPPED | OUTPATIENT
Start: 2019-07-09 | End: 2021-05-04

## 2019-07-09 RX ORDER — MEPERIDINE HYDROCHLORIDE 50 MG/ML
12.5 INJECTION INTRAMUSCULAR; INTRAVENOUS; SUBCUTANEOUS ONCE AS NEEDED
Status: DISCONTINUED | OUTPATIENT
Start: 2019-07-09 | End: 2019-07-09 | Stop reason: HOSPADM

## 2019-07-09 RX ORDER — DIPHENHYDRAMINE HYDROCHLORIDE 50 MG/ML
25 INJECTION INTRAMUSCULAR; INTRAVENOUS EVERY 6 HOURS PRN
Status: DISCONTINUED | OUTPATIENT
Start: 2019-07-09 | End: 2019-07-09 | Stop reason: HOSPADM

## 2019-07-09 RX ORDER — LIDOCAINE HCL/PF 100 MG/5ML
SYRINGE (ML) INTRAVENOUS
Status: DISCONTINUED | OUTPATIENT
Start: 2019-07-09 | End: 2019-07-09

## 2019-07-09 RX ORDER — ONDANSETRON 2 MG/ML
INJECTION INTRAMUSCULAR; INTRAVENOUS
Status: DISCONTINUED | OUTPATIENT
Start: 2019-07-09 | End: 2019-07-09

## 2019-07-09 RX ORDER — MIDAZOLAM HYDROCHLORIDE 1 MG/ML
INJECTION, SOLUTION INTRAMUSCULAR; INTRAVENOUS
Status: DISCONTINUED | OUTPATIENT
Start: 2019-07-09 | End: 2019-07-09

## 2019-07-09 RX ORDER — BUPIVACAINE HYDROCHLORIDE 2.5 MG/ML
INJECTION, SOLUTION EPIDURAL; INFILTRATION; INTRACAUDAL
Status: DISCONTINUED | OUTPATIENT
Start: 2019-07-09 | End: 2019-07-09 | Stop reason: HOSPADM

## 2019-07-09 RX ORDER — SODIUM CHLORIDE 0.9 % (FLUSH) 0.9 %
10 SYRINGE (ML) INJECTION
Status: DISCONTINUED | OUTPATIENT
Start: 2019-07-09 | End: 2019-07-10 | Stop reason: HOSPADM

## 2019-07-09 RX ORDER — ROCURONIUM BROMIDE 10 MG/ML
INJECTION, SOLUTION INTRAVENOUS
Status: DISCONTINUED | OUTPATIENT
Start: 2019-07-09 | End: 2019-07-09

## 2019-07-09 RX ORDER — PROPOFOL 10 MG/ML
VIAL (ML) INTRAVENOUS
Status: DISCONTINUED | OUTPATIENT
Start: 2019-07-09 | End: 2019-07-09

## 2019-07-09 RX ADMIN — HYDRALAZINE HYDROCHLORIDE 25 MG: 25 TABLET, FILM COATED ORAL at 09:07

## 2019-07-09 RX ADMIN — CEFAZOLIN 2 G: 330 INJECTION, POWDER, FOR SOLUTION INTRAMUSCULAR; INTRAVENOUS at 12:07

## 2019-07-09 RX ADMIN — HYDROCODONE BITARTRATE AND ACETAMINOPHEN 1 TABLET: 5; 325 TABLET ORAL at 02:07

## 2019-07-09 RX ADMIN — PROPOFOL 50 MG: 10 INJECTION, EMULSION INTRAVENOUS at 01:07

## 2019-07-09 RX ADMIN — MORPHINE SULFATE 3 MG: 2 INJECTION, SOLUTION INTRAMUSCULAR; INTRAVENOUS at 07:07

## 2019-07-09 RX ADMIN — ACETAMINOPHEN 1000 MG: 10 INJECTION, SOLUTION INTRAVENOUS at 01:07

## 2019-07-09 RX ADMIN — HYDROMORPHONE HYDROCHLORIDE 0.2 MG: 1 INJECTION, SOLUTION INTRAMUSCULAR; INTRAVENOUS; SUBCUTANEOUS at 02:07

## 2019-07-09 RX ADMIN — MILNACIPRAN HYDROCHLORIDE 100 MG: 25 TABLET, FILM COATED ORAL at 09:07

## 2019-07-09 RX ADMIN — MORPHINE SULFATE 3 MG: 2 INJECTION, SOLUTION INTRAMUSCULAR; INTRAVENOUS at 04:07

## 2019-07-09 RX ADMIN — GLYCOPYRROLATE 0.6 MG: 0.2 INJECTION, SOLUTION INTRAMUSCULAR; INTRAVENOUS at 02:07

## 2019-07-09 RX ADMIN — FENTANYL CITRATE 100 MCG: 50 INJECTION, SOLUTION INTRAMUSCULAR; INTRAVENOUS at 12:07

## 2019-07-09 RX ADMIN — HYDROCODONE BITARTRATE AND ACETAMINOPHEN 1 TABLET: 5; 325 TABLET ORAL at 11:07

## 2019-07-09 RX ADMIN — FENTANYL CITRATE 50 MCG: 50 INJECTION, SOLUTION INTRAMUSCULAR; INTRAVENOUS at 01:07

## 2019-07-09 RX ADMIN — FENTANYL CITRATE 50 MCG: 50 INJECTION, SOLUTION INTRAMUSCULAR; INTRAVENOUS at 12:07

## 2019-07-09 RX ADMIN — PREGABALIN 75 MG: 75 CAPSULE ORAL at 08:07

## 2019-07-09 RX ADMIN — Medication 20 MG: at 12:07

## 2019-07-09 RX ADMIN — MILNACIPRAN HYDROCHLORIDE 100 MG: 25 TABLET, FILM COATED ORAL at 08:07

## 2019-07-09 RX ADMIN — PROPOFOL 150 MG: 10 INJECTION, EMULSION INTRAVENOUS at 12:07

## 2019-07-09 RX ADMIN — DEXAMETHASONE SODIUM PHOSPHATE 8 MG: 4 INJECTION, SOLUTION INTRAMUSCULAR; INTRAVENOUS at 12:07

## 2019-07-09 RX ADMIN — PREGABALIN 75 MG: 75 CAPSULE ORAL at 09:07

## 2019-07-09 RX ADMIN — PROPOFOL 25 MG: 10 INJECTION, EMULSION INTRAVENOUS at 01:07

## 2019-07-09 RX ADMIN — LIDOCAINE HYDROCHLORIDE 100 MG: 20 INJECTION, SOLUTION INTRAVENOUS at 12:07

## 2019-07-09 RX ADMIN — SODIUM CHLORIDE: 0.9 INJECTION, SOLUTION INTRAVENOUS at 12:07

## 2019-07-09 RX ADMIN — HYDRALAZINE HYDROCHLORIDE 25 MG: 25 TABLET, FILM COATED ORAL at 04:07

## 2019-07-09 RX ADMIN — ONDANSETRON 4 MG: 2 INJECTION INTRAMUSCULAR; INTRAVENOUS at 02:07

## 2019-07-09 RX ADMIN — ROCURONIUM BROMIDE 10 MG: 10 INJECTION, SOLUTION INTRAVENOUS at 01:07

## 2019-07-09 RX ADMIN — MIDAZOLAM HYDROCHLORIDE 2 MG: 1 INJECTION, SOLUTION INTRAMUSCULAR; INTRAVENOUS at 12:07

## 2019-07-09 RX ADMIN — ONDANSETRON 8 MG: 2 INJECTION INTRAMUSCULAR; INTRAVENOUS at 04:07

## 2019-07-09 RX ADMIN — HYDRALAZINE HYDROCHLORIDE 25 MG: 25 TABLET, FILM COATED ORAL at 11:07

## 2019-07-09 RX ADMIN — Medication 10 MG: at 01:07

## 2019-07-09 RX ADMIN — ROCURONIUM BROMIDE 40 MG: 10 INJECTION, SOLUTION INTRAVENOUS at 12:07

## 2019-07-09 RX ADMIN — NEOSTIGMINE METHYLSULFATE 5 MG: 1 INJECTION INTRAVENOUS at 02:07

## 2019-07-09 RX ADMIN — ONDANSETRON 8 MG: 2 INJECTION INTRAMUSCULAR; INTRAVENOUS at 07:07

## 2019-07-09 NOTE — ANESTHESIA POSTPROCEDURE EVALUATION
Anesthesia Post Evaluation    Patient: Cayrl Cedeno    Procedure(s) Performed: Procedure(s) (LRB):  CHOLECYSTECTOMY, LAPAROSCOPIC (N/A)  BIOPSY, LIVER    Final Anesthesia Type: general  Patient location during evaluation: PACU  Patient participation: Yes- Able to Participate  Level of consciousness: awake and alert  Post-procedure vital signs: reviewed and stable  Pain management: adequate  Airway patency: patent  PONV status at discharge: No PONV  Anesthetic complications: no      Cardiovascular status: blood pressure returned to baseline  Respiratory status: unassisted  Hydration status: euvolemic  Follow-up not needed.          Vitals Value Taken Time   /96 7/9/2019  3:15 PM   Temp 36.2 °C (97.2 °F) 7/9/2019  2:45 PM   Pulse 103 7/9/2019  3:19 PM   Resp 18 7/9/2019  3:19 PM   SpO2 99 % 7/9/2019  3:20 PM   Vitals shown include unvalidated device data.      Event Time     Out of Recovery 07/09/2019 15:22:28          Pain/Jesus Score: Pain Rating Prior to Med Admin: 7 (7/9/2019  2:50 PM)  Pain Rating Post Med Admin: 5 (7/9/2019  3:00 PM)  Jesus Score: 9 (7/9/2019  2:45 PM)

## 2019-07-09 NOTE — ASSESSMENT & PLAN NOTE
40 y/o F with hx of cholelithiasis presents with gallstone pancreatitis , resolving    -To OR today for lap trace  -NPO   -IVF   -home medications restarted   -prn pain and nausea medication  -prn BP medication

## 2019-07-09 NOTE — PLAN OF CARE
Patient lives in a ground floor apartment, w/3 ROS, w/railings, w/her spouse. Spouse is at her BS. Patient is independent w/use of home DME at times. See below. (H/o small fiber neuropathy, fibromyalgia, &   Dysautonomia.) Patient reports she pays for someone to clean house sometimes, & is filing for disability. Currently no needs are determined.     [facility] Packet given to patient after informed about it;patient verbalized their understanding.        07/09/19 1010   Discharge Assessment   Assessment Type Discharge Planning Assessment   Confirmed/corrected address and phone number on facesheet? Yes   Assessment information obtained from? Patient;Medical Record   Expected Length of Stay (days)   (4-5)   Communicated expected length of stay with patient/caregiver no  (Per MD)   Prior to hospitilization cognitive status: Alert/Oriented;No Deficits   Prior to hospitalization functional status: Independent;Assistive Equipment;Needs Assistance  (Patient states she needs help sometimes because she has dizzieness at times. )   Current cognitive status: Alert/Oriented;No Deficits   Current Functional Status: Independent;Assistive Equipment;Needs Assistance   Facility Arrived From:   (N/A)   Lives With spouse   Able to Return to Prior Arrangements yes   Is patient able to care for self after discharge? Yes   Who are your caregiver(s) and their phone number(s)?   (Ezra Wright Spouse [phone]   )   Patient's perception of discharge disposition home or selfcare   Readmission Within the Last 30 Days no previous admission in last 30 days   Patient currently being followed by outpatient case management? No   Patient currently receives any other outside agency services? No   Equipment Currently Used at Home wheelchair;shower chair   Do you have any problems affording any of your prescribed medications? No   Is the patient taking medications as prescribed? yes   Does the patient have transportation home?  Yes   Transportation Anticipated family or friend will provide   Dialysis Name and Scheduled days   (N/A)   Does the patient receive services at the Coumadin Clinic? No   Discharge Plan A Home with family   Discharge Plan B Home with family   DME Needed Upon Discharge  none   Patient/Family in Agreement with Plan yes

## 2019-07-09 NOTE — OP NOTE
[facility]  Surgery Department  Operative Note    SUMMARY     Date of Procedure: 7/9/2019     Procedure: Procedure(s) (LRB):  CHOLECYSTECTOMY, LAPAROSCOPIC (N/A)  BIOPSY, LIVER     Surgeon(s) and Role:     * Jagjit Martinez Jr., MD - Primary     * Amber Woods MD - Resident - Assisting    Pre-Operative Diagnosis: Acute on chronic cholecystitis [K81.2]    Post-Operative Diagnosis: Post-Op Diagnosis Codes:     * Acute on chronic cholecystitis [K81.2]    Anesthesia: Choice    Technical Procedures Used: laparoscopic cholecystomy and liver biopsy    Description of the Findings of the Procedure:  Placed supine on the operating table and padded appropriately. Monitors were applied and there was smooth induction of general endotracheal anesthesia. The patient's abdomen was prepped and draped in the standard sterile surgical fashion. A time-out was performed and all team members present agreed this was the correct procedure on the correct patient. We also confirmed administration of appropriate preoperative antibiotics.     A 2-cm infraumbilical skin incision was made. Subcutaneous tissue was bluntly dissected until visualization of the underlying fascia. Fascia was cut with scissors and abdomen was entered with 10mm trocar. A 0 Vicryl figure of eight suture was placed around the fascial incision. The abdomen was insufflated with carbon dioxide to a maximum pressure of 15 mmHg. A 10-mm laparoscope was placed and the abdomen was examined. There was no evidence of injury from the initial trocar placement. An additional 5 mm subxiphoid trocar was placed, and two 5-mm trocars were placed under direct vision through separate stab incisions in the right upper quadrant. We directed our attention to the right upper quadrant. The gallbladder was identified and noted to have moderate inflammatory change. The fundus was grasped and retracted cranially and the infundibulum was grasped and retracted laterally.  We bluntly dissected the peritoneal reflection off the infundibulum and neck of the gallbladder. With careful blunt and cautery dissection in this area, we were able to identify the cystic duct. Further careful dissection identified the cystic artery and we did obtain a critical view of safety. The cystic duct and artery were then triply clipped and divided. The gallbladder was dissected off the gallbladder fossa using Bovie electrocautery from infundibulum to fundus until free. It was placed into an EndoCatch bag and removed from the umbilical port. We returned the laparoscope and trocar to the umbilicus and reexamined the right upper quadrant. The gallbladder fossa was examined and no further bleeding or any bile leak were noted. The clips on the cystic duct and artery were examined and no bleeding or bile leak were noted. At this time a liver biopsy was obtained. The edge of the liver was grasped with a cupped forceps.  The forceps were used to obtain a piece of liver bluntly.  At this time any bleeding from the liver was stopped with electrocautery.  Once hemostasis was acheived we proceeded with the next step of the procedure.   The right upper quadrant was irrigated with saline briefly until the returning effluent was clear. All ports were removed under direct vision and no bleeding from any port site was noted. The insufflation of the abdomen was evacuated.. The fascial incision at the umbilical port site was closed with the preexisting 0 Vicryl stitch. All port sites were closed in a subcuticular fashion with 4-0 Monocryl. Sterile dressings were applied. The patient was extubated in the Operating Room and transported to the Recovery Room in stable condition. All sponge, instrument and needle counts were correct at the end of the case.    Complications: None    Estimated Blood Loss (EBL): 15cc            Specimens:   Specimen (12h ago, onward)    Start     Ordered    07/09/19 3788  Specimen to Pathology -  Surgery  Once     Comments:  Specimen to Pathology: 1)  gallbladder  - permanent , formalin , to refrigerator2) liver biopsy   - permanent , formalin , to refrigerator     Start Status     07/09/19 1353 Collected (07/09/19 1354) Order ID: [id number]       07/09/19 1354                  Condition: Good    Disposition: PACU - hemodynamically stable.    Attestation: Dr. Martinez was present and scrubbed for the entire procedure.     ELDER Woods MD   General Surgery- PGYIII  [phone]

## 2019-07-09 NOTE — BRIEF OP NOTE
[facility]  Brief Operative Note     SUMMARY     Surgery Date: 7/9/2019     Surgeon(s) and Role:     * Jagjit Martinez Jr., MD - Primary     * Amber Woods MD - Resident - Assisting    Pre-op Diagnosis:  Acute on chronic cholecystitis [K81.2]    Post-op Diagnosis:  Post-Op Diagnosis Codes:     * Acute on chronic cholecystitis [K81.2]    Procedure(s) (LRB):  CHOLECYSTECTOMY, LAPAROSCOPIC (N/A)  BIOPSY, LIVER    Anesthesia: Choice    Description of the findings of the procedure: Laparoscopic cholecystectomy and liver biopsy (right lobe)    Findings/Key Components: Moderately inflamed gallbladder. Critical view achieved prior to placing clips on cystic duct and artery. Gallbladder removed in entirety and sent to pathology. Liver biopsy performed, right lobe of liver and sent to pathology. Hemostasis achieved    Estimated Blood Loss: 15cc         Specimens:   Specimen (12h ago, onward)    Start     Ordered    07/09/19 1353  Specimen to Pathology - Surgery  Once     Comments:  Specimen to Pathology: 1)  gallbladder  - permanent , formalin , to refrigerator2) liver biopsy   - permanent , formalin , to refrigerator     Start Status     07/09/19 1353 Collected (07/09/19 1354) Order ID: [id number]       07/09/19 1354        Dispo: Extubated in the OR and transported to PACU for recovery

## 2019-07-09 NOTE — PLAN OF CARE
Problem: Adult Inpatient Plan of Care  Goal: Plan of Care Review  Outcome: Ongoing (interventions implemented as appropriate)  Quiet hours. Npo for surgery this am. No complaints. Med x1 for elevated bp with po hydralazine. Safety maintaned.

## 2019-07-09 NOTE — PROGRESS NOTES
[facility]  General Surgery  Progress Note    Subjective:     History of Present Illness:  40 y/o F with hx of cholelithiasis, HTN, fibromyalgia, migraines, celiac disease presents to the ED with acute onset of RUQ pain. Pt was recently diagnosed and tentatively scheduled for a cholecystectomy in 8/19 but had an acute onset of RUQ along with nausea and small volume non-bilious, non-bloody emesis. Denies fever and chills. Reports similar pain to last ED presentation. Rates pain as 10/10 and does not radiate. Also having epigastric bloating/fullness over the past week. No previous abdominal surgery.     Post-Op Info:  Procedure(s) (LRB):  CHOLECYSTECTOMY, LAPAROSCOPIC (N/A)         Interval History:   Patient seen and examined, no acute events overnight  Pain well controlled. NPO.   Plan for laparoscopic cholecystomy today       Medications:  Continuous Infusions:   sodium chloride 0.9% 125 mL/hr at 07/08/19 2131     Scheduled Meds:   cetirizine  10 mg Oral Daily    lidocaine (PF) 10 mg/ml (1%)  1 mL Other Once    milnacipran  100 mg Oral BID    pregabalin  75 mg Oral BID     PRN Meds:acetaminophen, docusate sodium, hydrALAZINE, morphine, ondansetron, sodium chloride 0.9%     Review of patient's allergies indicates:   Allergen Reactions    Latex, natural rubber     Gluten protein Rash    Latex Rash     Objective:     Vital Signs (Most Recent):  Temp: 98 °F (36.7 °C) (07/09/19 0833)  Pulse: 90 (07/09/19 0833)  Resp: 16 (07/09/19 0833)  BP: (!) 165/118 (07/09/19 0833)  SpO2: 97 % (07/09/19 0833) Vital Signs (24h Range):  Temp:  [97.4 °F (36.3 °C)-98.7 °F (37.1 °C)] 98 °F (36.7 °C)  Pulse:  [74-97] 90  Resp:  [16-20] 16  SpO2:  [95 %-100 %] 97 %  BP: (145-181)/() 165/118     Weight: 83.9 kg (185 lb)  Body mass index is 29.86 kg/m².    Intake/Output - Last 3 Shifts       07/07 0700 - 07/08 0659 07/08 0700 - 07/09 0659 07/09 0700 - 07/10 0659    P.O. 1020 240     I.V. (mL/kg) 0053 (43.2)  1000 (11.9)     IV Piggyback 100      Total Intake(mL/kg) 4745 (56.6) 1240 (14.8)     Net +4745 +1240            Urine Occurrence 4 x 3 x           Physical Exam   Constitutional: She appears well-developed and well-nourished.   HENT:   Head: Normocephalic and atraumatic.   Cardiovascular: Normal rate and regular rhythm.   Pulmonary/Chest: Effort normal. No respiratory distress.   Abdominal:   Soft, minimal TTP  nondistended       Significant Labs:  CBC:   Recent Labs   Lab 07/09/19 0413   WBC 7.17   RBC 4.84   HGB 14.0   HCT 42.5   *   MCV 88   MCH 28.9   MCHC 32.9     BMP:   Recent Labs   Lab 07/09/19 0413         K 3.1*      CO2 26   BUN 11   CREATININE 0.8   CALCIUM 8.9     CMP:   Recent Labs   Lab 07/09/19 0413      CALCIUM 8.9   ALBUMIN 3.9   PROT 7.3      K 3.1*   CO2 26      BUN 11   CREATININE 0.8   ALKPHOS 192*   *   AST 81*   BILITOT 0.4     LFTs:   Recent Labs   Lab 07/09/19 0413   *   AST 81*   ALKPHOS 192*   BILITOT 0.4   PROT 7.3   ALBUMIN 3.9     Coagulation:   Recent Labs   Lab 07/06/19  0454   LABPROT 10.8   INR 1.1     Assessment/Plan:     * Acute gallstone pancreatitis  40 y/o F with hx of cholelithiasis presents with gallstone pancreatitis , resolving    -To OR today for lap trace  -NPO   -IVF   -home medications restarted   -prn pain and nausea medication  -prn BP medication          Amber Woods MD  General Surgery  [facility]

## 2019-07-09 NOTE — SUBJECTIVE & OBJECTIVE
Interval History:   Patient seen and examined, no acute events overnight  Pain well controlled. NPO.   Plan for laparoscopic cholecystomy today       Medications:  Continuous Infusions:   sodium chloride 0.9% 125 mL/hr at 07/08/19 2131     Scheduled Meds:   cetirizine  10 mg Oral Daily    lidocaine (PF) 10 mg/ml (1%)  1 mL Other Once    milnacipran  100 mg Oral BID    pregabalin  75 mg Oral BID     PRN Meds:acetaminophen, docusate sodium, hydrALAZINE, morphine, ondansetron, sodium chloride 0.9%     Review of patient's allergies indicates:   Allergen Reactions    Latex, natural rubber     Gluten protein Rash    Latex Rash     Objective:     Vital Signs (Most Recent):  Temp: 98 °F (36.7 °C) (07/09/19 0833)  Pulse: 90 (07/09/19 0833)  Resp: 16 (07/09/19 0833)  BP: (!) 165/118 (07/09/19 0833)  SpO2: 97 % (07/09/19 0833) Vital Signs (24h Range):  Temp:  [97.4 °F (36.3 °C)-98.7 °F (37.1 °C)] 98 °F (36.7 °C)  Pulse:  [74-97] 90  Resp:  [16-20] 16  SpO2:  [95 %-100 %] 97 %  BP: (145-181)/() 165/118     Weight: 83.9 kg (185 lb)  Body mass index is 29.86 kg/m².    Intake/Output - Last 3 Shifts       07/07 0700 - 07/08 0659 07/08 0700 - 07/09 0659 07/09 0700 - 07/10 0659    P.O. 1020 240     I.V. (mL/kg) 3625 (43.2) 1000 (11.9)     IV Piggyback 100      Total Intake(mL/kg) 4745 (56.6) 1240 (14.8)     Net +4745 +1240            Urine Occurrence 4 x 3 x           Physical Exam   Constitutional: She appears well-developed and well-nourished.   HENT:   Head: Normocephalic and atraumatic.   Cardiovascular: Normal rate and regular rhythm.   Pulmonary/Chest: Effort normal. No respiratory distress.   Abdominal:   Soft, minimal TTP  nondistended       Significant Labs:  CBC:   Recent Labs   Lab 07/09/19 0413   WBC 7.17   RBC 4.84   HGB 14.0   HCT 42.5   *   MCV 88   MCH 28.9   MCHC 32.9     BMP:   Recent Labs   Lab 07/09/19 0413         K 3.1*      CO2 26   BUN 11   CREATININE 0.8   CALCIUM 8.9      CMP:   Recent Labs   Lab 07/09/19  4203      CALCIUM 8.9   ALBUMIN 3.9   PROT 7.3      K 3.1*   CO2 26      BUN 11   CREATININE 0.8   ALKPHOS 192*   *   AST 81*   BILITOT 0.4     LFTs:   Recent Labs   Lab 07/09/19  3953   *   AST 81*   ALKPHOS 192*   BILITOT 0.4   PROT 7.3   ALBUMIN 3.9     Coagulation:   Recent Labs   Lab 07/06/19  0454   LABPROT 10.8   INR 1.1

## 2019-07-09 NOTE — TRANSFER OF CARE
"Anesthesia Transfer of Care Note    Patient: Caryl Cedeno    Procedure(s) Performed: Procedure(s) (LRB):  CHOLECYSTECTOMY, LAPAROSCOPIC (N/A)  BIOPSY, LIVER    Patient location: PACU    Anesthesia Type: general    Transport from OR: Transported from OR on 6-10 L/min O2 by face mask with adequate spontaneous ventilation    Post pain: adequate analgesia    Post assessment: no apparent anesthetic complications    Post vital signs: stable (hypertensive. baseline  )    Level of consciousness: sedated    Nausea/Vomiting: no nausea/vomiting    Complications: none    Transfer of care protocol was followed      Last vitals:   Visit Vitals  BP (!) 181/119 (BP Location: Left arm, Patient Position: Lying)   Pulse 87   Temp 36.8 °C (98.3 °F) (Oral)   Resp 17   Ht 5' 6" (1.676 m)   Wt 83.9 kg (185 lb)   LMP 06/29/2019   SpO2 100%   Breastfeeding? No   BMI 29.86 kg/m²     "

## 2019-07-09 NOTE — PLAN OF CARE
Problem: Adult Inpatient Plan of Care  Goal: Patient-Specific Goal (Individualization)  Plan of care reviewed and updated. Pt AA+O. Pt's pain is managed with the medication ordered at this time. Pt's VS are as charted.  No falls this shift. Pt is oriented to room and call system. Will continue to [facility].

## 2019-07-10 ENCOUNTER — PATIENT MESSAGE (OUTPATIENT)
Dept: SURGERY | Facility: CLINIC | Age: 39
End: 2019-07-10

## 2019-07-10 ENCOUNTER — DOCUMENTATION ONLY (OUTPATIENT)
Dept: SURGERY | Facility: CLINIC | Age: 39
End: 2019-07-10

## 2019-07-10 VITALS
TEMPERATURE: 99 F | OXYGEN SATURATION: 97 % | RESPIRATION RATE: 18 BRPM | HEIGHT: 66 IN | HEART RATE: 111 BPM | WEIGHT: 185 LBS | BODY MASS INDEX: 29.73 KG/M2 | DIASTOLIC BLOOD PRESSURE: 94 MMHG | SYSTOLIC BLOOD PRESSURE: 142 MMHG

## 2019-07-10 PROBLEM — K85.10 ACUTE GALLSTONE PANCREATITIS: Status: RESOLVED | Noted: 2019-07-06 | Resolved: 2019-07-10

## 2019-07-10 PROBLEM — K81.2 ACUTE ON CHRONIC CHOLECYSTITIS: Status: RESOLVED | Noted: 2019-07-06 | Resolved: 2019-07-10

## 2019-07-10 PROBLEM — R17 TOTAL BILIRUBIN, ELEVATED: Status: RESOLVED | Noted: 2019-07-07 | Resolved: 2019-07-10

## 2019-07-10 PROBLEM — K81.1 CHRONIC CHOLECYSTITIS: Status: RESOLVED | Noted: 2019-07-09 | Resolved: 2019-07-10

## 2019-07-10 LAB
ALBUMIN SERPL BCP-MCNC: 4 G/DL (ref 3.5–5.2)
ALP SERPL-CCNC: 185 U/L (ref 55–135)
ALT SERPL W/O P-5'-P-CCNC: 561 U/L (ref 10–44)
ANION GAP SERPL CALC-SCNC: 11 MMOL/L (ref 8–16)
AST SERPL-CCNC: 183 U/L (ref 10–40)
BASOPHILS # BLD AUTO: 0.01 K/UL (ref 0–0.2)
BASOPHILS NFR BLD: 0.1 % (ref 0–1.9)
BILIRUB SERPL-MCNC: 0.5 MG/DL (ref 0.1–1)
BUN SERPL-MCNC: 7 MG/DL (ref 6–20)
CALCIUM SERPL-MCNC: 9.9 MG/DL (ref 8.7–10.5)
CHLORIDE SERPL-SCNC: 100 MMOL/L (ref 95–110)
CO2 SERPL-SCNC: 26 MMOL/L (ref 23–29)
CREAT SERPL-MCNC: 0.8 MG/DL (ref 0.5–1.4)
DIFFERENTIAL METHOD: ABNORMAL
EOSINOPHIL # BLD AUTO: 0 K/UL (ref 0–0.5)
EOSINOPHIL NFR BLD: 0 % (ref 0–8)
ERYTHROCYTE [DISTWIDTH] IN BLOOD BY AUTOMATED COUNT: 13.4 % (ref 11.5–14.5)
EST. GFR  (AFRICAN AMERICAN): >60 ML/MIN/1.73 M^2
EST. GFR  (NON AFRICAN AMERICAN): >60 ML/MIN/1.73 M^2
GLUCOSE SERPL-MCNC: 139 MG/DL (ref 70–110)
HCT VFR BLD AUTO: 41.8 % (ref 37–48.5)
HGB BLD-MCNC: 13.8 G/DL (ref 12–16)
IMM GRANULOCYTES # BLD AUTO: 0.05 K/UL (ref 0–0.04)
IMM GRANULOCYTES NFR BLD AUTO: 0.4 % (ref 0–0.5)
LYMPHOCYTES # BLD AUTO: 0.8 K/UL (ref 1–4.8)
LYMPHOCYTES NFR BLD: 7 % (ref 18–48)
MCH RBC QN AUTO: 28.2 PG (ref 27–31)
MCHC RBC AUTO-ENTMCNC: 33 G/DL (ref 32–36)
MCV RBC AUTO: 86 FL (ref 82–98)
MONOCYTES # BLD AUTO: 0.6 K/UL (ref 0.3–1)
MONOCYTES NFR BLD: 5.4 % (ref 4–15)
NEUTROPHILS # BLD AUTO: 9.9 K/UL (ref 1.8–7.7)
NEUTROPHILS NFR BLD: 87.1 % (ref 38–73)
NRBC BLD-RTO: 0 /100 WBC
PLATELET # BLD AUTO: 424 K/UL (ref 150–350)
PMV BLD AUTO: 10.7 FL (ref 9.2–12.9)
POTASSIUM SERPL-SCNC: 3.8 MMOL/L (ref 3.5–5.1)
PROT SERPL-MCNC: 7.6 G/DL (ref 6–8.4)
RBC # BLD AUTO: 4.89 M/UL (ref 4–5.4)
SODIUM SERPL-SCNC: 137 MMOL/L (ref 136–145)
WBC # BLD AUTO: 11.35 K/UL (ref 3.9–12.7)

## 2019-07-10 PROCEDURE — 85025 COMPLETE CBC W/AUTO DIFF WBC: CPT

## 2019-07-10 PROCEDURE — 93010 ELECTROCARDIOGRAM REPORT: CPT | Mod: ,,, | Performed by: INTERNAL MEDICINE

## 2019-07-10 PROCEDURE — 63600175 PHARM REV CODE 636 W HCPCS: Performed by: STUDENT IN AN ORGANIZED HEALTH CARE EDUCATION/TRAINING PROGRAM

## 2019-07-10 PROCEDURE — 93010 EKG 12-LEAD: ICD-10-PCS | Mod: ,,, | Performed by: INTERNAL MEDICINE

## 2019-07-10 PROCEDURE — 36415 COLL VENOUS BLD VENIPUNCTURE: CPT

## 2019-07-10 PROCEDURE — 80053 COMPREHEN METABOLIC PANEL: CPT

## 2019-07-10 PROCEDURE — 25000003 PHARM REV CODE 250: Performed by: STUDENT IN AN ORGANIZED HEALTH CARE EDUCATION/TRAINING PROGRAM

## 2019-07-10 PROCEDURE — 93005 ELECTROCARDIOGRAM TRACING: CPT

## 2019-07-10 RX ORDER — HYDROCHLOROTHIAZIDE 25 MG/1
25 TABLET ORAL DAILY
Status: DISCONTINUED | OUTPATIENT
Start: 2019-07-10 | End: 2019-07-10 | Stop reason: HOSPADM

## 2019-07-10 RX ADMIN — HYDROCODONE BITARTRATE AND ACETAMINOPHEN 1 TABLET: 5; 325 TABLET ORAL at 06:07

## 2019-07-10 RX ADMIN — HYDRALAZINE HYDROCHLORIDE 25 MG: 25 TABLET, FILM COATED ORAL at 04:07

## 2019-07-10 RX ADMIN — MORPHINE SULFATE 3 MG: 2 INJECTION, SOLUTION INTRAMUSCULAR; INTRAVENOUS at 12:07

## 2019-07-10 RX ADMIN — ONDANSETRON 8 MG: 2 INJECTION INTRAMUSCULAR; INTRAVENOUS at 12:07

## 2019-07-10 RX ADMIN — PREGABALIN 75 MG: 75 CAPSULE ORAL at 09:07

## 2019-07-10 RX ADMIN — MILNACIPRAN HYDROCHLORIDE 100 MG: 25 TABLET, FILM COATED ORAL at 09:07

## 2019-07-10 RX ADMIN — HYDROCHLOROTHIAZIDE 25 MG: 25 TABLET ORAL at 09:07

## 2019-07-10 NOTE — HOSPITAL COURSE
Patient was admitted for gallstone pancreatitis following presentation for ED with severe abdominal pain, nausea, vomiting, transaminitis/hyperbilirubinemia with lipase >1000. She was originally scheduled for elective laparoscopic cholecystectomy on 08/06 with Dr. Martinez for chronic cholecystitis. Given acute presentation and new gallstone pancreatitis, she was admitted and monitored over the weekend to ensure that her labs trended downward prior to surgery. She underwent laparoscopic cholecystectomy on 07/09. She did well post-operatively with minimal pain. She had no nausea or vomiting and tolerated regular diet. Her labs at discharge have continued to trend down, lipase and total bilirubin normalized. At time of discharge patient stable without any complaints. Incisions appeared well-approximated, clean, dry, and intact. She will follow-up in clinic in 2 weeks.

## 2019-07-10 NOTE — NURSING
Pt IV removed, verbalized understanding of AVS, no further questions, pt received one paper prescription, pt being wheeled down by

## 2019-07-10 NOTE — DISCHARGE SUMMARY
[facility]  General Surgery  Discharge Summary      Patient Name: Caryl Cedeno  MRN: [id number]  Admission Date: 7/6/2019  Hospital Length of Stay: 4 days  Discharge Date and Time:  07/10/2019 9:37 AM  Attending Physician: Jagjit Martinez Jr.,*   Discharging Provider: Radha Shen MD  Primary Care Provider: Casper Cardoza MD    HPI:   40 y/o F with hx of cholelithiasis, HTN, fibromyalgia, migraines, celiac disease presents to the ED with acute onset of RUQ pain. Pt was recently diagnosed and tentatively scheduled for a cholecystectomy in 8/19 but had an acute onset of RUQ along with nausea and small volume non-bilious, non-bloody emesis. Denies fever and chills. Reports similar pain to last ED presentation. Rates pain as 10/10 and does not radiate. Also having epigastric bloating/fullness over the past week. No previous abdominal surgery.     Procedure(s) (LRB):  CHOLECYSTECTOMY, LAPAROSCOPIC (N/A)  BIOPSY, LIVER      Indwelling Lines/Drains at time of discharge:   Lines/Drains/Airways          None        Hospital Course: Patient was admitted for gallstone pancreatitis following presentation for ED with severe abdominal pain, nausea, vomiting, transaminitis/hyperbilirubinemia with lipase >1000. She was originally scheduled for elective laparoscopic cholecystectomy on 08/06 with Dr. Martinez for chronic cholecystitis. Given acute presentation and new gallstone pancreatitis, she was admitted and monitored over the weekend to ensure that her labs trended downward prior to surgery. She underwent laparoscopic cholecystectomy on 07/09. She did well post-operatively with minimal pain. She had no nausea or vomiting and tolerated regular diet. Her labs at discharge have continued to trend down, lipase and total bilirubin normalized. At time of discharge patient stable without any complaints. Incisions appeared well-approximated, clean, dry, and intact. She will follow-up in clinic in 2  weeks.     Consults:   Consults (From admission, onward)        Status Ordering Provider     Inpatient consult to [facility]  Once     Provider:  (Not yet assigned)    Completed MAYRA GALICIA     Inpatient consult to General Surgery  Once     Provider:  (Not yet assigned)    Completed SUSAN MCKEON          Significant Diagnostic Studies: Labs:   CMP   Recent Labs   Lab 07/09/19  0413 07/09/19  1552 07/10/19  0451     --  137   K 3.1*  --  3.8     --  100   CO2 26  --  26     --  139*   BUN 11  --  7   CREATININE 0.8  --  0.8   CALCIUM 8.9  --  9.9   PROT 7.3 7.5 7.6   ALBUMIN 3.9 4.1 4.0   BILITOT 0.4 0.5 0.5   ALKPHOS 192* 195* 185*   AST 81* 187* 183*   * 586* 561*   ANIONGAP 10  --  11   ESTGFRAFRICA >60.0  --  >60.0   EGFRNONAA >60.0  --  >60.0   , CBC   Recent Labs   Lab 07/09/19  0413 07/10/19  0451   WBC 7.17 11.35   HGB 14.0 13.8   HCT 42.5 41.8   * 424*    and All labs within the past 24 hours have been reviewed    Pending Diagnostic Studies:     None        Final Active Diagnoses:      Problems Resolved During this Admission:    Diagnosis Date Noted Date Resolved POA    PRINCIPAL PROBLEM:  Acute gallstone pancreatitis [K85.10] 07/06/2019 07/10/2019 Yes    Chronic cholecystitis [K81.1] 07/09/2019 07/10/2019 Yes    Hypokalemia [E87.6] 07/08/2019 07/09/2019 No    Total bilirubin, elevated [R17] 07/07/2019 07/10/2019 Yes    Acute on chronic cholecystitis [K81.2] 07/06/2019 07/10/2019 Yes      Discharged Condition: good    Disposition: Home or Self Care    Follow Up:  Follow-up Information     Jagjit Martinez Jr, MD In 2 weeks.    Specialties:  General Surgery, Bariatrics  Contact information:  [facility] [address] [phone]                 Patient Instructions:      Diet Adult Regular     No driving until:   Order Comments: No longer taking narcotics.     Notify your health care provider if you experience any of the  following:     Notify your health care provider if you experience any of the following:  increased confusion or weakness     Notify your health care provider if you experience any of the following:  persistent dizziness, light-headedness, or visual disturbances     Notify your health care provider if you experience any of the following:  worsening rash     Notify your health care provider if you experience any of the following:  severe persistent headache     Notify your health care provider if you experience any of the following:  difficulty breathing or increased cough     Notify your health care provider if you experience any of the following:  redness, tenderness, or signs of infection (pain, swelling, redness, odor or green/yellow discharge around incision site)     Notify your health care provider if you experience any of the following:  severe uncontrolled pain     Notify your health care provider if you experience any of the following:  persistent nausea and vomiting or diarrhea     Notify your health care provider if you experience any of the following:  temperature >100.4     No dressing needed   Order Comments: Bandages and skin glue will fall off on their own. Okay to shower today.     Weight bearing restrictions (specify):   Order Comments: No lifting more than 20 lbs for 4-6 weeks.     Shower on day dressing removed (No bath)     Medications:  Reconciled Home Medications:      Medication List      START taking these medications    HYDROcodone-acetaminophen 5-325 mg per tablet  Commonly known as:  NORCO  Take 1 tablet by mouth every 8 (eight) hours as needed.        CONTINUE taking these medications    cetirizine 10 MG tablet  Commonly known as:  ZYRTEC  Take 10 mg by mouth once daily.     docusate sodium 100 MG capsule  Commonly known as:  COLACE  Take 1 capsule (100 mg total) by mouth 2 (two) times daily as needed for Constipation.     FINACEA 15 % gel  Generic drug:  azelaic acid  AAA face qam -  bid     GAVILYTE-G 236-22.74-6.74 -5.86 gram suspension  Generic drug:  polyethylene glycol     hydroCHLOROthiazide 25 MG tablet  Commonly known as:  HYDRODIURIL  Take 25 mg by mouth once daily.     ibuprofen 600 MG tablet  Commonly known as:  ADVIL,MOTRIN  Take 600 mg by mouth daily as needed for Pain.     losartan 50 MG tablet  Commonly known as:  COZAAR  Take 1 tablet (50 mg total) by mouth once daily.     pregabalin 75 MG capsule  Commonly known as:  LYRICA  Take 1 capsule (75 mg total) by mouth 2 (two) times daily.     SAVELLA 100 mg Tab  Generic drug:  milnacipran  TAKE 1 TABLET BY MOUTH TWICE A DAY     water Liqd 150 mL with MILK OF MAGNESIA 400 mg/5 mL Susp 400 mg, diphenhydrAMINE 12.5 mg/5 mL Elix 60 mg, nystatin 100,000 unit/mL Susp 500,000 Units  SWISH AND SPIT 10ML'S BY MOUTH EVERY 4 HOURS     ZOMIG 5 mg Spry  Generic drug:  ZOLMitriptan  USE 1 SPRAY IN EACH NOSTRIL ONCE DAILY.          Time spent on the discharge of patient: 30 minutes    Radha Shen MD  General Surgery  [facility]

## 2019-07-10 NOTE — PROGRESS NOTES
Received notice from [facility] that they were unable to reach pt for her scheduled genetic counseling appt.  Messaged pt.

## 2019-07-10 NOTE — PROGRESS NOTES
Notified of patient having tachycardia and hypertension.  BP currently 146/90.  Reviewing vitals since 7/8/2019 it looks like the patient's blood pressure often is elevated around 150-170 systolic.  Ordered an EKG for the tachycardia.  EKG results show a sinus tachycardia.    Speaking with the patient she endorses having mild abdominal pain primarily located in the upper right quadrant.  This pain has been present since surgery.  She describes it as a sharp stinging pain that comes and goes.  On exam her abdomen is soft, non-distended, and minimally tender to deep palpation.  Her incisions are clean, dry, and not bleeding.    The patient also expressed concern about her HR and blood pressure.  She attributes an episode of feeling dizzy while walking to her elevated blood pressure.  The patient had an episode in which she felt dizzy after standing up from bed to walk to the bathroom.  Denies any falls or loss of consciousness.  She states that she has experienced similar sensations at home.  On exam she had intact sensation in bilateral upper and lower extremities.  Both upper and lower extremities had normal range of motion and motor skills.  CNII-XII were grossly intact.    She denies any shortness of breath, fevers, chills, nausea, vomiting, chest pain, or sensory deficits.    After meeting with the patient and answering questions she states that she is feeling much better.     Javi Taylor, PGY 1  General Surgery/Surgical Oncology  Pager: [phone]

## 2019-07-10 NOTE — PROGRESS NOTES
Contacted physician on call for general surgery about patient's elevated HR and BP. Discussed medications given, patient's home medication regime for BP, and trends of BP and HR since change of shift. Physician stated that BP is OK and that he will be coming to her room soon to evaluate patient's current status.

## 2019-07-10 NOTE — PLAN OF CARE
07/10/19 1022   Final Note   Assessment Type Discharge Planning Assessment   Anticipated Discharge Disposition Home   What phone number can be called within the next 1-3 days to see how you are doing after discharge?   ([phone])   Hospital Follow Up  Appt(s) scheduled? Yes   Discharge plans and expectations educations in teach back method with documentation complete? Yes   Right Care Referral Info   Post Acute Recommendation No Care

## 2019-07-12 ENCOUNTER — PATIENT OUTREACH (OUTPATIENT)
Dept: ADMINISTRATIVE | Facility: CLINIC | Age: 39
End: 2019-07-12

## 2019-07-12 NOTE — PATIENT INSTRUCTIONS
Having Laparoscopic Cholecystectomy  Youve had painful attacks caused by gallstones. Because of this, you are having surgery to remove your gallbladder. This is called cholecystecomy. A technique called laparoscopy will be used. This allows surgery to be done through a few small incisions.  During Laparoscopic Surgery  For this surgery, a thin tube with a camera is used. This is called a laparoscope. The scope sends images from inside the body to a video screen. It allows the surgeon to view and work on your gallbladder.   Small incisions are made in your abdomen. The scope is put through one of the incisions. Surgical tools are put through other incisions.   Small clips are used to close off the bile duct and blood vessels. The gallbladder is then detached from the liver.   The gallbladder is removed through one of the incisions. Bile still flows from the liver to the small intestine.   When the surgery is one, all tools are removed. Incisions are closed with sutures or staples.  After Surgery  You will be sent to a room to wake up from the anesthesia. You will likely go home the same day. In some cases, an overnight stay is needed. When you are released to go home, have a family member or friend ready to drive you.  Risks and Possible Complications of Gallbladder Surgery  All surgeries have risks. The risks of gallbladder surgery include:   Bleeding   Infection   Injury to the common bile duct or nearby organs   Blood clots in the legs   Prolonged diarrhea   Bile leaks   Hernia at incision site   © 4363-9486 [facility], [address] [place] [address], [place] [address]. All rights reserved. This information is not intended as a substitute for professional medical care. Always follow your healthcare professional's instructions.     Sepsis   Sepsis occurs when your body responds to bacteremia - the presence of bacteria in your bloodstream. Sepsis can be deadly. Blood pressure may drop and the lungs and  kidneys may start to fail. Emergency care for sepsis is crucial   When to Go to the Emergency Department (ED)   Sepsis is a medical emergency. Go to the nearest emergency department if a fever is present with any of these symptoms:   Chills and shaking   Fever or low body temperature (hypothermia)   Rapid heartbeat and breathing; shortness of breath   Nausea or vomiting   Confusion, dizziness   Skin rash   Decreased urination  © 0042-9811 [facility], [address] [place] [address], [place] [address]. All rights reserved. This information is not intended as a substitute for professional medical care. Always follow your healthcare professional's instructions

## 2019-07-22 ENCOUNTER — TELEPHONE (OUTPATIENT)
Dept: GASTROENTEROLOGY | Facility: CLINIC | Age: 39
End: 2019-07-22

## 2019-07-22 ENCOUNTER — PATIENT MESSAGE (OUTPATIENT)
Dept: GASTROENTEROLOGY | Facility: CLINIC | Age: 39
End: 2019-07-22

## 2019-07-22 ENCOUNTER — DOCUMENTATION ONLY (OUTPATIENT)
Dept: GASTROENTEROLOGY | Facility: CLINIC | Age: 39
End: 2019-07-22

## 2019-07-22 NOTE — PROGRESS NOTES
Caryl Cedeno 1 hour ago (3:11 PM)         Delroy Castañeda,     I don't know how much information was sent to you about any of this, but your instincts were right about my gallbladder and Dr. Martinez already took it out! I had a second stone get stuck in less than two weeks, so they admitted me from the ER and he did the surgery as soon as the gallstone pancratitis (a fun new twist) cleared up. Anyway, I thought you should know, I'm doing well after surgery and thank you very much for all of your help and the great referral. I hope some of the inflamation I was experiencing in the rest of my belly was from the gallbladder. But, of course, I don't expect any miracles. I am just happy all seems to have gone smoothly.     By the way, due to all of this, I am rescheduling our colonoscopy plans. Do you think we should re-evaluate? Or just postpone until I am cleared post-surgery?     Thank you again for everything!     Caryl Holley

## 2019-07-22 NOTE — TELEPHONE ENCOUNTER
----- Message from Melina Terry sent at 7/22/2019  3:03 PM CDT -----  Contact: Self - [phone]  Maddy- pt called to speak with staff- states she had to postpone c-scope- has labs ordered by Dr. Castañeda- does she need to keep them or reschedule them- please contact pt at [phone]

## 2019-07-24 NOTE — TELEPHONE ENCOUNTER
Spoke with patient. She thought labs ordered for 8/1/19 were from Dr Castañeda. Explained those labs were ordered by surgeon to check liver enzymes post cholecystectomy. Patient would like to know if she should hold off on colonoscopy to see if removing the gallbladder took care of symptoms. Will forward to Dr Castañeda.

## 2019-07-25 NOTE — TELEPHONE ENCOUNTER
Message left for patient to return call regarding Dr Castañeda recommendations to postpone colonoscopy for  3 months.

## 2019-07-30 ENCOUNTER — TELEPHONE (OUTPATIENT)
Dept: SURGERY | Facility: CLINIC | Age: 39
End: 2019-07-30

## 2019-07-30 NOTE — TELEPHONE ENCOUNTER
----- Message from Shawn Naik sent at 7/30/2019 11:23 AM CDT -----  Contact: pt  Needs Advice    Reason for call: pt calling to speak with the nurse regarding having a letter stating her hospital stay dates. Pt states due to the surgery she had to change her flight. Pt states she needs to have documentation of why to present to the airlines.         Communication Preference: [phone]     Additional Information:

## 2019-08-02 ENCOUNTER — OFFICE VISIT (OUTPATIENT)
Dept: SURGERY | Facility: CLINIC | Age: 39
End: 2019-08-02
Payer: COMMERCIAL

## 2019-08-02 VITALS
BODY MASS INDEX: 29.41 KG/M2 | WEIGHT: 183 LBS | SYSTOLIC BLOOD PRESSURE: 159 MMHG | TEMPERATURE: 98 F | HEART RATE: 87 BPM | HEIGHT: 66 IN | DIASTOLIC BLOOD PRESSURE: 107 MMHG

## 2019-08-02 DIAGNOSIS — Z09 POSTOP CHECK: Primary | ICD-10-CM

## 2019-08-02 PROCEDURE — 99999 PR PBB SHADOW E&M-EST. PATIENT-LVL III: ICD-10-PCS | Mod: PBBFAC,,, | Performed by: SURGERY

## 2019-08-02 PROCEDURE — 99024 POSTOP FOLLOW-UP VISIT: CPT | Mod: S$GLB,,, | Performed by: SURGERY

## 2019-08-02 PROCEDURE — 99024 PR POST-OP FOLLOW-UP VISIT: ICD-10-PCS | Mod: S$GLB,,, | Performed by: SURGERY

## 2019-08-02 PROCEDURE — 99999 PR PBB SHADOW E&M-EST. PATIENT-LVL III: CPT | Mod: PBBFAC,,, | Performed by: SURGERY

## 2019-08-02 NOTE — PROGRESS NOTES
HPI:  The patient is status post-lap trace.  Doing well.  Feeling much better than prior to surgery.    PHYSICAL EXAM:  Physical Exam     In NAD  Incisions c/d/i    1. Gallbladder (cholecystectomy):  Chronic cholecystitis  Cholelithiasis  2. 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     PLAN:    Follow up PRN.  Activity: light duty for 4 weeks        Jagjit Martinez MD

## 2019-08-07 ENCOUNTER — DOCUMENTATION ONLY (OUTPATIENT)
Dept: SURGERY | Facility: CLINIC | Age: 39
End: 2019-08-07

## 2019-08-07 NOTE — PROGRESS NOTES
Received notice from [facility] advising that they were unable to reach pt for her scheduled genetic counseling appt.  See Media for full notice.

## 2019-08-09 ENCOUNTER — TELEPHONE (OUTPATIENT)
Dept: ENDOSCOPY | Facility: HOSPITAL | Age: 39
End: 2019-08-09

## 2019-08-12 ENCOUNTER — DOCUMENTATION ONLY (OUTPATIENT)
Dept: SURGERY | Facility: CLINIC | Age: 39
End: 2019-08-12

## 2019-08-13 ENCOUNTER — TELEPHONE (OUTPATIENT)
Dept: ENDOSCOPY | Facility: HOSPITAL | Age: 39
End: 2019-08-13

## 2019-08-14 ENCOUNTER — PATIENT MESSAGE (OUTPATIENT)
Dept: SURGERY | Facility: CLINIC | Age: 39
End: 2019-08-14

## 2019-08-15 ENCOUNTER — TELEPHONE (OUTPATIENT)
Dept: GASTROENTEROLOGY | Facility: CLINIC | Age: 39
End: 2019-08-15

## 2019-08-16 ENCOUNTER — TELEPHONE (OUTPATIENT)
Dept: ENDOSCOPY | Facility: HOSPITAL | Age: 39
End: 2019-08-16

## 2019-08-16 NOTE — TELEPHONE ENCOUNTER
Multiple attempts to contact patient to reschedule EGD and colonoscopy-unsuccessful.  Left messages with direct line phone number to return call.            July 22, 2019   Ezra Castañeda MD   to Nestor Brar, RN  Me            5:24 PM   Robina come we pushed her EGD and colonoscopy back by about 6 weeks              5:14 PM   Nestor Brar, RN routed this conversation to MD Nestor Jordan, RN           5:14 PM   Note      ----- Message from Melina Stewart sent at 7/22/2019  3:03 PM CDT -----  Contact: Self - [phone]  Maddy- pt called to speak with staff- states she had to postpone c-scope- has labs ordered by Dr. Castañeda- does she need to keep them or reschedule them- please contact pt at [phone]

## 2019-08-22 ENCOUNTER — TELEPHONE (OUTPATIENT)
Dept: NEUROLOGY | Facility: CLINIC | Age: 39
End: 2019-08-22

## 2019-08-22 NOTE — TELEPHONE ENCOUNTER
----- Message from eBrlin Saleem MD sent at 8/22/2019 12:19 PM CDT -----  Contact: Pt [phone]  I'm not sure it matters. Four at once seems brutal though  ----- Message -----  From: Ross Javier MA  Sent: 8/15/2019   2:59 PM  To: Berlin Saleem MD    Patient  Stated that she needs all four of her wisdom teeth pull stated that would it be a good idea to get them al;l pull at once or 2 at a time  Being that she have   Small fiber neuropathy symptoms in  Her face   ----- Message -----  From: Breana Damon  Sent: 8/15/2019   1:45 PM  To: Minesh Slade Staff    Pt called to speak to the nurse regarding her care, to schedule an appt and would like a call back at [phone]

## 2019-09-11 DIAGNOSIS — G62.9 SMALL FIBER NEUROPATHY: ICD-10-CM

## 2019-09-11 RX ORDER — PREGABALIN 75 MG/1
CAPSULE ORAL
Qty: 60 CAPSULE | Refills: 11 | Status: SHIPPED | OUTPATIENT
Start: 2019-09-11 | End: 2020-03-02

## 2019-09-24 ENCOUNTER — TELEPHONE (OUTPATIENT)
Dept: GASTROENTEROLOGY | Facility: CLINIC | Age: 39
End: 2019-09-24

## 2019-09-24 NOTE — TELEPHONE ENCOUNTER
----- Message from Tari Mayorga sent at 9/24/2019 11:36 AM CDT -----  Contact: pt  Pt would like to be called back regarding  Getting a same day appt    Pt can be reached at [phone]

## 2019-09-25 ENCOUNTER — PATIENT MESSAGE (OUTPATIENT)
Dept: ENDOSCOPY | Facility: HOSPITAL | Age: 39
End: 2019-09-25

## 2019-09-25 NOTE — TELEPHONE ENCOUNTER
Pt returned call.  Pt states she sat by phone all day on yesterday but she didn't receive a call. She states she did receive the voicemail after hours.  Please see previous note.  Pt states she is having extreme pain in lower left abdomen. Rates pain 8.5/10.  Informed pt pain 7 or greater is typically recommended for pt to go to nearest ED.  Pt states she is not going to ED bc last time she went they told her she was constipated. Pt states she is not constipated.  Pt states she needs to be seen today.  Explained to pt Dr. Castañeda is not in clinic on today, but gave her options of seeing another provider.  Pt refused to be seen by another provider.   Pt states she will not see PA or NP because she needs to see someone that is very knowledgeable.  Pt states her case and illness is too complicated than to be seen by anyone other than Dr. Castañeda and she does not want to explain her illness to any other providers.  Pt states in urgent situations, she must be fit into the schedule.   Pt states pain come and go. For weeks she has been having painful BMs. She is having BMs daily, and states no constipation.   Pt states she recently have Colon done and the provider states she may have Crohns, but hasn't been finalized.  Pt states she was suppose to have Colon done at [facility] but didn't due to gall bladder surgery.  Pt wants to know if she is still on the books for Colon.

## 2019-09-26 ENCOUNTER — TELEPHONE (OUTPATIENT)
Dept: GASTROENTEROLOGY | Facility: CLINIC | Age: 39
End: 2019-09-26

## 2019-09-26 NOTE — TELEPHONE ENCOUNTER
Spoke with patient regarding rescheduling appointment for today. Patient would like to know if Dr Castañeda thinks she would be okay to NP? Will message MD.

## 2019-09-30 ENCOUNTER — TELEPHONE (OUTPATIENT)
Dept: GASTROENTEROLOGY | Facility: CLINIC | Age: 39
End: 2019-09-30

## 2019-09-30 NOTE — TELEPHONE ENCOUNTER
Called and spoke to pt.  Pt states symptoms have lessened but are still there.  Pt asked to be scheduled on a lunch break but not urgent.   Notified pt will call her on tomorrow to confrim if we can get her in on lunch break on 10/01 or if it may be another date.  Pt asked to call and leave info on her voicemail tomorrow bc she sleeps all day. Pt says she will set alarm for tomorrow at 11:00am to check her voicemail and will call us to confirm.

## 2019-09-30 NOTE — TELEPHONE ENCOUNTER
----- Message from Nestor Brar RN sent at 9/30/2019  7:36 AM CDT -----  Contact: [phone]      ----- Message -----  From: Trang Godinez  Sent: 9/27/2019   3:52 PM CDT  To: Nestor Brar RN    Pt is returning call from yesterday.   Please call pt back.

## 2019-10-01 ENCOUNTER — OFFICE VISIT (OUTPATIENT)
Dept: GASTROENTEROLOGY | Facility: CLINIC | Age: 39
End: 2019-10-01
Payer: COMMERCIAL

## 2019-10-01 ENCOUNTER — HOSPITAL ENCOUNTER (EMERGENCY)
Facility: OTHER | Age: 39
Discharge: HOME | End: 2019-10-01
Attending: EMERGENCY MEDICINE
Payer: COMMERCIAL

## 2019-10-01 ENCOUNTER — TELEPHONE (OUTPATIENT)
Dept: ENDOSCOPY | Facility: HOSPITAL | Age: 39
End: 2019-10-01

## 2019-10-01 ENCOUNTER — TELEPHONE (OUTPATIENT)
Dept: INTERNAL MEDICINE | Facility: CLINIC | Age: 39
End: 2019-10-01

## 2019-10-01 ENCOUNTER — TELEPHONE (OUTPATIENT)
Dept: GASTROENTEROLOGY | Facility: CLINIC | Age: 39
End: 2019-10-01

## 2019-10-01 ENCOUNTER — LAB VISIT (OUTPATIENT)
Dept: LAB | Facility: HOSPITAL | Age: 39
End: 2019-10-01
Attending: INTERNAL MEDICINE
Payer: COMMERCIAL

## 2019-10-01 VITALS
TEMPERATURE: 98 F | DIASTOLIC BLOOD PRESSURE: 87 MMHG | HEIGHT: 67 IN | BODY MASS INDEX: 28.56 KG/M2 | HEART RATE: 95 BPM | OXYGEN SATURATION: 96 % | RESPIRATION RATE: 17 BRPM | WEIGHT: 182 LBS | SYSTOLIC BLOOD PRESSURE: 140 MMHG

## 2019-10-01 VITALS
SYSTOLIC BLOOD PRESSURE: 136 MMHG | DIASTOLIC BLOOD PRESSURE: 100 MMHG | HEIGHT: 67 IN | WEIGHT: 182.13 LBS | HEART RATE: 108 BPM | BODY MASS INDEX: 28.58 KG/M2

## 2019-10-01 DIAGNOSIS — Z12.11 SPECIAL SCREENING FOR MALIGNANT NEOPLASMS, COLON: Primary | ICD-10-CM

## 2019-10-01 DIAGNOSIS — E87.6 HYPOKALEMIA: Primary | ICD-10-CM

## 2019-10-01 DIAGNOSIS — R10.32 LLQ PAIN: Primary | ICD-10-CM

## 2019-10-01 DIAGNOSIS — K81.1 CHRONIC CHOLECYSTITIS: ICD-10-CM

## 2019-10-01 DIAGNOSIS — R10.11 RUQ PAIN: ICD-10-CM

## 2019-10-01 DIAGNOSIS — R10.32 LLQ PAIN: ICD-10-CM

## 2019-10-01 DIAGNOSIS — R10.9 ABDOMINAL CRAMPS: Primary | ICD-10-CM

## 2019-10-01 DIAGNOSIS — R79.89 LFT ELEVATION: ICD-10-CM

## 2019-10-01 LAB
ALBUMIN SERPL BCP-MCNC: 4.1 G/DL (ref 3.5–5.2)
ALBUMIN SERPL BCP-MCNC: 4.4 G/DL (ref 3.5–5.2)
ALP SERPL-CCNC: 77 U/L (ref 55–135)
ALP SERPL-CCNC: 85 U/L (ref 55–135)
ALT SERPL W/O P-5'-P-CCNC: 39 U/L (ref 10–44)
ALT SERPL W/O P-5'-P-CCNC: 41 U/L (ref 10–44)
ANION GAP SERPL CALC-SCNC: 12 MMOL/L (ref 8–16)
ANION GAP SERPL CALC-SCNC: 9 MMOL/L (ref 8–16)
ANION GAP SERPL CALC-SCNC: 9 MMOL/L (ref 8–16)
AST SERPL-CCNC: 22 U/L (ref 10–40)
AST SERPL-CCNC: 29 U/L (ref 10–40)
B-HCG UR QL: NEGATIVE
BASOPHILS # BLD AUTO: 0.04 K/UL (ref 0–0.2)
BASOPHILS # BLD AUTO: 0.05 K/UL (ref 0–0.2)
BASOPHILS # BLD AUTO: 0.05 K/UL (ref 0–0.2)
BASOPHILS NFR BLD: 0.4 % (ref 0–1.9)
BASOPHILS NFR BLD: 0.5 % (ref 0–1.9)
BASOPHILS NFR BLD: 0.5 % (ref 0–1.9)
BILIRUB DIRECT SERPL-MCNC: 0.2 MG/DL (ref 0.1–0.3)
BILIRUB DIRECT SERPL-MCNC: 0.2 MG/DL (ref 0.1–0.3)
BILIRUB SERPL-MCNC: 0.2 MG/DL (ref 0.1–1)
BILIRUB SERPL-MCNC: 0.3 MG/DL (ref 0.1–1)
BUN SERPL-MCNC: 12 MG/DL (ref 6–20)
BUN SERPL-MCNC: 12 MG/DL (ref 6–20)
BUN SERPL-MCNC: 13 MG/DL (ref 6–20)
CALCIUM SERPL-MCNC: 10 MG/DL (ref 8.7–10.5)
CALCIUM SERPL-MCNC: 10 MG/DL (ref 8.7–10.5)
CALCIUM SERPL-MCNC: 9.6 MG/DL (ref 8.7–10.5)
CHLORIDE SERPL-SCNC: 101 MMOL/L (ref 95–110)
CHLORIDE SERPL-SCNC: 99 MMOL/L (ref 95–110)
CHLORIDE SERPL-SCNC: 99 MMOL/L (ref 95–110)
CO2 SERPL-SCNC: 28 MMOL/L (ref 23–29)
CO2 SERPL-SCNC: 32 MMOL/L (ref 23–29)
CO2 SERPL-SCNC: 32 MMOL/L (ref 23–29)
CREAT SERPL-MCNC: 1 MG/DL (ref 0.5–1.4)
CREAT SERPL-MCNC: 1 MG/DL (ref 0.5–1.4)
CREAT SERPL-MCNC: 1.1 MG/DL (ref 0.5–1.4)
CTP QC/QA: YES
DIFFERENTIAL METHOD: ABNORMAL
DIFFERENTIAL METHOD: ABNORMAL
DIFFERENTIAL METHOD: NORMAL
EOSINOPHIL # BLD AUTO: 0.1 K/UL (ref 0–0.5)
EOSINOPHIL NFR BLD: 0.7 % (ref 0–8)
EOSINOPHIL NFR BLD: 0.9 % (ref 0–8)
EOSINOPHIL NFR BLD: 0.9 % (ref 0–8)
ERYTHROCYTE [DISTWIDTH] IN BLOOD BY AUTOMATED COUNT: 13.1 % (ref 11.5–14.5)
ERYTHROCYTE [DISTWIDTH] IN BLOOD BY AUTOMATED COUNT: 13.2 % (ref 11.5–14.5)
ERYTHROCYTE [DISTWIDTH] IN BLOOD BY AUTOMATED COUNT: 13.2 % (ref 11.5–14.5)
EST. GFR  (AFRICAN AMERICAN): >60 ML/MIN/1.73 M^2
EST. GFR  (NON AFRICAN AMERICAN): >60 ML/MIN/1.73 M^2
FERRITIN SERPL-MCNC: 37 NG/ML (ref 20–300)
GLUCOSE SERPL-MCNC: 109 MG/DL (ref 70–110)
GLUCOSE SERPL-MCNC: 109 MG/DL (ref 70–110)
GLUCOSE SERPL-MCNC: 110 MG/DL (ref 70–110)
HCG INTACT+B SERPL-ACNC: <1.2 MIU/ML
HCT VFR BLD AUTO: 41 % (ref 37–48.5)
HCT VFR BLD AUTO: 44.4 % (ref 37–48.5)
HCT VFR BLD AUTO: 44.4 % (ref 37–48.5)
HGB BLD-MCNC: 13.8 G/DL (ref 12–16)
HGB BLD-MCNC: 14.3 G/DL (ref 12–16)
HGB BLD-MCNC: 14.3 G/DL (ref 12–16)
IGA SERPL-MCNC: 207 MG/DL (ref 40–350)
IMM GRANULOCYTES # BLD AUTO: 0.03 K/UL (ref 0–0.04)
IMM GRANULOCYTES NFR BLD AUTO: 0.3 % (ref 0–0.5)
IRON SERPL-MCNC: 91 UG/DL (ref 30–160)
LIPASE SERPL-CCNC: 39 U/L (ref 4–60)
LIPASE SERPL-CCNC: 39 U/L (ref 4–60)
LYMPHOCYTES # BLD AUTO: 2.1 K/UL (ref 1–4.8)
LYMPHOCYTES # BLD AUTO: 2.5 K/UL (ref 1–4.8)
LYMPHOCYTES # BLD AUTO: 2.5 K/UL (ref 1–4.8)
LYMPHOCYTES NFR BLD: 21.3 % (ref 18–48)
LYMPHOCYTES NFR BLD: 25.6 % (ref 18–48)
LYMPHOCYTES NFR BLD: 25.6 % (ref 18–48)
MAGNESIUM SERPL-MCNC: 1.9 MG/DL (ref 1.6–2.6)
MCH RBC QN AUTO: 28.8 PG (ref 27–31)
MCH RBC QN AUTO: 28.8 PG (ref 27–31)
MCH RBC QN AUTO: 29.2 PG (ref 27–31)
MCHC RBC AUTO-ENTMCNC: 32.2 G/DL (ref 32–36)
MCHC RBC AUTO-ENTMCNC: 32.2 G/DL (ref 32–36)
MCHC RBC AUTO-ENTMCNC: 33.7 G/DL (ref 32–36)
MCV RBC AUTO: 87 FL (ref 82–98)
MCV RBC AUTO: 90 FL (ref 82–98)
MCV RBC AUTO: 90 FL (ref 82–98)
MONOCYTES # BLD AUTO: 0.8 K/UL (ref 0.3–1)
MONOCYTES NFR BLD: 8.3 % (ref 4–15)
NEUTROPHILS # BLD AUTO: 6.3 K/UL (ref 1.8–7.7)
NEUTROPHILS # BLD AUTO: 6.3 K/UL (ref 1.8–7.7)
NEUTROPHILS # BLD AUTO: 6.8 K/UL (ref 1.8–7.7)
NEUTROPHILS NFR BLD: 64.4 % (ref 38–73)
NEUTROPHILS NFR BLD: 64.4 % (ref 38–73)
NEUTROPHILS NFR BLD: 69 % (ref 38–73)
NRBC BLD-RTO: 0 /100 WBC
PHOSPHATE SERPL-MCNC: 3.1 MG/DL (ref 2.7–4.5)
PLATELET # BLD AUTO: 318 K/UL (ref 150–350)
PLATELET # BLD AUTO: 366 K/UL (ref 150–350)
PLATELET # BLD AUTO: 366 K/UL (ref 150–350)
PMV BLD AUTO: 10.1 FL (ref 9.2–12.9)
PMV BLD AUTO: 10.7 FL (ref 9.2–12.9)
PMV BLD AUTO: 10.7 FL (ref 9.2–12.9)
POTASSIUM SERPL-SCNC: 2.7 MMOL/L (ref 3.5–5.1)
POTASSIUM SERPL-SCNC: 2.7 MMOL/L (ref 3.5–5.1)
POTASSIUM SERPL-SCNC: 3.4 MMOL/L (ref 3.5–5.1)
PROT SERPL-MCNC: 7.3 G/DL (ref 6–8.4)
PROT SERPL-MCNC: 7.9 G/DL (ref 6–8.4)
RBC # BLD AUTO: 4.73 M/UL (ref 4–5.4)
RBC # BLD AUTO: 4.96 M/UL (ref 4–5.4)
RBC # BLD AUTO: 4.96 M/UL (ref 4–5.4)
SATURATED IRON: 20 % (ref 20–50)
SODIUM SERPL-SCNC: 140 MMOL/L (ref 136–145)
SODIUM SERPL-SCNC: 140 MMOL/L (ref 136–145)
SODIUM SERPL-SCNC: 141 MMOL/L (ref 136–145)
TOTAL IRON BINDING CAPACITY: 454 UG/DL (ref 250–450)
TRANSFERRIN SERPL-MCNC: 307 MG/DL (ref 200–375)
TSH SERPL DL<=0.005 MIU/L-ACNC: 2.87 UIU/ML (ref 0.4–4)
WBC # BLD AUTO: 9.8 K/UL (ref 3.9–12.7)
WBC # BLD AUTO: 9.8 K/UL (ref 3.9–12.7)
WBC # BLD AUTO: 9.85 K/UL (ref 3.9–12.7)

## 2019-10-01 PROCEDURE — 82784 ASSAY IGA/IGD/IGG/IGM EACH: CPT

## 2019-10-01 PROCEDURE — 3075F PR MOST RECENT SYSTOLIC BLOOD PRESS GE 130-139MM HG: ICD-10-PCS | Mod: CPTII,S$GLB,, | Performed by: INTERNAL MEDICINE

## 2019-10-01 PROCEDURE — 99214 PR OFFICE/OUTPT VISIT, EST, LEVL IV, 30-39 MIN: ICD-10-PCS | Mod: S$GLB,,, | Performed by: INTERNAL MEDICINE

## 2019-10-01 PROCEDURE — 3008F BODY MASS INDEX DOCD: CPT | Mod: CPTII,S$GLB,, | Performed by: INTERNAL MEDICINE

## 2019-10-01 PROCEDURE — 99284 EMERGENCY DEPT VISIT MOD MDM: CPT | Mod: 25

## 2019-10-01 PROCEDURE — 87522 HEPATITIS C REVRS TRNSCRPJ: CPT

## 2019-10-01 PROCEDURE — 3008F PR BODY MASS INDEX (BMI) DOCUMENTED: ICD-10-PCS | Mod: CPTII,S$GLB,, | Performed by: INTERNAL MEDICINE

## 2019-10-01 PROCEDURE — 84100 ASSAY OF PHOSPHORUS: CPT

## 2019-10-01 PROCEDURE — 25000003 PHARM REV CODE 250: Performed by: PHYSICIAN ASSISTANT

## 2019-10-01 PROCEDURE — 81025 URINE PREGNANCY TEST: CPT | Performed by: EMERGENCY MEDICINE

## 2019-10-01 PROCEDURE — 80053 COMPREHEN METABOLIC PANEL: CPT

## 2019-10-01 PROCEDURE — 93010 EKG 12-LEAD: ICD-10-PCS | Mod: ,,, | Performed by: INTERNAL MEDICINE

## 2019-10-01 PROCEDURE — 87517 HEPATITIS B DNA QUANT: CPT

## 2019-10-01 PROCEDURE — 82728 ASSAY OF FERRITIN: CPT

## 2019-10-01 PROCEDURE — 99214 OFFICE O/P EST MOD 30 MIN: CPT | Mod: S$GLB,,, | Performed by: INTERNAL MEDICINE

## 2019-10-01 PROCEDURE — 80053 COMPREHEN METABOLIC PANEL: CPT | Mod: 91

## 2019-10-01 PROCEDURE — 83690 ASSAY OF LIPASE: CPT

## 2019-10-01 PROCEDURE — 3075F SYST BP GE 130 - 139MM HG: CPT | Mod: CPTII,S$GLB,, | Performed by: INTERNAL MEDICINE

## 2019-10-01 PROCEDURE — 99999 PR PBB SHADOW E&M-EST. PATIENT-LVL III: ICD-10-PCS | Mod: PBBFAC,,, | Performed by: INTERNAL MEDICINE

## 2019-10-01 PROCEDURE — 84443 ASSAY THYROID STIM HORMONE: CPT

## 2019-10-01 PROCEDURE — 3080F DIAST BP >= 90 MM HG: CPT | Mod: CPTII,S$GLB,, | Performed by: INTERNAL MEDICINE

## 2019-10-01 PROCEDURE — 93010 ELECTROCARDIOGRAM REPORT: CPT | Mod: ,,, | Performed by: INTERNAL MEDICINE

## 2019-10-01 PROCEDURE — 80076 HEPATIC FUNCTION PANEL: CPT

## 2019-10-01 PROCEDURE — 83540 ASSAY OF IRON: CPT

## 2019-10-01 PROCEDURE — 83516 IMMUNOASSAY NONANTIBODY: CPT

## 2019-10-01 PROCEDURE — 84702 CHORIONIC GONADOTROPIN TEST: CPT

## 2019-10-01 PROCEDURE — 85025 COMPLETE CBC W/AUTO DIFF WBC: CPT

## 2019-10-01 PROCEDURE — 99999 PR PBB SHADOW E&M-EST. PATIENT-LVL III: CPT | Mod: PBBFAC,,, | Performed by: INTERNAL MEDICINE

## 2019-10-01 PROCEDURE — 85025 COMPLETE CBC W/AUTO DIFF WBC: CPT | Mod: 91

## 2019-10-01 PROCEDURE — 93005 ELECTROCARDIOGRAM TRACING: CPT

## 2019-10-01 PROCEDURE — 3080F PR MOST RECENT DIASTOLIC BLOOD PRESSURE >= 90 MM HG: ICD-10-PCS | Mod: CPTII,S$GLB,, | Performed by: INTERNAL MEDICINE

## 2019-10-01 PROCEDURE — 83735 ASSAY OF MAGNESIUM: CPT

## 2019-10-01 RX ORDER — HYOSCYAMINE SULFATE 0.12 MG/1
0.12 TABLET SUBLINGUAL EVERY 12 HOURS PRN
Qty: 60 TABLET | Refills: 1 | Status: SHIPPED | OUTPATIENT
Start: 2019-10-01 | End: 2019-10-24 | Stop reason: SDUPTHER

## 2019-10-01 RX ORDER — SODIUM, POTASSIUM,MAG SULFATES 17.5-3.13G
1 SOLUTION, RECONSTITUTED, ORAL ORAL DAILY
Qty: 1 KIT | Refills: 0 | Status: SHIPPED | OUTPATIENT
Start: 2019-10-01 | End: 2019-10-03

## 2019-10-01 RX ORDER — POTASSIUM CHLORIDE 20 MEQ/1
40 TABLET, EXTENDED RELEASE ORAL DAILY
Qty: 30 TABLET | Refills: 0 | Status: SHIPPED | OUTPATIENT
Start: 2019-10-01 | End: 2021-09-13

## 2019-10-01 RX ORDER — POTASSIUM CHLORIDE 20 MEQ/1
40 TABLET, EXTENDED RELEASE ORAL
Status: COMPLETED | OUTPATIENT
Start: 2019-10-01 | End: 2019-10-01

## 2019-10-01 RX ORDER — EPINEPHRINE 0.3 MG/.3ML
INJECTION SUBCUTANEOUS
Status: DISCONTINUED
Start: 2019-10-01 | End: 2019-10-02 | Stop reason: HOSPADM

## 2019-10-01 RX ADMIN — POTASSIUM CHLORIDE 40 MEQ: 20 TABLET, EXTENDED RELEASE ORAL at 10:10

## 2019-10-01 NOTE — Clinical Note
Rafia please schedule EGD and colonoscopy -Case request GI: EGD (ESOPHAGOGASTRODUODENOSCOPY), COLONOSCOPY [GI5] (Order [id number]) Case Request Date and Time: 6/28/2019 12:30 PM Department: [facility] Rel By/Authorizing: Ezra Castañeda MD

## 2019-10-01 NOTE — PROGRESS NOTES
[facility] Consultation Note    Reason for Consult:  The primary encounter diagnosis was LLQ pain. A diagnosis of LFT elevation was also pertinent to this visit.    PCP:   Casper Cardoza       Referring MD:  No referring provider defined for this encounter.    Initial History of Present Illness (HPI):  This is a 39 y.o. female here for evaluation of 3 week history of left lower quadrant pain. Patient states her symptoms occur several times a day made worse sometimes with eating made worse with bowel movements initially but then better after bowel movements no blood in her stool she was scheduled for an EGD and colonoscopy but had to postpone a due to an urgent laparoscopic cholecystectomy for symptomatic cholecystitis and cholelithiasis.  Her surgery was in July 2019 last 3 weeks she has been feeling bad she has a little bit a urinary frequency but no dysuria urgency no nausea or vomiting having bowel movements every day but having left lower quadrant pain occasionally.  She says he has had these symptoms in the past many years ago but never lasted for 3 weeks.  She said she took in a pre Zwould like to schedule her EGD and colonoscopy.  Denies any fever chills.    Abdominal pain - as above  Reflux - no  Dysphagia - no   Bowel habits - normal  GI bleeding - none  NSAID usage - none    Interval HPI 10/01/2019:  The patient's last visit with me was on 6/28/2019.      ROS:  Constitutional: No fevers, chills, No weight loss  ENT:  No heartburn no dysphagia no odynophagia no hoarseness  CV: No chest pain, no palpitation  Pulm: No cough, No shortness of breath, no wheezing  Ophtho: No vision changes  GI: see HPI  Derm: No rash, no itching  Heme: No lymphadenopathy, No easy bruising  MSK: No significant arthritis  : No dysuria, No hematuria  Endo: No hot or cold intolerance  Neuro: No syncope, No seizure, no strokes  Psych: No uncontrolled anxiety, No uncontrolled depression    Medical History:   has a past medical history of Allergy, Celiac disease, Fibromyalgia, Hypertension, Migraines, and Small fiber neuropathy.    Surgical History:  has a past surgical history that includes Cervical spine surgery (2016); Spine surgery; Laparoscopic cholecystectomy (N/A, 7/9/2019); and Liver biopsy (7/9/2019).    Family History: family history includes Autoimmune disease in her father and sister; Breast cancer (age of onset: 30) in her paternal grandmother; Cancer in her maternal grandfather, mother, and other; Celiac disease in her maternal grandfather..     Social History:  reports that she has quit smoking. She has never used smokeless tobacco. She reports that she drinks alcohol. She reports that she does not use drugs.    Review of patient's allergies indicates:   Allergen Reactions    Latex, natural rubber     Gluten protein Rash    Latex Rash       Medication List with Changes/Refills   New Medications    HYOSCYAMINE (LEVSIN/SL) 0.125 MG SUBL    Place 1 tablet (0.125 mg total) under the tongue every 12 (twelve) hours as needed (abdominal cramps).   Current Medications    ASCORBIC ACID/COLLAGEN HYDR (COLLAGEN PLUS VITAMIN C ORAL)    Take 6,000 mg by mouth.    CETIRIZINE (ZYRTEC) 10 MG TABLET    Take 10 mg by mouth once daily.    DOCUSATE SODIUM (COLACE) 100 MG CAPSULE    Take 1 capsule (100 mg total) by mouth 2 (two) times daily as needed for Constipation.    FINACEA 15 % GEL    AAA face qam - bid    GAVILYTE-G [id number] -5.86 GRAM SUSPENSION        HYDROCHLOROTHIAZIDE (HYDRODIURIL) 25 MG TABLET    Take 25 mg by mouth once daily.    HYDROCODONE-ACETAMINOPHEN (NORCO) 5-325 MG PER TABLET    Take 1 tablet by mouth every 8 (eight) hours as needed.    IBUPROFEN (ADVIL,MOTRIN) 600 MG TABLET    Take 600 mg by mouth daily as needed for Pain.    LOSARTAN (COZAAR) 50 MG TABLET    Take 1 tablet (50 mg total) by mouth once daily.    LYRICA 75 MG CAPSULE    TAKE 1 CAPSULE BY MOUTH TWICE A DAY    PSYLLIUM (METAMUCIL)  "POWDER    Take 1 packet by mouth once daily.    SAVELLA 100 MG TAB    TAKE 1 TABLET BY MOUTH TWICE A DAY    WATER LIQD 150 ML WITH MILK OF MAGNESIA 400 MG/5 ML SUSP 400 MG, DIPHENHYDRAMINE 12.5 MG/5 ML ELIX 60 MG, NYSTATIN 100,000 UNIT/ML SUSP 500,000 UNITS    SWISH AND SPIT 10ML'S BY MOUTH EVERY 4 HOURS    ZOMIG 5 MG SPRY    USE 1 SPRAY IN EACH NOSTRIL ONCE DAILY.         Objective Findings:    Vital Signs:  BP (!) 136/100 (BP Location: Right arm, Patient Position: Sitting)   Pulse 108   Ht 5' 6.5" (1.689 m)   Wt 82.6 kg (182 lb 1.6 oz)   LMP 09/16/2019   BMI 28.95 kg/m²   Body mass index is 28.95 kg/m².    Physical Exam:  General Appearance: Well appearing in no acute distress  Eyes:    No scleral icterus  ENT:  No lesions or masses   Lungs: CTA bilaterally, no wheezes, no rhonchi, no rales  Heart:  S1, S2 normal, no murmurs heard  Abdomen:  Non distended, soft, no guarding, no rebound, no tenderness, no appreciated ascites, no bruits, no hepatosplenomegaly,  No CVA tenderness, no appreciated hernias  Musculoskeletal:  No major joint deformities  Skin: No petechiae or rash on exposed skin areas  Neurologic:  Alert and oriented x4  Psychiatric:  Normal speech mentation and affect    Labs:  Lab Results   Component Value Date    WBC 11.35 07/10/2019    HGB 13.8 07/10/2019    HCT 41.8 07/10/2019     (H) 07/10/2019     (H) 07/10/2019     (H) 07/10/2019     07/10/2019    K 3.8 07/10/2019     07/10/2019    CREATININE 0.8 07/10/2019    BUN 7 07/10/2019    CO2 26 07/10/2019    TSH 7.349 (H) 06/28/2019    INR 1.1 07/06/2019             Medical Decision Making:    EGD and colonoscopy talk given lab work talk given clean-catch mid stream urine talk given.    Assessment:  1. LLQ pain    2. LFT elevation         Recommendations:  1.  Abdominal pain etiology unknown status post laparoscopic cholecystectomy for symptomatic cholecystitis and cholelithiasis patient says she feels much improved " from that abdomen is soft no guarding or rebound no tenderness recommend plan for EGD and colonoscopy lab work today clean-catch mid stream UA okay for Levsin sublingual q.12 hours p.r.n. abdominal cramps.  2.  Return GI clinic in 3 months for follow-up sooner if symptoms persist or get worse.    Follow up in about 3 months (around 1/1/2020).      Order summary:  Orders Placed This Encounter    Tissue transglutaminase, IgA    IgA    Hepatic function panel    Basic metabolic panel    CBC auto differential    Iron and TIBC    Ferritin    Urinalysis    TSH    Lipase    hCG, quantitative         Thank you so much for allowing me to participate in the care of Caryl Castañeda MD

## 2019-10-01 NOTE — TELEPHONE ENCOUNTER
Called and relayed results to pt.  Pt said she is feeling much better.  She is currently in class but said she will definitely get there sometime tonight.  Pt verbalized understanding and appreciated the call.

## 2019-10-01 NOTE — TELEPHONE ENCOUNTER
Patient is scheduled on 10/28/19 at 3:00 pm with Dr Castañeda.            MD Rafia Jordan, SERENE Morataya please schedule EGD and colonoscopy -

## 2019-10-01 NOTE — TELEPHONE ENCOUNTER
----- Message from Ezra Castañeda MD sent at 10/1/2019  3:54 PM CDT -----  VERY IMPORTANT:    Hina please tell Caryl that her serum potassium level is very low and likely causing her symptoms and she needs to have someone bring her to the ER immediately for evaluation and replacement of her very low serum potassium!

## 2019-10-01 NOTE — TELEPHONE ENCOUNTER
Called pt around 8:30 am.   LM letting pt know she is on the schedule for today 10/01 at 12:00pm.

## 2019-10-02 ENCOUNTER — PATIENT MESSAGE (OUTPATIENT)
Dept: ENDOSCOPY | Facility: HOSPITAL | Age: 39
End: 2019-10-02

## 2019-10-02 NOTE — ED PROVIDER NOTES
Encounter Date: 10/1/2019       History     Chief Complaint   Patient presents with    Abnormal Lab     My potassium is low.      Patient is a 39 y.o. female with a past medical history of fibromyalgia, celiacs disease, hypertension, presenting to the emergency department for evaluation of abnormal labs.  The patient states that for the past few weeks she has been experiencing intermittent abdominal cramping.  She states she was seen by GI for this to set up a colonoscopy and an EGD.  She states that she had routine blood work done.  She states she was called shortly after and was told that her potassium level was dangerously low and she needed to come immediately to the emergency room.  She denies any new symptoms. No chest pain or shortness of breath. She states that besides her chronic pain and her abdominal cramping, nothing has changed.  She does admit that about 6 months ago she was started on HCTZ for hypertension.  She is due to see her cardiologist in 2 days. This is the extent of the patient's complaints at this time.         The history is provided by the patient.     Review of patient's allergies indicates:   Allergen Reactions    Latex, natural rubber     Gluten protein Rash    Latex Rash     Past Medical History:   Diagnosis Date    Allergy     Celiac disease     Fibromyalgia     Hypertension     Migraines     Small fiber neuropathy     per patient is types: sensory and autonomic     Past Surgical History:   Procedure Laterality Date    CERVICAL SPINE SURGERY  2016    LAPAROSCOPIC CHOLECYSTECTOMY N/A 7/9/2019    Procedure: CHOLECYSTECTOMY, LAPAROSCOPIC;  Surgeon: Jagjit Martienz Jr., MD;  Location: [address];  Service: General;  Laterality: N/A;    LIVER BIOPSY  7/9/2019    Procedure: BIOPSY, LIVER;  Surgeon: Jagjit Martinez Jr., MD;  Location: [facility] OR [address] [place] [address];  Service: General;;    SPINE SURGERY       Family History   Problem Relation Age of Onset    Autoimmune disease  Father     Autoimmune disease Sister     Celiac disease Maternal Grandfather     Cancer Maternal Grandfather         rectal ca, intestinal ca, brain ca    Breast cancer Paternal Grandmother 30        had breast ca 3x, unknown if unilat vs bilat    Cancer Mother         non-melanoma skin ca    Cancer Other         pat great-aunt with ca of unk origin    Colon cancer Neg Hx     Esophageal cancer Neg Hx     Ovarian cancer Neg Hx     Melanoma Neg Hx      Social History     Tobacco Use    Smoking status: Former Smoker    Smokeless tobacco: Never Used   Substance Use Topics    Alcohol use: Yes     Comment: rarely     Drug use: No     Review of Systems   Constitutional: Negative for activity change, appetite change, chills, fatigue and fever.   HENT: Negative for congestion, rhinorrhea and sore throat.    Eyes: Negative for photophobia and visual disturbance.   Respiratory: Negative for cough, shortness of breath and wheezing.    Cardiovascular: Negative for chest pain.   Gastrointestinal: Positive for abdominal pain. Negative for diarrhea, nausea and vomiting.   Genitourinary: Negative for dysuria, hematuria and urgency.   Musculoskeletal: Negative for back pain, myalgias and neck pain.   Skin: Negative for color change and wound.   Neurological: Negative for weakness and headaches.   Psychiatric/Behavioral: Negative for agitation and confusion.       Physical Exam     Initial Vitals [10/01/19 2115]   BP Pulse Resp Temp SpO2   (!) 135/94 106 18 98 °F (36.7 °C) 98 %      MAP       --         Physical Exam    Nursing note and vitals reviewed.  Constitutional: She appears well-developed and well-nourished. She is not diaphoretic. She is cooperative.  Non-toxic appearance. She does not have a sickly appearance. No distress.   Well-appearing,  female accompanied by her  in the emergency room.  Speaking clearly in full sentences.  No acute distress.   HENT:   Head: Normocephalic and atraumatic.    Right Ear: External ear normal.   Left Ear: External ear normal.   Nose: Nose normal.   Mouth/Throat: Oropharynx is clear and moist.   Eyes: Conjunctivae and EOM are normal.   Neck: Normal range of motion. Neck supple.   Cardiovascular: Normal rate, regular rhythm and normal heart sounds.   Pulmonary/Chest: Breath sounds normal. No respiratory distress. She has no wheezes.   Abdominal: Soft. Bowel sounds are normal. She exhibits no distension. There is no tenderness. There is no rebound and no guarding.   Musculoskeletal: Normal range of motion.   Neurological: She is alert and oriented to person, place, and time.   Skin: Skin is warm.   Psychiatric: She has a normal mood and affect. Her behavior is normal. Judgment and thought content normal.         ED Course   Procedures  Labs Reviewed   COMPREHENSIVE METABOLIC PANEL - Abnormal; Notable for the following components:       Result Value    Potassium 3.4 (*)     All other components within normal limits   CBC W/ AUTO DIFFERENTIAL   MAGNESIUM   PHOSPHORUS   POCT URINE PREGNANCY     EKG Readings: (Independently Interpreted)   Initial Reading: No STEMI. Previous EKG: Compared with most recent EKG Previous EKG Date: 7/10/19. Rhythm: Normal Sinus Rhythm. Heart Rate: 88.          Medical Decision Making:   History:   Old Medical Records: I decided to obtain old medical records.  Old Records Summarized: records from clinic visits.       <> Summary of Records:     Reviewed blood work in epic.  Patient's potassium from earlier today is 2.9      Initial Assessment:     Urgent evaluation of a 39-year-old female with a past medical history of hypertension, fibromyalgia, celiac disease, presenting to the emergency department for evaluation of hypokalemia.  Patient is afebrile, nontoxic appearing, hemodynamically stable. Physical exam reveals regular rate rhythm, lungs are clear to auscultation bilaterally. Will plan for repeat labs, cardiac monitor, reassess.    Independently  Interpreted Test(s):   I have ordered and independently interpreted EKG Reading(s) - see prior notes  Clinical Tests:   Lab Tests: Ordered and Reviewed  ED Management:    EKG shows normal sinus rhythm with a rate of 80 beats per minute, no STEMI.  UPT is negative. CBC is unremarkable. CMP shows potassium here for 3.4.  Mildly lower than standard range however nothing significantly concerning.  Patient given 40 mEq in the ED, no further workup necessary.  As she is on hydrochlorothiazide, will plan to discharge home with a prescription for oral potassium to take and recommend that she discuss this with the cardiologist when she sees in later this week.  Patient is counseled on home care and treatment.  Discharged in stable condition. The patient was instructed to follow up with a primary care provider in 2 days or to return to the emergency department for worsening symptoms. The treatment plan was discussed with the patient who demonstrated understanding and comfort with plan. The patient's history, physical exam, and plan of care was discussed with and agreed upon with my supervising physician.                         Clinical Impression:     1. Hypokalemia           Disposition:   Disposition: Discharged  Condition: Stable                        Perla Andrade PA-C  10/01/19 [id number]

## 2019-10-03 LAB
HCV RNA SERPL NAA+PROBE-LOG IU: <1.08 LOG (10) IU/ML
HCV RNA SERPL QL NAA+PROBE: NOT DETECTED IU/ML
HCV RNA SPEC NAA+PROBE-ACNC: <12 IU/ML
TTG IGA SER-ACNC: 5 UNITS

## 2019-10-05 LAB
HBV DNA SERPL NAA+PROBE-ACNC: NORMAL LOGIU/ML
HBV DNA SERPL NAA+PROBE-LOG IU: NORMAL IU/ML

## 2019-10-15 ENCOUNTER — PATIENT MESSAGE (OUTPATIENT)
Dept: ENDOSCOPY | Facility: HOSPITAL | Age: 39
End: 2019-10-15

## 2019-10-15 DIAGNOSIS — E87.6 LOW POTASSIUM SYNDROME: Primary | ICD-10-CM

## 2019-10-16 ENCOUNTER — LAB VISIT (OUTPATIENT)
Dept: LAB | Facility: HOSPITAL | Age: 39
End: 2019-10-16
Attending: INTERNAL MEDICINE
Payer: COMMERCIAL

## 2019-10-16 DIAGNOSIS — E87.6 LOW POTASSIUM SYNDROME: ICD-10-CM

## 2019-10-16 LAB
ANION GAP SERPL CALC-SCNC: 11 MMOL/L (ref 8–16)
BUN SERPL-MCNC: 12 MG/DL (ref 6–20)
CALCIUM SERPL-MCNC: 10.2 MG/DL (ref 8.7–10.5)
CHLORIDE SERPL-SCNC: 100 MMOL/L (ref 95–110)
CO2 SERPL-SCNC: 28 MMOL/L (ref 23–29)
CREAT SERPL-MCNC: 1 MG/DL (ref 0.5–1.4)
EST. GFR  (AFRICAN AMERICAN): >60 ML/MIN/1.73 M^2
EST. GFR  (NON AFRICAN AMERICAN): >60 ML/MIN/1.73 M^2
GLUCOSE SERPL-MCNC: 109 MG/DL (ref 70–110)
POTASSIUM SERPL-SCNC: 4.9 MMOL/L (ref 3.5–5.1)
SODIUM SERPL-SCNC: 139 MMOL/L (ref 136–145)

## 2019-10-16 PROCEDURE — 36415 COLL VENOUS BLD VENIPUNCTURE: CPT

## 2019-10-16 PROCEDURE — 80048 BASIC METABOLIC PNL TOTAL CA: CPT

## 2019-10-19 ENCOUNTER — TELEPHONE (OUTPATIENT)
Dept: HEPATOLOGY | Facility: CLINIC | Age: 39
End: 2019-10-19

## 2019-10-19 NOTE — TELEPHONE ENCOUNTER
Attempted to contact patient to schedule her 3 month recall but patient did not answer. I left patient a voice mail stating the reason for the call. Awaiting patient call back.

## 2019-10-21 ENCOUNTER — OFFICE VISIT (OUTPATIENT)
Dept: NEUROLOGY | Facility: CLINIC | Age: 39
End: 2019-10-21
Payer: COMMERCIAL

## 2019-10-21 VITALS — BODY MASS INDEX: 29.25 KG/M2 | HEIGHT: 66 IN | WEIGHT: 182 LBS

## 2019-10-21 DIAGNOSIS — G90.1 DYSAUTONOMIA: ICD-10-CM

## 2019-10-21 DIAGNOSIS — R20.0 NUMBNESS AND TINGLING OF BOTH LOWER EXTREMITIES: ICD-10-CM

## 2019-10-21 DIAGNOSIS — G90.A POTS (POSTURAL ORTHOSTATIC TACHYCARDIA SYNDROME): Chronic | ICD-10-CM

## 2019-10-21 DIAGNOSIS — G62.9 SMALL FIBER NEUROPATHY: Primary | ICD-10-CM

## 2019-10-21 DIAGNOSIS — R20.2 NUMBNESS AND TINGLING OF BOTH LOWER EXTREMITIES: ICD-10-CM

## 2019-10-21 DIAGNOSIS — G89.29 CHRONIC BILATERAL LOW BACK PAIN WITHOUT SCIATICA: ICD-10-CM

## 2019-10-21 DIAGNOSIS — M54.50 CHRONIC BILATERAL LOW BACK PAIN WITHOUT SCIATICA: ICD-10-CM

## 2019-10-21 DIAGNOSIS — G43.109 MIGRAINE WITH AURA AND WITHOUT STATUS MIGRAINOSUS, NOT INTRACTABLE: Chronic | ICD-10-CM

## 2019-10-21 PROCEDURE — 3008F BODY MASS INDEX DOCD: CPT | Mod: CPTII,S$GLB,, | Performed by: PSYCHIATRY & NEUROLOGY

## 2019-10-21 PROCEDURE — 99999 PR PBB SHADOW E&M-EST. PATIENT-LVL III: ICD-10-PCS | Mod: PBBFAC,,, | Performed by: PSYCHIATRY & NEUROLOGY

## 2019-10-21 PROCEDURE — 99215 PR OFFICE/OUTPT VISIT, EST, LEVL V, 40-54 MIN: ICD-10-PCS | Mod: S$GLB,,, | Performed by: PSYCHIATRY & NEUROLOGY

## 2019-10-21 PROCEDURE — 3008F PR BODY MASS INDEX (BMI) DOCUMENTED: ICD-10-PCS | Mod: CPTII,S$GLB,, | Performed by: PSYCHIATRY & NEUROLOGY

## 2019-10-21 PROCEDURE — 99999 PR PBB SHADOW E&M-EST. PATIENT-LVL III: CPT | Mod: PBBFAC,,, | Performed by: PSYCHIATRY & NEUROLOGY

## 2019-10-21 PROCEDURE — 99215 OFFICE O/P EST HI 40 MIN: CPT | Mod: S$GLB,,, | Performed by: PSYCHIATRY & NEUROLOGY

## 2019-10-21 RX ORDER — LOSARTAN POTASSIUM 100 MG/1
100 TABLET ORAL DAILY
Refills: 3 | COMMUNITY
Start: 2019-09-12 | End: 2021-09-13

## 2019-10-21 RX ORDER — TRIAMTERENE AND HYDROCHLOROTHIAZIDE 37.5; 25 MG/1; MG/1
1 CAPSULE ORAL EVERY MORNING
COMMUNITY
End: 2021-09-13

## 2019-10-24 DIAGNOSIS — R10.9 ABDOMINAL CRAMPS: ICD-10-CM

## 2019-10-27 RX ORDER — HYOSCYAMINE SULFATE 0.12 MG/1
TABLET SUBLINGUAL
Qty: 60 TABLET | Refills: 1 | Status: SHIPPED | OUTPATIENT
Start: 2019-10-27 | End: 2019-11-22 | Stop reason: SDUPTHER

## 2019-10-27 NOTE — PROGRESS NOTES
Subjective:     Chief Complaint:  Headache; Migraine; Pain; and Back Pain    Interval History 10/21/2019 - I have reviewed all relevant medical records in [facility]. Had to go to ER recently secondary to asymptomatic hypokalemia, got replenished in ER and was discharged. Dr. Cardoza has since discontinued the L-dopa and started Losartan. SFN and dysautonomia are at baseline. Still taking both Lyrica and Savella despite wanting to wean off of Lyrica at last appointment. Has some low back pain with sciatica that did not resolve or improve with PT. Has had some unintentional weight gain. Inquiring about pain management with ketamine infusions. Still having routine migraines, which resolve with Zomig.      Interval History 314/2019 - I have reviewed all relevant medical records in Epic. Symptoms of SFN are pretty much unchanged and at baseline> IVIg appeal was apparently declined by [employer]. Her symptoms are only partially controlled by Lyrica (currently on 100 Q12 and Savella). She wants to taper Lyrica to OFF since she is trying to become pregnant. Her dysautonomia has been managed by Dr. Cardoza who recently started her on Methyldopa (since she is trying to get pregnant), which inadvertently seems to have improved her her migraines. She now has less than 1/week and takes Zomig nasal 3-4 times monthly. Improvements in gait and balance continue with PT.    Interval History 8/20/2018 - Symptoms of tingling and numbness in the LEs is modestly improved since I last saw her. She is followed by Dr. Cardoza and has recently started Losartan, which has improved her cardiac symptoms and helped to normalize blood pressure. Therapath biopsy assessment for small fiber neuropathy was positive in both distal and proximal sites. Dizziness is minimally improved. She uses Savella to address FM and Lyrica to address neuropathic sensory changes. We have submitted orders for IVIg, but [employer] has declined coverage pending appeal (use of Ig cited  "as "investigational" in small fiber neuropathy).    History of Present Illness:  Caryl Cedeno is a 39 y.o. female who presents today for initial evaluation in my clinic for multiple complaints. [facility] and reports dissatisfaction that she was never seen by "a specialist." She moved to [place] from [place] and much of her medical care has been completed in [place] and we have scant records aside from some pertinent lab work.    Her first complaint is small fiber neuropathy, which she reports has been worked up extensively in [place] and included normal NCS studies but no biopsies as of yet. There has been an extensive battery of lab work done and she has shown me results in her [place] EMR with included normal values of: SPEP with Immunofixation, B12, folate, ferritin, ESR, voltage gated K channels, N-type Ca channels, ACh-R Ab, ACh-binding Ab, P/Q Ca channels, striated Ab, PCA 1-2, Anti-Magalis, SSA and SSB, Lyme, Gliadin, ARELY-1 and ARELY-2. It's unclear why myasthenia panels were run as she has no MG type complaints. Her ELIGIO was minimally positive (1:160). She has been fairly well-controlled on Lyrica and was recently started on Savella and had Lyrica reduced from 100/200 to 100/100 with an increase in symptoms since the reduction in Lyrica.    She has complaints of POTS and reports formal diagnostic testing was done at [facility] with impaired sweating in the hands and feet. She has not been treated medically and does not have a cardiologist here in [place]. She does not want to start Midodrine until seen by an expert here. She does not have any pertinent records available from the [facility] for my review today.    She has chronic migraine headaches, which occur twice monthly and are not catamenial. They are preceded by visual aura and have pain onset usually in the occipital region with anterior spread. Pain is sharp and stabbing / pounding and severe in intensity. There is " "associated photo and phonophobia and N/V. She is not on any ppx medications and uses only NSAIDs as abortive. Duration of headaches is 4 hours to 3 days and they are debilitating.    Review of Systems  Review of Systems   Constitutional: Positive for activity change, fatigue and unexpected weight change (gain). Negative for fever.   HENT: Negative for congestion, dental problem, facial swelling, sinus pressure, sinus pain and tinnitus.    Eyes: Positive for photophobia and visual disturbance.   Respiratory: Negative for chest tightness and shortness of breath.    Cardiovascular: Positive for palpitations. Negative for chest pain.   Gastrointestinal: Positive for nausea and vomiting. Negative for abdominal pain.   Endocrine: Negative for cold intolerance and heat intolerance.   Musculoskeletal: Positive for back pain. Negative for arthralgias, neck pain and neck stiffness.   Skin: Negative for color change.   Allergic/Immunologic: Negative for environmental allergies and food allergies.   Neurological: Positive for light-headedness and headaches. Negative for dizziness, seizures, syncope, weakness and numbness.   Psychiatric/Behavioral: Negative.         Objective:     Vitals:    10/21/19 1456   Weight: 82.6 kg (182 lb)   Height: 5' 6" (1.676 m)   PainSc:   8   PainLoc: Hip       Neurologic Exam     Mental Status   Oriented to person, place, and time.   Attention: normal.   Speech: speech is normal   Level of consciousness: alert  Knowledge: good.     Cranial Nerves     CN II   Visual fields full to confrontation.     CN III, IV, VI   Pupils are equal, round, and reactive to light.  Extraocular motions are normal.     CN V   Facial sensation intact.     CN VII   Facial expression full, symmetric.     CN VIII   Hearing: intact    CN IX, X   CN IX normal.     CN XI   CN XI normal.     CN XII   CN XII normal.     Motor Exam   Muscle bulk: normal  Overall muscle tone: normal    Strength   Strength 5/5 throughout. "     Sensory Exam   Right leg light touch: decreased from ankle  Left leg light touch: decreased from ankle  Vibration normal.   Right leg pinprick: decreased from ankle  Left leg pinprick: decreased from ankle    Gait, Coordination, and Reflexes     Gait  Gait: normal    Tremor   Resting tremor: absent  Intention tremor: absent  Action tremor: absent    Reflexes   Right brachioradialis: 2+  Left brachioradialis: 2+  Right biceps: 2+  Left biceps: 2+  Right triceps: 2+  Left triceps: 2+  Right patellar: 2+  Left patellar: 2+      Physical Exam   Constitutional: She is oriented to person, place, and time. She appears well-developed and well-nourished.   HENT:   Head: Normocephalic and atraumatic.   Eyes: Pupils are equal, round, and reactive to light. EOM are normal.   Neck: Normal range of motion. Neck supple. No thyromegaly present.   Cardiovascular: Normal rate.   Pulmonary/Chest: Effort normal.   Abdominal: Soft.   Lymphadenopathy:     She has no cervical adenopathy.   Neurological: She is oriented to person, place, and time. She has normal strength. Gait normal.   Reflex Scores:       Tricep reflexes are 2+ on the right side and 2+ on the left side.       Bicep reflexes are 2+ on the right side and 2+ on the left side.       Brachioradialis reflexes are 2+ on the right side and 2+ on the left side.       Patellar reflexes are 2+ on the right side and 2+ on the left side.  Skin: Skin is warm and dry.   Psychiatric: She has a normal mood and affect. Her speech is normal and behavior is normal. Thought content normal.   Vitals reviewed.        Lab Results   Component Value Date    WBC 9.85 10/01/2019    HGB 13.8 10/01/2019    HCT 41.0 10/01/2019     10/01/2019    ALT 41 10/01/2019    AST 29 10/01/2019     10/16/2019    K 4.9 10/16/2019     10/16/2019    CREATININE 1.0 10/16/2019    BUN 12 10/16/2019    CO2 28 10/16/2019    TSH 2.868 10/01/2019    XBUJHIUF51 580 11/22/2017    VITAMINB6 8 11/22/2017        Tissue Biopsy 7/26/2018 - Tissue biopsy consistent with a small fiber neuropathy / idiopathic peripheral autonomic neuropathy.     Assessment and Plan:     Problem List Items Addressed This Visit     POTS (postural orthostatic tachycardia syndrome) (Chronic)    Migraine with aura and without status migrainosus, not intractable (Chronic)    Current Assessment & Plan     Two per month with visual aura, improved somewhat with anti-hypertensives. She has considerable nausea with vomiting during the events and so pill form abortive therapy is a poor option. She is using Zomig nasal spray 3-4 times per month.              - Zomig nasal spray 5 mg prn migraine. Use immediately at headache / aura onset and she can repeat after two hours if severe headache persists but no more than two applications per day. No history of cerebrovascular or cardiovascular disease.              - Compazine and Zofran offered and patient declined.              - Aimovig discussed and she will review data online. Animal (primate) studies suggest that it is safe to use while pregnant, though no human studies have been done.         Small fiber neuropathy - Primary    Current Assessment & Plan     At baseline. L-Dopa changed to Losartan as per Dr. Cardoza secondary to hypokalemia, now resolved.   - Continue Lyrica 75 QD and Savella 100 Q12. Stop Lyrica when willing.         Dysautonomia    Current Assessment & Plan     L-dopa changed to Losartan. See above.         Chronic bilateral low back pain without sciatica    Current Assessment & Plan     No real improvements in pain or function with [facility].   - MRI Lumbar spine to assess for radiculopathy.         Relevant Orders    MRI Lumbar Spine Without Contrast    Numbness and tingling of both lower extremities        RTC 4 months    Berlin Saleem MD  [facility]

## 2019-10-27 NOTE — ASSESSMENT & PLAN NOTE
No real improvements in pain or function with [facility].   - MRI Lumbar spine to assess for radiculopathy.

## 2019-10-27 NOTE — ASSESSMENT & PLAN NOTE
Two per month with visual aura, improved somewhat with anti-hypertensives. She has considerable nausea with vomiting during the events and so pill form abortive therapy is a poor option. She is using Zomig nasal spray 3-4 times per month.              - Zomig nasal spray 5 mg prn migraine. Use immediately at headache / aura onset and she can repeat after two hours if severe headache persists but no more than two applications per day. No history of cerebrovascular or cardiovascular disease.              - Compazine and Zofran offered and patient declined.              - Aimovig discussed and she will review data online. Animal (primate) studies suggest that it is safe to use while pregnant, though no human studies have been done.

## 2019-10-27 NOTE — ASSESSMENT & PLAN NOTE
At baseline. L-Dopa changed to Losartan as per Dr. Cardoza secondary to hypokalemia, now resolved.   - Continue Lyrica 75 QD and Savella 100 Q12. Stop Lyrica when willing.

## 2019-10-28 ENCOUNTER — ANESTHESIA EVENT (OUTPATIENT)
Dept: ENDOSCOPY | Facility: HOSPITAL | Age: 39
End: 2019-10-28
Payer: COMMERCIAL

## 2019-10-28 ENCOUNTER — HOSPITAL ENCOUNTER (OUTPATIENT)
Facility: HOSPITAL | Age: 39
Discharge: HOME | End: 2019-10-28
Attending: INTERNAL MEDICINE | Admitting: INTERNAL MEDICINE
Payer: COMMERCIAL

## 2019-10-28 ENCOUNTER — ANESTHESIA (OUTPATIENT)
Dept: ENDOSCOPY | Facility: HOSPITAL | Age: 39
End: 2019-10-28
Payer: COMMERCIAL

## 2019-10-28 VITALS
HEART RATE: 99 BPM | HEIGHT: 66 IN | TEMPERATURE: 98 F | BODY MASS INDEX: 29.41 KG/M2 | SYSTOLIC BLOOD PRESSURE: 144 MMHG | OXYGEN SATURATION: 100 % | DIASTOLIC BLOOD PRESSURE: 93 MMHG | RESPIRATION RATE: 18 BRPM | WEIGHT: 183 LBS

## 2019-10-28 DIAGNOSIS — R10.11 RIGHT UPPER QUADRANT PAIN: ICD-10-CM

## 2019-10-28 LAB
B-HCG UR QL: NEGATIVE
CTP QC/QA: YES

## 2019-10-28 PROCEDURE — 43239 PR EGD, FLEX, W/BIOPSY, SGL/MULTI: ICD-10-PCS | Mod: 51,,, | Performed by: INTERNAL MEDICINE

## 2019-10-28 PROCEDURE — 63600175 PHARM REV CODE 636 W HCPCS: Performed by: NURSE ANESTHETIST, CERTIFIED REGISTERED

## 2019-10-28 PROCEDURE — 43239 EGD BIOPSY SINGLE/MULTIPLE: CPT | Mod: 51,,, | Performed by: INTERNAL MEDICINE

## 2019-10-28 PROCEDURE — 63600175 PHARM REV CODE 636 W HCPCS: Performed by: INTERNAL MEDICINE

## 2019-10-28 PROCEDURE — 37000008 HC ANESTHESIA 1ST 15 MINUTES: Performed by: INTERNAL MEDICINE

## 2019-10-28 PROCEDURE — 27201012 HC FORCEPS, HOT/COLD, DISP: Performed by: INTERNAL MEDICINE

## 2019-10-28 PROCEDURE — 45378 DIAGNOSTIC COLONOSCOPY: CPT | Performed by: INTERNAL MEDICINE

## 2019-10-28 PROCEDURE — 88305 TISSUE SPECIMEN TO PATHOLOGY - SURGERY: ICD-10-PCS | Mod: 26,,, | Performed by: PATHOLOGY

## 2019-10-28 PROCEDURE — 88305 TISSUE EXAM BY PATHOLOGIST: CPT | Mod: 26,,, | Performed by: PATHOLOGY

## 2019-10-28 PROCEDURE — 45378 DIAGNOSTIC COLONOSCOPY: CPT | Mod: ,,, | Performed by: INTERNAL MEDICINE

## 2019-10-28 PROCEDURE — 25000003 PHARM REV CODE 250: Performed by: NURSE ANESTHETIST, CERTIFIED REGISTERED

## 2019-10-28 PROCEDURE — 43239 EGD BIOPSY SINGLE/MULTIPLE: CPT | Performed by: INTERNAL MEDICINE

## 2019-10-28 PROCEDURE — 88305 TISSUE EXAM BY PATHOLOGIST: CPT | Performed by: PATHOLOGY

## 2019-10-28 PROCEDURE — 37000009 HC ANESTHESIA EA ADD 15 MINS: Performed by: INTERNAL MEDICINE

## 2019-10-28 PROCEDURE — 81025 URINE PREGNANCY TEST: CPT | Performed by: INTERNAL MEDICINE

## 2019-10-28 PROCEDURE — E9220 PRA ENDO ANESTHESIA: ICD-10-PCS | Mod: ,,, | Performed by: NURSE ANESTHETIST, CERTIFIED REGISTERED

## 2019-10-28 PROCEDURE — E9220 PRA ENDO ANESTHESIA: HCPCS | Mod: ,,, | Performed by: NURSE ANESTHETIST, CERTIFIED REGISTERED

## 2019-10-28 PROCEDURE — 45378 PR COLONOSCOPY,DIAGNOSTIC: ICD-10-PCS | Mod: ,,, | Performed by: INTERNAL MEDICINE

## 2019-10-28 RX ORDER — PROPOFOL 10 MG/ML
INJECTION, EMULSION INTRAVENOUS CONTINUOUS PRN
Status: DISCONTINUED | OUTPATIENT
Start: 2019-10-28 | End: 2019-10-28

## 2019-10-28 RX ORDER — PROPOFOL 10 MG/ML
INJECTION, EMULSION INTRAVENOUS
Status: DISCONTINUED | OUTPATIENT
Start: 2019-10-28 | End: 2019-10-28

## 2019-10-28 RX ORDER — GLYCOPYRROLATE 0.2 MG/ML
INJECTION INTRAMUSCULAR; INTRAVENOUS
Status: DISCONTINUED | OUTPATIENT
Start: 2019-10-28 | End: 2019-10-28

## 2019-10-28 RX ORDER — SODIUM CHLORIDE 9 MG/ML
INJECTION, SOLUTION INTRAVENOUS CONTINUOUS
Status: DISCONTINUED | OUTPATIENT
Start: 2019-10-28 | End: 2019-10-28 | Stop reason: HOSPADM

## 2019-10-28 RX ORDER — ESMOLOL HYDROCHLORIDE 10 MG/ML
INJECTION INTRAVENOUS
Status: DISCONTINUED | OUTPATIENT
Start: 2019-10-28 | End: 2019-10-28

## 2019-10-28 RX ORDER — LIDOCAINE HCL/PF 100 MG/5ML
SYRINGE (ML) INTRAVENOUS
Status: DISCONTINUED | OUTPATIENT
Start: 2019-10-28 | End: 2019-10-28

## 2019-10-28 RX ORDER — PHENYLEPHRINE HYDROCHLORIDE 10 MG/ML
INJECTION INTRAVENOUS
Status: DISCONTINUED | OUTPATIENT
Start: 2019-10-28 | End: 2019-10-28

## 2019-10-28 RX ADMIN — SODIUM CHLORIDE: 0.9 INJECTION, SOLUTION INTRAVENOUS at 02:10

## 2019-10-28 RX ADMIN — LIDOCAINE HYDROCHLORIDE 100 MG: 20 INJECTION, SOLUTION INTRAVENOUS at 03:10

## 2019-10-28 RX ADMIN — PHENYLEPHRINE HYDROCHLORIDE 100 MCG: 10 INJECTION INTRAVENOUS at 04:10

## 2019-10-28 RX ADMIN — GLYCOPYRROLATE 0.2 MG: 0.2 INJECTION, SOLUTION INTRAMUSCULAR; INTRAVENOUS at 03:10

## 2019-10-28 RX ADMIN — PROPOFOL 100 MG: 10 INJECTION, EMULSION INTRAVENOUS at 03:10

## 2019-10-28 RX ADMIN — PROPOFOL 150 MCG/KG/MIN: 10 INJECTION, EMULSION INTRAVENOUS at 03:10

## 2019-10-28 RX ADMIN — SODIUM CHLORIDE: 0.9 INJECTION, SOLUTION INTRAVENOUS at 04:10

## 2019-10-28 RX ADMIN — ESMOLOL HYDROCHLORIDE 10 MG: 10 INJECTION INTRAVENOUS at 03:10

## 2019-10-28 RX ADMIN — ESMOLOL HYDROCHLORIDE 10 MG: 10 INJECTION INTRAVENOUS at 04:10

## 2019-10-28 NOTE — TRANSFER OF CARE
"Anesthesia Transfer of Care Note    Patient: Caryl Cedeno    Procedure(s) Performed: Procedure(s) (LRB):  EGD (ESOPHAGOGASTRODUODENOSCOPY) (N/A)  COLONOSCOPY (N/A)    Patient location: PACU    Anesthesia Type: general    Transport from OR: Transported from OR on 2-3 L/min O2 by NC with adequate spontaneous ventilation    Post pain: adequate analgesia    Post assessment: no apparent anesthetic complications    Post vital signs: stable    Level of consciousness: lethargic and responds to stimulation    Nausea/Vomiting: no nausea/vomiting    Complications: none    Transfer of care protocol was followed      Last vitals:   Visit Vitals  BP (!) 102/63 (BP Location: Left arm, Patient Position: Lying)   Pulse 89   Temp 37 °C (98.6 °F) (Temporal)   Resp 16   Ht 5' 6" (1.676 m)   Wt 83 kg (183 lb)   SpO2 98%   Breastfeeding? No   BMI 29.54 kg/m²     "

## 2019-10-28 NOTE — ANESTHESIA PREPROCEDURE EVALUATION
10/28/2019  Caryl Cedeno is a 39 y.o., female.  Past Medical History:   Diagnosis Date    Allergy     Celiac disease     Fibromyalgia     Hypertension     Migraines     Small fiber neuropathy     per patient is types: sensory and autonomic     Past Surgical History:   Procedure Laterality Date    CERVICAL SPINE SURGERY  2016    LAPAROSCOPIC CHOLECYSTECTOMY N/A 7/9/2019    Procedure: CHOLECYSTECTOMY, LAPAROSCOPIC;  Surgeon: Jagjit Martinez Jr., MD;  Location: [address];  Service: General;  Laterality: N/A;    LIVER BIOPSY  7/9/2019    Procedure: BIOPSY, LIVER;  Surgeon: Jagjit Martinez Jr., MD;  Location: [facility] OR [address] [place] [address];  Service: General;;    SPINE SURGERY           Pre-op Assessment    I have reviewed the Patient Summary Reports.      I have reviewed the Medications.     Review of Systems  Anesthesia Hx:  No problems with previous Anesthesia Denies Hx of Anesthetic complications  History of prior surgery of interest to airway management or planning: (spinal fusion, EGD, Colonoscopy) Previous anesthesia: General Denies Family Hx of Anesthesia complications.   Denies Personal Hx of Anesthesia complications.   Social:  Social Alcohol Use, Former Smoker Quit 12-14 years ago   EENT/Dental:   Denies Otitis Media Denies Chronic Tonsillitis   Cardiovascular:   Exercise tolerance: good Hypertension, well controlled Reports dizziness when taking the stairs    Pulmonary:   Denies Pneumonia Denies Shortness of breath.    Hepatic/GI:   Bowel Prep. Denies PUD. Celiac disease   Musculoskeletal:   Denies Arthritis.   Spine Disorders: cervical    Neurological:   Neuromuscular Disease, (fibromylagia, ) Headaches Denies Seizures. Migraines, neuropathy    Endocrine:   Denies Diabetes.        Physical Exam  General:  Well nourished, Obesity    Airway/Jaw/Neck:  Airway Findings: Mouth Opening:  Normal Tongue: Normal  General Airway Assessment: Adult  Mallampati: I  TM Distance: Normal, at least 6 cm  Jaw/Neck Findings: (mild decreased extension 2/2 spinal fusion surgery)  Neck ROM: Extension Decreased, Mild  Neck Findings:     Eyes/Ears/Nose:  EYES/EARS/NOSE FINDINGS: Normal   Dental:  Dental Findings: (pt reports one prior chipped tooth. does not remember location) In tact   Chest/Lungs:  Chest/Lungs Clear    Heart/Vascular:  Heart Findings: Normal    Abdomen:  Abdomen Findings: (RUQ but improved from prior documented exams)  Tenderness     Musculoskeletal:  Musculoskeletal Findings: Normal   Skin:  Skin Findings: Normal    Mental Status:  Mental Status Findings:  Cooperative, Alert and Oriented         Anesthesia Plan  Type of Anesthesia, risks & benefits discussed:  Anesthesia Type:  general  Patient's Preference:   Intra-op Monitoring Plan: standard ASA monitors  Intra-op Monitoring Plan Comments:   Post Op Pain Control Plan:   Post Op Pain Control Plan Comments:   Induction:   IV  Beta Blocker:  Patient is on a Beta-Blocker and has received one dose within the past 24 hours (No further documentation required).       Informed Consent: Patient understands risks and agrees with Anesthesia plan.  Questions answered. Anesthesia consent signed with patient.  ASA Score: 3     Day of Surgery Review of History & Physical: I have interviewed and examined the patient. I have reviewed the patient's H&P dated:  There are no significant changes.  H&P update referred to the provider.         Ready For Surgery From Anesthesia Perspective.

## 2019-10-28 NOTE — [employer] PATIENT INSTRUCTIONS
Discharge Summary/Instructions after an Endoscopic Procedure  Patient Name: Caryl Cedeno  Patient MRN: [id number]  Patient YOB: 1980 Monday, October 28, 2019  Ezra Castañeda MD  RESTRICTIONS:  During your procedure today, you received medications for sedation.  These   medications may affect your judgment, balance and coordination.  Therefore,   for 24 hours, you have the following restrictions:   - DO NOT drive a car, operate machinery, make legal/financial decisions,   sign important papers or drink alcohol.    ACTIVITY:  Today: no heavy lifting, straining or running due to procedural   sedation/anesthesia.  The following day: return to full activity including work.  DIET:  Eat and drink normally unless instructed otherwise.     TREATMENT FOR COMMON SIDE EFFECTS:  - Mild abdominal pain, nausea, belching, bloating or excessive gas:  rest,   eat lightly and use a heating pad.  - Sore Throat: treat with throat lozenges and/or gargle with warm salt   water.  - Because air was used during the procedure, expelling large amounts of air   from your rectum or belching is normal.  - If a bowel prep was taken, you may not have a bowel movement for 1-3 days.    This is normal.  SYMPTOMS TO WATCH FOR AND REPORT TO YOUR PHYSICIAN:  1. Abdominal pain or bloating, other than gas cramps.  2. Chest pain.  3. Back pain.  4. Signs of infection such as: chills or fever occurring within 24 hours   after the procedure.  5. Rectal bleeding, which would show as bright red, maroon, or black stools.   (A tablespoon of blood from the rectum is not serious, especially if   hemorrhoids are present.)  6. Vomiting.  7. Weakness or dizziness.  GO DIRECTLY TO THE NEAREST EMERGENCY ROOM IF YOU HAVE ANY OF THE FOLLOWING:      Difficulty breathing              Chills and/or fever over 101 F   Persistent vomiting and/or vomiting blood   Severe abdominal pain   Severe chest pain   Black, tarry stools   Bleeding- more than one  tablespoon   Any other symptom or condition that you feel may need urgent attention  Your doctor recommends these additional instructions:  If any biopsies were taken, your doctors clinic will contact you in 1 to 2   weeks with any results.  - Discharge patient to home.   - Await pathology results.   - Telephone endoscopist for pathology results in 2 weeks.   - Return to GI clinic.   - The findings and recommendations were discussed with the patient.  For questions, problems or results please call your physician - Ezra Castañeda MD at Work:  [phone].  [facility], EMERGENCY ROOM PHONE NUMBER: [phone]  IF A COMPLICATION OR EMERGENCY SITUATION ARISES AND YOU ARE UNABLE TO REACH   YOUR PHYSICIAN - GO DIRECTLY TO THE EMERGENCY ROOM.  Ezra Castañeda MD  10/28/2019 4:10:24 PM  This report has been verified and signed electronically.  [employer]

## 2019-10-28 NOTE — DISCHARGE INSTRUCTIONS

## 2019-10-28 NOTE — H&P
Short Stay Endoscopy History and Physical    PCP - Casper Cardoza MD    Procedure - EGD/Colonoscopy  ASA - per anesthesia  Mallampati - per anesthesia  History of Anesthesia problems - no  Family history Anesthesia problems -  no   Plan of anesthesia - MAC    HPI:  This is a 39 y.o. female here for evaluation of :     EGD for abdominal pain  Colonoscopy for constipation and LLQ abdominal pain  S/p lap trace 7/2019. Takes ibuprofen. No PPI. Reports history of celiac (previous TTG IgA normal, IgA normal)    ROS:  Constitutional: No fevers, chills, No weight loss  CV: No chest pain  Pulm: No cough, No shortness of breath  Ophtho: No vision changes  GI: see HPI  Derm: No rash    Medical History:  has a past medical history of Allergy, Celiac disease, Fibromyalgia, Hypertension, Migraines, and Small fiber neuropathy.    Surgical History:  has a past surgical history that includes Cervical spine surgery (2016); Spine surgery; Laparoscopic cholecystectomy (N/A, 7/9/2019); and Liver biopsy (7/9/2019).    Family History: family history includes Autoimmune disease in her father and sister; Breast cancer (age of onset: 30) in her paternal grandmother; Cancer in her maternal grandfather, mother, and other; Celiac disease in her maternal grandfather.. Otherwise no colon cancer, inflammatory bowel disease, or GI malignancies.    Social History:  reports that she has quit smoking. She has never used smokeless tobacco. She reports that she drinks alcohol. She reports that she does not use drugs.    Review of patient's allergies indicates:   Allergen Reactions    Latex, natural rubber     Gluten protein Rash    Latex Rash       Medications:   Medications Prior to Admission   Medication Sig Dispense Refill Last Dose    ascorbic acid/collagen hydr (COLLAGEN PLUS VITAMIN C ORAL) Take 6,000 mg by mouth.   Past Month at Unknown time    cetirizine (ZYRTEC) 10 MG tablet Take 10 mg by mouth once daily.   Past Month at Unknown time     docusate sodium (COLACE) 100 MG capsule Take 1 capsule (100 mg total) by mouth 2 (two) times daily as needed for Constipation. 30 capsule 0 Past Month at Unknown time    losartan (COZAAR) 50 MG tablet Take 1 tablet (50 mg total) by mouth once daily. 90 tablet 3 10/27/2019 at Unknown time    LYRICA 75 mg capsule TAKE 1 CAPSULE BY MOUTH TWICE A DAY 60 capsule 11 10/27/2019 at Unknown time    psyllium (METAMUCIL) powder Take 1 packet by mouth once daily.   Past Month at Unknown time    SAVELLA 100 mg Tab TAKE 1 TABLET BY MOUTH TWICE A DAY 60 tablet 11 10/27/2019 at Unknown time    triamterene-hydrochlorothiazide 37.5-25 mg (DYAZIDE) 37.5-25 mg per capsule Take 1 capsule by mouth every morning.   10/27/2019 at Unknown time    water Liqd 150 mL with MILK OF MAGNESIA 400 mg/5 mL Susp 400 mg, diphenhydrAMINE 12.5 mg/5 mL Elix 60 mg, nystatin 100,000 unit/mL Susp 500,000 Units SWISH AND SPIT 10ML'S BY MOUTH EVERY 4 HOURS  1 Past Month at Unknown time    ZOMIG 5 mg Spry USE 1 SPRAY IN EACH NOSTRIL ONCE DAILY. 6 each 3 Past Month at Unknown time    FINACEA 15 % gel AAA face qam - bid 60 g 3 Taking    GAVILYTE-G 236-22.74-6.74 -5.86 gram suspension    Taking    hydroCHLOROthiazide (HYDRODIURIL) 25 MG tablet Take 25 mg by mouth once daily.   Not Taking    HYDROcodone-acetaminophen (NORCO) 5-325 mg per tablet Take 1 tablet by mouth every 8 (eight) hours as needed. 15 tablet 0 Taking    hyoscyamine (LEVSIN/SL) 0.125 mg Subl PLACE 1 TABLET (0.125 MG TOTAL) UNDER THE TONGUE EVERY 12 HOURS AS NEEDED (ABDOMINAL CRAMPS). 60 tablet 1     ibuprofen (ADVIL,MOTRIN) 600 MG tablet Take 600 mg by mouth daily as needed for Pain.   Taking    losartan (COZAAR) 100 MG tablet Take 100 mg by mouth once daily.  3 Taking    potassium chloride SA (K-DUR,KLOR-CON) 20 MEQ tablet Take 2 tablets (40 mEq total) by mouth once daily. 30 tablet 0 Taking       Physical Exam:    Vital Signs:   Vitals:    10/28/19 1439   BP: (!) 143/93    Pulse: 89   Resp: 16   Temp: 98.6 °F (37 °C)       General Appearance: Well appearing in no acute distress  Eyes:    No scleral icterus  ENT: Neck supple, Lips, mucosa, and tongue normal; teeth and gums normal  Lungs: CTA anteriorly  Heart:  Regular rate, S1, S2 normal, no murmurs heard.  Abdomen: Soft, non tender, non distended with normal bowel sounds. No hepatosplenomegaly, ascites, or mass.  Extremities: No edema  Skin: No rash    Labs:  Lab Results   Component Value Date    WBC 9.85 10/01/2019    HGB 13.8 10/01/2019    HCT 41.0 10/01/2019     10/01/2019    ALT 41 10/01/2019    AST 29 10/01/2019     10/16/2019    K 4.9 10/16/2019     10/16/2019    CREATININE 1.0 10/16/2019    BUN 12 10/16/2019    CO2 28 10/16/2019    TSH 2.868 10/01/2019    INR 1.1 07/06/2019       Plan:  EGD for abdominal pain  Colonoscopy for constipation and LLQ abdominal pain    I have explained the risks and benefits of endoscopy procedures to the patient including but not limited to bleeding, perforation, infection, and death.  The patient was asked if they understand and allowed to ask any further questions to their satisfaction.    Ramsey Terrazas MD

## 2019-10-28 NOTE — PLAN OF CARE
Pt verbalized understanding of discharge instructions including diet, s/s to notify md, and follow up. Pt used wheel chair and will be escorted off unit with spouse

## 2019-10-28 NOTE — ANESTHESIA POSTPROCEDURE EVALUATION
Anesthesia Post Evaluation    Patient: Caryl Cedeno    Procedure(s) Performed: Procedure(s) (LRB):  EGD (ESOPHAGOGASTRODUODENOSCOPY) (N/A)  COLONOSCOPY (N/A)    Final Anesthesia Type: general  Patient location during evaluation: GI PACU  Patient participation: Yes- Able to Participate  Level of consciousness: awake and alert  Post-procedure vital signs: reviewed and stable  Pain management: adequate  Airway patency: patent  PONV status at discharge: No PONV  Anesthetic complications: no      Cardiovascular status: hemodynamically stable  Respiratory status: unassisted and spontaneous ventilation  Hydration status: euvolemic  Follow-up not needed.          Vitals Value Taken Time   /93 10/28/2019  5:15 PM   Temp 36.6 °C (97.9 °F) 10/28/2019  4:45 PM   Pulse 99 10/28/2019  5:15 PM   Resp 18 10/28/2019  5:15 PM   SpO2 100 % 10/28/2019  5:15 PM         Event Time     Out of Recovery 17:29:58          Pain/Jesus Score: Jesus Score: 10 (10/28/2019  5:00 PM)

## 2019-10-28 NOTE — [employer] PATIENT INSTRUCTIONS
Discharge Summary/Instructions after an Endoscopic Procedure  Patient Name: Caryl Cedeno  Patient MRN: [id number]  Patient YOB: 1980 Monday, October 28, 2019  Ezra Castañeda MD  RESTRICTIONS:  During your procedure today, you received medications for sedation.  These   medications may affect your judgment, balance and coordination.  Therefore,   for 24 hours, you have the following restrictions:   - DO NOT drive a car, operate machinery, make legal/financial decisions,   sign important papers or drink alcohol.    ACTIVITY:  Today: no heavy lifting, straining or running due to procedural   sedation/anesthesia.  The following day: return to full activity including work.  DIET:  Eat and drink normally unless instructed otherwise.     TREATMENT FOR COMMON SIDE EFFECTS:  - Mild abdominal pain, nausea, belching, bloating or excessive gas:  rest,   eat lightly and use a heating pad.  - Sore Throat: treat with throat lozenges and/or gargle with warm salt   water.  - Because air was used during the procedure, expelling large amounts of air   from your rectum or belching is normal.  - If a bowel prep was taken, you may not have a bowel movement for 1-3 days.    This is normal.  SYMPTOMS TO WATCH FOR AND REPORT TO YOUR PHYSICIAN:  1. Abdominal pain or bloating, other than gas cramps.  2. Chest pain.  3. Back pain.  4. Signs of infection such as: chills or fever occurring within 24 hours   after the procedure.  5. Rectal bleeding, which would show as bright red, maroon, or black stools.   (A tablespoon of blood from the rectum is not serious, especially if   hemorrhoids are present.)  6. Vomiting.  7. Weakness or dizziness.  GO DIRECTLY TO THE NEAREST EMERGENCY ROOM IF YOU HAVE ANY OF THE FOLLOWING:      Difficulty breathing              Chills and/or fever over 101 F   Persistent vomiting and/or vomiting blood   Severe abdominal pain   Severe chest pain   Black, tarry stools   Bleeding- more than one  tablespoon   Any other symptom or condition that you feel may need urgent attention  Your doctor recommends these additional instructions:  If any biopsies were taken, your doctors clinic will contact you in 1 to 2   weeks with any results.  - Discharge patient to home.   - Return to GI clinic at the next available appointment.   - Repeat colonoscopy age 43 years old for screening purposes.   - The findings and recommendations were discussed with the patient.   - Use original regular Metamucil one tablespoon in 12 ounces of water every   12 hours ( or Twice daily).  For questions, problems or results please call your physician - Ezar Castañeda MD at Work:  [phone].  [facility], EMERGENCY ROOM PHONE NUMBER: [phone]  IF A COMPLICATION OR EMERGENCY SITUATION ARISES AND YOU ARE UNABLE TO REACH   YOUR PHYSICIAN - GO DIRECTLY TO THE EMERGENCY ROOM.  Ezra Castañeda MD  10/28/2019 4:50:50 PM  This report has been verified and signed electronically.  [employer]

## 2019-11-04 ENCOUNTER — TELEPHONE (OUTPATIENT)
Dept: GASTROENTEROLOGY | Facility: CLINIC | Age: 39
End: 2019-11-04

## 2019-11-04 ENCOUNTER — TELEPHONE (OUTPATIENT)
Dept: ENDOSCOPY | Facility: HOSPITAL | Age: 39
End: 2019-11-04

## 2019-11-04 NOTE — TELEPHONE ENCOUNTER
----- Message from Ramsey Terrazas MD sent at 11/1/2019 11:59 AM CDT -----  Please tell patient biopsies for celiac disease and H pylori from EGD are negative.

## 2019-11-04 NOTE — TELEPHONE ENCOUNTER
Patient called. Biopsy results given to patient per Dr Terrazas notes. Patient verbalized understanding.

## 2019-11-22 DIAGNOSIS — R10.9 ABDOMINAL CRAMPS: ICD-10-CM

## 2019-11-22 RX ORDER — HYOSCYAMINE SULFATE 0.12 MG/1
TABLET SUBLINGUAL
Qty: 60 TABLET | Refills: 1 | Status: SHIPPED | OUTPATIENT
Start: 2019-11-22 | End: 2019-12-06 | Stop reason: SDUPTHER

## 2019-11-27 DIAGNOSIS — R10.9 ABDOMINAL CRAMPS: ICD-10-CM

## 2019-11-27 RX ORDER — HYOSCYAMINE SULFATE 0.12 MG/1
TABLET SUBLINGUAL
Qty: 60 TABLET | Refills: 1 | OUTPATIENT
Start: 2019-11-27

## 2019-12-06 DIAGNOSIS — R10.9 ABDOMINAL CRAMPS: ICD-10-CM

## 2019-12-06 RX ORDER — HYOSCYAMINE SULFATE 0.12 MG/1
TABLET SUBLINGUAL
Qty: 60 TABLET | Refills: 1 | Status: SHIPPED | OUTPATIENT
Start: 2019-12-06 | End: 2021-09-13

## 2019-12-07 ENCOUNTER — PATIENT MESSAGE (OUTPATIENT)
Dept: NEUROLOGY | Facility: CLINIC | Age: 39
End: 2019-12-07

## 2020-01-16 ENCOUNTER — HOSPITAL ENCOUNTER (OUTPATIENT)
Dept: RADIOLOGY | Facility: HOSPITAL | Age: 40
Discharge: HOME | End: 2020-01-16
Attending: PSYCHIATRY & NEUROLOGY
Payer: COMMERCIAL

## 2020-01-16 ENCOUNTER — PATIENT MESSAGE (OUTPATIENT)
Dept: NEUROLOGY | Facility: CLINIC | Age: 40
End: 2020-01-16

## 2020-01-16 DIAGNOSIS — M54.50 CHRONIC BILATERAL LOW BACK PAIN WITHOUT SCIATICA: ICD-10-CM

## 2020-01-16 DIAGNOSIS — G89.29 CHRONIC BILATERAL LOW BACK PAIN WITHOUT SCIATICA: ICD-10-CM

## 2020-01-16 PROCEDURE — 72148 MRI LUMBAR SPINE W/O DYE: CPT | Mod: TC

## 2020-01-16 PROCEDURE — 72148 MRI LUMBAR SPINE WITHOUT CONTRAST: ICD-10-PCS | Mod: 26,,, | Performed by: RADIOLOGY

## 2020-01-16 PROCEDURE — 72148 MRI LUMBAR SPINE W/O DYE: CPT | Mod: 26,,, | Performed by: RADIOLOGY

## 2020-01-21 ENCOUNTER — TELEPHONE (OUTPATIENT)
Dept: NEUROLOGY | Facility: CLINIC | Age: 40
End: 2020-01-21

## 2020-01-21 NOTE — TELEPHONE ENCOUNTER
Patient stated that she have start fertility treatment  And had some concerns and also has some concerns about her MRI Results----- Message from Tari Mayorga sent at 1/21/2020  1:06 PM CST -----  Contact: pt  Type:  Needs Medical Advice    Who Called: pt  Symptoms (please be specific):  How long has patient had these symptoms:   Pharmacy name and phone #:    Would the patient rather a call back or a response via [facility]?   Best Call Back Number: [phone]  Additional Information: pt has questions about her Rx and MRI

## 2020-01-22 ENCOUNTER — TELEPHONE (OUTPATIENT)
Dept: NEUROLOGY | Facility: CLINIC | Age: 40
End: 2020-01-22

## 2020-01-22 NOTE — TELEPHONE ENCOUNTER
----- Message from Keyanna Dyson sent at 1/22/2020 11:11 AM CST -----  Contact: Pt   Pt is requesting a call back regarding concerns about MRI  Pt stated she can not receive msgs on the portal   Pt can be reached at [phone]

## 2020-02-17 RX ORDER — MILNACIPRAN HYDROCHLORIDE 100 MG/1
TABLET, FILM COATED ORAL
Qty: 60 TABLET | Refills: 11 | Status: SHIPPED | OUTPATIENT
Start: 2020-02-17 | End: 2020-07-09

## 2020-02-28 DIAGNOSIS — G62.9 SMALL FIBER NEUROPATHY: ICD-10-CM

## 2020-03-02 RX ORDER — PREGABALIN 75 MG/1
CAPSULE ORAL
Qty: 60 CAPSULE | Refills: 11 | Status: SHIPPED | OUTPATIENT
Start: 2020-03-02 | End: 2020-03-09

## 2020-03-05 ENCOUNTER — OFFICE VISIT (OUTPATIENT)
Dept: NEUROLOGY | Facility: CLINIC | Age: 40
End: 2020-03-05
Payer: COMMERCIAL

## 2020-03-05 VITALS
WEIGHT: 196 LBS | DIASTOLIC BLOOD PRESSURE: 109 MMHG | BODY MASS INDEX: 31.5 KG/M2 | HEIGHT: 66 IN | HEART RATE: 83 BPM | SYSTOLIC BLOOD PRESSURE: 165 MMHG

## 2020-03-05 DIAGNOSIS — M54.50 CHRONIC BILATERAL LOW BACK PAIN WITHOUT SCIATICA: ICD-10-CM

## 2020-03-05 DIAGNOSIS — G90.A POTS (POSTURAL ORTHOSTATIC TACHYCARDIA SYNDROME): Chronic | ICD-10-CM

## 2020-03-05 DIAGNOSIS — G89.29 CHRONIC BILATERAL LOW BACK PAIN WITHOUT SCIATICA: ICD-10-CM

## 2020-03-05 DIAGNOSIS — G62.9 SMALL FIBER NEUROPATHY: Primary | ICD-10-CM

## 2020-03-05 DIAGNOSIS — G90.1 DYSAUTONOMIA: ICD-10-CM

## 2020-03-05 DIAGNOSIS — G43.109 MIGRAINE WITH AURA AND WITHOUT STATUS MIGRAINOSUS, NOT INTRACTABLE: Chronic | ICD-10-CM

## 2020-03-05 PROCEDURE — 3080F DIAST BP >= 90 MM HG: CPT | Mod: CPTII,S$GLB,, | Performed by: PSYCHIATRY & NEUROLOGY

## 2020-03-05 PROCEDURE — 99999 PR PBB SHADOW E&M-EST. PATIENT-LVL III: CPT | Mod: PBBFAC,,, | Performed by: PSYCHIATRY & NEUROLOGY

## 2020-03-05 PROCEDURE — 99215 OFFICE O/P EST HI 40 MIN: CPT | Mod: S$GLB,,, | Performed by: PSYCHIATRY & NEUROLOGY

## 2020-03-05 PROCEDURE — 99999 PR PBB SHADOW E&M-EST. PATIENT-LVL III: ICD-10-PCS | Mod: PBBFAC,,, | Performed by: PSYCHIATRY & NEUROLOGY

## 2020-03-05 PROCEDURE — 3077F SYST BP >= 140 MM HG: CPT | Mod: CPTII,S$GLB,, | Performed by: PSYCHIATRY & NEUROLOGY

## 2020-03-05 PROCEDURE — 99215 PR OFFICE/OUTPT VISIT, EST, LEVL V, 40-54 MIN: ICD-10-PCS | Mod: S$GLB,,, | Performed by: PSYCHIATRY & NEUROLOGY

## 2020-03-05 PROCEDURE — 3080F PR MOST RECENT DIASTOLIC BLOOD PRESSURE >= 90 MM HG: ICD-10-PCS | Mod: CPTII,S$GLB,, | Performed by: PSYCHIATRY & NEUROLOGY

## 2020-03-05 PROCEDURE — 3008F BODY MASS INDEX DOCD: CPT | Mod: CPTII,S$GLB,, | Performed by: PSYCHIATRY & NEUROLOGY

## 2020-03-05 PROCEDURE — 3008F PR BODY MASS INDEX (BMI) DOCUMENTED: ICD-10-PCS | Mod: CPTII,S$GLB,, | Performed by: PSYCHIATRY & NEUROLOGY

## 2020-03-05 PROCEDURE — 3077F PR MOST RECENT SYSTOLIC BLOOD PRESSURE >= 140 MM HG: ICD-10-PCS | Mod: CPTII,S$GLB,, | Performed by: PSYCHIATRY & NEUROLOGY

## 2020-03-09 RX ORDER — PREGABALIN 75 MG/1
75 CAPSULE ORAL 3 TIMES DAILY
Qty: 90 CAPSULE | Refills: 11 | Status: SHIPPED | OUTPATIENT
Start: 2020-03-09 | End: 2020-09-09

## 2020-03-10 NOTE — ASSESSMENT & PLAN NOTE
1-2 per month with visual aura, improved somewhat with anti-hypertensives. She has considerable nausea with vomiting during the events and so pill form abortive therapy is a poor option. She is using Zomig nasal spray 1-2 times per month.              - Zomig nasal spray 5 mg prn migraine. Use immediately at headache / aura onset and she can repeat after two hours if severe headache persists but no more than two applications per day. No history of cerebrovascular or cardiovascular disease.              - Compazine and Zofran offered and patient declined.              - Aimovig discussed and she will review data online. Animal (primate) studies suggest that it is safe to use while pregnant, though no human studies have been done.

## 2020-03-10 NOTE — ASSESSMENT & PLAN NOTE
Somewhat improved with recent ablation. MRI lumbar spine showed mild to moderate bilateral NFS at L5/S1.

## 2020-03-10 NOTE — ASSESSMENT & PLAN NOTE
At baseline, on Losartan and managed by Dr. Cardoza.   - Increase Lyrica to 75 TID. Prescription adjusted;   - Decrease Savella to 50 daily for a few weeks and then discontinue;   - Regarding associated pain, referral to [facility] (Dr. Hong) for evaluation for ketamine infusions or other therapy as seen fit.

## 2020-03-10 NOTE — PROGRESS NOTES
Subjective:     Chief Complaint:  Follow-up    Interval History 3/5/2020 - I have reviewed all relevant medical records in Epic. Continuing to have tingling and numbness in the LEs that is somewhat responsive to Lyrica. She has been taping off of Savella since she is planning to become pregnant. She has been going to the fertility clinic trying to get pregnant. Currently taking Lyrica 75 Q12 and Savella 50 Q12. Symptoms persistent. Regarding migraine headaches she is not taking any daily ppx medication and is only requiring one Zomig use monthly. She has had issues with chronic low back pains and recently had injections with ablation and has had decent relief since the procedure. Dr. Cardoza has her on Losartan for dysautonomia related to her small fiber neuropathy.    Interval History 10/21/2019 - I have reviewed all relevant medical records in [facility]. Had to go to ER recently secondary to asymptomatic hypokalemia, got replenished in ER and was discharged. Dr. Cardoza has since discontinued the L-dopa and started Losartan. SFN and dysautonomia are at baseline. Still taking both Lyrica and Savella despite wanting to wean off of Lyrica at last appointment. Has some low back pain with sciatica that did not resolve or improve with PT. Has had some unintentional weight gain. Inquiring about pain management with ketamine infusions. Still having routine migraines, which resolve with Zomig.      Interval History 314/2019 - I have reviewed all relevant medical records in Epic. Symptoms of SFN are pretty much unchanged and at baseline> IVIg appeal was apparently declined by [employer]. Her symptoms are only partially controlled by Lyrica (currently on 100 Q12 and Savella). She wants to taper Lyrica to OFF since she is trying to become pregnant. Her dysautonomia has been managed by Dr. Cardoza who recently started her on Methyldopa (since she is trying to get pregnant), which inadvertently seems to have improved her her migraines.  "She now has less than 1/week and takes Zomig nasal 3-4 times monthly. Improvements in gait and balance continue with PT.    Interval History 8/20/2018 - Symptoms of tingling and numbness in the LEs is modestly improved since I last saw her. She is followed by Dr. Cardoza and has recently started Losartan, which has improved her cardiac symptoms and helped to normalize blood pressure. Therapath biopsy assessment for small fiber neuropathy was positive in both distal and proximal sites. Dizziness is minimally improved. She uses Savella to address FM and Lyrica to address neuropathic sensory changes. We have submitted orders for IVIg, but [employer] has declined coverage pending appeal (use of Ig cited as "investigational" in small fiber neuropathy).    History of Present Illness:  Caryl Cedeno is a 39 y.o. female who presents today for initial evaluation in my clinic for multiple complaints. [facility] has been [facility] in [facility] and reports dissatisfaction that she was never seen by "a specialist." She moved to [place] from [place] and much of her medical care has been completed in [place] and we have scant records aside from some pertinent lab work.    Her first complaint is small fiber neuropathy, which she reports has been worked up extensively in [place] and included normal NCS studies but no biopsies as of yet. There has been an extensive battery of lab work done and she has shown me results in her [place] EMR with included normal values of: SPEP with Immunofixation, B12, folate, ferritin, ESR, voltage gated K channels, N-type Ca channels, ACh-R Ab, ACh-binding Ab, P/Q Ca channels, striated Ab, PCA 1-2, Anti-Magalis, SSA and SSB, Lyme, Gliadin, ARELY-1 and ARELY-2. It's unclear why myasthenia panels were run as she has no MG type complaints. Her ELIGIO was minimally positive (1:160). She has been fairly well-controlled on Lyrica and was recently started on Savella and had Lyrica reduced from 100/200 to 100/100 with an " increase in symptoms since the reduction in Lyrica.    She has complaints of POTS and reports formal diagnostic testing was done at [facility] with impaired sweating in the hands and feet. She has not been treated medically and does not have a cardiologist here in [place]. She does not want to start Midodrine until seen by an expert here. She does not have any pertinent records available from the [facility] for my review today.    She has chronic migraine headaches, which occur twice monthly and are not catamenial. They are preceded by visual aura and have pain onset usually in the occipital region with anterior spread. Pain is sharp and stabbing / pounding and severe in intensity. There is associated photo and phonophobia and N/V. She is not on any ppx medications and uses only NSAIDs as abortive. Duration of headaches is 4 hours to 3 days and they are debilitating.    Review of Systems  Review of Systems   Constitutional: Positive for activity change, fatigue and unexpected weight change (gain). Negative for fever.   HENT: Negative for congestion, dental problem, facial swelling, sinus pressure, sinus pain and tinnitus.    Eyes: Positive for photophobia and visual disturbance.   Respiratory: Negative for chest tightness and shortness of breath.    Cardiovascular: Positive for palpitations. Negative for chest pain.   Gastrointestinal: Positive for nausea and vomiting. Negative for abdominal pain.   Endocrine: Negative for cold intolerance and heat intolerance.   Musculoskeletal: Positive for back pain (Somewhat improved with ablation). Negative for arthralgias, neck pain and neck stiffness.   Skin: Negative for color change.   Allergic/Immunologic: Negative for environmental allergies and food allergies.   Neurological: Positive for light-headedness and headaches. Negative for dizziness, seizures, syncope, weakness and numbness.   Psychiatric/Behavioral: Negative.         Objective:     Vitals:    03/05/20  "1400   BP: (!) 165/109   Pulse: 83   Weight: 88.9 kg (195 lb 15.8 oz)   Height: 5' 6" (1.676 m)   PainSc:   6   PainLoc: Back       Neurologic Exam     Mental Status   Oriented to person, place, and time.   Attention: normal.   Speech: speech is normal   Level of consciousness: alert  Knowledge: good.     Cranial Nerves     CN II   Visual fields full to confrontation.     CN III, IV, VI   Pupils are equal, round, and reactive to light.  Extraocular motions are normal.     CN V   Facial sensation intact.     CN VII   Facial expression full, symmetric.     CN VIII   Hearing: intact    CN IX, X   CN IX normal.     CN XI   CN XI normal.     CN XII   CN XII normal.     Motor Exam   Muscle bulk: normal  Overall muscle tone: normal    Strength   Strength 5/5 throughout.     Sensory Exam   Right leg light touch: decreased from ankle  Left leg light touch: decreased from ankle  Vibration normal.   Right leg pinprick: decreased from ankle  Left leg pinprick: decreased from ankle    Gait, Coordination, and Reflexes     Gait  Gait: normal    Tremor   Resting tremor: absent  Intention tremor: absent  Action tremor: absent    Reflexes   Right brachioradialis: 2+  Left brachioradialis: 2+  Right biceps: 2+  Left biceps: 2+  Right triceps: 2+  Left triceps: 2+  Right patellar: 2+  Left patellar: 2+      Physical Exam   Constitutional: She is oriented to person, place, and time. She appears well-developed and well-nourished.   HENT:   Head: Normocephalic and atraumatic.   Eyes: Pupils are equal, round, and reactive to light. EOM are normal.   Neck: Normal range of motion. Neck supple. No thyromegaly present.   Cardiovascular: Normal rate.   Pulmonary/Chest: Effort normal.   Abdominal: Soft.   Lymphadenopathy:     She has no cervical adenopathy.   Neurological: She is oriented to person, place, and time. She has normal strength. Gait normal.   Reflex Scores:       Tricep reflexes are 2+ on the right side and 2+ on the left side.      "  Bicep reflexes are 2+ on the right side and 2+ on the left side.       Brachioradialis reflexes are 2+ on the right side and 2+ on the left side.       Patellar reflexes are 2+ on the right side and 2+ on the left side.  Skin: Skin is warm and dry.   Psychiatric: She has a normal mood and affect. Her speech is normal and behavior is normal. Thought content normal.   Vitals reviewed.        Lab Results   Component Value Date    WBC 9.85 10/01/2019    HGB 13.8 10/01/2019    HCT 41.0 10/01/2019     10/01/2019    ALT 41 10/01/2019    AST 29 10/01/2019     10/16/2019    K 4.9 10/16/2019     10/16/2019    CREATININE 1.0 10/16/2019    BUN 12 10/16/2019    CO2 28 10/16/2019    TSH 2.868 10/01/2019    RUVDWYDG89 580 11/22/2017    VITAMINB6 8 11/22/2017       Tissue Biopsy 7/26/2018 - Tissue biopsy consistent with a small fiber neuropathy / idiopathic peripheral autonomic neuropathy.     Assessment and Plan:     Problem List Items Addressed This Visit     POTS (postural orthostatic tachycardia syndrome) (Chronic)    Current Assessment & Plan     Continue Losartan as per Dr. Cardoza.         Migraine with aura and without status migrainosus, not intractable (Chronic)    Current Assessment & Plan     1-2 per month with visual aura, improved somewhat with anti-hypertensives. She has considerable nausea with vomiting during the events and so pill form abortive therapy is a poor option. She is using Zomig nasal spray 1-2 times per month.              - Zomig nasal spray 5 mg prn migraine. Use immediately at headache / aura onset and she can repeat after two hours if severe headache persists but no more than two applications per day. No history of cerebrovascular or cardiovascular disease.              - Compazine and Zofran offered and patient declined.              - Aimovig discussed and she will review data online. Animal (primate) studies suggest that it is safe to use while pregnant, though no human  studies have been done.         Small fiber neuropathy - Primary    Current Assessment & Plan     At baseline, on Losartan and managed by Dr. Cardoza.   - Increase Lyrica to 75 TID. Prescription adjusted;   - Decrease Savella to 50 daily for a few weeks and then discontinue;   - Regarding associated pain, referral to Pain [facility] (Dr. Hong) for evaluation for ketamine infusions or other therapy as seen fit.         Relevant Medications    pregabalin (LYRICA) 75 MG capsule    Other Relevant Orders    Ambulatory consult to Pain Clinic    Dysautonomia    Current Assessment & Plan     Continue Losartan as per Dr. Cardoza.         Chronic bilateral low back pain without sciatica    Current Assessment & Plan     Somewhat improved with recent ablation. MRI lumbar spine showed mild to moderate bilateral NFS at L5/S1.             RTC 4-6 months    Mukesh Saleem MD  [facility]

## 2020-05-21 ENCOUNTER — PATIENT MESSAGE (OUTPATIENT)
Dept: NEUROLOGY | Facility: CLINIC | Age: 40
End: 2020-05-21

## 2020-06-01 DIAGNOSIS — G43.109 MIGRAINE WITH AURA AND WITHOUT STATUS MIGRAINOSUS, NOT INTRACTABLE: Primary | ICD-10-CM

## 2020-06-01 RX ORDER — ZOLMITRIPTAN 5 MG/1
SPRAY NASAL
Qty: 6 EACH | Refills: 3 | Status: SHIPPED | OUTPATIENT
Start: 2020-06-01 | End: 2020-12-22

## 2020-07-14 ENCOUNTER — OFFICE VISIT (OUTPATIENT)
Dept: INTERNAL MEDICINE | Facility: CLINIC | Age: 40
End: 2020-07-14
Payer: COMMERCIAL

## 2020-07-14 VITALS
OXYGEN SATURATION: 98 % | HEIGHT: 66 IN | BODY MASS INDEX: 31.64 KG/M2 | SYSTOLIC BLOOD PRESSURE: 127 MMHG | DIASTOLIC BLOOD PRESSURE: 80 MMHG | WEIGHT: 196.88 LBS | HEART RATE: 85 BPM

## 2020-07-14 DIAGNOSIS — R05.9 COUGH: ICD-10-CM

## 2020-07-14 DIAGNOSIS — R11.0 NAUSEA: ICD-10-CM

## 2020-07-14 DIAGNOSIS — R53.83 FATIGUE, UNSPECIFIED TYPE: Primary | ICD-10-CM

## 2020-07-14 DIAGNOSIS — R06.02 SHORTNESS OF BREATH: ICD-10-CM

## 2020-07-14 PROCEDURE — 99214 OFFICE O/P EST MOD 30 MIN: CPT | Mod: S$GLB,,, | Performed by: FAMILY MEDICINE

## 2020-07-14 PROCEDURE — 99999 PR PBB SHADOW E&M-EST. PATIENT-LVL V: ICD-10-PCS | Mod: PBBFAC,,, | Performed by: FAMILY MEDICINE

## 2020-07-14 PROCEDURE — 3079F PR MOST RECENT DIASTOLIC BLOOD PRESSURE 80-89 MM HG: ICD-10-PCS | Mod: CPTII,S$GLB,, | Performed by: FAMILY MEDICINE

## 2020-07-14 PROCEDURE — 3074F SYST BP LT 130 MM HG: CPT | Mod: CPTII,S$GLB,, | Performed by: FAMILY MEDICINE

## 2020-07-14 PROCEDURE — 3008F PR BODY MASS INDEX (BMI) DOCUMENTED: ICD-10-PCS | Mod: CPTII,S$GLB,, | Performed by: FAMILY MEDICINE

## 2020-07-14 PROCEDURE — 99214 PR OFFICE/OUTPT VISIT, EST, LEVL IV, 30-39 MIN: ICD-10-PCS | Mod: S$GLB,,, | Performed by: FAMILY MEDICINE

## 2020-07-14 PROCEDURE — 99999 PR PBB SHADOW E&M-EST. PATIENT-LVL V: CPT | Mod: PBBFAC,,, | Performed by: FAMILY MEDICINE

## 2020-07-14 PROCEDURE — 3074F PR MOST RECENT SYSTOLIC BLOOD PRESSURE < 130 MM HG: ICD-10-PCS | Mod: CPTII,S$GLB,, | Performed by: FAMILY MEDICINE

## 2020-07-14 PROCEDURE — 3079F DIAST BP 80-89 MM HG: CPT | Mod: CPTII,S$GLB,, | Performed by: FAMILY MEDICINE

## 2020-07-14 PROCEDURE — U0003 INFECTIOUS AGENT DETECTION BY NUCLEIC ACID (DNA OR RNA); SEVERE ACUTE RESPIRATORY SYNDROME CORONAVIRUS 2 (SARS-COV-2) (CORONAVIRUS DISEASE [COVID-19]), AMPLIFIED PROBE TECHNIQUE, MAKING USE OF HIGH THROUGHPUT TECHNOLOGIES AS DESCRIBED BY CMS-2020-01-R: HCPCS

## 2020-07-14 PROCEDURE — 3008F BODY MASS INDEX DOCD: CPT | Mod: CPTII,S$GLB,, | Performed by: FAMILY MEDICINE

## 2020-07-14 RX ORDER — ONDANSETRON 4 MG/1
4 TABLET, ORALLY DISINTEGRATING ORAL EVERY 8 HOURS PRN
Qty: 30 TABLET | Refills: 1 | Status: SHIPPED | OUTPATIENT
Start: 2020-07-14 | End: 2021-09-13

## 2020-07-14 NOTE — PATIENT INSTRUCTIONS
Below are suggestions for symptomatic relief:                 - Tylenol every 4 hours OR ibuprofen every 6 hours as needed for pain/fever.              - Salt water gargles to soothe throat pain.              - Chloroseptic spray also helps to numb throat pain.              - Nasal saline spray reduces inflammation and dryness.              - Warm face compresses to help with facial sinus pain/pressure.              - Vicks vapor rub at night.              - Flonase OTC or Nasacort OTC for nasal congestion.              - Simple foods like chicken noodle soup.              - Delsym helps with coughing at night              - Zyrtec/Claritin during the day & Benadryl at night may help with allergies.     If you DO NOT have Hypertension or any history of palpitations, it is ok to take over the counter Sudafed or Mucinex D or Allegra-D or Claritin-D or Zyrtec-D.  If you do take one of the above, it is ok to combine that with plain over the counter Mucinex or Allegra or Claritin or Zyrtec. If, for example, you are taking Zyrtec -D, you can combine that with Mucinex, but not Mucinex-D.  If you are taking Mucinex-D, you can combine that with plain Allegra or Claritin or Zyrtec.   If you DO have Hypertension or palpitations, it is safe to take Coricidin HBP for relief of sinus symptoms.

## 2020-07-14 NOTE — PROGRESS NOTES
Subjective:       Patient ID: Caryl Cedeno is a 40 y.o. female.    Chief Complaint: Sinus Problem    HPI    40yoF PMHX HTN, FM, Migraines, Celiac dz, POTS, Small  for same day visit.    C/o fatigue that started ~1-2 wks ago with gradually worsening symptoms incl coughing (Dry), diarrhea, nausea, Headache, and then one episode of shortness of breath last night while changing her sheets which normally would not provoke such a response. This all together prompted today's visit and requesting covid swab testing today. Denies direct exposure to anyone with covid or other similar symptoms. Here with  today who has no symptoms. Has been doing her best with social distancing but will go to grocery every now and then.    Denies fever, chills, vomiting, cp.    Review of Systems   Constitutional: Positive for fatigue. Negative for chills and fever.   Respiratory: Positive for cough and shortness of breath. Negative for wheezing.    Cardiovascular: Negative for chest pain and palpitations.   Gastrointestinal: Positive for diarrhea and nausea. Negative for abdominal pain and constipation.         Past Medical History:   Diagnosis Date    Allergy     Celiac disease     Fibromyalgia     Hypertension     Migraines     Small fiber neuropathy     per patient is types: sensory and autonomic        Prior to Admission medications    Medication Sig Start Date End Date Taking? Authorizing Provider   ascorbic acid/collagen hydr (COLLAGEN PLUS VITAMIN C ORAL) Take 6,000 mg by mouth.    Historical Provider, MD   cetirizine (ZYRTEC) 10 MG tablet Take 10 mg by mouth once daily.    Historical Provider, MD   docusate sodium (COLACE) 100 MG capsule Take 1 capsule (100 mg total) by mouth 2 (two) times daily as needed for Constipation. 6/20/19   Evelia Leonardo MD   FINACEA 15 % gel AAA face qam - bid 4/4/19   Radha Toussaint MD   GAVILYTE-G 236-22.74-6.74 -5.86 gram suspension  6/28/19   Historical Provider, MD    hydroCHLOROthiazide (HYDRODIURIL) 25 MG tablet Take 25 mg by mouth once daily.    Historical Provider, MD   HYDROcodone-acetaminophen (NORCO) 5-325 mg per tablet Take 1 tablet by mouth every 8 (eight) hours as needed. 7/9/19   Amber Woods MD   hyoscyamine (LEVSIN/SL) 0.125 mg Subl PLACE 1 TABLET (0.125 MG TOTAL) UNDER THE TONGUE EVERY 12 HOURS AS NEEDED (ABDOMINAL CRAMPS). 12/6/19   Ezra Castañeda MD   ibuprofen (ADVIL,MOTRIN) 600 MG tablet Take 600 mg by mouth daily as needed for Pain.    Historical Provider, MD   losartan (COZAAR) 100 MG tablet Take 100 mg by mouth once daily. 9/12/19   Historical Provider, MD   losartan (COZAAR) 50 MG tablet Take 1 tablet (50 mg total) by mouth once daily. 6/29/19   Joann Snyder MD   potassium chloride SA (K-DUR,KLOR-CON) 20 MEQ tablet Take 2 tablets (40 mEq total) by mouth once daily. 10/1/19   Perla Andrade PA-C   pregabalin (LYRICA) 75 MG capsule Take 1 capsule (75 mg total) by mouth 3 (three) times daily. 3/9/20   Berlin Saleem MD   psyllium (METAMUCIL) powder Take 1 packet by mouth once daily.    Historical Provider, MD   SAVELLA 100 mg Tab TAKE 1 TABLET BY MOUTH TWICE A DAY 7/9/20   Berlin Saleem MD   triamterene-hydrochlorothiazide 37.5-25 mg (DYAZIDE) 37.5-25 mg per capsule Take 1 capsule by mouth every morning.    Historical Provider, MD   water Liqd 150 mL with MILK OF MAGNESIA 400 mg/5 mL Susp 400 mg, diphenhydrAMINE 12.5 mg/5 mL Elix 60 mg, nystatin 100,000 unit/mL Susp 500,000 Units SWISH AND SPIT 10ML'S BY MOUTH EVERY 4 HOURS 3/5/18   Fernanda Provider, MD   ZOLMitriptan (ZOMIG) 5 mg Spry USE 1 SPRAY IN EACH NOSTRIL ONCE DAILY as needed for migraine. 6/1/20   Berlin Saleem MD        Past medical history, surgical history, and family medical history reviewed and updated as appropriate.    Medications and allergies reviewed.     Objective:          Vitals:    07/14/20 1623   BP: 127/80   BP Location: Right arm   Patient  "Position: Sitting   BP Method: Medium (Manual)   Pulse: 85   SpO2: 98%   Weight: 89.3 kg (196 lb 13.9 oz)   Height: 5' 6" (1.676 m)     Body mass index is 31.78 kg/m².  Physical Exam  Constitutional:       General: She is not in acute distress.     Appearance: Normal appearance. She is not ill-appearing.      Comments: In facility provided wheelchair given chronic conditions   Eyes:      Extraocular Movements: Extraocular movements intact.   Pulmonary:      Effort: Pulmonary effort is normal. No respiratory distress.      Breath sounds: Normal breath sounds.   Neurological:      Mental Status: She is alert and oriented to person, place, and time.   Psychiatric:         Mood and Affect: Mood normal.         Behavior: Behavior normal.         Lab Results   Component Value Date    WBC 9.85 10/01/2019    HGB 13.8 10/01/2019    HCT 41.0 10/01/2019     10/01/2019    ALT 41 10/01/2019    AST 29 10/01/2019     10/16/2019    K 4.9 10/16/2019     10/16/2019    CREATININE 1.0 10/16/2019    BUN 12 10/16/2019    CO2 28 10/16/2019    TSH 2.868 10/01/2019    INR 1.1 07/06/2019       Assessment:       1. Fatigue, unspecified type    2. Shortness of breath    3. Cough    4. Diarrhea    5. Head ache    6. Nausea        Plan:   Caryl was seen today for sinus problem.    Diagnoses and all orders for this visit:    covid swab today    Prn nausea medication    Per avs, general cough and cold recs given    Follow up should symptoms persist or worsen. If symptoms become severe, please report immediately to urgent care or emergency room for further evaluation. Patient voiced understanding.    Fatigue, unspecified type    Shortness of breath  -     COVID-19 Routine Screening    Cough  -     COVID-19 Routine Screening    Diarrhea  -     COVID-19 Routine Screening    Head ache  -     COVID-19 Routine Screening    Nausea  -     COVID-19 Routine Screening  -     ondansetron (ZOFRAN-ODT) 4 MG TbDL; Take 1 tablet (4 mg total) " by mouth every 8 (eight) hours as needed.    No follow-ups on file.    Osito Lebron MD  Family Medicine  [facility]  7/14/2020

## 2020-07-16 LAB — SARS-COV-2 RNA RESP QL NAA+PROBE: NOT DETECTED

## 2020-12-15 ENCOUNTER — PATIENT MESSAGE (OUTPATIENT)
Dept: NEUROLOGY | Facility: CLINIC | Age: 40
End: 2020-12-15

## 2020-12-16 DIAGNOSIS — G43.109 MIGRAINE WITH AURA AND WITHOUT STATUS MIGRAINOSUS, NOT INTRACTABLE: ICD-10-CM

## 2020-12-16 DIAGNOSIS — G62.9 SMALL FIBER NEUROPATHY: ICD-10-CM

## 2020-12-17 ENCOUNTER — TELEPHONE (OUTPATIENT)
Dept: NEUROLOGY | Facility: CLINIC | Age: 40
End: 2020-12-17

## 2020-12-17 NOTE — TELEPHONE ENCOUNTER
----- Message from Carlotta Tracy RN sent at 12/17/2020  8:19 AM CST -----  Contact: Pt  See message  ----- Message -----  From: Rosangela Carbone  Sent: 12/16/2020   5:33 PM CST  To: [facility] Clinical Support    Pt called to request a refill on pregabalin (LYRICA) 75 MG capsule and asked for a call back once called in. Pt stated she have been trying to get refilled for 3 days.

## 2020-12-17 NOTE — TELEPHONE ENCOUNTER
LVM that she still [facility] the [facility] and to just call them. Will call me back with any questions or concerns or issues.

## 2020-12-18 ENCOUNTER — TELEPHONE (OUTPATIENT)
Dept: NEUROLOGY | Facility: CLINIC | Age: 40
End: 2020-12-18

## 2020-12-18 NOTE — TELEPHONE ENCOUNTER
Spoke with pt, who states that she has a new insurance and would like for the lyrica to be called to [facility] at [phone]. Pt states that she just picked up her lyrica up at [facility]. Spoke with Ren, pharmacist at [facility] who took new rx of lyrica and will put it on hold (lyrica 75mg #90 capsules to be taken 1 capsule three times daily with 5 refills).

## 2020-12-22 DIAGNOSIS — G62.9 SMALL FIBER NEUROPATHY: ICD-10-CM

## 2020-12-22 DIAGNOSIS — G43.109 MIGRAINE WITH AURA AND WITHOUT STATUS MIGRAINOSUS, NOT INTRACTABLE: ICD-10-CM

## 2020-12-22 RX ORDER — MILNACIPRAN HYDROCHLORIDE 100 MG/1
1 TABLET, FILM COATED ORAL 2 TIMES DAILY
Qty: 180 TABLET | Refills: 3 | Status: SHIPPED | OUTPATIENT
Start: 2020-12-22 | End: 2021-02-22

## 2020-12-22 RX ORDER — PREGABALIN 75 MG/1
75 CAPSULE ORAL 3 TIMES DAILY
Qty: 90 CAPSULE | Refills: 11 | Status: SHIPPED | OUTPATIENT
Start: 2020-12-22 | End: 2021-08-23 | Stop reason: SDUPTHER

## 2020-12-22 RX ORDER — ZOLMITRIPTAN 5 MG/1
SPRAY, METERED NASAL
Qty: 6 EACH | Refills: 3 | Status: SHIPPED | OUTPATIENT
Start: 2020-12-22

## 2021-01-16 ENCOUNTER — PATIENT MESSAGE (OUTPATIENT)
Dept: NEUROLOGY | Facility: CLINIC | Age: 41
End: 2021-01-16

## 2021-02-10 ENCOUNTER — PATIENT MESSAGE (OUTPATIENT)
Dept: NEUROLOGY | Facility: CLINIC | Age: 41
End: 2021-02-10

## 2021-02-22 ENCOUNTER — OFFICE VISIT (OUTPATIENT)
Dept: NEUROLOGY | Facility: CLINIC | Age: 41
End: 2021-02-22
Payer: MEDICARE

## 2021-02-22 VITALS
BODY MASS INDEX: 31.5 KG/M2 | WEIGHT: 196 LBS | HEART RATE: 66 BPM | SYSTOLIC BLOOD PRESSURE: 137 MMHG | DIASTOLIC BLOOD PRESSURE: 94 MMHG | HEIGHT: 66 IN

## 2021-02-22 DIAGNOSIS — G90.1 DYSAUTONOMIA: ICD-10-CM

## 2021-02-22 DIAGNOSIS — G62.9 SMALL FIBER NEUROPATHY: ICD-10-CM

## 2021-02-22 DIAGNOSIS — G90.A POTS (POSTURAL ORTHOSTATIC TACHYCARDIA SYNDROME): ICD-10-CM

## 2021-02-22 DIAGNOSIS — M54.50 CHRONIC BILATERAL LOW BACK PAIN WITHOUT SCIATICA: ICD-10-CM

## 2021-02-22 DIAGNOSIS — G89.29 CHRONIC BILATERAL LOW BACK PAIN WITHOUT SCIATICA: ICD-10-CM

## 2021-02-22 DIAGNOSIS — G43.109 MIGRAINE WITH AURA AND WITHOUT STATUS MIGRAINOSUS, NOT INTRACTABLE: Primary | ICD-10-CM

## 2021-02-22 PROCEDURE — 99215 OFFICE O/P EST HI 40 MIN: CPT | Mod: S$PBB,,, | Performed by: PSYCHIATRY & NEUROLOGY

## 2021-02-22 PROCEDURE — 99214 OFFICE O/P EST MOD 30 MIN: CPT | Mod: PBBFAC | Performed by: PSYCHIATRY & NEUROLOGY

## 2021-02-22 PROCEDURE — 99215 PR OFFICE/OUTPT VISIT, EST, LEVL V, 40-54 MIN: ICD-10-PCS | Mod: S$PBB,,, | Performed by: PSYCHIATRY & NEUROLOGY

## 2021-02-22 PROCEDURE — 99999 PR PBB SHADOW E&M-EST. PATIENT-LVL IV: CPT | Mod: PBBFAC,,, | Performed by: PSYCHIATRY & NEUROLOGY

## 2021-02-22 PROCEDURE — 99999 PR PBB SHADOW E&M-EST. PATIENT-LVL IV: ICD-10-PCS | Mod: PBBFAC,,, | Performed by: PSYCHIATRY & NEUROLOGY

## 2021-02-22 RX ORDER — MILNACIPRAN HYDROCHLORIDE 25 MG/1
25 TABLET, FILM COATED ORAL 2 TIMES DAILY
Qty: 180 TABLET | Refills: 3 | Status: SHIPPED | OUTPATIENT
Start: 2021-02-22 | End: 2021-10-05

## 2021-02-23 ENCOUNTER — PATIENT MESSAGE (OUTPATIENT)
Dept: NEUROLOGY | Facility: CLINIC | Age: 41
End: 2021-02-23

## 2021-02-24 ENCOUNTER — TELEPHONE (OUTPATIENT)
Dept: PHARMACY | Facility: CLINIC | Age: 41
End: 2021-02-24

## 2021-02-25 ENCOUNTER — PATIENT MESSAGE (OUTPATIENT)
Dept: NEUROLOGY | Facility: CLINIC | Age: 41
End: 2021-02-25

## 2021-02-25 ENCOUNTER — TELEPHONE (OUTPATIENT)
Dept: INTERNAL MEDICINE | Facility: CLINIC | Age: 41
End: 2021-02-25

## 2021-03-05 ENCOUNTER — LAB VISIT (OUTPATIENT)
Dept: LAB | Facility: HOSPITAL | Age: 41
End: 2021-03-05
Attending: PSYCHIATRY & NEUROLOGY
Payer: MEDICARE

## 2021-03-05 DIAGNOSIS — G90.A POTS (POSTURAL ORTHOSTATIC TACHYCARDIA SYNDROME): ICD-10-CM

## 2021-03-05 DIAGNOSIS — G62.9 SMALL FIBER NEUROPATHY: ICD-10-CM

## 2021-03-05 LAB
ALBUMIN SERPL BCP-MCNC: 4.1 G/DL (ref 3.5–5.2)
ALP SERPL-CCNC: 86 U/L (ref 55–135)
ALT SERPL W/O P-5'-P-CCNC: 38 U/L (ref 10–44)
ANION GAP SERPL CALC-SCNC: 10 MMOL/L (ref 8–16)
AST SERPL-CCNC: 19 U/L (ref 10–40)
BASOPHILS # BLD AUTO: 0.04 K/UL (ref 0–0.2)
BASOPHILS NFR BLD: 0.6 % (ref 0–1.9)
BILIRUB SERPL-MCNC: 0.4 MG/DL (ref 0.1–1)
BUN SERPL-MCNC: 8 MG/DL (ref 6–20)
CALCIUM SERPL-MCNC: 9.2 MG/DL (ref 8.7–10.5)
CHLORIDE SERPL-SCNC: 105 MMOL/L (ref 95–110)
CO2 SERPL-SCNC: 25 MMOL/L (ref 23–29)
CREAT SERPL-MCNC: 1 MG/DL (ref 0.5–1.4)
DIFFERENTIAL METHOD: NORMAL
EOSINOPHIL # BLD AUTO: 0.1 K/UL (ref 0–0.5)
EOSINOPHIL NFR BLD: 1 % (ref 0–8)
ERYTHROCYTE [DISTWIDTH] IN BLOOD BY AUTOMATED COUNT: 12.8 % (ref 11.5–14.5)
EST. GFR  (AFRICAN AMERICAN): >60 ML/MIN/1.73 M^2
EST. GFR  (NON AFRICAN AMERICAN): >60 ML/MIN/1.73 M^2
GLUCOSE SERPL-MCNC: 95 MG/DL (ref 70–110)
HCT VFR BLD AUTO: 41.7 % (ref 37–48.5)
HGB BLD-MCNC: 13.7 G/DL (ref 12–16)
IMM GRANULOCYTES # BLD AUTO: 0.01 K/UL (ref 0–0.04)
IMM GRANULOCYTES NFR BLD AUTO: 0.1 % (ref 0–0.5)
LYMPHOCYTES # BLD AUTO: 1.8 K/UL (ref 1–4.8)
LYMPHOCYTES NFR BLD: 25.4 % (ref 18–48)
MCH RBC QN AUTO: 28.9 PG (ref 27–31)
MCHC RBC AUTO-ENTMCNC: 32.9 G/DL (ref 32–36)
MCV RBC AUTO: 88 FL (ref 82–98)
MONOCYTES # BLD AUTO: 0.7 K/UL (ref 0.3–1)
MONOCYTES NFR BLD: 9.5 % (ref 4–15)
NEUTROPHILS # BLD AUTO: 4.6 K/UL (ref 1.8–7.7)
NEUTROPHILS NFR BLD: 63.4 % (ref 38–73)
NRBC BLD-RTO: 0 /100 WBC
PLATELET # BLD AUTO: 318 K/UL (ref 150–350)
PMV BLD AUTO: 10.5 FL (ref 9.2–12.9)
POTASSIUM SERPL-SCNC: 3.9 MMOL/L (ref 3.5–5.1)
PROT SERPL-MCNC: 7.2 G/DL (ref 6–8.4)
RBC # BLD AUTO: 4.74 M/UL (ref 4–5.4)
SODIUM SERPL-SCNC: 140 MMOL/L (ref 136–145)
TSH SERPL DL<=0.005 MIU/L-ACNC: 2.1 UIU/ML (ref 0.4–4)
WBC # BLD AUTO: 7.17 K/UL (ref 3.9–12.7)

## 2021-03-05 PROCEDURE — 84443 ASSAY THYROID STIM HORMONE: CPT | Performed by: PSYCHIATRY & NEUROLOGY

## 2021-03-05 PROCEDURE — 85025 COMPLETE CBC W/AUTO DIFF WBC: CPT | Performed by: PSYCHIATRY & NEUROLOGY

## 2021-03-05 PROCEDURE — 80053 COMPREHEN METABOLIC PANEL: CPT | Performed by: PSYCHIATRY & NEUROLOGY

## 2021-03-05 PROCEDURE — 36415 COLL VENOUS BLD VENIPUNCTURE: CPT | Performed by: PSYCHIATRY & NEUROLOGY

## 2021-03-08 ENCOUNTER — PATIENT MESSAGE (OUTPATIENT)
Dept: NEUROLOGY | Facility: CLINIC | Age: 41
End: 2021-03-08

## 2021-03-08 RX ORDER — ACETAMINOPHEN 325 MG/1
650 TABLET ORAL
Status: CANCELLED | OUTPATIENT
Start: 2021-03-15

## 2021-03-08 RX ORDER — HEPARIN 100 UNIT/ML
500 SYRINGE INTRAVENOUS
Status: CANCELLED | OUTPATIENT
Start: 2021-03-15

## 2021-03-08 RX ORDER — FAMOTIDINE 10 MG/ML
20 INJECTION INTRAVENOUS
Status: CANCELLED | OUTPATIENT
Start: 2021-03-15

## 2021-03-08 RX ORDER — SODIUM CHLORIDE 0.9 % (FLUSH) 0.9 %
10 SYRINGE (ML) INJECTION
Status: CANCELLED | OUTPATIENT
Start: 2021-03-15

## 2021-03-09 ENCOUNTER — PATIENT MESSAGE (OUTPATIENT)
Dept: NEUROLOGY | Facility: CLINIC | Age: 41
End: 2021-03-09

## 2021-03-12 ENCOUNTER — IMMUNIZATION (OUTPATIENT)
Dept: PRIMARY CARE CLINIC | Facility: CLINIC | Age: 41
End: 2021-03-12
Payer: MEDICARE

## 2021-03-12 DIAGNOSIS — Z23 NEED FOR VACCINATION: Primary | ICD-10-CM

## 2021-03-12 PROCEDURE — 91300 PR SARS-COV- 2 COVID-19 VACCINE, NO PRSV, 30MCG/0.3ML, IM: CPT | Mod: S$GLB,,, | Performed by: INTERNAL MEDICINE

## 2021-03-12 PROCEDURE — 0001A PR IMMUNIZ ADMIN, SARS-COV-2 COVID-19 VACC, 30MCG/0.3ML, 1ST DOSE: CPT | Mod: CV19,S$GLB,, | Performed by: INTERNAL MEDICINE

## 2021-03-12 PROCEDURE — 91300 PR SARS-COV- 2 COVID-19 VACCINE, NO PRSV, 30MCG/0.3ML, IM: ICD-10-PCS | Mod: S$GLB,,, | Performed by: INTERNAL MEDICINE

## 2021-03-12 PROCEDURE — 0001A PR IMMUNIZ ADMIN, SARS-COV-2 COVID-19 VACC, 30MCG/0.3ML, 1ST DOSE: ICD-10-PCS | Mod: CV19,S$GLB,, | Performed by: INTERNAL MEDICINE

## 2021-03-12 RX ADMIN — Medication 0.3 ML: at 01:03

## 2021-03-29 ENCOUNTER — PATIENT MESSAGE (OUTPATIENT)
Dept: NEUROLOGY | Facility: CLINIC | Age: 41
End: 2021-03-29

## 2021-03-31 ENCOUNTER — OFFICE VISIT (OUTPATIENT)
Dept: PAIN MEDICINE | Facility: CLINIC | Age: 41
End: 2021-03-31
Payer: MEDICARE

## 2021-03-31 VITALS
SYSTOLIC BLOOD PRESSURE: 133 MMHG | RESPIRATION RATE: 18 BRPM | TEMPERATURE: 99 F | HEIGHT: 66 IN | OXYGEN SATURATION: 98 % | BODY MASS INDEX: 31.18 KG/M2 | WEIGHT: 194 LBS | HEART RATE: 76 BPM | DIASTOLIC BLOOD PRESSURE: 97 MMHG

## 2021-03-31 DIAGNOSIS — M50.30 DDD (DEGENERATIVE DISC DISEASE), CERVICAL: ICD-10-CM

## 2021-03-31 DIAGNOSIS — M47.812 CERVICAL SPONDYLOSIS: Primary | ICD-10-CM

## 2021-03-31 DIAGNOSIS — M54.2 CERVICALGIA: ICD-10-CM

## 2021-03-31 DIAGNOSIS — G62.9 SMALL FIBER NEUROPATHY: ICD-10-CM

## 2021-03-31 PROCEDURE — 99999 PR PBB SHADOW E&M-EST. PATIENT-LVL V: CPT | Mod: PBBFAC,,, | Performed by: ANESTHESIOLOGY

## 2021-03-31 PROCEDURE — 99204 PR OFFICE/OUTPT VISIT, NEW, LEVL IV, 45-59 MIN: ICD-10-PCS | Mod: S$PBB,,, | Performed by: ANESTHESIOLOGY

## 2021-03-31 PROCEDURE — 99999 PR PBB SHADOW E&M-EST. PATIENT-LVL V: ICD-10-PCS | Mod: PBBFAC,,, | Performed by: ANESTHESIOLOGY

## 2021-03-31 PROCEDURE — 99204 OFFICE O/P NEW MOD 45 MIN: CPT | Mod: S$PBB,,, | Performed by: ANESTHESIOLOGY

## 2021-03-31 PROCEDURE — 99215 OFFICE O/P EST HI 40 MIN: CPT | Mod: PBBFAC | Performed by: ANESTHESIOLOGY

## 2021-04-02 ENCOUNTER — IMMUNIZATION (OUTPATIENT)
Dept: PRIMARY CARE CLINIC | Facility: CLINIC | Age: 41
End: 2021-04-02

## 2021-04-02 DIAGNOSIS — Z23 NEED FOR VACCINATION: Primary | ICD-10-CM

## 2021-04-02 PROCEDURE — 0002A PR IMMUNIZ ADMIN, SARS-COV-2 COVID-19 VACC, 30MCG/0.3ML, 2ND DOSE: ICD-10-PCS | Mod: CV19,S$GLB,, | Performed by: INTERNAL MEDICINE

## 2021-04-02 PROCEDURE — 91300 PR SARS-COV- 2 COVID-19 VACCINE, NO PRSV, 30MCG/0.3ML, IM: CPT | Mod: S$GLB,,, | Performed by: INTERNAL MEDICINE

## 2021-04-02 PROCEDURE — 91300 PR SARS-COV- 2 COVID-19 VACCINE, NO PRSV, 30MCG/0.3ML, IM: ICD-10-PCS | Mod: S$GLB,,, | Performed by: INTERNAL MEDICINE

## 2021-04-02 PROCEDURE — 0002A PR IMMUNIZ ADMIN, SARS-COV-2 COVID-19 VACC, 30MCG/0.3ML, 2ND DOSE: CPT | Mod: CV19,S$GLB,, | Performed by: INTERNAL MEDICINE

## 2021-04-02 RX ADMIN — Medication 0.3 ML: at 04:04

## 2021-04-06 ENCOUNTER — HOSPITAL ENCOUNTER (OUTPATIENT)
Dept: RADIOLOGY | Facility: HOSPITAL | Age: 41
Discharge: HOME | End: 2021-04-06
Attending: ANESTHESIOLOGY
Payer: MEDICARE

## 2021-04-06 DIAGNOSIS — M50.30 DDD (DEGENERATIVE DISC DISEASE), CERVICAL: ICD-10-CM

## 2021-04-06 DIAGNOSIS — M54.2 CERVICALGIA: ICD-10-CM

## 2021-04-06 DIAGNOSIS — G62.9 SMALL FIBER NEUROPATHY: ICD-10-CM

## 2021-04-06 DIAGNOSIS — M47.812 CERVICAL SPONDYLOSIS: ICD-10-CM

## 2021-04-06 PROCEDURE — 72156 MRI NECK SPINE W/O & W/DYE: CPT | Mod: 26,,, | Performed by: RADIOLOGY

## 2021-04-06 PROCEDURE — 72156 MRI NECK SPINE W/O & W/DYE: CPT | Mod: TC

## 2021-04-06 PROCEDURE — 72052 X-RAY EXAM NECK SPINE 6/>VWS: CPT | Mod: TC,FY

## 2021-04-06 PROCEDURE — 72110 X-RAY EXAM L-2 SPINE 4/>VWS: CPT | Mod: 26,,, | Performed by: RADIOLOGY

## 2021-04-06 PROCEDURE — 72110 XR LUMBAR SPINE 5 VIEW WITH FLEX AND EXT: ICD-10-PCS | Mod: 26,,, | Performed by: RADIOLOGY

## 2021-04-06 PROCEDURE — 72052 XR CERVICAL SPINE 5 VIEW WITH FLEX AND EXT: ICD-10-PCS | Mod: 26,,, | Performed by: RADIOLOGY

## 2021-04-06 PROCEDURE — 72110 X-RAY EXAM L-2 SPINE 4/>VWS: CPT | Mod: TC,FY

## 2021-04-06 PROCEDURE — A9585 GADOBUTROL INJECTION: HCPCS | Performed by: ANESTHESIOLOGY

## 2021-04-06 PROCEDURE — 72156 MRI CERVICAL SPINE W WO CONTRAST: ICD-10-PCS | Mod: 26,,, | Performed by: RADIOLOGY

## 2021-04-06 PROCEDURE — 72052 X-RAY EXAM NECK SPINE 6/>VWS: CPT | Mod: 26,,, | Performed by: RADIOLOGY

## 2021-04-06 PROCEDURE — 25500020 PHARM REV CODE 255: Performed by: ANESTHESIOLOGY

## 2021-04-06 RX ORDER — GADOBUTROL 604.72 MG/ML
9 INJECTION INTRAVENOUS
Status: COMPLETED | OUTPATIENT
Start: 2021-04-06 | End: 2021-04-06

## 2021-04-06 RX ADMIN — GADOBUTROL 9 ML: 604.72 INJECTION INTRAVENOUS at 07:04

## 2021-04-07 ENCOUNTER — TELEPHONE (OUTPATIENT)
Dept: PAIN MEDICINE | Facility: CLINIC | Age: 41
End: 2021-04-07

## 2021-04-08 ENCOUNTER — OFFICE VISIT (OUTPATIENT)
Dept: PAIN MEDICINE | Facility: CLINIC | Age: 41
End: 2021-04-08
Payer: MEDICARE

## 2021-04-08 DIAGNOSIS — Z98.1 S/P CERVICAL SPINAL FUSION: ICD-10-CM

## 2021-04-08 DIAGNOSIS — G62.9 SMALL FIBER NEUROPATHY: ICD-10-CM

## 2021-04-08 DIAGNOSIS — G89.4 CHRONIC PAIN DISORDER: ICD-10-CM

## 2021-04-08 DIAGNOSIS — M47.812 CERVICAL SPONDYLOSIS: Primary | ICD-10-CM

## 2021-04-08 DIAGNOSIS — M50.30 DDD (DEGENERATIVE DISC DISEASE), CERVICAL: ICD-10-CM

## 2021-04-08 DIAGNOSIS — M51.36 DDD (DEGENERATIVE DISC DISEASE), LUMBAR: ICD-10-CM

## 2021-04-08 DIAGNOSIS — R40.4 ALTERATION CONSCIOUSNESS: Primary | ICD-10-CM

## 2021-04-08 DIAGNOSIS — M47.816 LUMBAR SPONDYLOSIS: ICD-10-CM

## 2021-04-08 DIAGNOSIS — G44.86 CERVICOGENIC HEADACHE: ICD-10-CM

## 2021-04-08 DIAGNOSIS — M43.16 SPONDYLOLISTHESIS OF LUMBAR REGION: ICD-10-CM

## 2021-04-08 PROCEDURE — 99213 OFFICE O/P EST LOW 20 MIN: CPT | Mod: 95,,, | Performed by: NURSE PRACTITIONER

## 2021-04-08 PROCEDURE — 99213 PR OFFICE/OUTPT VISIT, EST, LEVL III, 20-29 MIN: ICD-10-PCS | Mod: 95,,, | Performed by: NURSE PRACTITIONER

## 2021-04-09 ENCOUNTER — CLINICAL SUPPORT (OUTPATIENT)
Dept: REHABILITATION | Facility: HOSPITAL | Age: 41
End: 2021-04-09
Attending: ANESTHESIOLOGY
Payer: MEDICARE

## 2021-04-09 DIAGNOSIS — M47.812 CERVICAL SPONDYLOSIS: ICD-10-CM

## 2021-04-09 DIAGNOSIS — M54.2 CERVICALGIA: ICD-10-CM

## 2021-04-09 DIAGNOSIS — G62.9 SMALL FIBER NEUROPATHY: ICD-10-CM

## 2021-04-09 PROCEDURE — 97110 THERAPEUTIC EXERCISES: CPT

## 2021-04-09 PROCEDURE — 97161 PT EVAL LOW COMPLEX 20 MIN: CPT

## 2021-04-12 ENCOUNTER — PATIENT MESSAGE (OUTPATIENT)
Dept: NEUROLOGY | Facility: CLINIC | Age: 41
End: 2021-04-12

## 2021-04-13 ENCOUNTER — CLINICAL SUPPORT (OUTPATIENT)
Dept: REHABILITATION | Facility: HOSPITAL | Age: 41
End: 2021-04-13
Attending: ANESTHESIOLOGY
Payer: MEDICARE

## 2021-04-13 DIAGNOSIS — M79.7 FIBROMYALGIA: Primary | ICD-10-CM

## 2021-04-13 DIAGNOSIS — G89.29 CHRONIC BILATERAL LOW BACK PAIN WITHOUT SCIATICA: ICD-10-CM

## 2021-04-13 DIAGNOSIS — M54.50 CHRONIC BILATERAL LOW BACK PAIN WITHOUT SCIATICA: ICD-10-CM

## 2021-04-13 PROCEDURE — 97113 AQUATIC THERAPY/EXERCISES: CPT

## 2021-04-16 ENCOUNTER — HOSPITAL ENCOUNTER (OUTPATIENT)
Dept: RADIOLOGY | Facility: HOSPITAL | Age: 41
Discharge: HOME | End: 2021-04-16
Attending: NURSE PRACTITIONER
Payer: MEDICARE

## 2021-04-16 DIAGNOSIS — M47.816 LUMBAR SPONDYLOSIS: ICD-10-CM

## 2021-04-16 DIAGNOSIS — M43.16 SPONDYLOLISTHESIS OF LUMBAR REGION: ICD-10-CM

## 2021-04-16 PROCEDURE — 72148 MRI LUMBAR SPINE W/O DYE: CPT | Mod: TC

## 2021-04-16 PROCEDURE — 72148 MRI LUMBAR SPINE W/O DYE: CPT | Mod: 26,,, | Performed by: RADIOLOGY

## 2021-04-16 PROCEDURE — 72148 MRI LUMBAR SPINE WITHOUT CONTRAST: ICD-10-PCS | Mod: 26,,, | Performed by: RADIOLOGY

## 2021-04-19 ENCOUNTER — PATIENT MESSAGE (OUTPATIENT)
Dept: PAIN MEDICINE | Facility: OTHER | Age: 41
End: 2021-04-19

## 2021-04-19 ENCOUNTER — OFFICE VISIT (OUTPATIENT)
Dept: NEUROSURGERY | Facility: CLINIC | Age: 41
End: 2021-04-19
Payer: MEDICARE

## 2021-04-19 ENCOUNTER — CLINICAL SUPPORT (OUTPATIENT)
Dept: REHABILITATION | Facility: HOSPITAL | Age: 41
End: 2021-04-19
Attending: ANESTHESIOLOGY
Payer: MEDICARE

## 2021-04-19 VITALS
HEART RATE: 71 BPM | BODY MASS INDEX: 30.64 KG/M2 | DIASTOLIC BLOOD PRESSURE: 91 MMHG | HEIGHT: 67 IN | TEMPERATURE: 98 F | WEIGHT: 195.19 LBS | SYSTOLIC BLOOD PRESSURE: 139 MMHG

## 2021-04-19 DIAGNOSIS — M47.26 OSTEOARTHRITIS OF SPINE WITH RADICULOPATHY, LUMBAR REGION: ICD-10-CM

## 2021-04-19 DIAGNOSIS — M47.12 CERVICAL SPONDYLOSIS WITH MYELOPATHY: ICD-10-CM

## 2021-04-19 DIAGNOSIS — M54.2 CERVICALGIA: ICD-10-CM

## 2021-04-19 DIAGNOSIS — G62.9 SMALL FIBER NEUROPATHY: Primary | ICD-10-CM

## 2021-04-19 DIAGNOSIS — M43.16 SPONDYLOLISTHESIS OF LUMBAR REGION: ICD-10-CM

## 2021-04-19 PROCEDURE — 99204 OFFICE O/P NEW MOD 45 MIN: CPT | Mod: S$PBB,,, | Performed by: NEUROLOGICAL SURGERY

## 2021-04-19 PROCEDURE — 99999 PR PBB SHADOW E&M-EST. PATIENT-LVL IV: ICD-10-PCS | Mod: PBBFAC,,, | Performed by: NEUROLOGICAL SURGERY

## 2021-04-19 PROCEDURE — 97110 THERAPEUTIC EXERCISES: CPT | Mod: CQ

## 2021-04-19 PROCEDURE — 99999 PR PBB SHADOW E&M-EST. PATIENT-LVL IV: CPT | Mod: PBBFAC,,, | Performed by: NEUROLOGICAL SURGERY

## 2021-04-19 PROCEDURE — 99214 OFFICE O/P EST MOD 30 MIN: CPT | Mod: PBBFAC | Performed by: NEUROLOGICAL SURGERY

## 2021-04-19 PROCEDURE — 99204 PR OFFICE/OUTPT VISIT, NEW, LEVL IV, 45-59 MIN: ICD-10-PCS | Mod: S$PBB,,, | Performed by: NEUROLOGICAL SURGERY

## 2021-04-20 ENCOUNTER — HOSPITAL ENCOUNTER (OUTPATIENT)
Facility: OTHER | Age: 41
Discharge: HOME | End: 2021-04-20
Attending: ANESTHESIOLOGY | Admitting: ANESTHESIOLOGY
Payer: MEDICARE

## 2021-04-20 VITALS
HEIGHT: 66 IN | DIASTOLIC BLOOD PRESSURE: 89 MMHG | SYSTOLIC BLOOD PRESSURE: 136 MMHG | OXYGEN SATURATION: 98 % | WEIGHT: 190 LBS | RESPIRATION RATE: 14 BRPM | BODY MASS INDEX: 30.53 KG/M2 | HEART RATE: 88 BPM | TEMPERATURE: 99 F

## 2021-04-20 DIAGNOSIS — Z98.1 S/P CERVICAL SPINAL FUSION: ICD-10-CM

## 2021-04-20 DIAGNOSIS — M50.30 DDD (DEGENERATIVE DISC DISEASE), CERVICAL: Primary | ICD-10-CM

## 2021-04-20 DIAGNOSIS — G89.29 CHRONIC PAIN: ICD-10-CM

## 2021-04-20 PROBLEM — M47.26 OSTEOARTHRITIS OF SPINE WITH RADICULOPATHY, LUMBAR REGION: Status: ACTIVE | Noted: 2021-04-20

## 2021-04-20 PROBLEM — M47.12 CERVICAL SPONDYLOSIS WITH MYELOPATHY: Status: ACTIVE | Noted: 2021-04-20

## 2021-04-20 PROCEDURE — 63600175 PHARM REV CODE 636 W HCPCS: Performed by: ANESTHESIOLOGY

## 2021-04-20 PROCEDURE — 62321 NJX INTERLAMINAR CRV/THRC: CPT | Performed by: ANESTHESIOLOGY

## 2021-04-20 PROCEDURE — 62321 NJX INTERLAMINAR CRV/THRC: CPT | Mod: ,,, | Performed by: ANESTHESIOLOGY

## 2021-04-20 PROCEDURE — 25500020 PHARM REV CODE 255: Performed by: ANESTHESIOLOGY

## 2021-04-20 PROCEDURE — 25000003 PHARM REV CODE 250: Performed by: ANESTHESIOLOGY

## 2021-04-20 PROCEDURE — 62321 PR INJ CERV/THORAC, W/GUIDANCE: ICD-10-PCS | Mod: ,,, | Performed by: ANESTHESIOLOGY

## 2021-04-20 RX ORDER — FENTANYL CITRATE 50 UG/ML
INJECTION, SOLUTION INTRAMUSCULAR; INTRAVENOUS
Status: DISCONTINUED | OUTPATIENT
Start: 2021-04-20 | End: 2021-04-20 | Stop reason: HOSPADM

## 2021-04-20 RX ORDER — MIDAZOLAM HYDROCHLORIDE 1 MG/ML
INJECTION INTRAMUSCULAR; INTRAVENOUS
Status: DISCONTINUED | OUTPATIENT
Start: 2021-04-20 | End: 2021-04-20 | Stop reason: HOSPADM

## 2021-04-20 RX ORDER — LIDOCAINE HYDROCHLORIDE 10 MG/ML
INJECTION INFILTRATION; PERINEURAL
Status: DISCONTINUED | OUTPATIENT
Start: 2021-04-20 | End: 2021-04-20 | Stop reason: HOSPADM

## 2021-04-20 RX ORDER — BUPIVACAINE HYDROCHLORIDE 2.5 MG/ML
INJECTION, SOLUTION EPIDURAL; INFILTRATION; INTRACAUDAL
Status: DISCONTINUED | OUTPATIENT
Start: 2021-04-20 | End: 2021-04-20 | Stop reason: HOSPADM

## 2021-04-20 RX ORDER — DEXAMETHASONE SODIUM PHOSPHATE 10 MG/ML
INJECTION INTRAMUSCULAR; INTRAVENOUS
Status: DISCONTINUED | OUTPATIENT
Start: 2021-04-20 | End: 2021-04-20 | Stop reason: HOSPADM

## 2021-04-20 RX ORDER — SODIUM CHLORIDE 9 MG/ML
INJECTION, SOLUTION INTRAVENOUS CONTINUOUS
Status: DISCONTINUED | OUTPATIENT
Start: 2021-04-20 | End: 2021-04-20 | Stop reason: HOSPADM

## 2021-05-03 ENCOUNTER — HOSPITAL ENCOUNTER (OUTPATIENT)
Dept: NEUROLOGY | Facility: CLINIC | Age: 41
Discharge: HOME | End: 2021-05-03
Payer: MEDICARE

## 2021-05-03 ENCOUNTER — TELEPHONE (OUTPATIENT)
Dept: PAIN MEDICINE | Facility: CLINIC | Age: 41
End: 2021-05-03

## 2021-05-03 DIAGNOSIS — R40.4 ALTERATION CONSCIOUSNESS: ICD-10-CM

## 2021-05-03 PROCEDURE — 95819 EEG AWAKE AND ASLEEP: CPT | Mod: PBBFAC | Performed by: PSYCHIATRY & NEUROLOGY

## 2021-05-03 PROCEDURE — 95819 PR EEG,W/AWAKE & ASLEEP RECORD: ICD-10-PCS | Mod: 26,S$PBB,, | Performed by: PSYCHIATRY & NEUROLOGY

## 2021-05-03 PROCEDURE — 95819 EEG AWAKE AND ASLEEP: CPT | Mod: 26,S$PBB,, | Performed by: PSYCHIATRY & NEUROLOGY

## 2021-05-04 ENCOUNTER — OFFICE VISIT (OUTPATIENT)
Dept: PAIN MEDICINE | Facility: CLINIC | Age: 41
End: 2021-05-04
Payer: MEDICARE

## 2021-05-04 ENCOUNTER — PATIENT MESSAGE (OUTPATIENT)
Dept: NEUROLOGY | Facility: CLINIC | Age: 41
End: 2021-05-04

## 2021-05-04 DIAGNOSIS — M54.16 LUMBAR RADICULOPATHY: Primary | ICD-10-CM

## 2021-05-04 DIAGNOSIS — G89.4 CHRONIC PAIN DISORDER: ICD-10-CM

## 2021-05-04 DIAGNOSIS — M43.16 SPONDYLOLISTHESIS OF LUMBAR REGION: ICD-10-CM

## 2021-05-04 DIAGNOSIS — M51.36 DDD (DEGENERATIVE DISC DISEASE), LUMBAR: ICD-10-CM

## 2021-05-04 DIAGNOSIS — G62.9 SMALL FIBER NEUROPATHY: ICD-10-CM

## 2021-05-04 DIAGNOSIS — M47.812 CERVICAL SPONDYLOSIS: ICD-10-CM

## 2021-05-04 DIAGNOSIS — Z98.1 S/P CERVICAL SPINAL FUSION: ICD-10-CM

## 2021-05-04 DIAGNOSIS — M50.30 DDD (DEGENERATIVE DISC DISEASE), CERVICAL: ICD-10-CM

## 2021-05-04 DIAGNOSIS — G44.86 CERVICOGENIC HEADACHE: ICD-10-CM

## 2021-05-04 DIAGNOSIS — M47.816 LUMBAR SPONDYLOSIS: ICD-10-CM

## 2021-05-04 PROCEDURE — 99213 OFFICE O/P EST LOW 20 MIN: CPT | Mod: 95,,, | Performed by: NURSE PRACTITIONER

## 2021-05-04 PROCEDURE — 99213 PR OFFICE/OUTPT VISIT, EST, LEVL III, 20-29 MIN: ICD-10-PCS | Mod: 95,,, | Performed by: NURSE PRACTITIONER

## 2021-05-06 ENCOUNTER — CLINICAL SUPPORT (OUTPATIENT)
Dept: REHABILITATION | Facility: HOSPITAL | Age: 41
End: 2021-05-06
Attending: ANESTHESIOLOGY
Payer: MEDICARE

## 2021-05-06 DIAGNOSIS — G62.9 SMALL FIBER NEUROPATHY: ICD-10-CM

## 2021-05-06 DIAGNOSIS — M54.2 CERVICALGIA: ICD-10-CM

## 2021-05-06 PROCEDURE — 97110 THERAPEUTIC EXERCISES: CPT

## 2021-05-13 ENCOUNTER — CLINICAL SUPPORT (OUTPATIENT)
Dept: REHABILITATION | Facility: HOSPITAL | Age: 41
End: 2021-05-13
Attending: ANESTHESIOLOGY
Payer: MEDICARE

## 2021-05-13 DIAGNOSIS — G62.9 SMALL FIBER NEUROPATHY: ICD-10-CM

## 2021-05-13 DIAGNOSIS — M54.2 CERVICALGIA: ICD-10-CM

## 2021-05-13 PROCEDURE — 97113 AQUATIC THERAPY/EXERCISES: CPT

## 2021-05-17 ENCOUNTER — PATIENT MESSAGE (OUTPATIENT)
Dept: PAIN MEDICINE | Facility: OTHER | Age: 41
End: 2021-05-17

## 2021-05-18 ENCOUNTER — HOSPITAL ENCOUNTER (OUTPATIENT)
Facility: OTHER | Age: 41
Discharge: HOME | End: 2021-05-18
Attending: ANESTHESIOLOGY | Admitting: ANESTHESIOLOGY
Payer: MEDICARE

## 2021-05-18 VITALS
DIASTOLIC BLOOD PRESSURE: 90 MMHG | OXYGEN SATURATION: 99 % | BODY MASS INDEX: 30.53 KG/M2 | WEIGHT: 190 LBS | RESPIRATION RATE: 16 BRPM | HEIGHT: 66 IN | TEMPERATURE: 98 F | HEART RATE: 81 BPM | SYSTOLIC BLOOD PRESSURE: 130 MMHG

## 2021-05-18 DIAGNOSIS — M51.36 DDD (DEGENERATIVE DISC DISEASE), LUMBAR: Primary | ICD-10-CM

## 2021-05-18 DIAGNOSIS — G89.29 CHRONIC PAIN: ICD-10-CM

## 2021-05-18 DIAGNOSIS — M54.17 LUMBOSACRAL RADICULOPATHY: ICD-10-CM

## 2021-05-18 PROCEDURE — 63600175 PHARM REV CODE 636 W HCPCS: Performed by: ANESTHESIOLOGY

## 2021-05-18 PROCEDURE — 64484 PRA INJECT ANES/STEROID FORAMEN LUMBAR/SACRAL W IMG GUIDE ,EA ADD LEVEL: ICD-10-PCS | Mod: RT,,, | Performed by: ANESTHESIOLOGY

## 2021-05-18 PROCEDURE — 64483 NJX AA&/STRD TFRM EPI L/S 1: CPT | Mod: RT | Performed by: ANESTHESIOLOGY

## 2021-05-18 PROCEDURE — 64483 PR EPIDURAL INJ, ANES/STEROID, TRANSFORAMINAL, LUMB/SACR, SNGL LEVL: ICD-10-PCS | Mod: RT,,, | Performed by: ANESTHESIOLOGY

## 2021-05-18 PROCEDURE — 64484 NJX AA&/STRD TFRM EPI L/S EA: CPT | Mod: RT | Performed by: ANESTHESIOLOGY

## 2021-05-18 PROCEDURE — 64484 NJX AA&/STRD TFRM EPI L/S EA: CPT | Mod: RT,,, | Performed by: ANESTHESIOLOGY

## 2021-05-18 PROCEDURE — 25000003 PHARM REV CODE 250: Performed by: ANESTHESIOLOGY

## 2021-05-18 PROCEDURE — 25500020 PHARM REV CODE 255: Performed by: ANESTHESIOLOGY

## 2021-05-18 PROCEDURE — 64483 NJX AA&/STRD TFRM EPI L/S 1: CPT | Mod: RT,,, | Performed by: ANESTHESIOLOGY

## 2021-05-18 RX ORDER — DEXAMETHASONE SODIUM PHOSPHATE 10 MG/ML
INJECTION INTRAMUSCULAR; INTRAVENOUS
Status: DISCONTINUED | OUTPATIENT
Start: 2021-05-18 | End: 2021-05-18 | Stop reason: HOSPADM

## 2021-05-18 RX ORDER — LIDOCAINE HYDROCHLORIDE 10 MG/ML
INJECTION INFILTRATION; PERINEURAL
Status: DISCONTINUED | OUTPATIENT
Start: 2021-05-18 | End: 2021-05-18 | Stop reason: HOSPADM

## 2021-05-18 RX ORDER — ALPRAZOLAM 0.5 MG/1
1 TABLET ORAL ONCE
Status: COMPLETED | OUTPATIENT
Start: 2021-05-18 | End: 2021-05-18

## 2021-05-18 RX ORDER — SODIUM CHLORIDE 9 MG/ML
INJECTION, SOLUTION INTRAVENOUS CONTINUOUS
Status: ACTIVE | OUTPATIENT
Start: 2021-05-18

## 2021-05-18 RX ORDER — BUPIVACAINE HYDROCHLORIDE 2.5 MG/ML
INJECTION, SOLUTION EPIDURAL; INFILTRATION; INTRACAUDAL
Status: DISCONTINUED | OUTPATIENT
Start: 2021-05-18 | End: 2021-05-18 | Stop reason: HOSPADM

## 2021-05-18 RX ADMIN — ALPRAZOLAM 1 MG: 0.5 TABLET ORAL at 03:05

## 2021-05-24 ENCOUNTER — PATIENT MESSAGE (OUTPATIENT)
Dept: NEUROSURGERY | Facility: CLINIC | Age: 41
End: 2021-05-24

## 2021-05-31 ENCOUNTER — TELEPHONE (OUTPATIENT)
Dept: PAIN MEDICINE | Facility: CLINIC | Age: 41
End: 2021-05-31

## 2021-06-01 ENCOUNTER — OFFICE VISIT (OUTPATIENT)
Dept: PAIN MEDICINE | Facility: CLINIC | Age: 41
End: 2021-06-01
Payer: MEDICARE

## 2021-06-01 DIAGNOSIS — G89.4 CHRONIC PAIN DISORDER: ICD-10-CM

## 2021-06-01 DIAGNOSIS — G62.9 SMALL FIBER NEUROPATHY: ICD-10-CM

## 2021-06-01 DIAGNOSIS — M47.812 CERVICAL SPONDYLOSIS: ICD-10-CM

## 2021-06-01 DIAGNOSIS — Z98.1 S/P CERVICAL SPINAL FUSION: ICD-10-CM

## 2021-06-01 DIAGNOSIS — M50.30 DDD (DEGENERATIVE DISC DISEASE), CERVICAL: ICD-10-CM

## 2021-06-01 DIAGNOSIS — M54.81 BILATERAL OCCIPITAL NEURALGIA: Primary | ICD-10-CM

## 2021-06-01 DIAGNOSIS — G44.86 CERVICOGENIC HEADACHE: ICD-10-CM

## 2021-06-01 PROCEDURE — 99213 OFFICE O/P EST LOW 20 MIN: CPT | Mod: 95,,, | Performed by: NURSE PRACTITIONER

## 2021-06-01 PROCEDURE — 99213 PR OFFICE/OUTPT VISIT, EST, LEVL III, 20-29 MIN: ICD-10-PCS | Mod: 95,,, | Performed by: NURSE PRACTITIONER

## 2021-06-02 ENCOUNTER — PATIENT MESSAGE (OUTPATIENT)
Dept: HEMATOLOGY/ONCOLOGY | Facility: CLINIC | Age: 41
End: 2021-06-02

## 2021-06-02 ENCOUNTER — PATIENT MESSAGE (OUTPATIENT)
Dept: NEUROLOGY | Facility: CLINIC | Age: 41
End: 2021-06-02

## 2021-06-10 ENCOUNTER — CLINICAL SUPPORT (OUTPATIENT)
Dept: REHABILITATION | Facility: HOSPITAL | Age: 41
End: 2021-06-10
Attending: ANESTHESIOLOGY
Payer: MEDICARE

## 2021-06-10 DIAGNOSIS — G62.9 SMALL FIBER NEUROPATHY: ICD-10-CM

## 2021-06-10 DIAGNOSIS — M54.2 CERVICALGIA: ICD-10-CM

## 2021-06-10 PROCEDURE — 97113 AQUATIC THERAPY/EXERCISES: CPT

## 2021-06-14 ENCOUNTER — CLINICAL SUPPORT (OUTPATIENT)
Dept: REHABILITATION | Facility: HOSPITAL | Age: 41
End: 2021-06-14
Attending: ANESTHESIOLOGY
Payer: MEDICARE

## 2021-06-14 DIAGNOSIS — G62.9 SMALL FIBER NEUROPATHY: ICD-10-CM

## 2021-06-14 DIAGNOSIS — M54.2 CERVICALGIA: ICD-10-CM

## 2021-06-14 PROCEDURE — 97113 AQUATIC THERAPY/EXERCISES: CPT

## 2021-06-17 ENCOUNTER — TELEPHONE (OUTPATIENT)
Dept: HEMATOLOGY/ONCOLOGY | Facility: CLINIC | Age: 41
End: 2021-06-17

## 2021-06-24 ENCOUNTER — TELEPHONE (OUTPATIENT)
Dept: HEMATOLOGY/ONCOLOGY | Facility: CLINIC | Age: 41
End: 2021-06-24

## 2021-07-02 ENCOUNTER — PATIENT MESSAGE (OUTPATIENT)
Dept: NEUROLOGY | Facility: CLINIC | Age: 41
End: 2021-07-02

## 2021-07-05 ENCOUNTER — NURSE TRIAGE (OUTPATIENT)
Dept: ADMINISTRATIVE | Facility: CLINIC | Age: 41
End: 2021-07-05

## 2021-07-06 DIAGNOSIS — M79.7 FIBROMYALGIA: Primary | ICD-10-CM

## 2021-07-06 RX ORDER — PREGABALIN 75 MG/1
75 CAPSULE ORAL 3 TIMES DAILY
Qty: 90 CAPSULE | Refills: 0 | Status: SHIPPED | OUTPATIENT
Start: 2021-07-06 | End: 2021-08-05

## 2021-07-08 ENCOUNTER — TELEPHONE (OUTPATIENT)
Dept: HEMATOLOGY/ONCOLOGY | Facility: CLINIC | Age: 41
End: 2021-07-08

## 2021-07-14 ENCOUNTER — PATIENT MESSAGE (OUTPATIENT)
Dept: NEUROLOGY | Facility: CLINIC | Age: 41
End: 2021-07-14

## 2021-07-27 ENCOUNTER — OFFICE VISIT (OUTPATIENT)
Dept: OBSTETRICS AND GYNECOLOGY | Facility: CLINIC | Age: 41
End: 2021-07-27
Payer: MEDICARE

## 2021-07-27 VITALS
DIASTOLIC BLOOD PRESSURE: 68 MMHG | HEIGHT: 66 IN | SYSTOLIC BLOOD PRESSURE: 122 MMHG | WEIGHT: 190.25 LBS | BODY MASS INDEX: 30.58 KG/M2

## 2021-07-27 DIAGNOSIS — Z12.31 ENCOUNTER FOR SCREENING MAMMOGRAM FOR BREAST CANCER: ICD-10-CM

## 2021-07-27 DIAGNOSIS — N63.0 LARGE MASS OF BREAST: ICD-10-CM

## 2021-07-27 DIAGNOSIS — Z01.419 WELL WOMAN EXAM WITH ROUTINE GYNECOLOGICAL EXAM: Primary | ICD-10-CM

## 2021-07-27 PROCEDURE — G0101 PR CA SCREEN;PELVIC/BREAST EXAM: ICD-10-PCS | Mod: S$PBB,,, | Performed by: OBSTETRICS & GYNECOLOGY

## 2021-07-27 PROCEDURE — 99214 OFFICE O/P EST MOD 30 MIN: CPT | Mod: PBBFAC,25 | Performed by: OBSTETRICS & GYNECOLOGY

## 2021-07-27 PROCEDURE — G0101 CA SCREEN;PELVIC/BREAST EXAM: HCPCS | Mod: S$PBB,,, | Performed by: OBSTETRICS & GYNECOLOGY

## 2021-07-27 PROCEDURE — 99999 PR PBB SHADOW E&M-EST. PATIENT-LVL IV: ICD-10-PCS | Mod: PBBFAC,,, | Performed by: OBSTETRICS & GYNECOLOGY

## 2021-07-27 PROCEDURE — G0101 CA SCREEN;PELVIC/BREAST EXAM: HCPCS | Mod: PBBFAC

## 2021-07-27 PROCEDURE — 88175 CYTOPATH C/V AUTO FLUID REDO: CPT | Performed by: OBSTETRICS & GYNECOLOGY

## 2021-07-27 PROCEDURE — 99999 PR PBB SHADOW E&M-EST. PATIENT-LVL IV: CPT | Mod: PBBFAC,,, | Performed by: OBSTETRICS & GYNECOLOGY

## 2021-07-27 PROCEDURE — 87624 HPV HI-RISK TYP POOLED RSLT: CPT | Performed by: OBSTETRICS & GYNECOLOGY

## 2021-07-29 ENCOUNTER — PATIENT MESSAGE (OUTPATIENT)
Dept: NEUROLOGY | Facility: CLINIC | Age: 41
End: 2021-07-29

## 2021-08-03 ENCOUNTER — HOSPITAL ENCOUNTER (OUTPATIENT)
Dept: RADIOLOGY | Facility: OTHER | Age: 41
Discharge: HOME | End: 2021-08-03
Attending: OBSTETRICS & GYNECOLOGY
Payer: MEDICARE

## 2021-08-03 DIAGNOSIS — Z12.31 ENCOUNTER FOR SCREENING MAMMOGRAM FOR BREAST CANCER: ICD-10-CM

## 2021-08-03 LAB
CLINICAL INFO: NORMAL
CYTO CVX: NORMAL
CYTOLOGIST CVX/VAG CYTO: NORMAL
CYTOLOGY CMNT CVX/VAG CYTO-IMP: NORMAL
CYTOLOGY PAP THIN PREP EXPLANATION: NORMAL
DATE OF PREVIOUS PAP: NO
DATE PREVIOUS BX: NO
HPV I/H RISK 4 DNA CVX QL NAA+PROBE: NOT DETECTED
LMP START DATE: NORMAL
SPECIMEN SOURCE CVX/VAG CYTO: NORMAL
STAT OF ADQ CVX/VAG CYTO-IMP: NORMAL

## 2021-08-03 PROCEDURE — 77063 MAMMO DIGITAL SCREENING BILAT WITH TOMO: ICD-10-PCS | Mod: 26,,, | Performed by: RADIOLOGY

## 2021-08-03 PROCEDURE — 77067 SCR MAMMO BI INCL CAD: CPT | Mod: TC

## 2021-08-03 PROCEDURE — 77063 BREAST TOMOSYNTHESIS BI: CPT | Mod: 26,,, | Performed by: RADIOLOGY

## 2021-08-03 PROCEDURE — 77067 SCR MAMMO BI INCL CAD: CPT | Mod: 26,,, | Performed by: RADIOLOGY

## 2021-08-03 PROCEDURE — 77067 MAMMO DIGITAL SCREENING BILAT WITH TOMO: ICD-10-PCS | Mod: 26,,, | Performed by: RADIOLOGY

## 2021-08-04 ENCOUNTER — TELEPHONE (OUTPATIENT)
Dept: RHEUMATOLOGY | Facility: CLINIC | Age: 41
End: 2021-08-04

## 2021-08-05 ENCOUNTER — TELEPHONE (OUTPATIENT)
Dept: RHEUMATOLOGY | Facility: CLINIC | Age: 41
End: 2021-08-05

## 2021-08-10 ENCOUNTER — OFFICE VISIT (OUTPATIENT)
Dept: HEMATOLOGY/ONCOLOGY | Facility: CLINIC | Age: 41
End: 2021-08-10
Payer: MEDICARE

## 2021-08-10 ENCOUNTER — TELEPHONE (OUTPATIENT)
Dept: HEMATOLOGY/ONCOLOGY | Facility: CLINIC | Age: 41
End: 2021-08-10

## 2021-08-10 ENCOUNTER — PATIENT MESSAGE (OUTPATIENT)
Dept: HEMATOLOGY/ONCOLOGY | Facility: CLINIC | Age: 41
End: 2021-08-10

## 2021-08-10 DIAGNOSIS — Z80.9 FAMILY HISTORY OF CANCER: ICD-10-CM

## 2021-08-10 DIAGNOSIS — Z71.83 ENCOUNTER FOR NONPROCREATIVE GENETIC COUNSELING: Primary | ICD-10-CM

## 2021-08-10 PROCEDURE — 99215 PR OFFICE/OUTPT VISIT, EST, LEVL V, 40-54 MIN: ICD-10-PCS | Mod: 95,,, | Performed by: NURSE PRACTITIONER

## 2021-08-10 PROCEDURE — 99417 PR PROLONGED SVC, OUTPT, W/WO DIRECT PT CONTACT,  EA ADDTL 15 MIN: ICD-10-PCS | Mod: 95,,, | Performed by: NURSE PRACTITIONER

## 2021-08-10 PROCEDURE — 99417 PROLNG OP E/M EACH 15 MIN: CPT | Mod: 95,,, | Performed by: NURSE PRACTITIONER

## 2021-08-10 PROCEDURE — 99215 OFFICE O/P EST HI 40 MIN: CPT | Mod: 95,,, | Performed by: NURSE PRACTITIONER

## 2021-08-12 ENCOUNTER — TELEPHONE (OUTPATIENT)
Dept: SURGERY | Facility: CLINIC | Age: 41
End: 2021-08-12

## 2021-08-13 ENCOUNTER — PATIENT MESSAGE (OUTPATIENT)
Dept: NEUROLOGY | Facility: CLINIC | Age: 41
End: 2021-08-13

## 2021-08-13 DIAGNOSIS — R40.4 ALTERATION CONSCIOUSNESS: Primary | ICD-10-CM

## 2021-08-22 ENCOUNTER — PATIENT MESSAGE (OUTPATIENT)
Dept: NEUROLOGY | Facility: CLINIC | Age: 41
End: 2021-08-22

## 2021-08-22 ENCOUNTER — PATIENT MESSAGE (OUTPATIENT)
Dept: PAIN MEDICINE | Facility: CLINIC | Age: 41
End: 2021-08-22

## 2021-08-23 ENCOUNTER — TELEPHONE (OUTPATIENT)
Dept: PAIN MEDICINE | Facility: CLINIC | Age: 41
End: 2021-08-23

## 2021-08-23 ENCOUNTER — TELEPHONE (OUTPATIENT)
Dept: NEUROLOGY | Facility: CLINIC | Age: 41
End: 2021-08-23
Payer: MEDICARE

## 2021-08-23 DIAGNOSIS — M54.81 BILATERAL OCCIPITAL NEURALGIA: Primary | ICD-10-CM

## 2021-08-25 ENCOUNTER — TELEPHONE (OUTPATIENT)
Dept: SURGERY | Facility: CLINIC | Age: 41
End: 2021-08-25

## 2021-08-26 ENCOUNTER — PATIENT MESSAGE (OUTPATIENT)
Dept: NEUROLOGY | Facility: CLINIC | Age: 41
End: 2021-08-26

## 2021-09-03 ENCOUNTER — PATIENT MESSAGE (OUTPATIENT)
Dept: NEUROSURGERY | Facility: CLINIC | Age: 41
End: 2021-09-03

## 2021-09-13 ENCOUNTER — LAB VISIT (OUTPATIENT)
Dept: LAB | Facility: HOSPITAL | Age: 41
End: 2021-09-13
Attending: INTERNAL MEDICINE
Payer: MEDICARE

## 2021-09-13 ENCOUNTER — OFFICE VISIT (OUTPATIENT)
Dept: INTERNAL MEDICINE | Facility: CLINIC | Age: 41
End: 2021-09-13
Payer: MEDICARE

## 2021-09-13 ENCOUNTER — NURSE TRIAGE (OUTPATIENT)
Dept: ADMINISTRATIVE | Facility: CLINIC | Age: 41
End: 2021-09-13

## 2021-09-13 ENCOUNTER — PATIENT MESSAGE (OUTPATIENT)
Dept: INTERNAL MEDICINE | Facility: CLINIC | Age: 41
End: 2021-09-13

## 2021-09-13 VITALS
HEART RATE: 76 BPM | WEIGHT: 188.06 LBS | BODY MASS INDEX: 30.22 KG/M2 | DIASTOLIC BLOOD PRESSURE: 68 MMHG | SYSTOLIC BLOOD PRESSURE: 116 MMHG | OXYGEN SATURATION: 97 % | HEIGHT: 66 IN

## 2021-09-13 DIAGNOSIS — K92.1 BLOOD IN STOOL: Primary | ICD-10-CM

## 2021-09-13 DIAGNOSIS — G62.9 SMALL FIBER NEUROPATHY: ICD-10-CM

## 2021-09-13 DIAGNOSIS — Z00.00 HEALTH CARE MAINTENANCE: ICD-10-CM

## 2021-09-13 DIAGNOSIS — M35.00 SJOGREN'S SYNDROME, WITH UNSPECIFIED ORGAN INVOLVEMENT: ICD-10-CM

## 2021-09-13 DIAGNOSIS — G90.A POTS (POSTURAL ORTHOSTATIC TACHYCARDIA SYNDROME): ICD-10-CM

## 2021-09-13 DIAGNOSIS — Z13.6 SCREENING FOR CARDIOVASCULAR CONDITION: ICD-10-CM

## 2021-09-13 DIAGNOSIS — K90.0 CELIAC DISEASE: ICD-10-CM

## 2021-09-13 DIAGNOSIS — G90.1 DYSAUTONOMIA: ICD-10-CM

## 2021-09-13 DIAGNOSIS — M35.00 SJOGREN'S SYNDROME, WITH UNSPECIFIED ORGAN INVOLVEMENT: Primary | ICD-10-CM

## 2021-09-13 LAB
ALBUMIN SERPL BCP-MCNC: 4.5 G/DL (ref 3.5–5.2)
ALP SERPL-CCNC: 73 U/L (ref 55–135)
ALT SERPL W/O P-5'-P-CCNC: 37 U/L (ref 10–44)
ANION GAP SERPL CALC-SCNC: 11 MMOL/L (ref 8–16)
AST SERPL-CCNC: 24 U/L (ref 10–40)
BASOPHILS # BLD AUTO: 0.03 K/UL (ref 0–0.2)
BASOPHILS NFR BLD: 0.4 % (ref 0–1.9)
BILIRUB SERPL-MCNC: 0.6 MG/DL (ref 0.1–1)
BUN SERPL-MCNC: 12 MG/DL (ref 6–20)
CALCIUM SERPL-MCNC: 9.9 MG/DL (ref 8.7–10.5)
CHLORIDE SERPL-SCNC: 105 MMOL/L (ref 95–110)
CHOLEST SERPL-MCNC: 224 MG/DL (ref 120–199)
CHOLEST/HDLC SERPL: 4.5 {RATIO} (ref 2–5)
CO2 SERPL-SCNC: 23 MMOL/L (ref 23–29)
CREAT SERPL-MCNC: 1 MG/DL (ref 0.5–1.4)
DIFFERENTIAL METHOD: NORMAL
EOSINOPHIL # BLD AUTO: 0.1 K/UL (ref 0–0.5)
EOSINOPHIL NFR BLD: 0.9 % (ref 0–8)
ERYTHROCYTE [DISTWIDTH] IN BLOOD BY AUTOMATED COUNT: 13.2 % (ref 11.5–14.5)
EST. GFR  (AFRICAN AMERICAN): >60 ML/MIN/1.73 M^2
EST. GFR  (NON AFRICAN AMERICAN): >60 ML/MIN/1.73 M^2
GLUCOSE SERPL-MCNC: 94 MG/DL (ref 70–110)
HCT VFR BLD AUTO: 44.4 % (ref 37–48.5)
HDLC SERPL-MCNC: 50 MG/DL (ref 40–75)
HDLC SERPL: 22.3 % (ref 20–50)
HGB BLD-MCNC: 15 G/DL (ref 12–16)
IMM GRANULOCYTES # BLD AUTO: 0.01 K/UL (ref 0–0.04)
IMM GRANULOCYTES NFR BLD AUTO: 0.1 % (ref 0–0.5)
LDLC SERPL CALC-MCNC: 141.8 MG/DL (ref 63–159)
LYMPHOCYTES # BLD AUTO: 1.5 K/UL (ref 1–4.8)
LYMPHOCYTES NFR BLD: 22.2 % (ref 18–48)
MCH RBC QN AUTO: 29.5 PG (ref 27–31)
MCHC RBC AUTO-ENTMCNC: 33.8 G/DL (ref 32–36)
MCV RBC AUTO: 87 FL (ref 82–98)
MONOCYTES # BLD AUTO: 0.5 K/UL (ref 0.3–1)
MONOCYTES NFR BLD: 8.1 % (ref 4–15)
NEUTROPHILS # BLD AUTO: 4.6 K/UL (ref 1.8–7.7)
NEUTROPHILS NFR BLD: 68.3 % (ref 38–73)
NONHDLC SERPL-MCNC: 174 MG/DL
NRBC BLD-RTO: 0 /100 WBC
PLATELET # BLD AUTO: 299 K/UL (ref 150–450)
PMV BLD AUTO: 11.1 FL (ref 9.2–12.9)
POTASSIUM SERPL-SCNC: 4 MMOL/L (ref 3.5–5.1)
PROT SERPL-MCNC: 7.8 G/DL (ref 6–8.4)
RBC # BLD AUTO: 5.09 M/UL (ref 4–5.4)
SODIUM SERPL-SCNC: 139 MMOL/L (ref 136–145)
TRIGL SERPL-MCNC: 161 MG/DL (ref 30–150)
TSH SERPL DL<=0.005 MIU/L-ACNC: 2.77 UIU/ML (ref 0.4–4)
WBC # BLD AUTO: 6.68 K/UL (ref 3.9–12.7)

## 2021-09-13 PROCEDURE — 99214 PR OFFICE/OUTPT VISIT, EST, LEVL IV, 30-39 MIN: ICD-10-PCS | Mod: S$PBB,,, | Performed by: INTERNAL MEDICINE

## 2021-09-13 PROCEDURE — 85025 COMPLETE CBC W/AUTO DIFF WBC: CPT | Performed by: INTERNAL MEDICINE

## 2021-09-13 PROCEDURE — 82306 VITAMIN D 25 HYDROXY: CPT | Performed by: INTERNAL MEDICINE

## 2021-09-13 PROCEDURE — 84443 ASSAY THYROID STIM HORMONE: CPT | Performed by: INTERNAL MEDICINE

## 2021-09-13 PROCEDURE — 87389 HIV-1 AG W/HIV-1&-2 AB AG IA: CPT | Performed by: INTERNAL MEDICINE

## 2021-09-13 PROCEDURE — 80053 COMPREHEN METABOLIC PANEL: CPT | Performed by: INTERNAL MEDICINE

## 2021-09-13 PROCEDURE — 99214 OFFICE O/P EST MOD 30 MIN: CPT | Mod: S$PBB,,, | Performed by: INTERNAL MEDICINE

## 2021-09-13 PROCEDURE — 99214 OFFICE O/P EST MOD 30 MIN: CPT | Mod: PBBFAC | Performed by: INTERNAL MEDICINE

## 2021-09-13 PROCEDURE — 99999 PR PBB SHADOW E&M-EST. PATIENT-LVL IV: ICD-10-PCS | Mod: PBBFAC,,, | Performed by: INTERNAL MEDICINE

## 2021-09-13 PROCEDURE — 99999 PR PBB SHADOW E&M-EST. PATIENT-LVL IV: CPT | Mod: PBBFAC,,, | Performed by: INTERNAL MEDICINE

## 2021-09-13 PROCEDURE — 80061 LIPID PANEL: CPT | Performed by: INTERNAL MEDICINE

## 2021-09-13 PROCEDURE — 36415 COLL VENOUS BLD VENIPUNCTURE: CPT | Performed by: INTERNAL MEDICINE

## 2021-09-13 RX ORDER — LABETALOL 200 MG/1
200 TABLET, FILM COATED ORAL 2 TIMES DAILY
COMMUNITY
Start: 2021-09-05

## 2021-09-14 ENCOUNTER — PATIENT MESSAGE (OUTPATIENT)
Dept: INTERNAL MEDICINE | Facility: CLINIC | Age: 41
End: 2021-09-14

## 2021-09-14 LAB
25(OH)D3+25(OH)D2 SERPL-MCNC: 43 NG/ML (ref 30–96)
HIV 1+2 AB+HIV1 P24 AG SERPL QL IA: NEGATIVE

## 2021-09-20 ENCOUNTER — TELEPHONE (OUTPATIENT)
Dept: SURGERY | Facility: CLINIC | Age: 41
End: 2021-09-20

## 2021-09-20 ENCOUNTER — TELEPHONE (OUTPATIENT)
Dept: RHEUMATOLOGY | Facility: CLINIC | Age: 41
End: 2021-09-20

## 2021-09-20 ENCOUNTER — PATIENT MESSAGE (OUTPATIENT)
Dept: SURGERY | Facility: CLINIC | Age: 41
End: 2021-09-20

## 2021-09-29 ENCOUNTER — PATIENT OUTREACH (OUTPATIENT)
Dept: ADMINISTRATIVE | Facility: OTHER | Age: 41
End: 2021-09-29

## 2021-09-30 ENCOUNTER — LAB VISIT (OUTPATIENT)
Dept: LAB | Facility: HOSPITAL | Age: 41
End: 2021-09-30
Attending: INTERNAL MEDICINE
Payer: MEDICARE

## 2021-09-30 ENCOUNTER — OFFICE VISIT (OUTPATIENT)
Dept: RHEUMATOLOGY | Facility: CLINIC | Age: 41
End: 2021-09-30
Payer: MEDICARE

## 2021-09-30 ENCOUNTER — PATIENT MESSAGE (OUTPATIENT)
Dept: NEUROLOGY | Facility: CLINIC | Age: 41
End: 2021-09-30

## 2021-09-30 ENCOUNTER — PATIENT MESSAGE (OUTPATIENT)
Dept: RHEUMATOLOGY | Facility: CLINIC | Age: 41
End: 2021-09-30

## 2021-09-30 VITALS
DIASTOLIC BLOOD PRESSURE: 95 MMHG | HEART RATE: 80 BPM | WEIGHT: 193.81 LBS | SYSTOLIC BLOOD PRESSURE: 130 MMHG | BODY MASS INDEX: 31.28 KG/M2

## 2021-09-30 DIAGNOSIS — M47.12 CERVICAL SPONDYLOSIS WITH MYELOPATHY: ICD-10-CM

## 2021-09-30 DIAGNOSIS — R68.2 DRY MOUTH: ICD-10-CM

## 2021-09-30 DIAGNOSIS — M35.00 SJOGREN'S SYNDROME, WITH UNSPECIFIED ORGAN INVOLVEMENT: ICD-10-CM

## 2021-09-30 DIAGNOSIS — M47.26 OSTEOARTHRITIS OF SPINE WITH RADICULOPATHY, LUMBAR REGION: ICD-10-CM

## 2021-09-30 DIAGNOSIS — G62.9 SMALL FIBER NEUROPATHY: ICD-10-CM

## 2021-09-30 DIAGNOSIS — G89.4 CHRONIC PAIN SYNDROME: ICD-10-CM

## 2021-09-30 DIAGNOSIS — H04.123 DRY EYES: ICD-10-CM

## 2021-09-30 DIAGNOSIS — M79.7 FIBROMYALGIA: ICD-10-CM

## 2021-09-30 DIAGNOSIS — G90.A POTS (POSTURAL ORTHOSTATIC TACHYCARDIA SYNDROME): Chronic | ICD-10-CM

## 2021-09-30 DIAGNOSIS — H04.123 DRY EYES: Primary | ICD-10-CM

## 2021-09-30 DIAGNOSIS — G43.109 MIGRAINE WITH AURA AND WITHOUT STATUS MIGRAINOSUS, NOT INTRACTABLE: Chronic | ICD-10-CM

## 2021-09-30 DIAGNOSIS — G90.1 DYSAUTONOMIA: ICD-10-CM

## 2021-09-30 LAB
CRP SERPL-MCNC: 0.5 MG/L (ref 0–8.2)
DAT IGG-SP REAG RBC-IMP: NORMAL
ERYTHROCYTE [SEDIMENTATION RATE] IN BLOOD BY WESTERGREN METHOD: 5 MM/HR (ref 0–36)
IGA SERPL-MCNC: 168 MG/DL (ref 40–350)
IGG SERPL-MCNC: 964 MG/DL (ref 650–1600)
IGM SERPL-MCNC: 99 MG/DL (ref 50–300)

## 2021-09-30 PROCEDURE — 82595 ASSAY OF CRYOGLOBULIN: CPT | Performed by: INTERNAL MEDICINE

## 2021-09-30 PROCEDURE — 86704 HEP B CORE ANTIBODY TOTAL: CPT | Performed by: INTERNAL MEDICINE

## 2021-09-30 PROCEDURE — 86592 SYPHILIS TEST NON-TREP QUAL: CPT | Performed by: INTERNAL MEDICINE

## 2021-09-30 PROCEDURE — 87340 HEPATITIS B SURFACE AG IA: CPT | Performed by: INTERNAL MEDICINE

## 2021-09-30 PROCEDURE — 85670 THROMBIN TIME PLASMA: CPT | Performed by: INTERNAL MEDICINE

## 2021-09-30 PROCEDURE — 85652 RBC SED RATE AUTOMATED: CPT | Performed by: INTERNAL MEDICINE

## 2021-09-30 PROCEDURE — 85613 RUSSELL VIPER VENOM DILUTED: CPT | Performed by: INTERNAL MEDICINE

## 2021-09-30 PROCEDURE — 36415 COLL VENOUS BLD VENIPUNCTURE: CPT | Performed by: INTERNAL MEDICINE

## 2021-09-30 PROCEDURE — 86147 CARDIOLIPIN ANTIBODY EA IG: CPT | Performed by: INTERNAL MEDICINE

## 2021-09-30 PROCEDURE — 86790 VIRUS ANTIBODY NOS: CPT | Performed by: INTERNAL MEDICINE

## 2021-09-30 PROCEDURE — 86706 HEP B SURFACE ANTIBODY: CPT | Performed by: INTERNAL MEDICINE

## 2021-09-30 PROCEDURE — 86880 COOMBS TEST DIRECT: CPT | Performed by: INTERNAL MEDICINE

## 2021-09-30 PROCEDURE — 99999 PR PBB SHADOW E&M-EST. PATIENT-LVL IV: CPT | Mod: PBBFAC,,, | Performed by: INTERNAL MEDICINE

## 2021-09-30 PROCEDURE — 99205 PR OFFICE/OUTPT VISIT, NEW, LEVL V, 60-74 MIN: ICD-10-PCS | Mod: S$PBB,,, | Performed by: INTERNAL MEDICINE

## 2021-09-30 PROCEDURE — 99214 OFFICE O/P EST MOD 30 MIN: CPT | Mod: PBBFAC | Performed by: INTERNAL MEDICINE

## 2021-09-30 PROCEDURE — 99999 PR PBB SHADOW E&M-EST. PATIENT-LVL IV: ICD-10-PCS | Mod: PBBFAC,,, | Performed by: INTERNAL MEDICINE

## 2021-09-30 PROCEDURE — 86140 C-REACTIVE PROTEIN: CPT | Performed by: INTERNAL MEDICINE

## 2021-09-30 PROCEDURE — 86146 BETA-2 GLYCOPROTEIN ANTIBODY: CPT | Performed by: INTERNAL MEDICINE

## 2021-09-30 PROCEDURE — 87389 HIV-1 AG W/HIV-1&-2 AB AG IA: CPT | Performed by: INTERNAL MEDICINE

## 2021-09-30 PROCEDURE — 99205 OFFICE O/P NEW HI 60 MIN: CPT | Mod: S$PBB,,, | Performed by: INTERNAL MEDICINE

## 2021-09-30 PROCEDURE — 86787 VARICELLA-ZOSTER ANTIBODY: CPT | Performed by: INTERNAL MEDICINE

## 2021-09-30 PROCEDURE — 86803 HEPATITIS C AB TEST: CPT | Performed by: INTERNAL MEDICINE

## 2021-09-30 PROCEDURE — 85732 THROMBOPLASTIN TIME PARTIAL: CPT | Performed by: INTERNAL MEDICINE

## 2021-09-30 PROCEDURE — 82784 ASSAY IGA/IGD/IGG/IGM EACH: CPT | Performed by: INTERNAL MEDICINE

## 2021-10-01 ENCOUNTER — TELEPHONE (OUTPATIENT)
Dept: ADMINISTRATIVE | Facility: OTHER | Age: 41
End: 2021-10-01

## 2021-10-01 LAB
HBV CORE AB SERPL QL IA: NEGATIVE
HBV SURFACE AB SER-ACNC: POSITIVE M[IU]/ML
HBV SURFACE AG SERPL QL IA: NEGATIVE
HCV AB SERPL QL IA: NEGATIVE
HEPATITIS A ANTIBODY, IGG: NEGATIVE
HIV 1+2 AB+HIV1 P24 AG SERPL QL IA: NEGATIVE
RPR SER QL: NORMAL

## 2021-10-04 ENCOUNTER — TELEPHONE (OUTPATIENT)
Dept: ADMINISTRATIVE | Facility: OTHER | Age: 41
End: 2021-10-04

## 2021-10-04 LAB
APTT IMM NP PPP: 44 SEC (ref 32–48)
APTT P HEP NEUT PPP: ABNORMAL SEC (ref 32–48)
CARDIOLIPIN IGG SER IA-ACNC: <9.4 GPL (ref 0–14.99)
CARDIOLIPIN IGM SER IA-ACNC: 21.1 MPL (ref 0–12.49)
CONFIRM APTT STACLOT: ABNORMAL
DRVVT SCREEN TO CONFIRM RATIO: ABNORMAL RATIO
LA 3 SCREEN W REFLEX-IMP: ABNORMAL
LA NT DPL PPP QL: ABNORMAL
MIXING DRVVT: ABNORMAL SEC (ref 33–44)
PROTHROMBIN TIME: 12.9 SEC (ref 12–15.5)
REPTILASE TIME: ABNORMAL SEC
SCREEN APTT: 55 SEC (ref 32–48)
SCREEN DRVVT: 38 SEC (ref 33–44)
STRONGYLOIDES ANTIBODY IGG: NEGATIVE
THROMBIN TIME: 15.4 SEC (ref 14.7–19.5)
VARICELLA INTERPRETATION: POSITIVE
VARICELLA ZOSTER IGG: 2.96 ISR (ref 0–0.9)

## 2021-10-05 LAB
B2 GLYCOPROT1 IGA SER QL: <9 SAU
B2 GLYCOPROT1 IGG SER QL: <9 SGU
B2 GLYCOPROT1 IGM SER QL: <9 SMU

## 2021-10-07 DIAGNOSIS — G62.9 SMALL FIBER NEUROPATHY: Primary | ICD-10-CM

## 2021-10-11 LAB — CRYOGLOB SER QL: NORMAL

## 2021-10-13 ENCOUNTER — PATIENT MESSAGE (OUTPATIENT)
Dept: RHEUMATOLOGY | Facility: CLINIC | Age: 41
End: 2021-10-13
Payer: MEDICARE

## 2021-10-13 ENCOUNTER — PATIENT MESSAGE (OUTPATIENT)
Dept: ADMINISTRATIVE | Facility: OTHER | Age: 41
End: 2021-10-13
Payer: MEDICARE

## 2021-10-19 ENCOUNTER — PATIENT MESSAGE (OUTPATIENT)
Dept: RHEUMATOLOGY | Facility: CLINIC | Age: 41
End: 2021-10-19
Payer: MEDICARE

## 2021-10-19 ENCOUNTER — PATIENT MESSAGE (OUTPATIENT)
Dept: INTERNAL MEDICINE | Facility: CLINIC | Age: 41
End: 2021-10-19

## 2021-10-20 ENCOUNTER — LAB VISIT (OUTPATIENT)
Dept: LAB | Facility: HOSPITAL | Age: 41
End: 2021-10-20
Attending: INTERNAL MEDICINE
Payer: MEDICARE

## 2021-10-20 ENCOUNTER — OFFICE VISIT (OUTPATIENT)
Dept: INTERNAL MEDICINE | Facility: CLINIC | Age: 41
End: 2021-10-20
Payer: MEDICARE

## 2021-10-20 VITALS
DIASTOLIC BLOOD PRESSURE: 86 MMHG | HEIGHT: 66 IN | OXYGEN SATURATION: 97 % | WEIGHT: 189.13 LBS | TEMPERATURE: 98 F | SYSTOLIC BLOOD PRESSURE: 134 MMHG | BODY MASS INDEX: 30.4 KG/M2 | HEART RATE: 76 BPM

## 2021-10-20 DIAGNOSIS — R22.1 MASS OF RIGHT SIDE OF NECK: ICD-10-CM

## 2021-10-20 DIAGNOSIS — R22.1 MASS OF RIGHT SIDE OF NECK: Primary | ICD-10-CM

## 2021-10-20 DIAGNOSIS — G62.9 SMALL FIBER NEUROPATHY: ICD-10-CM

## 2021-10-20 LAB
BASOPHILS # BLD AUTO: 0.04 K/UL (ref 0–0.2)
BASOPHILS NFR BLD: 0.5 % (ref 0–1.9)
DIFFERENTIAL METHOD: NORMAL
EOSINOPHIL # BLD AUTO: 0.1 K/UL (ref 0–0.5)
EOSINOPHIL NFR BLD: 0.7 % (ref 0–8)
ERYTHROCYTE [DISTWIDTH] IN BLOOD BY AUTOMATED COUNT: 13 % (ref 11.5–14.5)
HCT VFR BLD AUTO: 40.6 % (ref 37–48.5)
HGB BLD-MCNC: 13.1 G/DL (ref 12–16)
IMM GRANULOCYTES # BLD AUTO: 0.02 K/UL (ref 0–0.04)
IMM GRANULOCYTES NFR BLD AUTO: 0.2 % (ref 0–0.5)
LYMPHOCYTES # BLD AUTO: 1.5 K/UL (ref 1–4.8)
LYMPHOCYTES NFR BLD: 18 % (ref 18–48)
MCH RBC QN AUTO: 28.7 PG (ref 27–31)
MCHC RBC AUTO-ENTMCNC: 32.3 G/DL (ref 32–36)
MCV RBC AUTO: 89 FL (ref 82–98)
MONOCYTES # BLD AUTO: 0.7 K/UL (ref 0.3–1)
MONOCYTES NFR BLD: 8.6 % (ref 4–15)
NEUTROPHILS # BLD AUTO: 6.1 K/UL (ref 1.8–7.7)
NEUTROPHILS NFR BLD: 72 % (ref 38–73)
NRBC BLD-RTO: 0 /100 WBC
PLATELET # BLD AUTO: 314 K/UL (ref 150–450)
PMV BLD AUTO: 10.9 FL (ref 9.2–12.9)
RBC # BLD AUTO: 4.56 M/UL (ref 4–5.4)
WBC # BLD AUTO: 8.41 K/UL (ref 3.9–12.7)

## 2021-10-20 PROCEDURE — 99999 PR PBB SHADOW E&M-EST. PATIENT-LVL IV: ICD-10-PCS | Mod: PBBFAC,,, | Performed by: INTERNAL MEDICINE

## 2021-10-20 PROCEDURE — 85025 COMPLETE CBC W/AUTO DIFF WBC: CPT | Performed by: INTERNAL MEDICINE

## 2021-10-20 PROCEDURE — 83520 IMMUNOASSAY QUANT NOS NONAB: CPT | Performed by: PSYCHIATRY & NEUROLOGY

## 2021-10-20 PROCEDURE — 36415 COLL VENOUS BLD VENIPUNCTURE: CPT | Performed by: PSYCHIATRY & NEUROLOGY

## 2021-10-20 PROCEDURE — 99213 OFFICE O/P EST LOW 20 MIN: CPT | Mod: S$PBB,,, | Performed by: INTERNAL MEDICINE

## 2021-10-20 PROCEDURE — 99214 OFFICE O/P EST MOD 30 MIN: CPT | Mod: PBBFAC | Performed by: INTERNAL MEDICINE

## 2021-10-20 PROCEDURE — 99999 PR PBB SHADOW E&M-EST. PATIENT-LVL IV: CPT | Mod: PBBFAC,,, | Performed by: INTERNAL MEDICINE

## 2021-10-20 PROCEDURE — 99213 PR OFFICE/OUTPT VISIT, EST, LEVL III, 20-29 MIN: ICD-10-PCS | Mod: S$PBB,,, | Performed by: INTERNAL MEDICINE

## 2021-10-21 ENCOUNTER — HOSPITAL ENCOUNTER (OUTPATIENT)
Dept: RADIOLOGY | Facility: HOSPITAL | Age: 41
Discharge: HOME | End: 2021-10-21
Attending: INTERNAL MEDICINE
Payer: MEDICARE

## 2021-10-21 DIAGNOSIS — R22.1 MASS OF RIGHT SIDE OF NECK: ICD-10-CM

## 2021-10-21 LAB
KAPPA LC SER QL IA: 1.27 MG/DL (ref 0.33–1.94)
KAPPA LC/LAMBDA SER IA: 1.19 (ref 0.26–1.65)
LAMBDA LC SER QL IA: 1.07 MG/DL (ref 0.57–2.63)

## 2021-10-21 PROCEDURE — 76536 US EXAM OF HEAD AND NECK: CPT | Mod: TC

## 2021-10-21 PROCEDURE — 76536 US EXAM OF HEAD AND NECK: CPT | Mod: 26,,, | Performed by: RADIOLOGY

## 2021-10-21 PROCEDURE — 76536 US SOFT TISSUE HEAD NECK THYROID: ICD-10-PCS | Mod: 26,,, | Performed by: RADIOLOGY

## 2021-10-22 ENCOUNTER — PATIENT MESSAGE (OUTPATIENT)
Dept: INTERNAL MEDICINE | Facility: CLINIC | Age: 41
End: 2021-10-22
Payer: MEDICARE

## 2021-10-26 ENCOUNTER — OFFICE VISIT (OUTPATIENT)
Dept: OTOLARYNGOLOGY | Facility: CLINIC | Age: 41
End: 2021-10-26
Payer: MEDICARE

## 2021-10-26 VITALS
HEART RATE: 85 BPM | WEIGHT: 188.69 LBS | BODY MASS INDEX: 30.46 KG/M2 | SYSTOLIC BLOOD PRESSURE: 117 MMHG | DIASTOLIC BLOOD PRESSURE: 85 MMHG

## 2021-10-26 DIAGNOSIS — K21.9 LARYNGOPHARYNGEAL REFLUX (LPR): ICD-10-CM

## 2021-10-26 DIAGNOSIS — M54.2 NECK PAIN: Primary | ICD-10-CM

## 2021-10-26 PROCEDURE — 99204 OFFICE O/P NEW MOD 45 MIN: CPT | Mod: 25,S$PBB,, | Performed by: OTOLARYNGOLOGY

## 2021-10-26 PROCEDURE — 31575 DIAGNOSTIC LARYNGOSCOPY: CPT | Mod: PBBFAC | Performed by: OTOLARYNGOLOGY

## 2021-10-26 PROCEDURE — 99214 OFFICE O/P EST MOD 30 MIN: CPT | Mod: PBBFAC | Performed by: OTOLARYNGOLOGY

## 2021-10-26 PROCEDURE — 31575 PR LARYNGOSCOPY, FLEXIBLE; DIAGNOSTIC: ICD-10-PCS | Mod: S$PBB,,, | Performed by: OTOLARYNGOLOGY

## 2021-10-26 PROCEDURE — 99999 PR PBB SHADOW E&M-EST. PATIENT-LVL IV: ICD-10-PCS | Mod: PBBFAC,,, | Performed by: OTOLARYNGOLOGY

## 2021-10-26 PROCEDURE — 99999 PR PBB SHADOW E&M-EST. PATIENT-LVL IV: CPT | Mod: PBBFAC,,, | Performed by: OTOLARYNGOLOGY

## 2021-10-26 PROCEDURE — 99204 PR OFFICE/OUTPT VISIT, NEW, LEVL IV, 45-59 MIN: ICD-10-PCS | Mod: 25,S$PBB,, | Performed by: OTOLARYNGOLOGY

## 2021-10-26 PROCEDURE — 31575 DIAGNOSTIC LARYNGOSCOPY: CPT | Mod: S$PBB,,, | Performed by: OTOLARYNGOLOGY

## 2021-10-28 ENCOUNTER — PATIENT MESSAGE (OUTPATIENT)
Dept: INTERNAL MEDICINE | Facility: CLINIC | Age: 41
End: 2021-10-28
Payer: MEDICARE

## 2021-10-28 RX ORDER — FLUCONAZOLE 150 MG/1
TABLET ORAL
Qty: 2 TABLET | Refills: 0 | Status: SHIPPED | OUTPATIENT
Start: 2021-10-28

## 2021-11-30 NOTE — ED NOTES
"Pt reporting RUQ ABD and bilat. Lower ABD pain, onset x last PM, reporting pain is intermittent accompanied with nausea, vomiting, and loose stool. + reports PMH of celiac disease, states, "I have a gastro in [place] who thinks I may have chrons disease." Pt AAOx4 and appropriate at this time. Respirations even and unlabored. No acute distress noted.    "
Appearance: Pt awake, alert & oriented to person, place & time. Pt in no acute distress at present time. Pt is clean and well groomed with clothes appropriately fastened.   Skin: Skin warm, dry & intact. Color consistent with ethnicity. Mucous membranes moist. No breakdown or brusing noted.   Musculoskeletal: Patient moving all extremities well, no obvious swelling or deformities noted.   Respiratory: Respirations spontaneous, even, and non-labored. Visible chest rise noted. Airway is open and patent. No accessory muscle use noted.   Neurologic: Sensation is intact. Speech is clear and appropriate. Eyes open spontaneously, behavior appropriate to situation, follows commands,  purposeful motor response noted.   Cardiac:  No Bilateral lower extremity edema. Cap refill is <3 seconds. Pt denies active chest pains, SOB.   Abdomen: Pt reporting pain to RUQ ABD, and RLQ And LLQ ABD with reports of tenderness with palpation, no ABD guarding present. + reports of nausea, vomiting, and diarrhea, no active vomiting from pt at this time. + reports of small amount of bright red blood in stool.   : Pt reports no dysuria, vaginal discharge, or hematuria.     
Not applicable

## 2021-12-02 ENCOUNTER — PROCEDURE VISIT (OUTPATIENT)
Dept: PAIN MEDICINE | Facility: CLINIC | Age: 41
End: 2021-12-02
Payer: MEDICARE

## 2021-12-02 VITALS
OXYGEN SATURATION: 100 % | TEMPERATURE: 98 F | DIASTOLIC BLOOD PRESSURE: 100 MMHG | HEIGHT: 66 IN | WEIGHT: 187.38 LBS | HEART RATE: 73 BPM | BODY MASS INDEX: 30.11 KG/M2 | RESPIRATION RATE: 18 BRPM | SYSTOLIC BLOOD PRESSURE: 142 MMHG

## 2021-12-02 DIAGNOSIS — M47.812 CERVICAL SPONDYLOSIS: ICD-10-CM

## 2021-12-02 DIAGNOSIS — G44.86 CERVICOGENIC HEADACHE: Primary | ICD-10-CM

## 2021-12-02 PROCEDURE — 64450 NJX AA&/STRD OTHER PN/BRANCH: CPT | Mod: 50,S$PBB,, | Performed by: ANESTHESIOLOGY

## 2021-12-02 PROCEDURE — 64450 NJX AA&/STRD OTHER PN/BRANCH: CPT | Mod: 50,PBBFAC | Performed by: ANESTHESIOLOGY

## 2021-12-02 PROCEDURE — 99499 UNLISTED E&M SERVICE: CPT | Mod: S$PBB,,, | Performed by: ANESTHESIOLOGY

## 2021-12-02 PROCEDURE — 64450 PR NERVE BLOCK INJ, ANES/STEROID, OTHER PERIPHERAL: ICD-10-PCS | Mod: 50,S$PBB,, | Performed by: ANESTHESIOLOGY

## 2021-12-02 PROCEDURE — 99499 NO LOS: ICD-10-PCS | Mod: S$PBB,,, | Performed by: ANESTHESIOLOGY

## 2021-12-02 RX ORDER — PILOCARPINE HYDROCHLORIDE 5 MG/1
5 TABLET, FILM COATED ORAL 3 TIMES DAILY
COMMUNITY
Start: 2021-11-12

## 2021-12-08 ENCOUNTER — PATIENT OUTREACH (OUTPATIENT)
Dept: ADMINISTRATIVE | Facility: OTHER | Age: 41
End: 2021-12-08
Payer: MEDICARE

## 2021-12-09 ENCOUNTER — OFFICE VISIT (OUTPATIENT)
Dept: OPHTHALMOLOGY | Facility: CLINIC | Age: 41
End: 2021-12-09
Payer: MEDICARE

## 2021-12-09 DIAGNOSIS — H04.123 DRY EYE SYNDROME, BILATERAL: Primary | ICD-10-CM

## 2021-12-09 DIAGNOSIS — H52.13 MYOPIA, BILATERAL: ICD-10-CM

## 2021-12-09 DIAGNOSIS — M35.01 SJOGREN'S SYNDROME WITH KERATOCONJUNCTIVITIS SICCA: ICD-10-CM

## 2021-12-09 PROCEDURE — 99213 OFFICE O/P EST LOW 20 MIN: CPT | Mod: PBBFAC,PO | Performed by: OPHTHALMOLOGY

## 2021-12-09 PROCEDURE — 99999 PR PBB SHADOW E&M-EST. PATIENT-LVL III: ICD-10-PCS | Mod: PBBFAC,,, | Performed by: OPHTHALMOLOGY

## 2021-12-09 PROCEDURE — 92004 COMPRE OPH EXAM NEW PT 1/>: CPT | Mod: S$PBB,,, | Performed by: OPHTHALMOLOGY

## 2021-12-09 PROCEDURE — 99999 PR PBB SHADOW E&M-EST. PATIENT-LVL III: CPT | Mod: PBBFAC,,, | Performed by: OPHTHALMOLOGY

## 2021-12-09 PROCEDURE — 92004 PR EYE EXAM, NEW PATIENT,COMPREHESV: ICD-10-PCS | Mod: S$PBB,,, | Performed by: OPHTHALMOLOGY

## 2021-12-09 RX ORDER — MYCOPHENOLATE MOFETIL 250 MG/1
250 CAPSULE ORAL DAILY
COMMUNITY
Start: 2021-11-11

## 2021-12-15 ENCOUNTER — PATIENT MESSAGE (OUTPATIENT)
Dept: PAIN MEDICINE | Facility: CLINIC | Age: 41
End: 2021-12-15
Payer: MEDICARE

## 2021-12-16 ENCOUNTER — OFFICE VISIT (OUTPATIENT)
Dept: PAIN MEDICINE | Facility: CLINIC | Age: 41
End: 2021-12-16
Payer: MEDICARE

## 2021-12-16 DIAGNOSIS — M50.30 DDD (DEGENERATIVE DISC DISEASE), CERVICAL: ICD-10-CM

## 2021-12-16 DIAGNOSIS — Z98.1 S/P CERVICAL SPINAL FUSION: ICD-10-CM

## 2021-12-16 DIAGNOSIS — M47.812 CERVICAL SPONDYLOSIS: ICD-10-CM

## 2021-12-16 DIAGNOSIS — G89.4 CHRONIC PAIN DISORDER: Primary | ICD-10-CM

## 2021-12-16 DIAGNOSIS — M54.81 BILATERAL OCCIPITAL NEURALGIA: ICD-10-CM

## 2021-12-16 DIAGNOSIS — G44.86 CERVICOGENIC HEADACHE: ICD-10-CM

## 2021-12-16 DIAGNOSIS — G62.9 SMALL FIBER NEUROPATHY: ICD-10-CM

## 2021-12-16 PROCEDURE — 99213 OFFICE O/P EST LOW 20 MIN: CPT | Mod: 95,,, | Performed by: NURSE PRACTITIONER

## 2021-12-16 PROCEDURE — 99213 PR OFFICE/OUTPT VISIT, EST, LEVL III, 20-29 MIN: ICD-10-PCS | Mod: 95,,, | Performed by: NURSE PRACTITIONER

## 2022-01-05 ENCOUNTER — PATIENT MESSAGE (OUTPATIENT)
Dept: NEUROLOGY | Facility: CLINIC | Age: 42
End: 2022-01-05
Payer: MEDICARE

## 2022-01-06 DIAGNOSIS — G62.9 SMALL FIBER NEUROPATHY: ICD-10-CM

## 2022-02-23 DIAGNOSIS — D84.9 IMMUNOSUPPRESSED STATUS: ICD-10-CM

## 2022-03-10 ENCOUNTER — PATIENT MESSAGE (OUTPATIENT)
Dept: RHEUMATOLOGY | Facility: CLINIC | Age: 42
End: 2022-03-10
Payer: MEDICARE

## 2022-03-16 ENCOUNTER — PES CALL (OUTPATIENT)
Dept: ADMINISTRATIVE | Facility: CLINIC | Age: 42
End: 2022-03-16
Payer: MEDICARE

## 2022-04-20 ENCOUNTER — PATIENT MESSAGE (OUTPATIENT)
Dept: NEUROLOGY | Facility: CLINIC | Age: 42
End: 2022-04-20
Payer: MEDICARE

## 2022-05-26 ENCOUNTER — PATIENT MESSAGE (OUTPATIENT)
Dept: NEUROLOGY | Facility: CLINIC | Age: 42
End: 2022-05-26
Payer: MEDICARE

## 2022-05-26 ENCOUNTER — PATIENT MESSAGE (OUTPATIENT)
Dept: GASTROENTEROLOGY | Facility: CLINIC | Age: 42
End: 2022-05-26
Payer: MEDICARE

## 2022-07-05 ENCOUNTER — PATIENT MESSAGE (OUTPATIENT)
Dept: INTERNAL MEDICINE | Facility: CLINIC | Age: 42
End: 2022-07-05
Payer: MEDICARE

## 2022-09-26 ENCOUNTER — NURSE TRIAGE (OUTPATIENT)
Dept: ADMINISTRATIVE | Facility: CLINIC | Age: 42
End: 2022-09-26
Payer: MEDICARE

## 2022-09-27 ENCOUNTER — OFFICE VISIT (OUTPATIENT)
Dept: FAMILY MEDICINE | Facility: CLINIC | Age: 42
End: 2022-09-27
Payer: MEDICARE

## 2022-09-27 VITALS
HEART RATE: 70 BPM | WEIGHT: 187.56 LBS | DIASTOLIC BLOOD PRESSURE: 72 MMHG | BODY MASS INDEX: 30.14 KG/M2 | OXYGEN SATURATION: 97 % | TEMPERATURE: 99 F | HEIGHT: 66 IN | SYSTOLIC BLOOD PRESSURE: 110 MMHG

## 2022-09-27 DIAGNOSIS — M35.01 SJOGREN'S SYNDROME WITH KERATOCONJUNCTIVITIS SICCA: ICD-10-CM

## 2022-09-27 DIAGNOSIS — G90.1 DYSAUTONOMIA: ICD-10-CM

## 2022-09-27 DIAGNOSIS — Z12.31 ENCOUNTER FOR SCREENING MAMMOGRAM FOR MALIGNANT NEOPLASM OF BREAST: ICD-10-CM

## 2022-09-27 DIAGNOSIS — S90.861A INSECT BITE OF RIGHT FOOT, INITIAL ENCOUNTER: Primary | ICD-10-CM
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
## 2022-09-27 PROCEDURE — 99999 PR PBB SHADOW E&M-EST. PATIENT-LVL IV: CPT | Mod: PBBFAC,,, | Performed by: INTERNAL MEDICINE

## 2022-09-27 PROCEDURE — 99999 PR PBB SHADOW E&M-EST. PATIENT-LVL IV: ICD-10-PCS | Mod: PBBFAC,,, | Performed by: INTERNAL MEDICINE

## 2022-09-27 PROCEDURE — 99213 OFFICE O/P EST LOW 20 MIN: CPT | Mod: S$PBB,,, | Performed by: INTERNAL MEDICINE

## 2022-09-27 PROCEDURE — 99214 OFFICE O/P EST MOD 30 MIN: CPT | Mod: PBBFAC,PO | Performed by: INTERNAL MEDICINE

## 2022-09-27 PROCEDURE — 99213 PR OFFICE/OUTPT VISIT, EST, LEVL III, 20-29 MIN: ICD-10-PCS | Mod: S$PBB,,, | Performed by: INTERNAL MEDICINE

## 2022-09-27 RX ORDER — TRIAMCINOLONE ACETONIDE 1 MG/G
CREAM TOPICAL 2 TIMES DAILY
Qty: 30 G | Refills: 0 | Status: SHIPPED | OUTPATIENT
Start: 2022-09-27 | End: 2022-09-27 | Stop reason: SDUPTHER

## 2022-09-27 RX ORDER — TRIAMCINOLONE ACETONIDE 1 MG/G
CREAM TOPICAL 2 TIMES DAILY
Qty: 30 G | Refills: 0 | Status: SHIPPED | OUTPATIENT
Start: 2022-09-27 | End: 2022-10-11

## 2022-09-27 RX ORDER — AZATHIOPRINE 50 MG/1
1 TABLET ORAL 2 TIMES DAILY
COMMUNITY
Start: 2022-08-19

## 2022-09-27 NOTE — TELEPHONE ENCOUNTER
Calling about an allergic reaction to a bite from 48 hours ago or 2days ago. Felt bite but not sure what type of bug. Bite I s on foot between toes and on the ankle.  Reason for Disposition   [1] Red or very tender (to touch) area AND [2] getting larger over 48 hours after the bite    Additional Information   Negative: [1] Life-threatening reaction (anaphylaxis) in the past to bite from same insect AND [2] < 2 hours since bite   Negative: Passed out (i.e., lost consciousness, collapsed and was not responding)   Negative: Difficulty breathing or wheezing   Negative: [1] Hoarseness or cough AND [2] sudden onset following bite   Negative: [1] Difficulty swallowing or slurred speech AND [2] sudden onset following bite   Negative: Sounds like a life-threatening emergency to the triager   Negative: Bee sting(s)   Negative: Fire ant sting   Negative: Spider bite(s)   Negative: Tick bite(s)   Negative: Mosquito bite(s)   Negative: Bed bug bite(s)   Negative: Boil suspected (i.e., painful red lump and NO insect bite)   Negative: Doesn't sound like an insect bite   Negative: Patient sounds very sick or weak to the triager   Negative: [1] SEVERE bite pain AND [2] not improved after 2 hours of pain medicine   Negative: [1] Fever AND [2] red area   Negative: [1] Fever AND [2] area is very tender to touch   Negative: [1] Red streak or red line AND [2] length > 2 inches (5 cm)   Negative: [1] Red or very tender (to touch) area AND [2] started over 24 hours after the bite    Protocols used: Insect Bite-A-

## 2022-09-27 NOTE — PROGRESS NOTES
This note was created by combination of typed  and M-Modal dictation.  Transcription errors may be present.  If there are any questions, please contact me.    Assessment and Plan:   Insect bite of right foot, initial encounter  -I do not think this is infection.  Hold on antibiotic.  Signs and symptoms of cellulitis discussed with the patient.  Regular soap and water.  Trial of topical triamcinolone for symptom control.  Avoid systemics.  She is going to go get IVIG tomorrow.  -     Discontinue: triamcinolone acetonide 0.1% (KENALOG) 0.1 % cream; Apply topically 2 (two) times daily. for 14 days  Dispense: 30 g; Refill: 0  -     triamcinolone acetonide 0.1% (KENALOG) 0.1 % cream; Apply topically 2 (two) times daily. for 14 days  Dispense: 30 g; Refill: 0    Sjogren's syndrome with keratoconjunctivitis sicca  -followed by rheum    Dysautonomia  -followed by cardiology    Encounter for screening mammogram for malignant neoplasm of breast  -she'll schedule at her convenience.  -     Mammo Digital Screening Bilat w/ Erick; Future; Expected date: 09/27/2022        Medications Discontinued During This Encounter   Medication Reason    triamcinolone acetonide 0.1% (KENALOG) 0.1 % cream Reorder       meds sent this encounter:  Medications Ordered This Encounter   Medications    triamcinolone acetonide 0.1% (KENALOG) 0.1 % cream     Sig: Apply topically 2 (two) times daily. for 14 days     Dispense:  30 g     Refill:  0       Follow Up: No follow-ups on file.    Subjective:     Chief Complaint   Patient presents with    Insect Bite       HPI  Caryl is a 42 y.o. female, last appointment with this clinic was Visit date not found.  Social History     Tobacco Use    Smoking status: Former    Smokeless tobacco: Never   Substance Use Topics    Alcohol use: Yes     Comment: rarely           Social History     Social History Narrative    Not on file       No LMP recorded.    New to me  Sjogren's syndrome, small fiber  neuropathy.  Fibromyalgia.  Postural orthostatic tachycardia syndrome.  Cervical degenerative disc disease.  Followed by pain clinic.    Saturday evening, went out to go greet her neighbor and neighbor's dog, this was later in the evening.  Slipped on some sandals.  As the dog was jumping on her, kicked up some dirt, and feels like she felt a bite, on her toes of the right foot.  She brushed off the dirt and possibly the insect.  Is unsure whether it was aunts or a spider.  Started getting irritation on the toes and also on her ankle.  This pattern her ankle got really red with a ring around it, and seemed to enlarge and she became concerned and so schedule this appointment.  However today the ankle spot is improved some.    But the lesions on her toes have some surrounding erythema, itching and pain, draining clear serous fluid.  No systemic fevers or chills.  Has been applying topical hydrocortisone.  Took an oral Benadryl.    She is going to be going to [place] tomorrow for her 1st IVIG infusion.    Patient Care Team:  Joann Snyder MD as PCP - General (Internal Medicine)    Patient Active Problem List    Diagnosis Date Noted    Cervicogenic headache 12/02/2021    Laryngopharyngeal reflux (LPR) 10/26/2021    Sjogren's syndrome 09/13/2021    Osteoarthritis of spine with radiculopathy, lumbar region 04/20/2021    Cervical spondylosis 04/20/2021    Chronic pain 04/20/2021    Cervicalgia 04/19/2021    Right upper quadrant pain 10/28/2019    Chronic bilateral low back pain without sciatica 11/08/2018    Dysautonomia 08/20/2018    Numbness and tingling of both lower extremities 08/20/2018    POTS (postural orthostatic tachycardia syndrome) 01/10/2018    Migraine with aura and without status migrainosus, not intractable 01/10/2018    Throat papilloma 12/04/2017    Celiac disease 11/27/2017    Small fiber neuropathy 11/27/2017    Fibromyalgia 11/27/2017       PAST MEDICAL PROBLEMS, PAST SURGICAL HISTORY: please  "see relevant portions of the electronic medical record    ALLERGIES AND MEDICATIONS: updated and reviewed.  Review of patient's allergies indicates:   Allergen Reactions    Latex, natural rubber     Gluten protein Rash    Latex Rash       Medication List with Changes/Refills   Current Medications    ASCORBIC ACID/COLLAGEN HYDR (COLLAGEN PLUS VITAMIN C ORAL)    Take 6,000 mg by mouth.    AZATHIOPRINE (IMURAN) 50 MG TAB    Take 1 tablet by mouth 2 (two) times daily.    CETIRIZINE (ZYRTEC) 10 MG TABLET    Take 10 mg by mouth once daily.    FLUCONAZOLE (DIFLUCAN) 150 MG TAB    1 tab po now then 1 tab po in a week    GALCANEZUMAB-GNLM 120 MG/ML PNIJ    Inject 120 mg into the skin every 28 days. maintenance dose    IBUPROFEN (ADVIL,MOTRIN) 600 MG TABLET    Take 600 mg by mouth daily as needed for Pain.    LABETALOL (NORMODYNE) 200 MG TABLET    Take 200 mg by mouth 2 (two) times daily.    MILNACIPRAN (SAVELLA) 12.5 MG TAB TABLET    Take 1 tablet (12.5 mg total) by mouth 2 (two) times daily.    MYCOPHENOLATE (CELLCEPT) 250 MG CAP    Take 250 mg by mouth once daily.    PILOCARPINE (SALAGEN) 5 MG TAB    Take 5 mg by mouth 3 (three) times daily.    PREGABALIN (LYRICA) 75 MG CAPSULE    TAKE 1 CAPSULE BY MOUTH THREE TIMES DAILY    PSYLLIUM (METAMUCIL) POWDER    Take 1 packet by mouth once daily.    WATER LIQD 150 ML WITH MILK OF MAGNESIA 400 MG/5 ML SUSP 400 MG, DIPHENHYDRAMINE 12.5 MG/5 ML ELIX 60 MG, NYSTATIN 100,000 UNIT/ML SUSP 500,000 UNITS    SWISH AND SPIT 10ML'S BY MOUTH EVERY 4 HOURS    ZOLMITRIPTAN (ZOMIG) 5 MG SPRY    USE 1 SPRAY IN EACH NOSTRIL ONCE DAILY as needed for migraine.        Objective:   Physical Exam  Vitals:    09/27/22 1419   BP: 110/72   BP Location: Right arm   Patient Position: Sitting   BP Method: Large (Manual)   Pulse: 70   Temp: 98.7 °F (37.1 °C)   TempSrc: Oral   SpO2: 97%   Weight: 85.1 kg (187 lb 9.1 oz)   Height: 5' 6" (1.676 m)    Body mass index is 30.27 kg/m².  Weight: 85.1 kg (187 lb 9.1 " "oz)   Height: 5' 6" (167.6 cm)     Physical Exam  Constitutional:       General: She is not in acute distress.     Appearance: She is well-developed.   HENT:      Head: Normocephalic and atraumatic.   Eyes:      General: No scleral icterus.  Pulmonary:      Effort: Pulmonary effort is normal.   Musculoskeletal:      Right lower leg: No edema.   Skin:     General: Skin is warm and dry.      Comments: See the photograph-the right foot 2nd and 3rd toes with small vesicles, each about a mm in diameter, with some surrounding erythema, blanching.  Some mild associated swelling of the digits.  Dorsal pedal and posterior tibial pulses 3+ strong.  The ankle lesion, looks like a small scabbed papule, with some minimal surrounding erythema   Neurological:      Mental Status: She is alert and oriented to person, place, and time.   Psychiatric:         Behavior: Behavior normal.         Thought Content: Thought content normal.                   "

## 2022-10-06 ENCOUNTER — OFFICE VISIT (OUTPATIENT)
Dept: URGENT CARE | Facility: CLINIC | Age: 42
End: 2022-10-06
Payer: MEDICARE

## 2022-10-06 VITALS
HEIGHT: 66 IN | TEMPERATURE: 100 F | SYSTOLIC BLOOD PRESSURE: 127 MMHG | DIASTOLIC BLOOD PRESSURE: 87 MMHG | BODY MASS INDEX: 30.05 KG/M2 | OXYGEN SATURATION: 97 % | WEIGHT: 187 LBS | RESPIRATION RATE: 17 BRPM | HEART RATE: 72 BPM

## 2022-10-06 DIAGNOSIS — R73.9 ELEVATED BLOOD SUGAR: ICD-10-CM

## 2022-10-06 DIAGNOSIS — J02.9 SORE THROAT: Primary | ICD-10-CM

## 2022-10-06 DIAGNOSIS — M43.6 NECK STIFFNESS: ICD-10-CM

## 2022-10-06 LAB
CTP QC/QA: YES
HETEROPH AB SER QL: NEGATIVE

## 2022-10-06 PROCEDURE — 96372 THER/PROPH/DIAG INJ SC/IM: CPT | Mod: S$GLB,,,

## 2022-10-06 PROCEDURE — 99203 OFFICE O/P NEW LOW 30 MIN: CPT | Mod: 25,S$GLB,,

## 2022-10-06 PROCEDURE — 99203 PR OFFICE/OUTPT VISIT, NEW, LEVL III, 30-44 MIN: ICD-10-PCS | Mod: 25,S$GLB,,

## 2022-10-06 PROCEDURE — 86308 HETEROPHILE ANTIBODY SCREEN: CPT | Mod: QW,S$GLB,,

## 2022-10-06 PROCEDURE — 86308 POCT INFECTIOUS MONONUCLEOSIS: ICD-10-PCS | Mod: QW,S$GLB,,

## 2022-10-06 PROCEDURE — 96372 PR INJECTION,THERAP/PROPH/DIAG2ST, IM OR SUBCUT: ICD-10-PCS | Mod: S$GLB,,,

## 2022-10-06 RX ORDER — KETOROLAC TROMETHAMINE 30 MG/ML
30 INJECTION, SOLUTION INTRAMUSCULAR; INTRAVENOUS
Status: COMPLETED | OUTPATIENT
Start: 2022-10-06 | End: 2022-10-06

## 2022-10-06 RX ORDER — METHOCARBAMOL 500 MG/1
500 TABLET, FILM COATED ORAL 4 TIMES DAILY
Qty: 40 TABLET | Refills: 0 | Status: SHIPPED | OUTPATIENT
Start: 2022-10-06 | End: 2022-10-16

## 2022-10-06 RX ADMIN — KETOROLAC TROMETHAMINE 30 MG: 30 INJECTION, SOLUTION INTRAMUSCULAR; INTRAVENOUS at 03:10

## 2022-10-06 NOTE — PROGRESS NOTES
"  Subjective:       Patient ID: Caryl Cedeno is a 42 y.o. female.    Vitals:  height is 5' 6" (1.676 m) and weight is 84.8 kg (187 lb). Her oral temperature is 99.7 °F (37.6 °C). Her blood pressure is 127/87 and her pulse is 72. Her respiration is 17 and oxygen saturation is 97%.     Chief Complaint: Neck Pain    41 yo female with pmhx of Sjrogen's presents with complaints of neck swelling, neck soreness on the left side that is aggravated by movement. Patient had IVIG and high dose steroids in [place] 1 week ago. She states the neck soreness started 3 days ago and she began having bilateral neck swelling. She has not taken any medications for her symptoms. She denies difficulty breathing, difficulty swallowing, drooling, sore throat, anaphylaxis.    Neck Pain   This is a new problem. Episode onset: 3 days ago. The problem occurs constantly. The problem has been gradually worsening. The pain is associated with nothing. The pain is present in the left side, right side and occipital region. The pain is at a severity of 5/10. The pain is moderate. The symptoms are aggravated by twisting. The pain is Same all the time. Stiffness is present All day. Associated symptoms include pain with swallowing. Pertinent negatives include no chest pain, fever, headaches, leg pain, numbness, paresis, photophobia, syncope, tingling, trouble swallowing, visual change or weakness. Treatments tried: ibuprofen. The treatment provided mild relief.     Constitution: Negative for chills, sweating, fatigue and fever.   HENT:  Negative for trouble swallowing and voice change.    Neck: Positive for neck pain and neck swelling. Negative for neck stiffness.   Cardiovascular:  Negative for chest pain and passing out.   Eyes:  Negative for photophobia.   Respiratory:  Negative for cough, sputum production, shortness of breath, stridor and wheezing.    Neurological:  Negative for headaches and numbness.     Objective:      Physical Exam "   Constitutional: She is oriented to person, place, and time. She appears well-developed. She is cooperative.  Non-toxic appearance. She does not appear ill. No distress.   HENT:   Head: Normocephalic and atraumatic.   Ears:   Right Ear: Hearing, tympanic membrane, external ear and ear canal normal.   Left Ear: Hearing, tympanic membrane, external ear and ear canal normal.   Nose: Nose normal. No mucosal edema, rhinorrhea, nasal deformity or congestion. No epistaxis. Right sinus exhibits no maxillary sinus tenderness and no frontal sinus tenderness. Left sinus exhibits no maxillary sinus tenderness and no frontal sinus tenderness.   Mouth/Throat: Uvula is midline, oropharynx is clear and moist and mucous membranes are normal. Mucous membranes are moist. No trismus in the jaw. Normal dentition. No uvula swelling. No oropharyngeal exudate or posterior oropharyngeal erythema.   Eyes: Conjunctivae, EOM and lids are normal. Pupils are equal, round, and reactive to light. Right eye exhibits no discharge. Left eye exhibits no discharge. No scleral icterus.   Neck: Trachea normal and phonation normal. Neck supple. No Brudzinski's sign and no Kernig's sign noted. No thyroid mass and no thyromegaly present. edema (Bilaterally) present.No neck rigidity present. No decreased range of motion present.   Cardiovascular: Normal rate, regular rhythm, normal heart sounds and normal pulses.   Pulmonary/Chest: Effort normal and breath sounds normal. No respiratory distress. She has no wheezes.   Abdominal: Normal appearance and bowel sounds are normal. She exhibits no distension and no mass. Soft. There is no abdominal tenderness.   Musculoskeletal: Normal range of motion.         General: No deformity. Normal range of motion.   Lymphadenopathy:     She has no cervical adenopathy.        Right cervical: No superficial cervical, no deep cervical and no posterior cervical adenopathy present.       Left cervical: No superficial cervical,  no deep cervical and no posterior cervical adenopathy present.   Neurological: She is alert and oriented to person, place, and time. She exhibits normal muscle tone. Coordination normal.   Skin: Skin is warm, dry, intact, not diaphoretic and not pale.   Psychiatric: Her speech is normal and behavior is normal. Judgment and thought content normal.   Nursing note and vitals reviewed.      Results for orders placed or performed in visit on 10/06/22   POCT Infectious mononucleosis antibody   Result Value Ref Range    Monospot Negative Negative     Acceptable Yes        Assessment:       1. Sore throat    2. Elevated blood sugar    3. Neck stiffness          Plan:        Negative mono.  Negative meningeal signs.  Patient has full range of motion of neck but some tenderness to palpation on the left and right side.  She does have superficial neck edema without stridor, drooling, tenderness, lymphadenopathy.  No supraclavicular Or adnexal lymphadenopathy. Patient was instructed that we can do a Toradol shot with muscle relaxers for the neck soreness.  Patient was instructed to follow up with PCP for neck edema.  She states that this has happened to her once before in the past and that goes away.  I do not think that any kind of reaction to the IVIG or high-dose steroids that she had a week prior.  Patient states that she reached out to the doctor in that performed the IVIG in [place] and he agreed that it was not a reaction to any of the medication.  ER precautions discussed in given for drooling, difficulty breathing or difficulty swallowing.  Patient verbalized understanding and agrees to treatment plan.  Her vitals are stable and she is in no acute respiratory distress  Sore throat  -     POCT Infectious mononucleosis antibody    Elevated blood sugar  -     HEMOGLOBIN A1C; Future; Expected date: 10/06/2022    Neck stiffness  -     ketorolac injection 30 mg  -     methocarbamoL (ROBAXIN) 500 MG Tab; Take 1  tablet (500 mg total) by mouth 4 (four) times daily. for 10 days  Dispense: 40 tablet; Refill: 0

## 2022-10-10 ENCOUNTER — PATIENT MESSAGE (OUTPATIENT)
Dept: ADMINISTRATIVE | Facility: HOSPITAL | Age: 42
End: 2022-10-10
Payer: MEDICARE

## 2022-10-25 ENCOUNTER — LAB VISIT (OUTPATIENT)
Dept: LAB | Facility: HOSPITAL | Age: 42
End: 2022-10-25
Payer: MEDICARE

## 2022-10-25 DIAGNOSIS — R73.9 ELEVATED BLOOD SUGAR: ICD-10-CM

## 2022-10-25 PROCEDURE — 83036 HEMOGLOBIN GLYCOSYLATED A1C: CPT

## 2022-10-25 PROCEDURE — 36415 COLL VENOUS BLD VENIPUNCTURE: CPT | Mod: PO

## 2022-10-26 LAB
ESTIMATED AVG GLUCOSE: 91 MG/DL (ref 68–131)
HBA1C MFR BLD: 4.8 % (ref 4–5.6)

## 2023-01-26 ENCOUNTER — TELEPHONE (OUTPATIENT)
Dept: INTERNAL MEDICINE | Facility: CLINIC | Age: 43
End: 2023-01-26
Payer: MEDICARE

## 2023-01-26 NOTE — TELEPHONE ENCOUNTER
----- Message from Quang White sent at 1/26/2023  1:13 PM CST -----  Regarding: Patient advice  Contact: Pt  Pt called in regards to having  a positive COVID result from home test       Please advise , Pt can be reached at [phone]

## 2023-01-26 NOTE — TELEPHONE ENCOUNTER
Pt did home covid test today  Tested positive     Symptoms started yesterday    Stuffy nose  Cough  Headaches  Scratchy throat   Post nasal drip       Immunosuppressants pt is taking currently   azathioprine  50mg 3x/day    Pt asking about paxlovid

## 2023-01-26 NOTE — TELEPHONE ENCOUNTER
Spoke with pt  Offered VV to discuss.  Pt requested VV for today.  Nothing available until Monday 1/30.   Declined first available.  Pt stated will call [facility] and ended the call

## 2023-01-26 NOTE — TELEPHONE ENCOUNTER
Covid risk score is 2  Not seen by Dr. Snyder since 9/2021  Would recommend she schedule a virtual visit to discuss treatment options and to make sure we have an updated medication list as paxlovid has MANY interactions to review

## 2023-01-27 ENCOUNTER — OFFICE VISIT (OUTPATIENT)
Dept: INTERNAL MEDICINE | Facility: CLINIC | Age: 43
End: 2023-01-27
Payer: MEDICARE

## 2023-01-27 DIAGNOSIS — U07.1 COVID-19: Primary | ICD-10-CM

## 2023-01-27 PROCEDURE — 99214 OFFICE O/P EST MOD 30 MIN: CPT | Mod: CR,95,, | Performed by: INTERNAL MEDICINE

## 2023-01-27 PROCEDURE — 99214 PR OFFICE/OUTPT VISIT, EST, LEVL IV, 30-39 MIN: ICD-10-PCS | Mod: CR,95,, | Performed by: INTERNAL MEDICINE

## 2023-01-27 NOTE — PROGRESS NOTES
[facility] Virtual Visit Note    The patient location is: [place]  The chief complaint leading to consultation is: COVID infection  Visit type: Virtual visit with synchronous audio and video  Total time spent with patient: 20 min  Each patient to whom he or she provides medical services by telemedicine is:  (1) informed of the relationship between the physician and patient and the respective role of any other health care provider with respect to management of the patient; and (2) notified that he or she may decline to receive medical services by telemedicine and may withdraw from such care at any time.      Chief Complaint    No chief complaint on file.      History of Present Illness      Caryl Cedeno is a 42 y.o. female pt of Dr. Snyder with chronic conditions of Sjogren's syndrome, migraine, POTS who presents today for: complaints of COVID infection, 2 days of symptoms.  Pt is on azathioprine immunosuppressants for Sjogren's.        Past Medical History:  Past Medical History:   Diagnosis Date    Allergy     Celiac disease     Fibromyalgia     Hypertension     Migraines     Small fiber neuropathy     per patient is types: sensory and autonomic       Past Surgical History:   has a past surgical history that includes Cervical spine surgery (2016); Spine surgery; Laparoscopic cholecystectomy (N/A, 7/9/2019); Liver biopsy (7/9/2019); Esophagogastroduodenoscopy (N/A, 10/28/2019); Colonoscopy (N/A, 10/28/2019); Epidural steroid injection (N/A, 4/20/2021); and Transforaminal epidural injection of steroid (Right, 5/18/2021).    Family History:  family history includes Autoimmune disease in her father and sister; Brain cancer in her maternal grandfather; Cancer in her maternal grandfather, mother, paternal grandmother, and other family members; Celiac disease in her maternal grandfather; Hashimoto's thyroiditis in her maternal aunt and mother; Other in her maternal aunt, paternal grandfather, and  other family members; Other (age of onset: 75) in her father; Rectal cancer in her maternal grandfather.     Social History:  Social History     Tobacco Use    Smoking status: Former    Smokeless tobacco: Never   Substance Use Topics    Alcohol use: Yes     Comment: rarely     Drug use: No       Review of Systems   Constitutional:  Negative for chills, fever and malaise/fatigue.   Respiratory:  Positive for cough. Negative for shortness of breath.    Cardiovascular:  Negative for chest pain.   Gastrointestinal:  Negative for constipation, diarrhea, nausea and vomiting.   Skin:  Negative for rash.   Neurological:  Negative for weakness.   All other systems reviewed and are negative.     Medications:  Outpatient Encounter Medications as of 1/27/2023   Medication Sig Dispense Refill    ascorbic acid/collagen hydr (COLLAGEN PLUS VITAMIN C ORAL) Take 6,000 mg by mouth.      azaTHIOprine (IMURAN) 50 mg Tab Take 1 tablet by mouth 2 (two) times daily.      cetirizine (ZYRTEC) 10 MG tablet Take 10 mg by mouth once daily.      fluconazole (DIFLUCAN) 150 MG Tab 1 tab po now then 1 tab po in a week 2 tablet 0    galcanezumab-gnlm 120 mg/mL PnIj Inject 120 mg into the skin every 28 days. maintenance dose 1 mL 6    ibuprofen (ADVIL,MOTRIN) 600 MG tablet Take 600 mg by mouth daily as needed for Pain.      labetaloL (NORMODYNE) 200 MG tablet Take 200 mg by mouth 2 (two) times daily.      milnacipran (SAVELLA) 12.5 mg Tab tablet Take 1 tablet (12.5 mg total) by mouth 2 (two) times daily. 60 tablet 1    mycophenolate (CELLCEPT) 250 mg Cap Take 250 mg by mouth once daily.      nirmatrelvir-ritonavir 300 mg (150 mg x 2)-100 mg copackaged tablets (EUA) Take 3 tablets by mouth 2 (two) times daily for 5 days. Each dose contains 2 nirmatrelvir (pink tablets) and 1 ritonavir (white tablet). Take all 3 tablets together 30 tablet 0    pilocarpine (SALAGEN) 5 MG Tab Take 5 mg by mouth 3 (three) times daily.      pregabalin (LYRICA) 50 MG  capsule Take 1 capsule (50 mg total) by mouth 3 (three) times daily. 90 capsule 5    psyllium (METAMUCIL) powder Take 1 packet by mouth once daily.      triamcinolone acetonide 0.1% (KENALOG) 0.1 % cream Apply topically 2 (two) times daily. for 14 days 30 g 0    water Liqd 150 mL with MILK OF MAGNESIA 400 mg/5 mL Susp 400 mg, diphenhydrAMINE 12.5 mg/5 mL Elix 60 mg, nystatin 100,000 unit/mL Susp 500,000 Units SWISH AND SPIT 10ML'S BY MOUTH EVERY 4 HOURS  1    ZOLMitriptan (ZOMIG) 5 mg Spry USE 1 SPRAY IN EACH NOSTRIL ONCE DAILY as needed for migraine. 6 each 3     Facility-Administered Encounter Medications as of 1/27/2023   Medication Dose Route Frequency Provider Last Rate Last Admin    0.9%  NaCl infusion   Intravenous Continuous Juanjo Ward MD           Allergies:  Review of patient's allergies indicates:   Allergen Reactions    Latex, natural rubber     Gluten protein Rash    Latex Rash       Health Maintenance:  Immunization History   Administered Date(s) Administered    COVID-19, MRNA, LN-S, PF (Pfizer) (Purple Cap) 03/12/2021, 04/02/2021, 11/19/2021    COVID-19, mRNA, LNP-S, bivalent booster, PF (PFIZER OMICRON) 10/21/2022    Influenza 10/01/2018    Influenza - Quadrivalent - PF *Preferred* (6 months and older) 09/12/2018, 09/12/2018, 12/19/2020, 12/19/2020    Influenza - Trivalent (ADULT) 09/30/2016    Influenza Split 10/30/2009    MMR 08/21/1980, 01/15/2008    Tdap 08/27/2014      Health Maintenance   Topic Date Due    Mammogram  08/03/2022    TETANUS VACCINE  08/27/2024    Hepatitis C Screening  Completed    Lipid Panel  Completed        Physical Exam       ]    Physical Exam  Constitutional:       General: She is not in acute distress.     Appearance: She is well-developed. She is not diaphoretic.   Eyes:      General: No scleral icterus.        Right eye: No discharge.         Left eye: No discharge.      Conjunctiva/sclera: Conjunctivae normal.   Pulmonary:      Effort: Pulmonary effort is normal. No  respiratory distress.   Neurological:      Mental Status: She is alert and oriented to person, place, and time.   Psychiatric:         Mood and Affect: Mood normal.         Behavior: Behavior normal.         Thought Content: Thought content normal.         Judgment: Judgment normal.        Laboratory:  CBC:  Recent Labs   Lab 03/05/21 1707 09/13/21  1512 10/20/21  1307   WBC 7.17 6.68 8.41   RBC 4.74 5.09 4.56   Hemoglobin 13.7 15.0 13.1   Hematocrit 41.7 44.4 40.6   Platelets 318 299 314   MCV 88 87 89   MCH 28.9 29.5 28.7   MCHC 32.9 33.8 32.3     CMP:  Recent Labs   Lab 03/05/21 1707 09/13/21  1512   Glucose 95 94   Calcium 9.2 9.9   Albumin 4.1 4.5   Total Protein 7.2 7.8   Sodium 140 139   Potassium 3.9 4.0   CO2 25 23   Chloride 105 105   BUN 8 12   Alkaline Phosphatase 86 73   ALT 38 37   AST 19 24   Total Bilirubin 0.4 0.6     URINALYSIS:  Recent Labs   Lab 10/20/21  1301   Color, UA Yellow   Specific Gravity, UA 1.020   pH, UA 5.0   Protein, UA Negative   Nitrite, UA Negative   Leukocytes, UA Negative      LIPIDS:  Recent Labs   Lab 03/05/21 1707 09/13/21  1512   TSH 2.098 2.773   HDL  --  50   Cholesterol  --  224 H   Triglycerides  --  161 H   LDL Cholesterol  --  141.8   HDL/Cholesterol Ratio  --  22.3   Non-HDL Cholesterol  --  174   Total Cholesterol/HDL Ratio  --  4.5     TSH:  Recent Labs   Lab 03/05/21 1707 09/13/21  1512   TSH 2.098 2.773     A1C:  Recent Labs   Lab 10/25/22  1333   Hemoglobin A1C 4.8       Assessment/Plan     Caryl Cedeno is a 42 y.o.female with:    1. COVID-19  - nirmatrelvir-ritonavir 300 mg (150 mg x 2)-100 mg copackaged tablets (EUA); Take 3 tablets by mouth 2 (two) times daily for 5 days. Each dose contains 2 nirmatrelvir (pink tablets) and 1 ritonavir (white tablet). Take all 3 tablets together  Dispense: 30 tablet; Refill: 0  Reviewed for med interactions.  Rheumatology already advised pt to stop azathioprine.  Pt will follow up with them to determine when to  restart.  Sending in paxlovid due to immunosuppression.  Reviewed OTC meds to try for symptom control.  Reviewed quarantine recommendations.  F/U with PCP if symptoms persisting.  GO to ER if symptoms worsening.    Chronic conditions status updated as per HPI.  Other than changes above, cont current medications and maintain follow up with specialists.  No follow-ups on file.    No future appointments.    Justino Keith MD  [facility]                Answers submitted by the patient for this visit:  Cough Questionnaire (Submitted on 1/27/2023)  Chief Complaint: Cough  Chronicity: new  Onset: yesterday

## 2023-01-29 ENCOUNTER — NURSE TRIAGE (OUTPATIENT)
Dept: ADMINISTRATIVE | Facility: CLINIC | Age: 43
End: 2023-01-29
Payer: MEDICARE

## 2023-01-30 NOTE — TELEPHONE ENCOUNTER
Reason for Disposition   [1] Caller has medicine question about med NOT prescribed by PCP AND [2] triager unable to answer question (e.g., compatibility with other med, storage)    Protocols used: Medication Question Call-A-AH    Pt asked if she can take Lactase with Paxlovid and if it is okay to take her medication less than 12 hours apart.    Pt advised to discuss with a pharmacist. She verbalized understanding.

## 2023-01-31 ENCOUNTER — NURSE TRIAGE (OUTPATIENT)
Dept: ADMINISTRATIVE | Facility: CLINIC | Age: 43
End: 2023-01-31
Payer: MEDICARE

## 2023-02-01 NOTE — TELEPHONE ENCOUNTER
Prescribed paxlovid 1/27 for COVID, today tested negative, when can pt stop isolating from . Discussed isolation per CDC guidelines.     https://www.cdc.gov/coronavirus/2019-ncov/hcp/duration-isolation.html  Reason for Disposition   General information question, no triage required and triager able to answer question    Protocols used: Information Only Call - No Triage-A-

## 2023-02-05 ENCOUNTER — NURSE TRIAGE (OUTPATIENT)
Dept: ADMINISTRATIVE | Facility: CLINIC | Age: 43
End: 2023-02-05
Payer: MEDICARE

## 2023-02-06 NOTE — TELEPHONE ENCOUNTER
Caryl calling states she just recovered from Covid and was told to stop taking Pilocarpine and Azathioprine while having symptoms of Covid. Completed Paxlovid on Wednesday. Tested negative for Covid for 3 days. Asking when she can resume the Pilocarpine and Azathioprine. Advised per triage protocol on call provider will be contacted and informed caller of brief hold. V/u.     Per Dr. Dante Francois, on call PCP, pt can start taking medications now.     Updated Caryl per Dr. Francois's verbal advice. Instructed to call back if further questions. V/u.      Reason for Disposition   [1] Caller has URGENT medicine question about med that PCP or specialist prescribed AND [2] triager unable to answer question    Protocols used: Medication Question Call-A-

## 2023-02-11 ENCOUNTER — PATIENT MESSAGE (OUTPATIENT)
Dept: NEUROLOGY | Facility: CLINIC | Age: 43
End: 2023-02-11
Payer: MEDICARE

## 2023-04-13 ENCOUNTER — PES CALL (OUTPATIENT)
Dept: ADMINISTRATIVE | Facility: CLINIC | Age: 43
End: 2023-04-13
Payer: MEDICARE

## 2023-04-25 ENCOUNTER — PES CALL (OUTPATIENT)
Dept: ADMINISTRATIVE | Facility: CLINIC | Age: 43
End: 2023-04-25
Payer: MEDICARE

## 2023-05-29 ENCOUNTER — NURSE TRIAGE (OUTPATIENT)
Dept: ADMINISTRATIVE | Facility: CLINIC | Age: 43
End: 2023-05-29
Payer: MEDICARE

## 2023-05-29 DIAGNOSIS — G62.9 SMALL FIBER NEUROPATHY: ICD-10-CM

## 2023-05-29 RX ORDER — PREGABALIN 50 MG/1
50 CAPSULE ORAL 3 TIMES DAILY
Qty: 90 CAPSULE | Refills: 5 | Status: SHIPPED | OUTPATIENT
Start: 2023-05-29

## 2023-05-29 NOTE — TELEPHONE ENCOUNTER
Patient is requesting pregabalin Lyrica 3xd is needed to be refilled. She is leaving for a trip out of the country.

## 2023-05-29 NOTE — TELEPHONE ENCOUNTER
Reason for Disposition   Caller requesting a CONTROLLED substance prescription refill (e.g., narcotics, ADHD medicines)    Additional Information   Negative: New-onset or worsening symptoms, see that guideline (e.g., diarrhea, runny nose, sore throat)   Negative: Medicine question not related to refill or renewal   Negative: Caller (e.g., patient or pharmacist) requesting information about a new medicine   Negative: Caller requesting information unrelated to medicine   Negative: [1] Prescription refill request for ESSENTIAL medicine (i.e., likelihood of harm to patient if not taken) AND [2] triager unable to refill per department policy   Negative: [1] Prescription not at pharmacy AND [2] was prescribed by PCP recently  (Exception: Triager has access to EMR and prescription is recorded there. Go to Home Care and confirm for pharmacy.)   Negative: [1] Pharmacy calling with prescription questions AND [2] triager unable to answer question   Negative: Prescription request for new medicine (not a refill)    Protocols used: Medication Refill and Renewal Call-A-

## 2023-07-24 ENCOUNTER — HOSPITAL ENCOUNTER (OUTPATIENT)
Dept: RADIOLOGY | Facility: HOSPITAL | Age: 43
Discharge: HOME | End: 2023-07-24
Attending: INTERNAL MEDICINE
Payer: MEDICARE

## 2023-07-24 DIAGNOSIS — Z12.31 ENCOUNTER FOR SCREENING MAMMOGRAM FOR MALIGNANT NEOPLASM OF BREAST: ICD-10-CM

## 2023-07-24 PROCEDURE — 77063 BREAST TOMOSYNTHESIS BI: CPT | Mod: 26,,, | Performed by: RADIOLOGY

## 2023-07-24 PROCEDURE — 77067 SCR MAMMO BI INCL CAD: CPT | Mod: 26,,, | Performed by: RADIOLOGY

## 2023-07-24 PROCEDURE — 77063 MAMMO DIGITAL SCREENING BILAT WITH TOMO: ICD-10-PCS | Mod: 26,,, | Performed by: RADIOLOGY

## 2023-07-24 PROCEDURE — 77067 MAMMO DIGITAL SCREENING BILAT WITH TOMO: ICD-10-PCS | Mod: 26,,, | Performed by: RADIOLOGY

## 2023-07-24 PROCEDURE — 77067 SCR MAMMO BI INCL CAD: CPT | Mod: TC

## 2023-07-26 ENCOUNTER — TELEPHONE (OUTPATIENT)
Dept: OPHTHALMOLOGY | Facility: CLINIC | Age: 43
End: 2023-07-26
Payer: MEDICARE

## 2023-07-26 NOTE — TELEPHONE ENCOUNTER
----- Message from Peyton Gibbs sent at 7/26/2023  4:19 PM CDT -----  Regarding: appt access  Name of Caller: ROMEO HAMM [[id number]]      When is the first available appointment? unable to schedule       Symptoms:       Best Call Back Number: [phone]        Additional Information: pt would like a call back to get a sooner appt, would like a Monday afternoon, and to be put on wait list, please advise.

## 2023-09-18 ENCOUNTER — OFFICE VISIT (OUTPATIENT)
Dept: ORTHOPEDICS | Facility: CLINIC | Age: 43
End: 2023-09-18
Payer: MEDICARE

## 2023-09-18 ENCOUNTER — HOSPITAL ENCOUNTER (OUTPATIENT)
Dept: RADIOLOGY | Facility: HOSPITAL | Age: 43
Discharge: HOME | End: 2023-09-18
Attending: PHYSICIAN ASSISTANT
Payer: MEDICARE

## 2023-09-18 VITALS — HEIGHT: 66 IN | BODY MASS INDEX: 30.04 KG/M2 | WEIGHT: 186.94 LBS

## 2023-09-18 DIAGNOSIS — M75.52 SUBACROMIAL BURSITIS OF LEFT SHOULDER JOINT: ICD-10-CM

## 2023-09-18 DIAGNOSIS — M25.512 ACUTE PAIN OF LEFT SHOULDER: Primary | ICD-10-CM

## 2023-09-18 DIAGNOSIS — R52 PAIN: ICD-10-CM

## 2023-09-18 PROCEDURE — 73030 X-RAY EXAM OF SHOULDER: CPT | Mod: TC,LT

## 2023-09-18 PROCEDURE — 73030 X-RAY EXAM OF SHOULDER: CPT | Mod: 26,LT,, | Performed by: RADIOLOGY

## 2023-09-18 PROCEDURE — 99213 OFFICE O/P EST LOW 20 MIN: CPT | Mod: S$PBB,,, | Performed by: PHYSICIAN ASSISTANT

## 2023-09-18 PROCEDURE — 99999 PR PBB SHADOW E&M-EST. PATIENT-LVL III: ICD-10-PCS | Mod: PBBFAC,,, | Performed by: PHYSICIAN ASSISTANT

## 2023-09-18 PROCEDURE — 99213 OFFICE O/P EST LOW 20 MIN: CPT | Mod: PBBFAC | Performed by: PHYSICIAN ASSISTANT

## 2023-09-18 PROCEDURE — 73030 XR SHOULDER TRAUMA 3 VIEW LEFT: ICD-10-PCS | Mod: 26,LT,, | Performed by: RADIOLOGY

## 2023-09-18 PROCEDURE — 99213 PR OFFICE/OUTPT VISIT, EST, LEVL III, 20-29 MIN: ICD-10-PCS | Mod: S$PBB,,, | Performed by: PHYSICIAN ASSISTANT

## 2023-09-18 PROCEDURE — 99999 PR PBB SHADOW E&M-EST. PATIENT-LVL III: CPT | Mod: PBBFAC,,, | Performed by: PHYSICIAN ASSISTANT

## 2023-09-18 NOTE — PROGRESS NOTES
SUBJECTIVE:     Chief Complaint & History of Present Illness:  Caryl Cedeno is a 43 y.o. year old female who presents today with constant left shoulder pain that started 2 weeks ago.  She is Right hand dominant.  There is not a history of injury.  The pain is located in the top of the shoulder. The pain is described as achy.  It is aggravated by reaching back, overhead activity and lifting. Previous treatments include ice and rest.  There is not a history of previous injury or surgery to the shoulder.      Review of patient's allergies indicates:   Allergen Reactions    Latex, natural rubber     Gluten protein Rash    Latex Rash         Current Outpatient Medications   Medication Sig Dispense Refill    ascorbic acid/collagen hydr (COLLAGEN PLUS VITAMIN C ORAL) Take 6,000 mg by mouth.      azaTHIOprine (IMURAN) 50 mg Tab Take 1 tablet by mouth 2 (two) times daily.      cetirizine (ZYRTEC) 10 MG tablet Take 10 mg by mouth once daily.      fluconazole (DIFLUCAN) 150 MG Tab 1 tab po now then 1 tab po in a week 2 tablet 0    galcanezumab-gnlm 120 mg/mL PnIj Inject 120 mg into the skin every 28 days. maintenance dose 1 mL 6    ibuprofen (ADVIL,MOTRIN) 600 MG tablet Take 600 mg by mouth daily as needed for Pain.      labetaloL (NORMODYNE) 200 MG tablet Take 200 mg by mouth 2 (two) times daily.      mycophenolate (CELLCEPT) 250 mg Cap Take 250 mg by mouth once daily.      pilocarpine (SALAGEN) 5 MG Tab Take 5 mg by mouth 3 (three) times daily.      pregabalin (LYRICA) 50 MG capsule Take 1 capsule (50 mg total) by mouth 3 (three) times daily. 90 capsule 5    psyllium (METAMUCIL) powder Take 1 packet by mouth once daily.      water Liqd 150 mL with MILK OF MAGNESIA 400 mg/5 mL Susp 400 mg, diphenhydrAMINE 12.5 mg/5 mL Elix 60 mg, nystatin 100,000 unit/mL Susp 500,000 Units SWISH AND SPIT 10ML'S BY MOUTH EVERY 4 HOURS  1    ZOLMitriptan (ZOMIG) 5 mg Spry USE 1 SPRAY IN EACH NOSTRIL ONCE DAILY as needed for migraine.  6 each 3    milnacipran (SAVELLA) 12.5 mg Tab tablet Take 1 tablet (12.5 mg total) by mouth 2 (two) times daily. 60 tablet 1    triamcinolone acetonide 0.1% (KENALOG) 0.1 % cream Apply topically 2 (two) times daily. for 14 days 30 g 0     No current facility-administered medications for this visit.     Facility-Administered Medications Ordered in Other Visits   Medication Dose Route Frequency Provider Last Rate Last Admin    0.9%  NaCl infusion   Intravenous Continuous Juanjo Ward MD           Past Medical History:   Diagnosis Date    Allergy     Celiac disease     Fibromyalgia     Hypertension     Migraines     Small fiber neuropathy     per patient is types: sensory and autonomic       Past Surgical History:   Procedure Laterality Date    CERVICAL SPINE SURGERY  2016    COLONOSCOPY N/A 10/28/2019    Procedure: COLONOSCOPY;  Surgeon: Ezra Castañeda MD;  Location: [facility] (4TH FLR);  Service: Endoscopy;  Laterality: N/A;  LATEX Allergy    EPIDURAL STEROID INJECTION N/A 4/20/2021    Procedure: INJECTION, STEROID, EPIDURAL C7/T1;  Surgeon: Nevaeh Esposito MD;  [facility];  Service: Pain Management;  Laterality: N/A;  CONSENT NEEDED    ESOPHAGOGASTRODUODENOSCOPY N/A 10/28/2019    Procedure: EGD (ESOPHAGOGASTRODUODENOSCOPY);  Surgeon: Ezra Castañeda MD;  Location: [facility] (4TH FLR);  Service: Endoscopy;  Laterality: N/A;  LATEX Allergy    LAPAROSCOPIC CHOLECYSTECTOMY N/A 7/9/2019    Procedure: CHOLECYSTECTOMY, LAPAROSCOPIC;  Surgeon: Jagjit Martinez Jr., MD;  Location: [facility] OR 2ND FLR;  Service: General;  Laterality: N/A;    LIVER BIOPSY  7/9/2019    Procedure: BIOPSY, LIVER;  Surgeon: Jagjit Martinez Jr., MD;  Location: [facility] OR 2ND FLR;  Service: General;;    SPINE SURGERY      TRANSFORAMINAL EPIDURAL INJECTION OF STEROID Right 5/18/2021    Procedure: INJECTION, STEROID, EPIDURAL, TRANSFORAMINAL APPROACH L4/5 and L5/S1;  Surgeon: Nevaeh Esposito MD;  Location: [facility];   Service: Pain Management;  Laterality: Right;  Consent needed       Review of Systems:  ROS:  Constitutional: no fever or chills  Eyes: no visual changes  ENT: no nasal congestion or sore throat  Respiratory: no cough or shortness of breath  Musculoskeletal: no arthralgias or myalgias      OBJECTIVE:     PHYSICAL EXAM:    General: Weight: 84.8 kg (186 lb 15.2 oz) Body mass index is 30.19 kg/m².  Patient is alert, awake and oriented to time, place and person. Mood and affect are appropriate.  Patient does not appear to be in any distress, denies any constitutional symptoms and appears stated age.   HEENT: Pupils are equal and round, sclera are not injected. External examination of ears and nose reveals no abnormalities. Cranial nerves II-X are grossly intact  Neck: examination demonstrates painless active range of motion. Spurling's sign is negative  Skin: no rashes, abrasions or open wounds on the affected extremity   Resp: No respiratory distress or audible wheezing   Psych:  normal mood and behavior  CV: 2+  pulses, all extremities warm and well perfused   Left Shoulder   Skin intact  No warmth or effusion  Tenderness: AC joint, posterior acromial  Range of motion is painful   ROM: forward flexion 165, external rotation 50/50, internal rotation L1  Shoulder Strength: biceps 5/5, triceps 5/5, abduction 5/5, adduction 5/5  positive for impingement sign, sensory exam normal, and motor exam normal  Stability tests: normal  Special Tests:    Crossbody test: positive    Neer's positive  Hawkin's positive    Lori's positive  Drop arm negative    IMAGING:  X-rays of the left shoulder, personally reviewed by me, demonstrate minimal degenerative changes.  No fracture or dislocation.     ASSESSMENT     1. Acute pain of left shoulder    2. Subacromial bursitis of left shoulder joint      PLAN:     Discussed with the patient at length all the different treatment options available for her left shoulder including  anti-inflammatories, acetaminophen, rest, ice, Physical therapy, occasional cortisone injections for temporary relief    - Will try course of OTC NSAIDS- ibuprofen works well for her so she will try this for 7-10 days  - Discussed activity modification, rest, home exercises  - We can consider CSI if no improvement  - Follow up if symptoms worsen or fail to improve

## 2023-11-29 ENCOUNTER — ON-DEMAND VIRTUAL (OUTPATIENT)
Dept: URGENT CARE | Facility: CLINIC | Age: 43
End: 2023-11-29
Payer: MEDICARE

## 2023-11-29 ENCOUNTER — NURSE TRIAGE (OUTPATIENT)
Dept: ADMINISTRATIVE | Facility: CLINIC | Age: 43
End: 2023-11-29
Payer: MEDICARE

## 2023-11-29 DIAGNOSIS — R39.15 URINARY URGENCY: ICD-10-CM

## 2023-11-29 DIAGNOSIS — R30.0 DYSURIA: ICD-10-CM

## 2023-11-29 DIAGNOSIS — N30.00 ACUTE CYSTITIS WITHOUT HEMATURIA: Primary | ICD-10-CM

## 2023-11-29 PROCEDURE — 99213 OFFICE O/P EST LOW 20 MIN: CPT | Mod: 95,,, | Performed by: PHYSICIAN ASSISTANT

## 2023-11-29 PROCEDURE — 99213 PR OFFICE/OUTPT VISIT, EST, LEVL III, 20-29 MIN: ICD-10-PCS | Mod: 95,,, | Performed by: PHYSICIAN ASSISTANT

## 2023-11-29 RX ORDER — FEXOFENADINE HCL AND PSEUDOEPHEDRINE HCI 180; 240 MG/1; MG/1
1 TABLET, EXTENDED RELEASE ORAL
COMMUNITY
End: 2024-01-26 | Stop reason: ALTCHOICE

## 2023-11-29 RX ORDER — CYCLOSPORINE 0.5 MG/ML
EMULSION OPHTHALMIC
COMMUNITY
Start: 2023-08-23

## 2023-11-29 RX ORDER — NITROFURANTOIN 25; 75 MG/1; MG/1
100 CAPSULE ORAL 2 TIMES DAILY
Qty: 14 CAPSULE | Refills: 0 | Status: SHIPPED | OUTPATIENT
Start: 2023-11-29 | End: 2023-12-06

## 2023-11-29 RX ORDER — PHENAZOPYRIDINE HYDROCHLORIDE 100 MG/1
100 TABLET, FILM COATED ORAL 3 TIMES DAILY PRN
Qty: 6 TABLET | Refills: 0 | Status: SHIPPED | OUTPATIENT
Start: 2023-11-29 | End: 2023-12-01

## 2023-11-29 NOTE — PATIENT INSTRUCTIONS
Naltrexone and Macrobid and Pyridium: No known drug interactions. Pyridium and Macrobid can cause false glucose urine readings. Macrobid is not fluoroquinolone antibiotic such as levofloxacin or ciprofloxacin.     If you were prescribed antibiotics, please take them to completion.  If you were prescribed a narcotic medication, do not drive or operate heavy equipment or machinery while taking these medications.  Please drink plenty of fluids.  Please get plenty of rest.  If you were prescribed Pyridium (phenazopyridine), please be aware that if you wear contact lens that this medication may stain your contacts.  While taking this medication it is recommended that you do not wear your contacts until 24 hours after your last dose. If it not covered, you can  purchase Azo (phenazopyridine 99 mg) over the counter at drug stores.  If you  smoke, please stop smoking.  If not allergic, please take over the counter Tylenol (Acetaminophen) and/or Motrin (Ibuprofen) as directed for control of pain and/or fever.    Please remember that you have received care at [facility] today. Telehealth visits and Urgent cares are not emergency rooms and are not equipped to handle life threatening emergencies and cannot rule in or out certain medical conditions and you may be released before all of your medical problems are known or treated. Further, in-person, evaluation may be necessary for continued treatment. Please arrange follow up with your primary care physician or speciality clinic within 2-5 days if your signs and symptoms have not resolved or worsen. Patient can call our Referral Hotline at [phone] to make an appointment.    Please go to the Emergency Department for any concerns or worsening of condition.Patient was educated on signs/symptoms that would warrant emergent medical attention. Patient verbalized understanding.    Signs of infection. These include a fever of 100.4°F (38°C) or higher,  chills, back pain, nausea, throwing up, or bloody urine.  Signs come back after treatment ends  You notice more blood in your urine.  Your signs get worse or do not improve within 24 hours of starting treatment.  You are not able to urinate for more than 8 hours.  Your signs come back after treatment has stopped.

## 2023-11-29 NOTE — PROGRESS NOTES
Subjective:      Patient ID: Caryl Cedeno is a 43 y.o. female.    Vitals:  vitals were not taken for this visit.     Chief Complaint: Urinary Tract Infection      Visit Type: TELE AUDIOVISUAL    Present with the patient at the time of consultation: TELEMED PRESENT WITH PATIENT: None    Past Medical History:   Diagnosis Date    Allergy     Celiac disease     Fibromyalgia     Hypertension     Migraines     Small fiber neuropathy     per patient is types: sensory and autonomic     Past Surgical History:   Procedure Laterality Date    CERVICAL SPINE SURGERY  2016    COLONOSCOPY N/A 10/28/2019    Procedure: COLONOSCOPY;  Surgeon: Ezra Castañeda MD;  Location: [facility] (4TH FLR);  Service: Endoscopy;  Laterality: N/A;  LATEX Allergy    EPIDURAL STEROID INJECTION N/A 4/20/2021    Procedure: INJECTION, STEROID, EPIDURAL C7/T1;  Surgeon: Nevaeh Esposito MD;  Location: [facility] PAIN MGT;  Service: Pain Management;  Laterality: N/A;  CONSENT NEEDED    ESOPHAGOGASTRODUODENOSCOPY N/A 10/28/2019    Procedure: EGD (ESOPHAGOGASTRODUODENOSCOPY);  Surgeon: Ezra Castañeda MD;  Location: [facility] (4TH FLR);  Service: Endoscopy;  Laterality: N/A;  LATEX Allergy    LAPAROSCOPIC CHOLECYSTECTOMY N/A 7/9/2019    Procedure: CHOLECYSTECTOMY, LAPAROSCOPIC;  Surgeon: Jagjit Martinez Jr., MD;  Location: [facility] OR [address] [place] [address];  Service: General;  Laterality: N/A;    LIVER BIOPSY  7/9/2019    Procedure: BIOPSY, LIVER;  Surgeon: Jagjit Martinez Jr., MD;  Location: [facility] OR [address] [place] [address];  Service: General;;    SPINE SURGERY      TRANSFORAMINAL EPIDURAL INJECTION OF STEROID Right 5/18/2021    Procedure: INJECTION, STEROID, EPIDURAL, TRANSFORAMINAL APPROACH L4/5 and L5/S1;  Surgeon: Nevaeh Esposito MD;  Location: [facility] PAIN MGT;  Service: Pain Management;  Laterality: Right;  Consent needed     Review of patient's allergies indicates:   Allergen Reactions    Latex, natural rubber     Gluten protein Rash    Latex Rash     Current  Outpatient Medications on File Prior to Visit   Medication Sig Dispense Refill    ascorbic acid/collagen hydr (COLLAGEN PLUS VITAMIN C ORAL) Take 6,000 mg by mouth.      azaTHIOprine (IMURAN) 50 mg Tab Take 1 tablet by mouth 2 (two) times daily.      cetirizine (ZYRTEC) 10 MG tablet Take 10 mg by mouth once daily.      fluconazole (DIFLUCAN) 150 MG Tab 1 tab po now then 1 tab po in a week 2 tablet 0    galcanezumab-gnlm 120 mg/mL PnIj Inject 120 mg into the skin every 28 days. maintenance dose 1 mL 6    ibuprofen (ADVIL,MOTRIN) 600 MG tablet Take 600 mg by mouth daily as needed for Pain.      labetaloL (NORMODYNE) 200 MG tablet Take 200 mg by mouth 2 (two) times daily.      milnacipran (SAVELLA) 12.5 mg Tab tablet Take 1 tablet (12.5 mg total) by mouth 2 (two) times daily. 60 tablet 1    mycophenolate (CELLCEPT) 250 mg Cap Take 250 mg by mouth once daily.      pilocarpine (SALAGEN) 5 MG Tab Take 5 mg by mouth 3 (three) times daily.      pregabalin (LYRICA) 50 MG capsule Take 1 capsule (50 mg total) by mouth 3 (three) times daily. 90 capsule 5    psyllium (METAMUCIL) powder Take 1 packet by mouth once daily.      triamcinolone acetonide 0.1% (KENALOG) 0.1 % cream Apply topically 2 (two) times daily. for 14 days 30 g 0    water Liqd 150 mL with MILK OF MAGNESIA 400 mg/5 mL Susp 400 mg, diphenhydrAMINE 12.5 mg/5 mL Elix 60 mg, nystatin 100,000 unit/mL Susp 500,000 Units SWISH AND SPIT 10ML'S BY MOUTH EVERY 4 HOURS  1    ZOLMitriptan (ZOMIG) 5 mg Spry USE 1 SPRAY IN EACH NOSTRIL ONCE DAILY as needed for migraine. 6 each 3     Current Facility-Administered Medications on File Prior to Visit   Medication Dose Route Frequency Provider Last Rate Last Admin    0.9%  NaCl infusion   Intravenous Continuous Juanjo Ward MD         Family History   Problem Relation Age of Onset    Autoimmune disease Father     Other Father 75        suspected cancer (bladder?) [place] [facility]    Autoimmune disease Sister     Celiac disease  Maternal Grandfather     Cancer Maternal Grandfather         intestinal (origin?) (dx 70s/80s)     Rectal cancer Maternal Grandfather         (dx 70s/80s)     Brain cancer Maternal Grandfather         (2 primaries? 1 in early 90s)    Cancer Paternal Grandmother         breast- twice, 1st dx @ around 30 (2nd a recurrence vs 2nd primary?)    Cancer Mother         non-melanoma skin ca    Hashimoto's thyroiditis Mother     Other Paternal Grandfather         history?    Hashimoto's thyroiditis Maternal Aunt     Other Maternal Aunt         congenital brain damage 2/2 vaccine given to her mother during pregnancy    Other Other     Cancer Other         (origin?)    Cancer Other         (origin?)    Cancer Other         (origin?)    Other Other         possible cancer    Cancer Other          of smoking-related cancer    Colon cancer Neg Hx     Esophageal cancer Neg Hx     Ovarian cancer Neg Hx     Melanoma Neg Hx        Medications Ordered                [id number] - [place], [place] - [facility] [address] [place] [address] [address], [facility] [address]    Telephone: [phone]   Fax: [phone]   Hours: Open 24 hours                         E-Prescribed (2 of 2)              nitrofurantoin, macrocrystal-monohydrate, (MACROBID) 100 MG capsule    Sig: Take 1 capsule (100 mg total) by mouth 2 (two) times daily. for 7 days       Start: 23     Quantity: 14 capsule Refills: 0                         phenazopyridine (PYRIDIUM) 100 MG tablet    Sig: Take 1 tablet (100 mg total) by mouth 3 (three) times daily as needed for Pain.       Start: 23     Quantity: 6 tablet Refills: 0                           Ohs Peq Odvv Intake    2023  4:58 AM CST - Filed by Patient   Describe your reason for todays visit uti   What is your current physical address in the event of a medical emergency? [address] [facility]   Are you able to take your vital signs?  No   Please attach any relevant images or files          Caryl Cedeno is a 43 y.o. female with small fiber neuropathy who complains of strong urine odor, urinary frequency, urgency and  mild dysuria x 1 days  without flank pain, fever, chills, or abnormal vaginal discharge or bleeding.  Patient states it is hard to tell that she has pain due to her neuropathy. She asked if she needs to get off immunosuppressant (imuran) for her infection.    Patient states she is on a new medication (low dose naltrexone).     Urinary Tract Infection   This is a new problem. The current episode started yesterday. The problem occurs intermittently. The problem has been gradually worsening. The quality of the pain is described as aching. The pain is mild. There has been no fever. She is Sexually active. There is No history of pyelonephritis. Associated symptoms include frequency, hesitancy and urgency. Pertinent negatives include no behavior changes, chills, discharge, flank pain, hematuria, nausea, possible pregnancy, sweats, vomiting, bubble bath use, constipation, rash or withholding. She has tried increased fluids for the symptoms. The treatment provided mild relief. Her past medical history is significant for hypertension. There is no history of diabetes mellitus, kidney stones, recurrent UTIs, STD or urinary stasis.       Constitution: Negative for chills and fever.   Gastrointestinal:  Negative for nausea, vomiting and constipation.   Genitourinary:  Positive for dysuria, frequency and urgency. Negative for urine decreased, flank pain, hematuria, vaginal discharge, vaginal odor and pelvic pain.   Musculoskeletal:  Negative for back pain.   Skin:  Negative for rash.        Objective:   The physical exam was conducted virtually.  Physical Exam   Constitutional: She is oriented to person, place, and time.      Comments:Patient is awake and alert, standing in bathroom, speaking and answering in complete sentences, in no  signs of acute distress   normal  HENT:   Head: Normocephalic and atraumatic.   Nose: Nose normal.   Eyes: Conjunctivae are normal. Extraocular movement intact   Abdominal: Normal appearance.   Musculoskeletal: Normal range of motion.         General: Normal range of motion.   Neurological: She is alert and oriented to person, place, and time.   Psychiatric: Her behavior is normal. Mood, judgment and thought content normal.       Assessment:     1. Acute cystitis without hematuria    2. Urinary urgency    3. Dysuria        Plan:       Acute cystitis without hematuria  -     nitrofurantoin, macrocrystal-monohydrate, (MACROBID) 100 MG capsule; Take 1 capsule (100 mg total) by mouth 2 (two) times daily. for 7 days  Dispense: 14 capsule; Refill: 0  -     phenazopyridine (PYRIDIUM) 100 MG tablet; Take 1 tablet (100 mg total) by mouth 3 (three) times daily as needed for Pain.  Dispense: 6 tablet; Refill: 0    Urinary urgency  -     nitrofurantoin, macrocrystal-monohydrate, (MACROBID) 100 MG capsule; Take 1 capsule (100 mg total) by mouth 2 (two) times daily. for 7 days  Dispense: 14 capsule; Refill: 0    Dysuria  -     phenazopyridine (PYRIDIUM) 100 MG tablet; Take 1 tablet (100 mg total) by mouth 3 (three) times daily as needed for Pain.  Dispense: 6 tablet; Refill: 0        Used Micro medex to look up side effects. Naltrexone and Macrobid and Pyridium: No known drug interactions. Pyridium and Macrobid can cause false glucose urine readings.     Macrobid is not fluoroquinolone antibiotic such as levofloxacin or ciprofloxacin.             Discussed results/diagnosis/plan with patient in clinic.  We had shared decision making for patient's treatment. Patient verbalized understanding and in agreement with current treatment plan.     Please remember that you have received care at [facility] today. Telehealth visits  are not equipped to handle life threatening emergencies and cannot rule in or out  "certain medical conditions and you may be released before all of your medical problems are known or treated.    Patient was instructed to return for re-evaluation with urgent care or PCP for continued outpatient workup and management if symptoms do not improve/worsening symptoms. Strict ED versus clinic precautions given in depth.    Discharge and follow-up instructions given verbally with the patient who expressed understanding. The After Visit Summary (AVS) and  instructions and results are also available on [facility].              Karol "Mary Jo" DEBRA Keith          Patient Instructions   Naltrexone and Macrobid and Pyridium: No known drug interactions. Pyridium and Macrobid can cause false glucose urine readings. Macrobid is not fluoroquinolone antibiotic such as levofloxacin or ciprofloxacin.     If you were prescribed antibiotics, please take them to completion.  If you were prescribed a narcotic medication, do not drive or operate heavy equipment or machinery while taking these medications.  Please drink plenty of fluids.  Please get plenty of rest.  If you were prescribed Pyridium (phenazopyridine), please be aware that if you wear contact lens that this medication may stain your contacts.  While taking this medication it is recommended that you do not wear your contacts until 24 hours after your last dose. If it not covered, you can  purchase Azo (phenazopyridine 99 mg) over the counter at drug stores.  If you  smoke, please stop smoking.  If not allergic, please take over the counter Tylenol (Acetaminophen) and/or Motrin (Ibuprofen) as directed for control of pain and/or fever.    Please remember that you have received care at [facility] today. Telehealth visits and Urgent cares are not emergency rooms and are not equipped to handle life threatening emergencies and cannot rule in or out certain medical conditions and you may be released before all of your medical problems are " known or treated. Further, in-person, evaluation may be necessary for continued treatment. Please arrange follow up with your primary care physician or speciality clinic within 2-5 days if your signs and symptoms have not resolved or worsen. Patient can call our Referral Hotline at [phone] to make an appointment.    Please go to the Emergency Department for any concerns or worsening of condition.Patient was educated on signs/symptoms that would warrant emergent medical attention. Patient verbalized understanding.    Signs of infection. These include a fever of 100.4°F (38°C) or higher, chills, back pain, nausea, throwing up, or bloody urine.  Signs come back after treatment ends  You notice more blood in your urine.  Your signs get worse or do not improve within 24 hours of starting treatment.  You are not able to urinate for more than 8 hours.  Your signs come back after treatment has stopped.

## 2023-11-29 NOTE — TELEPHONE ENCOUNTER
Pt states thinks has UTI,  odor and small discomfort when urinating. Pt states has appt set up for noon today, states hasn't slept and worried about gettting to appt. Pt also states on immunosuppressants. Per protocol, see hcp within 4 hours. Discussed On Demand visit at this time. Pt accepts. Advised to call back for any further questions or concerns.   Reason for Disposition   Diabetes mellitus or weak immune system (e.g., HIV positive, cancer chemo, splenectomy, organ transplant, chronic steroids)    Additional Information   Negative: Shock suspected (e.g., cold/pale/clammy skin, too weak to stand, low BP, rapid pulse)   Negative: Sounds like a life-threatening emergency to the triager   Negative: [1] Unable to urinate (or only a few drops) > 4 hours AND [2] bladder feels very full (e.g., palpable bladder or strong urge to urinate)   Negative: Vomiting   Negative: Patient sounds very sick or weak to the triager   Negative: [1] SEVERE pain with urination (e.g., excruciating) AND [2] not improved after 2 hours of pain medicine and Sitz bath   Negative: Fever > 100.4 F (38.0 C)   Negative: Side (flank) or lower back pain present    Protocols used: Urination Pain - Female-A-AH

## 2023-12-06 ENCOUNTER — PATIENT MESSAGE (OUTPATIENT)
Dept: ORTHOPEDICS | Facility: CLINIC | Age: 43
End: 2023-12-06
Payer: MEDICARE

## 2023-12-06 RX ORDER — MELOXICAM 15 MG/1
15 TABLET ORAL DAILY
Qty: 30 TABLET | Refills: 1 | Status: SHIPPED | OUTPATIENT
Start: 2023-12-06 | End: 2024-02-12

## 2023-12-07 DIAGNOSIS — M25.512 CHRONIC LEFT SHOULDER PAIN: Primary | ICD-10-CM

## 2023-12-07 DIAGNOSIS — M75.52 SUBACROMIAL BURSITIS OF LEFT SHOULDER JOINT: ICD-10-CM

## 2023-12-07 DIAGNOSIS — G89.29 CHRONIC LEFT SHOULDER PAIN: Primary | ICD-10-CM

## 2023-12-22 ENCOUNTER — HOSPITAL ENCOUNTER (OUTPATIENT)
Dept: RADIOLOGY | Facility: OTHER | Age: 43
Discharge: HOME | End: 2023-12-22
Attending: PHYSICIAN ASSISTANT
Payer: MEDICARE

## 2023-12-22 DIAGNOSIS — G89.29 CHRONIC LEFT SHOULDER PAIN: ICD-10-CM

## 2023-12-22 DIAGNOSIS — M25.512 CHRONIC LEFT SHOULDER PAIN: ICD-10-CM

## 2023-12-22 DIAGNOSIS — M75.52 SUBACROMIAL BURSITIS OF LEFT SHOULDER JOINT: ICD-10-CM

## 2023-12-22 PROCEDURE — 73221 MRI JOINT UPR EXTREM W/O DYE: CPT | Mod: 26,LT,, | Performed by: RADIOLOGY

## 2023-12-22 PROCEDURE — 73221 MRI JOINT UPR EXTREM W/O DYE: CPT | Mod: TC,LT

## 2023-12-22 PROCEDURE — 73221 MRI SHOULDER WITHOUT CONTRAST LEFT: ICD-10-PCS | Mod: 26,LT,, | Performed by: RADIOLOGY

## 2023-12-26 ENCOUNTER — TELEPHONE (OUTPATIENT)
Dept: ORTHOPEDICS | Facility: CLINIC | Age: 43
End: 2023-12-26
Payer: MEDICARE

## 2023-12-26 NOTE — TELEPHONE ENCOUNTER
Called patient to discuss MRI results  Will follow up in clinic for injection  Continue conservative management

## 2023-12-28 ENCOUNTER — ON-DEMAND VIRTUAL (OUTPATIENT)
Dept: URGENT CARE | Facility: CLINIC | Age: 43
End: 2023-12-28
Payer: MEDICARE

## 2023-12-28 DIAGNOSIS — R05.9 COUGH, UNSPECIFIED TYPE: Primary | ICD-10-CM

## 2023-12-28 PROCEDURE — 99212 PR OFFICE/OUTPT VISIT, EST, LEVL II, 10-19 MIN: ICD-10-PCS | Mod: 95,,, | Performed by: FAMILY MEDICINE

## 2023-12-28 PROCEDURE — 99212 OFFICE O/P EST SF 10 MIN: CPT | Mod: 95,,, | Performed by: FAMILY MEDICINE

## 2023-12-28 RX ORDER — ALBUTEROL SULFATE 90 UG/1
2 AEROSOL, METERED RESPIRATORY (INHALATION) EVERY 4 HOURS PRN
Qty: 18 G | Refills: 0 | Status: SHIPPED | OUTPATIENT
Start: 2023-12-28 | End: 2024-12-27

## 2023-12-28 RX ORDER — PREDNISONE 10 MG/1
10 TABLET ORAL DAILY
Qty: 10 TABLET | Refills: 0 | Status: SHIPPED | OUTPATIENT
Start: 2023-12-28

## 2023-12-28 NOTE — PROGRESS NOTES
Subjective:      Patient ID: Caryl Cedeno is a 43 y.o. female.    Vitals:  vitals were not taken for this visit.     Chief Complaint: Cough (Cough and allergies)      Visit Type: TELE AUDIOVISUAL    Present with the patient at the time of consultation: TELEMED PRESENT WITH PATIENT: None    Past Medical History:   Diagnosis Date    Allergy     Celiac disease     Fibromyalgia     Hypertension     Migraines     Small fiber neuropathy     per patient is types: sensory and autonomic     Past Surgical History:   Procedure Laterality Date    CERVICAL SPINE SURGERY  2016    COLONOSCOPY N/A 10/28/2019    Procedure: COLONOSCOPY;  Surgeon: Ezra Castañeda MD;  Location: [facility] (4TH FLR);  Service: Endoscopy;  Laterality: N/A;  LATEX Allergy    EPIDURAL STEROID INJECTION N/A 4/20/2021    Procedure: INJECTION, STEROID, EPIDURAL C7/T1;  Surgeon: Nevaeh Esposito MD;  [facility];  Service: Pain Management;  Laterality: N/A;  CONSENT NEEDED    ESOPHAGOGASTRODUODENOSCOPY N/A 10/28/2019    Procedure: EGD (ESOPHAGOGASTRODUODENOSCOPY);  Surgeon: Ezra Castañeda MD;  Location: [facility] ([address] [place] [address]);  Service: Endoscopy;  Laterality: N/A;  LATEX Allergy    LAPAROSCOPIC CHOLECYSTECTOMY N/A 7/9/2019    Procedure: CHOLECYSTECTOMY, LAPAROSCOPIC;  Surgeon: Jagjit Martinez Jr., MD;  Location: [facility] OR [address] [place] [address];  Service: General;  Laterality: N/A;    LIVER BIOPSY  7/9/2019    Procedure: BIOPSY, LIVER;  Surgeon: Jagjit Martinez Jr., MD;  Location: [facility] OR [address] [place] [address];  Service: General;;    SPINE SURGERY      TRANSFORAMINAL EPIDURAL INJECTION OF STEROID Right 5/18/2021    Procedure: INJECTION, STEROID, EPIDURAL, TRANSFORAMINAL APPROACH L4/5 and L5/S1;  Surgeon: Nevaeh Esposito MD;  Location: [facility] PAIN [facility]GT;  Service: Pain Management;  Laterality: Right;  Consent needed     Review of patient's allergies indicates:   Allergen Reactions    Latex, natural rubber     Gluten protein Rash    Latex Rash     Current  Outpatient Medications on File Prior to Visit   Medication Sig Dispense Refill    ascorbic acid/collagen hydr (COLLAGEN PLUS VITAMIN C ORAL) Take 6,000 mg by mouth.      azaTHIOprine (IMURAN) 50 mg Tab Take 1 tablet by mouth 2 (two) times daily.      cetirizine (ZYRTEC) 10 MG tablet Take 10 mg by mouth once daily.      fexofenadine-pseudoephedrine (ALLEGRA-D 24) 180-240 mg per 24 hr tablet Take 1 tablet by mouth.      fluconazole (DIFLUCAN) 150 MG Tab 1 tab po now then 1 tab po in a week 2 tablet 0    galcanezumab-gnlm 120 mg/mL PnIj Inject 120 mg into the skin every 28 days. maintenance dose 1 mL 6    ibuprofen (ADVIL,MOTRIN) 600 MG tablet Take 600 mg by mouth daily as needed for Pain.      labetaloL (NORMODYNE) 200 MG tablet Take 200 mg by mouth 2 (two) times daily.      meloxicam (MOBIC) 15 MG tablet Take 1 tablet (15 mg total) by mouth once daily. 30 tablet 1    mycophenolate (CELLCEPT) 250 mg Cap Take 250 mg by mouth once daily.      naltrexone 1.5 mg Cap Take 1 capsule by mouth once daily. TAKE 1 CAPSULE BY MOUTH ONCE A DAY FOR 1 WEEK then INCREASE to 2 CAPSULE(S) ONCE A DAY THEREAFTER      pilocarpine (SALAGEN) 5 MG Tab Take 5 mg by mouth 3 (three) times daily.      pregabalin (LYRICA) 50 MG capsule Take 1 capsule (50 mg total) by mouth 3 (three) times daily. 90 capsule 5    psyllium (METAMUCIL) powder Take 1 packet by mouth once daily.      RESTASIS 0.05 % ophthalmic emulsion       triamcinolone acetonide 0.1% (KENALOG) 0.1 % cream Apply topically 2 (two) times daily. for 14 days 30 g 0    water Liqd 150 mL with MILK OF MAGNESIA 400 mg/5 mL Susp 400 mg, diphenhydrAMINE 12.5 mg/5 mL Elix 60 mg, nystatin 100,000 unit/mL Susp 500,000 Units SWISH AND SPIT 10ML'S BY MOUTH EVERY 4 HOURS  1    ZOLMitriptan (ZOMIG) 5 mg Spry USE 1 SPRAY IN EACH NOSTRIL ONCE DAILY as needed for migraine. 6 each 3     Current Facility-Administered Medications on File Prior to Visit   Medication Dose Route Frequency Provider Last Rate  Last Admin    0.9%  NaCl infusion   Intravenous Continuous Juanjo Ward MD         Family History   Problem Relation Age of Onset    Autoimmune disease Father     Other Father 75        suspected cancer (bladder?) ([place] at [facility]    Autoimmune disease Sister     Celiac disease Maternal Grandfather     Cancer Maternal Grandfather         intestinal (origin?) (dx 70s/80s)     Rectal cancer Maternal Grandfather         (dx 70s/80s)     Brain cancer Maternal Grandfather         (2 primaries? 1 in early 90s)    Cancer Paternal Grandmother         breast- twice, 1st dx @ around 30 (2nd a recurrence vs 2nd primary?)    Cancer Mother         non-melanoma skin ca    Hashimoto's thyroiditis Mother     Other Paternal Grandfather         history?    Hashimoto's thyroiditis Maternal Aunt     Other Maternal Aunt         congenital brain damage 2/2 vaccine given to her mother during pregnancy    Other Other     Cancer Other         (origin?)    Cancer Other         (origin?)    Cancer Other         (origin?)    Other Other         possible cancer    Cancer Other          of smoking-related cancer    Colon cancer Neg Hx     Esophageal cancer Neg Hx     Ovarian cancer Neg Hx     Melanoma Neg Hx        Medications Ordered                [id number] [place] [address]    Telephone: [phone]   Fax: [phone]   Hours: Not open 24 hours                         E-Prescribed (2 of 2)              albuterol (VENTOLIN HFA) 90 mcg/actuation inhaler    Sig: Inhale 2 puffs into the lungs every 4 (four) hours as needed for Wheezing. Rescue       Start: 23     Quantity: 18 g Refills: 0                         predniSONE (DELTASONE) 10 MG tablet    Sig: Take 1 tablet (10 mg total) by mouth once daily.       Start: 23     Quantity: 10 tablet Refills: 0                           Ohs Peq Odvv Intake    2023  2:16 PM CST -  Filed by Patient   Describe your reason for todays visit upper respiratory symptoms. Testing negative for covid.   What is your current physical address in the event of a medical emergency? [address] [place], [place], [address]   Are you able to take your vital signs? No   Please attach any relevant images or files          Exposed to COVID a little over a week ago.  Onset of symptoms is difficult to clarify.  Unclear when any symptoms started.  Asked to clarify but patient says there is confusion.  Has a scratchy throat and nasal congestion.  Has allergic reactions to la trees.  Describes a non protductive cough from yesterday.  Takes allegra  Last dose was this morning.    Cough  This is a new problem. The current episode started in the past 7 days.       HENT:  Positive for congestion.    Respiratory:  Positive for cough.         Objective:   The physical exam was conducted virtually.  Physical Exam   Constitutional: She appears ill. No distress.   HENT:   Head: Normocephalic.   Pulmonary/Chest: Effort normal.   Neurological: She is alert.       Assessment:     1. Cough, unspecified type        Plan:       Cough, unspecified type  -     predniSONE (DELTASONE) 10 MG tablet; Take 1 tablet (10 mg total) by mouth once daily.  Dispense: 10 tablet; Refill: 0  -     albuterol (VENTOLIN HFA) 90 mcg/actuation inhaler; Inhale 2 puffs into the lungs every 4 (four) hours as needed for Wheezing. Rescue  Dispense: 18 g; Refill: 0        Please take your allegra along with this medicine.

## 2023-12-30 ENCOUNTER — NURSE TRIAGE (OUTPATIENT)
Dept: ADMINISTRATIVE | Facility: CLINIC | Age: 43
End: 2023-12-30
Payer: MEDICARE

## 2023-12-31 NOTE — TELEPHONE ENCOUNTER
Caller states he is calling in for spouse. Caller state pt was seen virtual and diagnosis with cough, pt was given a prescription for predisone and ventolin. Caller would like to know if it is safe to take PTC cough and cold medication with prescription medication.  Pt advised per protocol and verbalized understanding.     Reason for Disposition   [1] Caller has medicine question about med NOT prescribed by PCP AND [2] triager unable to answer question (e.g., compatibility with other med, storage)    Additional Information   Negative: [1] Intentional drug overdose AND [2] suicidal thoughts or ideas   Negative: MORE THAN A DOUBLE DOSE of a prescription or over-the-counter (OTC) drug   Negative: [1] DOUBLE DOSE (an extra dose or lesser amount) of prescription drug AND [2] any symptoms (e.g., dizziness, nausea, pain, sleepiness)   Negative: [1] DOUBLE DOSE (an extra dose or lesser amount) of over-the-counter (OTC) drug AND [2] any symptoms (e.g., dizziness, nausea, pain, sleepiness)   Negative: Took another person's prescription drug   Negative: [1] DOUBLE DOSE (an extra dose or lesser amount) of prescription drug AND [2] NO symptoms  (Exception: A double dose of antibiotics.)   Negative: Diabetes drug error or overdose (e.g., took wrong type of insulin or took extra dose)   Negative: [1] Prescription not at pharmacy AND [2] was prescribed by PCP recently (Exception: Triager has access to EMR and prescription is recorded there. Go to Home Care and confirm for pharmacy.)   Negative: [1] Pharmacy calling with prescription question AND [2] triager unable to answer question   Negative: [1] Caller has URGENT medicine question about med that PCP or specialist prescribed AND [2] triager unable to answer question   Negative: Medicine patch causing local rash or itching    Protocols used: Medication Question Call-A-

## 2024-01-11 DIAGNOSIS — Z00.00 ENCOUNTER FOR MEDICARE ANNUAL WELLNESS EXAM: ICD-10-CM

## 2024-01-20 ENCOUNTER — PATIENT MESSAGE (OUTPATIENT)
Dept: ORTHOPEDICS | Facility: CLINIC | Age: 44
End: 2024-01-20
Payer: MEDICARE

## 2024-01-26 ENCOUNTER — PATIENT MESSAGE (OUTPATIENT)
Dept: INTERNAL MEDICINE | Facility: CLINIC | Age: 44
End: 2024-01-26

## 2024-01-26 ENCOUNTER — IMMUNIZATION (OUTPATIENT)
Dept: INTERNAL MEDICINE | Facility: CLINIC | Age: 44
End: 2024-01-26
Payer: MEDICARE

## 2024-01-26 ENCOUNTER — TELEPHONE (OUTPATIENT)
Dept: INTERNAL MEDICINE | Facility: CLINIC | Age: 44
End: 2024-01-26
Payer: MEDICARE

## 2024-01-26 ENCOUNTER — OFFICE VISIT (OUTPATIENT)
Dept: INTERNAL MEDICINE | Facility: CLINIC | Age: 44
End: 2024-01-26
Payer: MEDICARE

## 2024-01-26 VITALS
DIASTOLIC BLOOD PRESSURE: 70 MMHG | BODY MASS INDEX: 32.59 KG/M2 | HEART RATE: 65 BPM | RESPIRATION RATE: 16 BRPM | SYSTOLIC BLOOD PRESSURE: 116 MMHG | HEIGHT: 66 IN | WEIGHT: 202.81 LBS

## 2024-01-26 DIAGNOSIS — D84.9 IMMUNOSUPPRESSION: ICD-10-CM

## 2024-01-26 DIAGNOSIS — M79.7 FIBROMYALGIA: ICD-10-CM

## 2024-01-26 DIAGNOSIS — K21.9 LARYNGOPHARYNGEAL REFLUX (LPR): ICD-10-CM

## 2024-01-26 DIAGNOSIS — Z00.00 ENCOUNTER FOR MEDICARE ANNUAL WELLNESS EXAM: ICD-10-CM

## 2024-01-26 DIAGNOSIS — G43.109 MIGRAINE WITH AURA AND WITHOUT STATUS MIGRAINOSUS, NOT INTRACTABLE: Chronic | ICD-10-CM

## 2024-01-26 DIAGNOSIS — Z00.00 ENCOUNTER FOR PREVENTIVE HEALTH EXAMINATION: Primary | ICD-10-CM

## 2024-01-26 DIAGNOSIS — M47.812 CERVICAL SPONDYLOSIS: ICD-10-CM

## 2024-01-26 DIAGNOSIS — R26.9 ABNORMALITY OF GAIT AND MOBILITY: ICD-10-CM

## 2024-01-26 DIAGNOSIS — G89.4 CHRONIC PAIN SYNDROME: ICD-10-CM

## 2024-01-26 DIAGNOSIS — Z23 NEED FOR VACCINATION: Primary | ICD-10-CM

## 2024-01-26 DIAGNOSIS — M54.50 CHRONIC BILATERAL LOW BACK PAIN WITHOUT SCIATICA: ICD-10-CM

## 2024-01-26 DIAGNOSIS — G89.29 CHRONIC BILATERAL LOW BACK PAIN WITHOUT SCIATICA: ICD-10-CM

## 2024-01-26 DIAGNOSIS — M35.01 SJOGREN'S SYNDROME WITH KERATOCONJUNCTIVITIS SICCA: ICD-10-CM

## 2024-01-26 DIAGNOSIS — Z99.89 DEPENDENCE ON OTHER ENABLING MACHINES AND DEVICES: ICD-10-CM

## 2024-01-26 DIAGNOSIS — M47.26 OSTEOARTHRITIS OF SPINE WITH RADICULOPATHY, LUMBAR REGION: ICD-10-CM

## 2024-01-26 DIAGNOSIS — G90.A POTS (POSTURAL ORTHOSTATIC TACHYCARDIA SYNDROME): Chronic | ICD-10-CM

## 2024-01-26 DIAGNOSIS — G90.1 DYSAUTONOMIA: ICD-10-CM

## 2024-01-26 DIAGNOSIS — K90.0 CELIAC DISEASE: ICD-10-CM

## 2024-01-26 PROCEDURE — 99999 PR PBB SHADOW E&M-EST. PATIENT-LVL V: CPT | Mod: PBBFAC,,, | Performed by: NURSE PRACTITIONER

## 2024-01-26 PROCEDURE — G0439 PPPS, SUBSEQ VISIT: HCPCS | Mod: ,,, | Performed by: NURSE PRACTITIONER

## 2024-01-26 PROCEDURE — 99215 OFFICE O/P EST HI 40 MIN: CPT | Mod: PBBFAC,25 | Performed by: NURSE PRACTITIONER

## 2024-01-26 PROCEDURE — 90686 IIV4 VACC NO PRSV 0.5 ML IM: CPT | Mod: PBBFAC

## 2024-01-26 PROCEDURE — 99999PBSHW FLU VACCINE (QUAD) GREATER THAN OR EQUAL TO 3YO PRESERVATIVE FREE IM: Mod: PBBFAC,,,

## 2024-01-26 RX ORDER — FEXOFENADINE HCL 60 MG
60 TABLET ORAL DAILY
COMMUNITY

## 2024-01-26 NOTE — PROGRESS NOTES
"Caryl Cedeno presented for an initial Medicare AWV today. The following components were reviewed and updated:    Medical history  Family History  Social history  Allergies and Current Medications  Health Risk Assessment  Health Maintenance  Care Team    **See Completed Assessments for Annual Wellness visit with in the encounter summary    The following assessments were completed:  Depression Screening  Cognitive function Screening    Timed Get Up Test - Deferred, mobility impairment  Whisper Test      Opioid documentation:      Patient does not have a current opioid prescription.          Vitals:    01/26/24 1429   BP: 116/70   BP Location: Right arm   Patient Position: Sitting   BP Method: Large (Manual)   Pulse: 65   Resp: 16   Weight: 92 kg (202 lb 13.2 oz)   Height: 5' 6" (1.676 m)     Body mass index is 32.74 kg/m².       Physical Exam  Vitals reviewed.   Constitutional:       Appearance: Normal appearance. She is obese.   HENT:      Head: Normocephalic.   Cardiovascular:      Rate and Rhythm: Normal rate.   Pulmonary:      Effort: Pulmonary effort is normal.   Abdominal:      General: Bowel sounds are normal.   Musculoskeletal:         General: Normal range of motion.      Right lower leg: No edema.      Left lower leg: No edema.   Skin:     General: Skin is warm and dry.      Capillary Refill: Capillary refill takes less than 2 seconds.   Neurological:      Mental Status: She is alert and oriented to person, place, and time.   Psychiatric:         Mood and Affect: Mood normal.         Behavior: Behavior normal.         Thought Content: Thought content normal.         Judgment: Judgment normal.           Diagnoses and health risks identified today and associated recommendations/orders:  1. Encounter for preventive health examination  Assessments completed.  HM recommendation reviewed. Flu shot today. Discussed pneumonia vaccine.  F/u with PCP as instructed.    2. Immunosuppression  Chronic, stable on current " regimen. Followed by outside rheumatology.    3. Sjogren's syndrome with keratoconjunctivitis sicca  Chronic, stable on current regimen. Followed by outside rheumatology.    4. Dysautonomia  Chronic, stable on current regimen. Followed by outside cardiology.    5. POTS (postural orthostatic tachycardia syndrome)  Chronic, stable on current regimen. Followed by outside cardiology.    6. BMI 32.0-32.9,adult  Chronic, stable on current regimen. Followed by PCP.    7. Chronic pain syndrome  Chronic, stable on current regimen. Followed by outside pain med.    8. Cervical spondylosis  Chronic, stable on current regimen. Followed by outside pain med.    9. Osteoarthritis of spine with radiculopathy, lumbar region  Chronic, stable on current regimen. Followed by outside pain med.    10. Chronic bilateral low back pain without sciatica  Chronic, stable on current regimen. Followed by outside pain med.    11. Fibromyalgia  Chronic, stable on current regimen. Followed by outside pain med.    12. Abnormality of gait and mobility  Chronic, stable on current regimen. Followed by PCP.    13. Dependence on other enabling machines and devices  Chronic, stable. Has scooter, wheelchair, and rollator at home. Followed by PCP.    14. Migraine with aura and without status migrainosus, not intractable  Chronic, stable on current regimen. Followed by neurology.    15. Laryngopharyngeal reflux (LPR)  Chronic, stable on current regimen. Followed by GI.    16. Celiac disease  Chronic, stable on current regimen. Followed by GI.    17. Encounter for Medicare annual wellness exam  Medicare HRA completed.  - Ambulatory Referral/Consult to Enhanced Annual Wellness Visit (eAWV)      Provided Caryl with a 5-10 year written screening schedule and personal prevention plan. Recommendations were developed using the USPSTF age appropriate recommendations. Education, counseling, and referrals were provided as needed.  After Visit Summary printed and  given to patient which includes a list of additional screenings\tests needed.    Follow up in about 1 year (around 1/26/2025) for Medicare AWV and with PCP as instructed.       Pascale Garcia NP    I offered to discuss advanced care planning, including how to pick a person who would make decisions for you if you were unable to make them for yourself, called a health care power of , and what kind of decisions you might make such as use of life sustaining treatments such as ventilators and tube feeding when faced with a life limiting illness recorded on a living will that they will need to know. (How you want to be cared for as you near the end of your natural life)     X Patient is interested in learning more about how to make advanced directives.  I provided them paperwork and offered to discuss this with them.

## 2024-01-26 NOTE — PATIENT INSTRUCTIONS
1. Schedule annual with Dr. Snyder, Joann SU MD.    2. Flu shot today.    3. Pneumonia shot in about 2 weeks after flu shot, Prevnar 20.    Counseling and Referral of Other Preventative  (Italic type indicates deductible and co-insurance are waived)    Patient Name: Caryl Cedeno  Today's Date: 1/26/2024    Health Maintenance       Date Due Completion Date    Pneumococcal Vaccines (Age 0-64) (1 of 2 - PCV) Never done ---    Influenza Vaccine (1) 09/01/2023 12/19/2020    Mammogram 07/24/2024 7/24/2023    TETANUS VACCINE 08/27/2024 8/27/2014    Hemoglobin A1c (Diabetic Prevention Screening) 10/25/2025 10/25/2022    Cervical Cancer Screening 07/27/2026 7/27/2021    Lipid Panel 09/13/2026 9/13/2021    Colorectal Cancer Screening 10/28/2029 10/28/2019        No orders of the defined types were placed in this encounter.    The following information is provided to all patients.  This information is to help you find resources for any of the problems found today that may be affecting your health:                  Living healthy guide: www.[facility].[place].gov      Understanding Diabetes: www.diabetes.org      Eating healthy: www.cdc.gov/healthyweight      CDC home safety checklist: www.cdc.gov/steadi/patient.html      Agency on Aging: www.goea.[place].[facility]      Alcoholics anonymous (AA): www.aa.org      Physical Activity: www.basia.nih.gov/hm6wits      Tobacco use: www.quitwithusla.org

## 2024-01-26 NOTE — TELEPHONE ENCOUNTER
Patient needs to r/s EAWV. Patient has not seen PCP in 2 years. Needs to reestablish with PCP before EAWV.

## 2024-02-05 ENCOUNTER — OFFICE VISIT (OUTPATIENT)
Dept: ORTHOPEDICS | Facility: CLINIC | Age: 44
End: 2024-02-05
Payer: MEDICARE

## 2024-02-05 VITALS — WEIGHT: 202 LBS | HEIGHT: 66 IN | BODY MASS INDEX: 32.47 KG/M2

## 2024-02-05 DIAGNOSIS — M75.42 IMPINGEMENT SYNDROME OF LEFT SHOULDER: Primary | ICD-10-CM

## 2024-02-05 PROCEDURE — 99213 OFFICE O/P EST LOW 20 MIN: CPT | Mod: S$PBB,25,, | Performed by: PHYSICIAN ASSISTANT

## 2024-02-05 PROCEDURE — 99999 PR PBB SHADOW E&M-EST. PATIENT-LVL IV: CPT | Mod: PBBFAC,,, | Performed by: PHYSICIAN ASSISTANT

## 2024-02-05 PROCEDURE — 20610 DRAIN/INJ JOINT/BURSA W/O US: CPT | Mod: PBBFAC | Performed by: PHYSICIAN ASSISTANT

## 2024-02-05 PROCEDURE — 20610 DRAIN/INJ JOINT/BURSA W/O US: CPT | Mod: S$PBB,LT,, | Performed by: PHYSICIAN ASSISTANT

## 2024-02-05 PROCEDURE — 99999PBSHW PR PBB SHADOW TECHNICAL ONLY FILED TO HB: Mod: PBBFAC,,,

## 2024-02-05 PROCEDURE — 99214 OFFICE O/P EST MOD 30 MIN: CPT | Mod: PBBFAC | Performed by: PHYSICIAN ASSISTANT

## 2024-02-05 RX ORDER — LIDOCAINE HYDROCHLORIDE 10 MG/ML
2 INJECTION INFILTRATION; PERINEURAL
Status: DISCONTINUED | OUTPATIENT
Start: 2024-02-05 | End: 2024-02-05 | Stop reason: HOSPADM

## 2024-02-05 RX ORDER — TRIAMCINOLONE ACETONIDE 40 MG/ML
40 INJECTION, SUSPENSION INTRA-ARTICULAR; INTRAMUSCULAR
Status: DISCONTINUED | OUTPATIENT
Start: 2024-02-05 | End: 2024-02-05 | Stop reason: HOSPADM

## 2024-02-05 RX ORDER — PREGABALIN 50 MG/1
50 CAPSULE ORAL 3 TIMES DAILY
Qty: 90 CAPSULE | Refills: 1 | Status: SHIPPED | OUTPATIENT
Start: 2024-02-05 | End: 2024-03-06

## 2024-02-05 RX ADMIN — TRIAMCINOLONE ACETONIDE 40 MG: 40 INJECTION, SUSPENSION INTRA-ARTICULAR; INTRAMUSCULAR at 02:02

## 2024-02-05 RX ADMIN — LIDOCAINE HYDROCHLORIDE 2 ML: 10 INJECTION, SOLUTION INFILTRATION; PERINEURAL at 02:02

## 2024-02-05 NOTE — PROCEDURES
Large Joint Aspiration/Injection: L subacromial bursa    Date/Time: 2/5/2024 2:30 PM    Performed by: Radha Salvador PA-C  Authorized by: Radha Salvador PA-C    Consent Done?:  Yes (Verbal)  Indications:  Pain  Timeout: prior to procedure the correct patient, procedure, and site was verified    Prep: patient was prepped and draped in usual sterile fashion    Local anesthetic:  Topical anesthetic    Details:  Needle Size:  22 G  Approach:  Posterior  Location:  Shoulder  Site:  L subacromial bursa  Medications:  40 mg triamcinolone acetonide 40 mg/mL; 2 mL LIDOcaine HCL 10 mg/ml (1%) 10 mg/mL (1 %)  Patient tolerance:  Patient tolerated the procedure well with no immediate complications

## 2024-02-05 NOTE — PROGRESS NOTES
"Patient ID: Caryl Cedeno is a 43 y.o. female.    Chief Complaint: Pain and Injections of the Left Shoulder      HISTORY:  Caryl Cedeno is a 43 y.o. female who returns to me today for follow up of left shoulder pain.  She was last seen by me 9/18/2023.  Today she is doing well, but notes pain has persisted in her left shoulder.  She does have relief with meloxicam.  She has pain worse with overhead activity, getting dressed, reaching across. She had MRI done and plan was to return to try injection.      PMH/PSH/FamHx/SocHx:    Unchanged from prior visit.    ROS:  Constitution: Negative for chills, fever and weakness.   Respiratory: Negative for cough and shortness of breath.   Musculoskeletal: Positive for left shoulder  Psychiatric/Behavioral: The patient is not nervous/anxious.       PHYSICAL EXAM:   Ht 5' 5.98" (1.676 m)   Wt 91.6 kg (202 lb)   BMI 32.62 kg/m²   Left shoulder  Skin intact  No warmth or effusion  ROM , ER 40, IR L1  + mtz  + impingement      ASSESSMENT/PLAN:    Caryl was seen today for pain and injections.    Diagnoses and all orders for this visit:    Impingement syndrome of left shoulder  -     Large Joint Aspiration/Injection: L subacromial bursa    Other orders  -     pregabalin (LYRICA) 50 MG capsule; Take 1 capsule (50 mg total) by mouth 3 (three) times daily.    - Left shoulder CSI performed today- rest, ice as needed  - Continue meloxicam as needed  - Out of Lyrica- takes for neuropathy, has been on this from neurology.  She is out of prescription- will refill once until she can get this managed by pcp or pain management  - Follow up as needed        "

## 2024-02-12 RX ORDER — MELOXICAM 15 MG/1
15 TABLET ORAL
Qty: 30 TABLET | Refills: 1 | Status: SHIPPED | OUTPATIENT
Start: 2024-02-12 | End: 2024-04-15

## 2024-03-21 NOTE — PROGRESS NOTES
Lab at bedside for blood cultures   [facility]      Name: Caryl Cedeno  Clinic Number: [id number]  Date of Treatment: 2018   Diagnosis:   Encounter Diagnosis   Name Primary?    Chronic neck pain      Physician: Berlin Saleem*    Pain pattern determined: 1 no movement preference, anxiety, fearful of movement, neck sx, POTS, small fiber neuropathy, fibromyalgia   Plan of care signed: 2018  Time in: 4:15  Time Out: 5:15  Total Billable Units: 45  Precautions: POTS (postural orthostatic tachycardia syndrome) (Chronic)  Small fiber neuropathy - Primary  Fibromyalgia  Neck sx: disc replacement 2016    Visit #: 12  CHRONIC PAIN< MULTIPLE PRECAUTIONS< ANXIETY>PROGRESS WITH CARE    Visit # authorized: 25  Authorization period: 18  Plan of care Expiration: 18    Reassessment done: 18  Reassessment Due:18    Subjective   Pt reports that she is an increase in pain today. However, she notes feeling a little better and more flexible after exercise today.      Patient reports tolerating previous visit fairly well with minimal reports of increased pain.     Patient reports their pain to be 6/10 on a 0-10 scale with 0 being no pain and 10 being the worst pain imaginable.  Pain Location: neck and shoulders     Occupation:     , but not working, working on disability  Leisure: gym 1 if that per week, pool 2/week, sometimes goes to gym                     Pts goals:   Socialize, cleaning, working-tutoring    Objective     Baseline IM Testing Results:   Date of testin2018  ROM 36-90 deg   Max Peak Torque 96    Min Peak Torque 67    Flex/Ext Ratio 1.2/1   % below normative data 67     Midpoint IM Testing Results:   Date of testin2018  ROM  deg   Max Peak Torque 98   Min Peak Torque 69   Flex/Ext Ratio 1.42:1   % below normative data -56%   Hinojosa compared to IE  +13%    Seat adjustment 291   counterweight .8   Seat pad 0       Outcomes: Cervical  Initial  score:59  Visit 5 score:  Goal:      Treatment    Pt was instructed in and performed the following:     Caryl received therapeutic exercises to develop/improved posture, cardiovascular endurance, muscular endurance, lumbar/cervical ROM, strength and muscular endurance for 40 minutes including the following exercises:   [facility] 7/5/2018   Visit Number 12   VAS Pain Rating 6   Recumbent Bike Seat Pos. 6   Time 10   Retraction in Sitting -   Scapular Retraction -   Manual Therapy -   Cervical Extension Seat Pad -   Seat Adjustment -   Top Dead Center -   Counterweight -   Cervical Flexion -   Cervical Extension -   Cervical Peak Torque -   Cervical Weight -   Repetitions -   Rating of Perceived Exertion -   Lumbar Weight 66   Repetitions 21   Rating of Perceived Exertion 5   Ice - Z Lie (in min.) 10      cont to increase time on recumbent bike  Shoulder shrugs in sitting  Retractions 10 reps in sitting  Scapular adduction with shoulder ER YTB 20x reps  RIS 10x  3-5 s/h  RIL 10x 3-5 s/h  OB 10x   Craniocradle placement in supine 5 minutes  Reviewed PPT for core strengthening.      Peripheral muscle strengthening which included 1 set of 15-20 repetitions at a slow, controlled 7 second per rep pace focused on strengthening supporting musculature for improved body mechanics and functional mobility.  Pt and therapist focused on proper form during treatment to ensure optimal strengthening of each targeted muscle group.  Machines were utilized including torso rotation, leg extension, leg curl, chest press, upright row. Tricep extension, and hip abduction    Caryl received the following manual therapy techniques: : grade 2 bilateral CT downglide mobilization, gentle STM and cervical traction, 5 minutes       Home Exercise Program as follows:   Handouts were given to the patient. Pt demo good understanding of the education provided. Caryl demonstrated good return demonstration of activities.     Swim 2/week  Use  "lumbar roll  Neck retractions 3/day 10 reps  Shoulder shrugs 3/day 10 reps  Shoulder ext rot and  scap retraction YTB 3/day 10 reps  Cervical retractions in supine with towel roll 10x, 2x daily  Sidelying thoracic rotations "Open Books" 10x, 1-2daily    Lumbar roll use compliance: unknown  Additional exercises taught this treatment session:   Cervical retractions in supine with towel roll 10x, 2x daily  Sidelying thoracic rotations "Open Books" 10x, 1-2daily      Assessment   Pt tolerated treatment well  and able to complete 20 reps at 66 in/lbs. Weight not increased today secondary to high exertion last session and increase pain. Plan to increase 5% next visit.  Pt tolerated genltle STM and CT mobilization with mild reduction in symptoms. Attempted RIL without increased symptoms. Also attempted OB stretch with positive stretch response but pt reported some left should pain during. Pt reported reduced neck tenderness with suboccipital release on cranio cradle. Plan to attempt again next session. Pt is making fair-good progress towards treatment goals.   Pt will continue to benefit from skilled outpatient physical therapy to address the deficits stated in the impairment chart, provide pt/family education and to maximize pt's level of independence in the home and community environment.       Pt's spiritual, cultural and educational needs considered and pt agreeable to plan of care and goals as stated below:     Medical necessity is demonstrated by the following problem list.    Pt presents with the following impairments:   History  Co-morbidities and personal factor after manual thrs that may impact the plan of care Examination  Body Structures and Functions, activity limitations and participation restrictions that may impact the plan of care Clinical Presentation    Decision Making/ Complexity Score   Co-morbidities:      POTS (postural orthostatic tachycardia syndrome) (Chronic)  Small fiber neuropathy - " Primary  Fibromyalgia  Neck sx: disc replacement Nov 2016              Personal Factors:   Anxiety  Weak  Fearful of movement Body Regions:   head  neck  back  lower extremities  upper extremities     Body Systems:   gross symmetry  ROM  strength  gross coordinated movement  balance  gait  transfers  motor control     Activity limitations:   Learning and applying knowledge  no deficits     General Tasks and Commands  no deficits     Communication  no deficits     Mobility  fine hand use (grasping/picking up)  walking  moving around using equipment (WC)  using transportation (bus, train, plane, car)  driving (bike, car, motorcycle)     Self care  washing oneself (bathing, drying, washing hands)  caring for body parts (brushing teeth, shaving, grooming)  toileting  dressing  looking after one's health     Domestic Life  shopping  cooking  doing house work (cleaning house, washing dishes, laundry)  assisting others     Interactions/Relationships  no deficits     Life Areas  no deficits     Community and Social Life  community life  recreation and leisure     Participation Restrictions:  Not shopping, going out, driving       unstable clinical presentation with unpredictable characteristics    high      GOALS: Pt is in agreement with the following goals.     Short term goals: 6 weeks or 10 visits   1.  Pt will demonstrate increased isometric torque by 5% from initial test indicating positive muscular response to program of progressive resistance training Met 6/18/2018  2. Pt will demonstrate reduced pain and improved functional outcomes as reported on the patient centered questionnaires. Report 2-4 points reduction NDI indicating improvement in function and pain Progressing  3.. Pt will demonstrate independence with reducing or controlling symptoms with ther ex, movement, or position independently, able to reduce pain 1-2 points on pain scale using strategies taught in therapy Progressing  4. Pt will demonstrate  increased maximum isometric torque value by 30% when compared to the initial value resulting in improved ability to perform bending, lifting, and carrying activities safely, confidently.Progressing           Long term goals: 13 weeks or 20 visits  1. Pt will demonstratte increased cervical ROM as measured by med ex by 6 degrees from initial test which results in full functional ROM of neck for ease with ADLs and drivingMet 6/18/2018  2. Pt will demonstrate increased isometric torque by 10% from initial test to improve ability to lift and carry. Met 6/18/20183.Patient will   demonstrate improved overall function per FOTO Survey to CJ = at least 20% but < 40% impaired, limited or restricted score or less.  Progressing  4. Pt will demonstrate independence with reducing or controlling symptoms with ther ex, movement, or position independently, able to reduce pain 2-4 points on pain scale using strategies taught in therapy Progressing  5. Pt will demonstrate independence with the HEP at discharge.  Progressing  6.   drive and make a meal, regularly Progressing  7. Not use a scooter to shop Progressing  8.   walk more than 2 blocks Progressing        Plan   Continue with established Plan of Care towards established PT goals.

## 2024-04-08 ENCOUNTER — OFFICE VISIT (OUTPATIENT)
Dept: FAMILY MEDICINE | Facility: CLINIC | Age: 44
End: 2024-04-08
Payer: MEDICARE

## 2024-04-08 ENCOUNTER — TELEPHONE (OUTPATIENT)
Dept: FAMILY MEDICINE | Facility: CLINIC | Age: 44
End: 2024-04-08
Payer: MEDICARE

## 2024-04-08 VITALS
HEART RATE: 75 BPM | HEIGHT: 65 IN | SYSTOLIC BLOOD PRESSURE: 122 MMHG | OXYGEN SATURATION: 96 % | DIASTOLIC BLOOD PRESSURE: 76 MMHG | WEIGHT: 202.19 LBS | TEMPERATURE: 99 F | BODY MASS INDEX: 33.69 KG/M2

## 2024-04-08 DIAGNOSIS — M25.521 ELBOW PAIN, RIGHT: Primary | ICD-10-CM
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
## 2024-04-08 DIAGNOSIS — R79.9 ABNORMAL FINDING OF BLOOD CHEMISTRY, UNSPECIFIED: ICD-10-CM

## 2024-04-08 PROCEDURE — 99999 PR PBB SHADOW E&M-EST. PATIENT-LVL IV: CPT | Mod: PBBFAC,,, | Performed by: FAMILY MEDICINE

## 2024-04-08 PROCEDURE — 99214 OFFICE O/P EST MOD 30 MIN: CPT | Mod: S$PBB,,, | Performed by: FAMILY MEDICINE

## 2024-04-08 PROCEDURE — 99214 OFFICE O/P EST MOD 30 MIN: CPT | Mod: PBBFAC,PO | Performed by: FAMILY MEDICINE

## 2024-04-08 NOTE — PROGRESS NOTES
Subjective     Patient ID: Caryl Cedeno is a 43 y.o. female.    Chief Complaint: Annual Exam and Fall    43 year old female presents for concerns about her elbow. She  states she fell and slid on a wet floor and she landed on her right elbow.  She states it did swell. She states when she leaned on it it hurts. She states it stays there it doesn't shoot anywhere. This happened on 2/11/24.       She had surgery on her neck years ago. She states she has a tingling at the base of her neck. It doesn't spread or go down her arm. She states it goes away on its own. She states it lasts 35 minutes. She has no weakness in her arms or hands.     Fall      Past Medical History:   Diagnosis Date    Allergy     Celiac disease     Fibromyalgia     Hypertension     Migraines     Small fiber neuropathy     per patient is types: sensory and autonomic      Past Surgical History:   Procedure Laterality Date    CERVICAL SPINE SURGERY  2016    COLONOSCOPY N/A 10/28/2019    Procedure: COLONOSCOPY;  Surgeon: Ezra Castañeda MD;  Location: [address]);  Service: Endoscopy;  Laterality: N/A;  LATEX Allergy    EPIDURAL STEROID INJECTION N/A 4/20/2021    Procedure: INJECTION, STEROID, EPIDURAL C7/T1;  Surgeon: Nevaeh Esposito MD;  Location: [facility];  Service: Pain Management;  Laterality: N/A;  CONSENT NEEDED    ESOPHAGOGASTRODUODENOSCOPY N/A 10/28/2019    Procedure: EGD (ESOPHAGOGASTRODUODENOSCOPY);  Surgeon: Ezra Castañeda MD;  Location: [address]);  Service: Endoscopy;  Laterality: N/A;  LATEX Allergy    LAPAROSCOPIC CHOLECYSTECTOMY N/A 7/9/2019    Procedure: CHOLECYSTECTOMY, LAPAROSCOPIC;  Surgeon: Jagjit Martinez Jr., MD;  Location: [address];  Service: General;  Laterality: N/A;    LIVER BIOPSY  7/9/2019    Procedure: BIOPSY, LIVER;  Surgeon: Jagjit Martinez Jr., MD;  Location: [facility] OR [address] [place] [address];  Service: General;;    SPINE SURGERY      TRANSFORAMINAL EPIDURAL INJECTION OF STEROID  Right 2021    Procedure: INJECTION, STEROID, EPIDURAL, TRANSFORAMINAL APPROACH L4/5 and L5/S1;  Surgeon: Nevaeh Esposito MD;  Location: [facility];  Service: Pain Management;  Laterality: Right;  Consent needed     Family History   Problem Relation Age of Onset    Autoimmune disease Father     Other Father 75        suspected cancer (bladder?) (pt at [facility])    Autoimmune disease Sister     Celiac disease Maternal Grandfather     Cancer Maternal Grandfather         intestinal (origin?) (dx 70s/80s)     Rectal cancer Maternal Grandfather         (dx 70s/80s)     Brain cancer Maternal Grandfather         (2 primaries? 1 in early 90s)    Cancer Paternal Grandmother         breast- twice, 1st dx @ around 30 (2nd a recurrence vs 2nd primary?)    Cancer Mother         non-melanoma skin ca    Hashimoto's thyroiditis Mother     Other Paternal Grandfather         history?    Hashimoto's thyroiditis Maternal Aunt     Other Maternal Aunt         congenital brain damage 2/2 vaccine given to her mother during pregnancy    Other Other     Cancer Other         (origin?)    Cancer Other         (origin?)    Cancer Other         (origin?)    Other Other         possible cancer    Cancer Other          of smoking-related cancer    Colon cancer Neg Hx     Esophageal cancer Neg Hx     Ovarian cancer Neg Hx     Melanoma Neg Hx      Social History     Socioeconomic History    Marital status:    Tobacco Use    Smoking status: Former    Smokeless tobacco: Never   Substance and Sexual Activity    Alcohol use: Yes     Comment: rarely     Drug use: No    Sexual activity: Yes     Partners: Male     Birth control/protection: None   Other Topics Concern    Are you pregnant or think you may be? No    Breast-feeding No     Social Determinants of Health     Financial Resource Strain: Low Risk  (2024)    Overall Financial Resource Strain (CARDIA)     Difficulty of Paying Living Expenses: Not very hard   Food Insecurity: No  "Food Insecurity (1/26/2024)    Hunger Vital Sign     Worried About Running Out of Food in the Last Year: Never true     Ran Out of Food in the Last Year: Never true   Transportation Needs: No Transportation Needs (1/26/2024)    PRAPARE - Transportation     Lack of Transportation (Medical): No     Lack of Transportation (Non-Medical): No   Physical Activity: Inactive (1/26/2024)    Exercise Vital Sign     Days of Exercise per Week: 0 days     Minutes of Exercise per Session: 0 min   Stress: Stress Concern Present (1/26/2024)    [employer] [place] [employer] - Occupational Stress Questionnaire     Feeling of Stress : Rather much   Social Connections: Moderately Integrated (1/26/2024)    Social Connection and Isolation Panel [NHANES]     Frequency of Communication with Friends and Family: More than three times a week     Frequency of Social Gatherings with Friends and Family: Once a week     Attends [employer] Services: Never     Active Member of Clubs or Organizations: Yes     Attends Club or Organization Meetings: 1 to 4 times per year     Marital Status:    Housing Stability: Low Risk  (1/26/2024)    Housing Stability Vital Sign     Unable to Pay for Housing in the Last Year: No     Number of Places Lived in the Last Year: 1     Unstable Housing in the Last Year: No       Review of Systems       Objective   Vitals:    04/08/24 1418   BP: 122/76   Pulse: 75   Temp: 98.7 °F (37.1 °C)   TempSrc: Oral   SpO2: 96%   Weight: 91.7 kg (202 lb 2.6 oz)   Height: 5' 5" (1.651 m)      Physical Exam  Constitutional:       General: She is not in acute distress.     Appearance: Normal appearance. She is not ill-appearing or toxic-appearing.   HENT:      Head: Normocephalic and atraumatic.   Musculoskeletal:      Right elbow: No swelling, deformity, effusion or lacerations. Normal range of motion. Tenderness present.      Cervical back: No signs of trauma, rigidity, torticollis or crepitus. No pain with movement, " spinous process tenderness or muscular tenderness. Normal range of motion.   Neurological:      Mental Status: She is alert.            Assessment and Plan     1. Elbow pain, right  -     X-Ray Elbow 2 Views Right; Future; Expected date: 04/08/2024    2. Fall, initial encounter  -     X-Ray Elbow 2 Views Right; Future; Expected date: 04/08/2024    3. BMI 33.0-33.9,adult  -     Lipid Panel; Future; Expected date: 04/08/2024    4. Abnormal finding of blood chemistry, unspecified  -     Lipid Panel; Future; Expected date: 04/08/2024                 No follow-ups on file.

## 2024-04-08 NOTE — TELEPHONE ENCOUNTER
----- Message from Chantel Segura sent at 4/8/2024  2:02 PM CDT -----  Type:  Patient Returning Call    Who Called:pt  Who Left Message for Patient:pt  Does the patient know what this is regarding?:pt call she is running late to her appt went to the wrong location   Would the patient rather a call back or a response via [facility]? call  Best Call Back Number [phone]  Additional Information: call back

## 2024-04-08 NOTE — TELEPHONE ENCOUNTER
Spoke with the patient states she 5 minutes away. Notified that there is a 20 minutes shaun period and would need to reschedule if past the time. She stated understanding.

## 2024-04-15 RX ORDER — MELOXICAM 15 MG/1
15 TABLET ORAL
Qty: 30 TABLET | Refills: 1 | Status: SHIPPED | OUTPATIENT
Start: 2024-04-15 | End: 2024-06-17

## 2024-04-22 ENCOUNTER — APPOINTMENT (OUTPATIENT)
Dept: RADIOLOGY | Facility: HOSPITAL | Age: 44
End: 2024-04-22
Attending: FAMILY MEDICINE
Payer: MEDICARE

## 2024-04-22 DIAGNOSIS — M25.521 ELBOW PAIN, RIGHT: ICD-10-CM
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
## 2024-04-22 PROCEDURE — 73070 X-RAY EXAM OF ELBOW: CPT | Mod: 26,RT,, | Performed by: INTERNAL MEDICINE

## 2024-04-22 PROCEDURE — 73070 X-RAY EXAM OF ELBOW: CPT | Mod: TC,FY,PN,RT

## 2024-05-05 NOTE — TELEPHONE ENCOUNTER
Ma spoke to pt informed pt per Dr. Castañeda message - Requesting patient to see her Primary care MD for mouth evaluation.     Pt verbalize understanding and thank Ma   
Ma spoke to pt,    Pt is reporting sore in mouth and requesting for Dr. Castañeda to send Rx for Magic mouth wash with Nystatin to- [facility]  [address] [place] [id number]     Message routed to Dr. Castañdea     ----- Message from Breana Damon sent at 8/15/2019  1:41 PM CDT -----  Contact: Pt  Pt called to speak to the nurse regarding her care and would like a call back at [phone]    
PAST MEDICAL HISTORY:  No pertinent past medical history

## 2024-05-13 ENCOUNTER — TELEPHONE (OUTPATIENT)
Dept: HEMATOLOGY/ONCOLOGY | Facility: CLINIC | Age: 44
End: 2024-05-13
Payer: MEDICARE

## 2024-05-13 NOTE — TELEPHONE ENCOUNTER
Called patient to respond to message left a voicemail to please call back.         jaren Cedeno   Sent: Sat May 11, 2024 12:17 AM   To: [facility] Alirio Cedeno   MRN: [id number] : 1980   Pt Home: [phone]     Entered: [phone]        Message    Appointment Request From: Caryl Cedeno      With Provider: Jose Chandra [[facility]]      Preferred Date Range: Any date 2024 or later      Preferred Times: Monday Morning, Monday Afternoon, Wednesday Morning, Wednesday Afternoon, Friday Morning, Friday Afternoon      Reason for visit: Red spot on breast      Comments:   Red spot on breast      I prefer to do late morning or early afternoon on the days marked, if possible. Friday, I can do later also.

## 2024-05-13 NOTE — TELEPHONE ENCOUNTER
-Patient called back to schedule appointment and I also gave her the name of her obgyn. ---- Message from Kerrie Cruz sent at 5/13/2024  2:22 PM CDT -----  Regarding: Scheduling Request  Contact: Caryl Cedeno  Scheduling Request           Appt Type:  Ep     Date/Time Preference:any     Treating Provider:Prateek     Caller Name:Caryl Cedeno      Contact Preference:[phone] (home)       Comments/notes:Patient is returning call to Dalila to schedule appt . Requesting a call back

## 2024-05-17 ENCOUNTER — OFFICE VISIT (OUTPATIENT)
Dept: OBSTETRICS AND GYNECOLOGY | Facility: CLINIC | Age: 44
End: 2024-05-17
Payer: MEDICARE

## 2024-05-17 VITALS
SYSTOLIC BLOOD PRESSURE: 130 MMHG | HEIGHT: 65 IN | WEIGHT: 204.56 LBS | BODY MASS INDEX: 34.08 KG/M2 | DIASTOLIC BLOOD PRESSURE: 89 MMHG

## 2024-05-17 DIAGNOSIS — N63.10 MASS OF RIGHT BREAST, UNSPECIFIED QUADRANT: Primary | ICD-10-CM

## 2024-05-17 DIAGNOSIS — N64.59 OTHER SIGNS AND SYMPTOMS IN BREAST: ICD-10-CM

## 2024-05-17 PROCEDURE — 99213 OFFICE O/P EST LOW 20 MIN: CPT | Mod: PBBFAC | Performed by: OBSTETRICS & GYNECOLOGY

## 2024-05-17 PROCEDURE — 99213 OFFICE O/P EST LOW 20 MIN: CPT | Mod: S$PBB,,, | Performed by: OBSTETRICS & GYNECOLOGY

## 2024-05-17 PROCEDURE — 99999 PR PBB SHADOW E&M-EST. PATIENT-LVL III: CPT | Mod: PBBFAC,,, | Performed by: OBSTETRICS & GYNECOLOGY

## 2024-05-17 RX ORDER — PREGABALIN 25 MG/1
CAPSULE ORAL
COMMUNITY
Start: 2024-04-30

## 2024-05-17 NOTE — PROGRESS NOTES
Subjective     Patient ID: Caryl Cedeno is a 43 y.o. female.    Chief Complaint:  Breast Pain      History of Present Illness  44yo  presents with c/o painful lump in right breast, first noticed about 4 days ago. Very tender, relieved a little bit when wearing bra/support.  She does think it has improved a little bit in size and pain.  No nipple discharge.  Denies any bug bites or trauma to the area.  She does have a family h/o breast cancer. She also reports a small bump near her sternum that has been present for years, seen by derm.  Sometimes drains thick white foul-smelling discharge. Has not changed in size over the years. Last MMG 2023 normal.  No other complaints today.     Mass  This is a new problem. The current episode started in the past 7 days. The problem occurs intermittently. The problem has been waxing and waning. Associated symptoms include headaches, myalgias, neck pain and a rash. Pertinent negatives include no abdominal pain, anorexia, change in bowel habit, chest pain, chills, congestion, coughing, diaphoresis, fatigue, fever, nausea, numbness, swollen glands, urinary symptoms, vertigo, visual change, vomiting or weakness. The symptoms are aggravated by stress. She has tried immobilization and position changes for the symptoms. The treatment provided mild relief.       GYN & OB History  Patient's last menstrual period was 2024 (approximate).   Date of Last Pap: No result found    OB History    Para Term  AB Living   1   0   1     SAB IAB Ectopic Multiple Live Births     1            # Outcome Date GA Lbr Stevenson/2nd Weight Sex Type Anes PTL Lv   1 IAB                Review of Systems  Review of Systems   Constitutional:  Negative for chills, diaphoresis, fatigue and fever.   HENT:  Negative for nasal congestion.    Respiratory:  Negative for cough.    Cardiovascular:  Negative for chest pain.   Gastrointestinal:  Negative for abdominal pain, anorexia, change  "in bowel habit, nausea and vomiting.   Genitourinary:  Negative for pelvic pain.   Musculoskeletal:  Positive for myalgias and neck pain.   Integumentary:  Positive for rash and breast tenderness.   Neurological:  Positive for headaches. Negative for vertigo, weakness and numbness.   Breast: Positive for lump and tenderness.         Objective   Physical Exam    /89   Ht 5' 5" (1.651 m)   Wt 92.8 kg (204 lb 9.4 oz)   LMP 05/07/2024 (Approximate)   BMI 34.05 kg/m²     Gen: NAD  Resp: Normal respiratory effort  Breast: Symmetric, nontender.  No nipple discharge.  Area of erythema noted at 2-3 o'clock just outside the areaola with 2cm area of firm induration underneath.  Mildly tender on exam.  Also 5mm nodule noted on edge or right breast near sternum, appears c/w sebaceous cyst.  No masses palpable in left breast.   Abd: soft, NT  Pelvic: deferred  Ext: normal ROM  Psych: appropriate affect  Neuro: grossly intact       Assessment and Plan     Caryl was seen today for breast pain.    Diagnoses and all orders for this visit:    Mass of right breast, unspecified quadrant  -     Mammo Digital Diagnostic Right; Future  -     US Breast Right Complete; Future    Other signs and symptoms in breast  -     Mammo Digital Diagnostic Right; Future          Plan:  Symptoms and exam discussed with patient - appears to be possible infection/abscess, but will get diagnostic MMG and US in light of family history.  Offered abx, but pt states she often gets thrush with abx and would like to avoid if not necessary.  Symptoms are improving, so will continue to monitor for now.    Counseling done, precautions given.  RTC for annual exam.  Discussed can possibly drain/excise sebaceous cyst at next visit if normal MMG.            "

## 2024-05-31 ENCOUNTER — NURSE TRIAGE (OUTPATIENT)
Dept: ADMINISTRATIVE | Facility: CLINIC | Age: 44
End: 2024-05-31
Payer: MEDICARE

## 2024-05-31 ENCOUNTER — PATIENT MESSAGE (OUTPATIENT)
Dept: OBSTETRICS AND GYNECOLOGY | Facility: CLINIC | Age: 44
End: 2024-05-31
Payer: MEDICARE

## 2024-05-31 ENCOUNTER — TELEPHONE (OUTPATIENT)
Dept: OBSTETRICS AND GYNECOLOGY | Facility: HOSPITAL | Age: 44
End: 2024-05-31
Payer: MEDICARE

## 2024-05-31 ENCOUNTER — HOSPITAL ENCOUNTER (OUTPATIENT)
Dept: RADIOLOGY | Facility: OTHER | Age: 44
Discharge: HOME | End: 2024-05-31
Attending: OBSTETRICS & GYNECOLOGY
Payer: MEDICARE

## 2024-05-31 DIAGNOSIS — N63.10 MASS OF RIGHT BREAST, UNSPECIFIED QUADRANT: Primary | ICD-10-CM

## 2024-05-31 DIAGNOSIS — N63.10 MASS OF RIGHT BREAST, UNSPECIFIED QUADRANT: ICD-10-CM

## 2024-05-31 DIAGNOSIS — N64.59 OTHER SIGNS AND SYMPTOMS IN BREAST: ICD-10-CM

## 2024-05-31 PROCEDURE — 77065 DX MAMMO INCL CAD UNI: CPT | Mod: TC,RT

## 2024-05-31 PROCEDURE — 77065 DX MAMMO INCL CAD UNI: CPT | Mod: 26,RT,, | Performed by: RADIOLOGY

## 2024-05-31 PROCEDURE — 76642 ULTRASOUND BREAST LIMITED: CPT | Mod: TC,RT

## 2024-05-31 PROCEDURE — 76642 ULTRASOUND BREAST LIMITED: CPT | Mod: 26,RT,, | Performed by: RADIOLOGY

## 2024-05-31 PROCEDURE — 77061 BREAST TOMOSYNTHESIS UNI: CPT | Mod: 26,RT,, | Performed by: RADIOLOGY

## 2024-05-31 PROCEDURE — 77061 BREAST TOMOSYNTHESIS UNI: CPT | Mod: TC,RT

## 2024-05-31 RX ORDER — DICLOXACILLIN SODIUM 250 MG/1
500 CAPSULE ORAL 4 TIMES DAILY
Qty: 80 CAPSULE | Refills: 0 | Status: SHIPPED | OUTPATIENT
Start: 2024-05-31 | End: 2024-06-10

## 2024-06-01 NOTE — TELEPHONE ENCOUNTER
Nursing line called to inform of patient asking for prescription for antibiotics due to breast abscess noted on imaging.    Reviewed imaging which is consistent with breast abscess. Will trial dicloxicillin and place referral to breast surgery for follow-up/drainage. Rx sent to pharmacy.    Yesenia Patel MD/MPH  OB/GYN PGY4

## 2024-06-01 NOTE — TELEPHONE ENCOUNTER
States that she found a mass in breast and went to a GYN who thought it might be an infection. Was advised on antibiotics and imaging. Pt discussed with MD that she has trouble with antibiotics and would prefer to take antibiotics after it has been confirmed that she needs it for sure. Had imaging done today and was determines that she would need to take the antibiotics. Did not complete the imaging until late today and MD did not get a chance to call in the medication. States she would like to start the antibiotics as soon as possible.  Dr Patel on call provider contacted. MD states she will call in the medication. Pt states pharmacy of choice is [facility] located at [address] [place] [address].     Reason for Disposition   [1] Caller has URGENT medicine question about med that PCP or specialist prescribed AND [2] triager unable to answer question    Protocols used: Medication Question Call-A-

## 2024-06-06 RX ORDER — FLUCONAZOLE 150 MG/1
TABLET ORAL
Qty: 2 TABLET | Refills: 0 | Status: SHIPPED | OUTPATIENT
Start: 2024-06-06

## 2024-06-10 ENCOUNTER — OFFICE VISIT (OUTPATIENT)
Dept: SURGERY | Facility: CLINIC | Age: 44
End: 2024-06-10
Payer: MEDICARE

## 2024-06-10 VITALS
WEIGHT: 202 LBS | BODY MASS INDEX: 33.66 KG/M2 | HEART RATE: 60 BPM | HEIGHT: 65 IN | SYSTOLIC BLOOD PRESSURE: 118 MMHG | DIASTOLIC BLOOD PRESSURE: 83 MMHG

## 2024-06-10 DIAGNOSIS — L02.91 ABSCESS: Primary | ICD-10-CM

## 2024-06-10 DIAGNOSIS — N63.10 MASS OF RIGHT BREAST, UNSPECIFIED QUADRANT: ICD-10-CM

## 2024-06-10 PROCEDURE — 99214 OFFICE O/P EST MOD 30 MIN: CPT | Mod: S$PBB,,, | Performed by: PHYSICIAN ASSISTANT

## 2024-06-10 PROCEDURE — 99999 PR PBB SHADOW E&M-EST. PATIENT-LVL IV: CPT | Mod: PBBFAC,,, | Performed by: PHYSICIAN ASSISTANT

## 2024-06-10 PROCEDURE — 99214 OFFICE O/P EST MOD 30 MIN: CPT | Mod: PBBFAC | Performed by: PHYSICIAN ASSISTANT

## 2024-06-10 RX ORDER — SULFAMETHOXAZOLE AND TRIMETHOPRIM 800; 160 MG/1; MG/1
1 TABLET ORAL 2 TIMES DAILY
Qty: 14 TABLET | Refills: 0 | Status: SHIPPED | OUTPATIENT
Start: 2024-06-10 | End: 2024-06-10

## 2024-06-10 RX ORDER — SULFAMETHOXAZOLE AND TRIMETHOPRIM 800; 160 MG/1; MG/1
1 TABLET ORAL 2 TIMES DAILY
Qty: 14 TABLET | Refills: 0 | Status: SHIPPED | OUTPATIENT
Start: 2024-06-10 | End: 2024-06-17

## 2024-06-10 NOTE — PROGRESS NOTES
[facility]  Department of Surgery      REFERRING PROVIDER: Joann Snyder Md  [address] [place] [address]   CHIEF COMPLAINT:   Chief Complaint   Patient presents with    Breast Mass       Subjective:      Caryl Cedeno is a 43 y.o. premenopausal female referred for evaluation of a right breast abscess. Change was noted a few weeks ago. She was seen by her OBGYN who ordered breast imaging to evaluate changes. Imaging performed on 5/31/24 showed a 2.6 cm hypoechoic fluid collection in the right breast with visible erythema and induration consistent with breast abscess. Patient was started on dicloxacillin. Patient states the area drained a few days ago spontaneously with copious purulence. It continues to drain today. Patient denies  fever.  Patient reports skin changes. Patient reports drainage. Patient denies nipple discharge. Patient denies to previous breast biopsy. Patient denies a personal history of breast cancer.      Past Medical History:   Diagnosis Date    Allergy     Celiac disease     Fibromyalgia     Hypertension     Migraines     Small fiber neuropathy     per patient is types: sensory and autonomic     Past Surgical History:   Procedure Laterality Date    CERVICAL SPINE SURGERY  2016    COLONOSCOPY N/A 10/28/2019    Procedure: COLONOSCOPY;  Surgeon: Ezra Castañeda MD;  Location: [address]);  Service: Endoscopy;  Laterality: N/A;  LATEX Allergy    EPIDURAL STEROID INJECTION N/A 4/20/2021    Procedure: INJECTION, STEROID, EPIDURAL C7/T1;  Surgeon: Nevaeh Esposito MD;  Location: [facility] PAIN MGT;  Service: Pain Management;  Laterality: N/A;  CONSENT NEEDED    ESOPHAGOGASTRODUODENOSCOPY N/A 10/28/2019    Procedure: EGD (ESOPHAGOGASTRODUODENOSCOPY);  Surgeon: Ezra Castañeda MD;  Location: [address]);  Service: Endoscopy;  Laterality: N/A;  LATEX Allergy    LAPAROSCOPIC CHOLECYSTECTOMY N/A 7/9/2019    Procedure: CHOLECYSTECTOMY, LAPAROSCOPIC;   Surgeon: Jagjit Martinez Jr., MD;  [facility];  Service: General;  Laterality: N/A;    LIVER BIOPSY  7/9/2019    Procedure: BIOPSY, LIVER;  Surgeon: Jagjit Martinez Jr., MD;  [facility];  Service: General;;    SPINE SURGERY      TRANSFORAMINAL EPIDURAL INJECTION OF STEROID Right 5/18/2021    Procedure: INJECTION, STEROID, EPIDURAL, TRANSFORAMINAL APPROACH L4/5 and L5/S1;  Surgeon: Nevaeh Esposito MD;  Location: [facility] [place] PAIN T;  Service: Pain Management;  Laterality: Right;  Consent needed     Current Outpatient Medications on File Prior to Visit   Medication Sig Dispense Refill    azaTHIOprine (IMURAN) 50 mg Tab Take 1 tablet by mouth 2 (two) times daily.      dicloxacillin (DYNAPEN) 250 MG capsule Take 2 capsules (500 mg total) by mouth 4 (four) times daily. for 10 days 80 capsule 0    fexofenadine (ALLEGRA) 60 MG tablet Take 60 mg by mouth once daily.      fluconazole (DIFLUCAN) 150 MG Tab Take one tablet by mouth today.  Take second tablet by mouth in 3 days, if symptoms still present. 2 tablet 0    labetaloL (NORMODYNE) 200 MG tablet Take 200 mg by mouth 2 (two) times daily.      meloxicam (MOBIC) 15 MG tablet TAKE 1 TABLET(15 MG) BY MOUTH EVERY DAY 30 tablet 1    naltrexone 1.5 mg Cap Take 1 capsule by mouth once daily. TAKE 1 CAPSULE BY MOUTH ONCE A DAY FOR 1 WEEK then INCREASE to 2 CAPSULE(S) ONCE A DAY THEREAFTER      NALTREXONE HCL, BULK, MISC TAKE ONE CAPSULE BY MOUTH EVERY DAY      pilocarpine (SALAGEN) 5 MG Tab Take 5 mg by mouth 3 (three) times daily.      pregabalin (LYRICA) 25 MG capsule Take 1 in AM and 2 at night  Indications: Fibromyalgia Syndrome      water Liqd 150 mL with MILK OF MAGNESIA 400 mg/5 mL Susp 400 mg, diphenhydrAMINE 12.5 mg/5 mL Elix 60 mg, nystatin 100,000 unit/mL Susp 500,000 Units SWISH AND SPIT 10ML'S BY MOUTH EVERY 4 HOURS  1    ZOLMitriptan (ZOMIG) 5 mg Spry USE 1 SPRAY IN EACH NOSTRIL ONCE DAILY as needed for migraine. 6 each 3      Current Facility-Administered Medications on File Prior to Visit   Medication Dose Route Frequency Provider Last Rate Last Admin    0.9%  NaCl infusion   Intravenous Continuous Juanjo Ward MD         Social History     Socioeconomic History    Marital status:    Tobacco Use    Smoking status: Former    Smokeless tobacco: Never   Substance and Sexual Activity    Alcohol use: Yes     Comment: rarely     Drug use: No    Sexual activity: Yes     Partners: Male     Birth control/protection: None   Other Topics Concern    Are you pregnant or think you may be? No    Breast-feeding No     Social Determinants of Health     Financial Resource Strain: Low Risk  (1/26/2024)    Overall Financial Resource Strain (CARDIA)     Difficulty of Paying Living Expenses: Not very hard   Food Insecurity: No Food Insecurity (4/2/2024)    [facility]    Hunger Vital Sign     Worried About Running Out of Food in the Last Year: Never true     Ran Out of Food in the Last Year: Never true   Transportation Needs: No Transportation Needs (1/26/2024)    PRAPARE - Transportation     Lack of Transportation (Medical): No     Lack of Transportation (Non-Medical): No   Physical Activity: Inactive (1/26/2024)    Exercise Vital Sign     Days of Exercise per Week: 0 days     Minutes of Exercise per Session: 0 min   Stress: Stress Concern Present (1/26/2024)    [employer] - Occupational Stress Questionnaire     Feeling of Stress : Rather much   Housing Stability: Low Risk  (1/26/2024)    Housing Stability Vital Sign     Unable to Pay for Housing in the Last Year: No     Number of Places Lived in the Last Year: 1     Unstable Housing in the Last Year: No     Family History   Problem Relation Name Age of Onset    Autoimmune disease Father father     Other Father father 75        suspected cancer (bladder?) [place] [facility]    Autoimmune disease Sister sister     Celiac disease  Maternal Grandfather MGF     Cancer Maternal Grandfather MGF         intestinal (origin?) (dx 70s/80s)     Rectal cancer Maternal Grandfather MGF         (dx 70s/80s)     Brain cancer Maternal Grandfather MGF         (2 primaries? 1 in early 90s)    Cancer Paternal Grandmother PGM         breast- twice, 1st dx @ around 30 (2nd a recurrence vs 2nd primary?)    Cancer Mother mother         non-melanoma skin ca    Hashimoto's thyroiditis Mother mother     Other Paternal Grandfather PGF         history?    Hashimoto's thyroiditis Maternal Aunt Jessica     Other Maternal Aunt Misty         congenital brain damage 2/2 vaccine given to her mother during pregnancy    Other Other Tung     Cancer Other MGF's brother         (origin?)    Cancer Other Michelle         (origin?)    Cancer Other Tristian         (origin?)    Other Other MGF's brother         possible cancer    Cancer Other MGM's brother          of smoking-related cancer    Colon cancer Neg Hx      Esophageal cancer Neg Hx      Ovarian cancer Neg Hx      Melanoma Neg Hx         Review of Systems  Review of Systems   Constitutional:  Negative for chills, fever and malaise/fatigue.   HENT:  Negative for congestion.    Eyes:  Negative for discharge.   Respiratory:  Negative for cough, shortness of breath and stridor.    Cardiovascular:  Negative for chest pain and palpitations.   Gastrointestinal:  Negative for abdominal pain and nausea.   Neurological:  Negative for headaches.   Psychiatric/Behavioral:  The patient is not nervous/anxious.         Objective:        Vitals:    06/10/24 1424   BP: 118/83   Pulse: 60       Physical Exam   Vitals reviewed.  Constitutional: She is oriented to person, place, and time.   HENT:   Head: Normocephalic and atraumatic.   Nose: Nose normal.   Eyes: Pupils are equal, round, and reactive to light. Right eye exhibits no discharge. Left eye exhibits no discharge.   Pulmonary/Chest: Effort normal and breath sounds normal. No stridor.  No respiratory distress. She exhibits no mass, no tenderness and no edema. Right breast exhibits skin change and tenderness. Right breast exhibits no inverted nipple, no mass and no nipple discharge. Left breast exhibits no inverted nipple, no mass, no nipple discharge, no skin change and no tenderness. No breast swelling or bleeding. Breasts are asymmetrical.       Abdominal: Normal appearance.   Genitourinary: No breast swelling or bleeding.   Neurological: She is alert and oriented to person, place, and time.   Skin: Skin is warm and dry.     Psychiatric: Her behavior is normal. Mood, judgment and thought content normal.           Radiology review: Images personally reviewed by me in the clinic.    5/31/24 Right Diagnostic Mammo/US Right:    Findings:  This procedure was performed using tomosynthesis. Computer-aided detection was utilized in the interpretation of this examination.  Mammo Digital Diagnostic Right with Erick  The right breast is heterogeneously dense, which may obscure small masses.      In the right breast retroareolar upper inner quadrant anterior depth, there is an oval fluid collection just beneath the skin surface.  This corresponds to the area of concern.     Limited right breast ultrasound:   In the right breast 02:00 o'clock axis 2 cm from the nipple, there is an irregular heterogeneously hypoechoic fluid collection within the skin measuring 2.6 x 1.3 x 2.1 cm.  Associated posterior acoustic enhancement and rim hypervascularity.  This corresponds to the area of concern and right breast mammographic finding.  Associated visible erythema, induration, tenderness to palpation, and warmth to physical exam.     Impression:  Heterogeneous fluid collection in the right breast 02:00 o'clock axis skin is most consistent with a breast abscess (2.6 cm).  BI-RADS 2: Benign.  Recommend antibiotic therapy.  Also, recommend follow-up in breast surgery clinic next week to evaluate response to therapy and for  possible incision and drainage.  If there is no resolution, consider repeat ultrasound for possible biopsy, as clinically indicated.     BI-RADS Category:   Overall: 2 - Benign        Recommendation:  Begin antibiotic therapy as soon as possible.    Follow-up in breast surgery clinic next week for response to therapy and possible incision and drainage.  Annual screening mammography, next due July 2024.     The findings and recommendations were discussed directly with her by Dr. GEORGE Metcalf at the time of interpretation.      Assessment:      Caryl Cedeno is a 43 y.o. premenopausal female with right breast abscess.      Plan:   We discussed the options for management of a breast abscess. These include antibiotics and drainage.  Drainage options include aspiration vs. incision and drainage.     No role for drainage today as it drained spontaneously. Small resolving collection although I do recommend changing abx in order to ellicit a more complete resolution. Switched to Bactrim.     Patient will return to clinic in 1 week for wound check.  Instructions for wound care given to the patient. May consider repeat imaging at that time to ensure no residual infectious pocket.     Patient verbalized understanding of plan. All questions asked and answered. Advised to call our office with any new or worsening sxs.

## 2024-06-13 ENCOUNTER — OFFICE VISIT (OUTPATIENT)
Dept: OBSTETRICS AND GYNECOLOGY | Facility: CLINIC | Age: 44
End: 2024-06-13
Payer: MEDICARE

## 2024-06-13 VITALS
WEIGHT: 203.69 LBS | HEIGHT: 65 IN | BODY MASS INDEX: 33.94 KG/M2 | DIASTOLIC BLOOD PRESSURE: 84 MMHG | SYSTOLIC BLOOD PRESSURE: 122 MMHG

## 2024-06-13 DIAGNOSIS — Z12.31 ENCOUNTER FOR SCREENING MAMMOGRAM FOR MALIGNANT NEOPLASM OF BREAST: ICD-10-CM

## 2024-06-13 DIAGNOSIS — Z01.419 WELL WOMAN EXAM WITH ROUTINE GYNECOLOGICAL EXAM: Primary | ICD-10-CM

## 2024-06-13 PROCEDURE — 99213 OFFICE O/P EST LOW 20 MIN: CPT | Mod: PBBFAC | Performed by: OBSTETRICS & GYNECOLOGY

## 2024-06-13 PROCEDURE — 99999 PR PBB SHADOW E&M-EST. PATIENT-LVL III: CPT | Mod: PBBFAC,,, | Performed by: OBSTETRICS & GYNECOLOGY

## 2024-06-13 NOTE — PROGRESS NOTES
"Subjective     Patient ID: Caryl Cedeno is a 43 y.o. female.    Chief Complaint:  Well Woman      History of Present Illness  HPI  44yo  presents for annual exam.  Overall doing well.  Seen recently in breast clinic due to breast abscess.  Currently on Bactrim.  Abscess is less painful now, but still draining.  Otherwise doing well.  Regular, monthly cycles.  Heavy with cramping but only last 1-2 days.  Sexually active without complaints.  Last pap  nml, HPV neg.      GYN & OB History  Patient's last menstrual period was 2024 (approximate).   Date of Last Pap: No result found    OB History    Para Term  AB Living   1   0   1     SAB IAB Ectopic Multiple Live Births     1            # Outcome Date GA Lbr Stevenson/2nd Weight Sex Type Anes PTL Lv   1 IAB                Review of Systems  Review of Systems   Constitutional:  Negative for chills and fever.   Respiratory:  Negative for shortness of breath.    Cardiovascular:  Negative for chest pain.   Gastrointestinal:  Positive for constipation (Celiac disease) and diarrhea (Celiac disease). Negative for abdominal pain.   Genitourinary:  Negative for dyspareunia, pelvic pain and vaginal discharge.   Musculoskeletal:  Negative for myalgias.   Integumentary:  Positive for breast skin changes (draining abscess) and breast tenderness.   Neurological:  Positive for headaches.   Breast: Positive for skin changes (draining abscess) and tenderness.         Objective   Physical Exam    /84   Ht 5' 5" (1.651 m)   Wt 92.4 kg (203 lb 11.3 oz)   LMP 2024 (Approximate)   BMI 33.90 kg/m²     Gen: NAD  Resp: Normal respiratory effort  Breast: Symmetric, no nipple discharge. Area of erythema and openly draining abscess noted at 2 o'clock just outside the areola, tender on exam. Also 5mm nodule noted on edge or right breast near sternum, appears c/w sebaceous cyst. No masses palpable in left breast.   Abd: soft, NT  Pelvic: " Normal-appearing external female genitalia.  No CMT.  Uterus small, mobile, nontender.  No adnexal masses or tenderness.    Ext: normal ROM  Psych: appropriate affect  Neuro: grossly intact     Assessment and Plan     Caryl was seen today for well woman.    Diagnoses and all orders for this visit:    Well woman exam with routine gynecological exam    Encounter for screening mammogram for malignant neoplasm of breast          Plan:  Routine annual exam with gyn and breast exam today.  Pap due 2026  Declined STD screening.  Per last imaging - recommend routine screening MMG in July.   Will continue to f/u with breast clinic.  Counseling done, precautions given, all questions answered.  RTC 1 year for annual, or prn.

## 2024-06-17 RX ORDER — MELOXICAM 15 MG/1
15 TABLET ORAL
Qty: 30 TABLET | Refills: 1 | Status: SHIPPED | OUTPATIENT
Start: 2024-06-17

## 2024-06-20 ENCOUNTER — OFFICE VISIT (OUTPATIENT)
Dept: SURGERY | Facility: CLINIC | Age: 44
End: 2024-06-20
Payer: MEDICARE

## 2024-06-20 VITALS
HEART RATE: 76 BPM | HEIGHT: 65 IN | BODY MASS INDEX: 33.82 KG/M2 | DIASTOLIC BLOOD PRESSURE: 78 MMHG | WEIGHT: 203 LBS | SYSTOLIC BLOOD PRESSURE: 117 MMHG

## 2024-06-20 DIAGNOSIS — L02.91 ABSCESS: ICD-10-CM

## 2024-06-20 DIAGNOSIS — T14.8XXD WOUND HEALING, DELAYED: Primary | ICD-10-CM

## 2024-06-20 PROCEDURE — 99213 OFFICE O/P EST LOW 20 MIN: CPT | Mod: S$PBB,,, | Performed by: PHYSICIAN ASSISTANT

## 2024-06-20 PROCEDURE — 99213 OFFICE O/P EST LOW 20 MIN: CPT | Mod: PBBFAC | Performed by: PHYSICIAN ASSISTANT

## 2024-06-20 PROCEDURE — 99999 PR PBB SHADOW E&M-EST. PATIENT-LVL III: CPT | Mod: PBBFAC,,, | Performed by: PHYSICIAN ASSISTANT

## 2024-06-21 RX ORDER — HONEY 100 %
1 PASTE (ML) TOPICAL DAILY
Qty: 15 ML | Refills: 0 | Status: SHIPPED | OUTPATIENT
Start: 2024-06-21

## 2024-06-24 ENCOUNTER — TELEPHONE (OUTPATIENT)
Dept: HEMATOLOGY/ONCOLOGY | Facility: CLINIC | Age: 44
End: 2024-06-24
Payer: MEDICARE

## 2024-06-25 ENCOUNTER — TELEPHONE (OUTPATIENT)
Dept: HEMATOLOGY/ONCOLOGY | Facility: CLINIC | Age: 44
End: 2024-06-25
Payer: MEDICARE

## 2024-06-25 NOTE — TELEPHONE ENCOUNTER
Patient called in to reschedule virtual appointment for today there was a problem with the portal and the help line said there was a problem with virtual visits today.

## 2024-06-27 ENCOUNTER — TELEPHONE (OUTPATIENT)
Dept: PAIN MEDICINE | Facility: CLINIC | Age: 44
End: 2024-06-27
Payer: MEDICARE

## 2024-06-27 NOTE — TELEPHONE ENCOUNTER
----- Message from John Eduardo sent at 6/27/2024  1:56 PM CDT -----  Type:  Sooner Apoointment Request    Caller is requesting a sooner appointment.  Caller declined first available appointment listed below.  Caller will not accept being placed on the waitlist and is requesting a message be sent to doctor.  Name of Caller:pt  When is the first available appointment?-  Symptoms:back pain  Would the patient rather a call back or a response via [facility]? call  Best Call Back Number:[phone]  Additional Information: pt states she believes she needs a nerve ablation and would like to see if someone is able to do it for her. Pt states she would like a call back asap. Thank you

## 2024-06-27 NOTE — TELEPHONE ENCOUNTER
Staff tried contacting patient to get her scheduled with one of our providers. Pt did not answer staff left a message for pt to give us a call back.

## 2024-06-28 ENCOUNTER — TELEPHONE (OUTPATIENT)
Dept: HEMATOLOGY/ONCOLOGY | Facility: CLINIC | Age: 44
End: 2024-06-28
Payer: MEDICARE

## 2024-07-02 NOTE — PROGRESS NOTES
[facility]  Department of Surgery      REFERRING PROVIDER: No referring provider defined for this encounter.   CHIEF COMPLAINT:   Chief Complaint   Patient presents with    Follow-up    Right Breast       Subjective:      Caryl Cedeno is a 44 y.o. premenopausal female referred for evaluation of a right breast abscess. Change was noted a few weeks ago. She was seen by her OBGYN who ordered breast imaging to evaluate changes. Imaging performed on 5/31/24 showed a 2.6 cm hypoechoic fluid collection in the right breast with visible erythema and induration consistent with breast abscess. Patient was started on dicloxacillin. Patient states the area drained a few days ago spontaneously with copious purulence. It continues to drain today. Patient denies  fever.  Patient reports skin changes. Patient reports drainage. Patient denies nipple discharge. Patient denies to previous breast biopsy. Patient denies a personal history of breast cancer.    Interval History:   Patient returns for follow up evaluation of her right breast abscess. Pain is almost completely improved. Still with opening, but wound is also looking better. Denies fevers or chills.       Past Medical History:   Diagnosis Date    Allergy     Celiac disease     Fibromyalgia     Hypertension     Migraines     Small fiber neuropathy     per patient is types: sensory and autonomic     Past Surgical History:   Procedure Laterality Date    CERVICAL SPINE SURGERY  2016    COLONOSCOPY N/A 10/28/2019    Procedure: COLONOSCOPY;  Surgeon: Ezra Castañeda MD;  Location: [address];  Service: Endoscopy;  Laterality: N/A;  LATEX Allergy    EPIDURAL STEROID INJECTION N/A 4/20/2021    Procedure: INJECTION, STEROID, EPIDURAL C7/T1;  Surgeon: Nevaeh Esposito MD;  Location: [facility];  Service: Pain Management;  Laterality: N/A;  CONSENT NEEDED    ESOPHAGOGASTRODUODENOSCOPY N/A 10/28/2019    Procedure: EGD (ESOPHAGOGASTRODUODENOSCOPY);   Surgeon: Ezra Castañeda MD;  Location: [facility] ENDO (4TH FLR);  Service: Endoscopy;  Laterality: N/A;  LATEX Allergy    LAPAROSCOPIC CHOLECYSTECTOMY N/A 7/9/2019    Procedure: CHOLECYSTECTOMY, LAPAROSCOPIC;  Surgeon: Jagjit Martinez Jr., MD;  Location: [facility] OR 2ND FLR;  Service: General;  Laterality: N/A;    LIVER BIOPSY  7/9/2019    Procedure: BIOPSY, LIVER;  Surgeon: Jagjit Martinez Jr., MD;  Location: [facility] OR 2ND FLR;  Service: General;;    SPINE SURGERY      TRANSFORAMINAL EPIDURAL INJECTION OF STEROID Right 5/18/2021    Procedure: INJECTION, STEROID, EPIDURAL, TRANSFORAMINAL APPROACH L4/5 and L5/S1;  Surgeon: Nevaeh Esposito MD;  Location: [facility] PAIN MGT;  Service: Pain Management;  Laterality: Right;  Consent needed     Current Outpatient Medications on File Prior to Visit   Medication Sig Dispense Refill    azaTHIOprine (IMURAN) 50 mg Tab Take 1 tablet by mouth 2 (two) times daily.      fexofenadine (ALLEGRA) 60 MG tablet Take 60 mg by mouth once daily.      fluconazole (DIFLUCAN) 150 MG Tab Take one tablet by mouth today.  Take second tablet by mouth in 3 days, if symptoms still present. 2 tablet 0    labetaloL (NORMODYNE) 200 MG tablet Take 200 mg by mouth 2 (two) times daily.      meloxicam (MOBIC) 15 MG tablet TAKE 1 TABLET(15 MG) BY MOUTH EVERY DAY 30 tablet 1    naltrexone 1.5 mg Cap Take 1 capsule by mouth once daily. TAKE 1 CAPSULE BY MOUTH ONCE A DAY FOR 1 WEEK then INCREASE to 2 CAPSULE(S) ONCE A DAY THEREAFTER      NALTREXONE HCL, BULK, MISC TAKE ONE CAPSULE BY MOUTH EVERY DAY      NALTREXONE HCL, BULK, MISC TAKE ONE CAPSULE BY MOUTH EVERY DAY      pilocarpine (SALAGEN) 5 MG Tab Take 5 mg by mouth 3 (three) times daily.      pregabalin (LYRICA) 25 MG capsule Take 1 in AM and 2 at night  Indications: Fibromyalgia Syndrome      ZOLMitriptan (ZOMIG) 5 mg Spry USE 1 SPRAY IN EACH NOSTRIL ONCE DAILY as needed for migraine. 6 each 3     Current Facility-Administered Medications on File Prior to  Visit   Medication Dose Route Frequency Provider Last Rate Last Admin    0.9%  NaCl infusion   Intravenous Continuous Juanjo Ward MD         Social History     Socioeconomic History    Marital status:    Tobacco Use    Smoking status: Former    Smokeless tobacco: Never   Substance and Sexual Activity    Alcohol use: Yes     Comment: rarely     Drug use: No    Sexual activity: Yes     Partners: Male     Birth control/protection: None   Other Topics Concern    Are you pregnant or think you may be? No    Breast-feeding No     Social Determinants of Health     Financial Resource Strain: Low Risk  (1/26/2024)    Overall Financial Resource Strain (CARDIA)     Difficulty of Paying Living Expenses: Not very hard   Food Insecurity: No Food Insecurity (4/2/2024)    [facility]    Hunger Vital Sign     Worried About Running Out of Food in the Last Year: Never true     Ran Out of Food in the Last Year: Never true   Transportation Needs: No Transportation Needs (1/26/2024)    PRAPARE - Transportation     Lack of Transportation (Medical): No     Lack of Transportation (Non-Medical): No   Physical Activity: Inactive (1/26/2024)    Exercise Vital Sign     Days of Exercise per Week: 0 days     Minutes of Exercise per Session: 0 min   Stress: Stress Concern Present (1/26/2024)    [employer] - Occupational Stress Questionnaire     Feeling of Stress : Rather much   Housing Stability: Low Risk  (1/26/2024)    Housing Stability Vital Sign     Unable to Pay for Housing in the Last Year: No     Number of Places Lived in the Last Year: 1     Unstable Housing in the Last Year: No     Family History   Problem Relation Name Age of Onset    Autoimmune disease Father father     Other Father father 75        suspected cancer (bladder?) (pt at [facility])    Autoimmune disease Sister sister     Celiac disease Maternal Grandfather MGF     Cancer Maternal Grandfather MGF          intestinal (origin?) (dx 70s/80s)     Rectal cancer Maternal Grandfather MGF         (dx 70s/80s)     Brain cancer Maternal Grandfather MGF         (2 primaries? 1 in early 90s)    Cancer Paternal Grandmother PGM         breast- twice, 1st dx @ around 30 (2nd a recurrence vs 2nd primary?)    Cancer Mother mother         non-melanoma skin ca    Hashimoto's thyroiditis Mother mother     Other Paternal Grandfather PGF         history?    Hashimoto's thyroiditis Maternal Aunt Jessica     Other Maternal Aunt Misty         congenital brain damage 2/2 vaccine given to her mother during pregnancy    Other Other Tung     Cancer Other MGF's brother         (origin?)    Cancer Other Michelle         (origin?)    Cancer Other Tristian         (origin?)    Other Other MGF's brother         possible cancer    Cancer Other MGM's brother          of smoking-related cancer    Colon cancer Neg Hx      Esophageal cancer Neg Hx      Ovarian cancer Neg Hx      Melanoma Neg Hx         Review of Systems  Review of Systems   Constitutional:  Negative for chills, fever and malaise/fatigue.   HENT:  Negative for congestion.    Eyes:  Negative for discharge.   Respiratory:  Negative for cough, shortness of breath and stridor.    Cardiovascular:  Negative for chest pain and palpitations.   Gastrointestinal:  Negative for abdominal pain and nausea.   Neurological:  Negative for headaches.   Psychiatric/Behavioral:  The patient is not nervous/anxious.         Objective:        Vitals:    24 1528   BP: 117/78   Pulse: 76       Physical Exam   Vitals reviewed.  Constitutional: She is oriented to person, place, and time.   HENT:   Head: Normocephalic and atraumatic.   Nose: Nose normal.   Eyes: Pupils are equal, round, and reactive to light. Right eye exhibits no discharge. Left eye exhibits no discharge.   Pulmonary/Chest: Effort normal and breath sounds normal. No stridor. No respiratory distress. She exhibits no mass, no tenderness and no  edema. Right breast exhibits skin change and tenderness. Right breast exhibits no inverted nipple, no mass and no nipple discharge. Left breast exhibits no inverted nipple, no mass, no nipple discharge, no skin change and no tenderness. No breast swelling or bleeding. Breasts are asymmetrical.       Abdominal: Normal appearance.   Genitourinary: No breast swelling or bleeding.   Neurological: She is alert and oriented to person, place, and time.   Skin: Skin is warm and dry.     Psychiatric: Her behavior is normal. Mood, judgment and thought content normal.           Radiology review: Images personally reviewed by me in the clinic.    5/31/24 Right Diagnostic Mammo/US Right:    Findings:  This procedure was performed using tomosynthesis. Computer-aided detection was utilized in the interpretation of this examination.  Mammo Digital Diagnostic Right with Erick  The right breast is heterogeneously dense, which may obscure small masses.      In the right breast retroareolar upper inner quadrant anterior depth, there is an oval fluid collection just beneath the skin surface.  This corresponds to the area of concern.     Limited right breast ultrasound:   In the right breast 02:00 o'clock axis 2 cm from the nipple, there is an irregular heterogeneously hypoechoic fluid collection within the skin measuring 2.6 x 1.3 x 2.1 cm.  Associated posterior acoustic enhancement and rim hypervascularity.  This corresponds to the area of concern and right breast mammographic finding.  Associated visible erythema, induration, tenderness to palpation, and warmth to physical exam.     Impression:  Heterogeneous fluid collection in the right breast 02:00 o'clock axis skin is most consistent with a breast abscess (2.6 cm).  BI-RADS 2: Benign.  Recommend antibiotic therapy.  Also, recommend follow-up in breast surgery clinic next week to evaluate response to therapy and for possible incision and drainage.  If there is no resolution,  consider repeat ultrasound for possible biopsy, as clinically indicated.     BI-RADS Category:   Overall: 2 - Benign        Recommendation:  Begin antibiotic therapy as soon as possible.    Follow-up in breast surgery clinic next week for response to therapy and possible incision and drainage.  Annual screening mammography, next due July 2024.     The findings and recommendations were discussed directly with her by Dr. GEORGE Metcalf at the time of interpretation.      Assessment:      Caryl Cedeno is a 44 y.o. premenopausal female with right breast abscess, resolving.      Plan:   We discussed the options for management of a breast abscess. These include antibiotics and drainage.  Drainage options include aspiration vs. incision and drainage.     No role for drainage today as it drained spontaneously. She has now completed her Bactrim course. No further drainage. Small wound without signs of concern such as residual infection. Discussed expectations with wound healing. Could take time to fully heal given extent of infection.     Follow up PRN.     Patient verbalized understanding of plan. All questions asked and answered. Advised to call our office with any new or worsening sxs.

## 2024-07-10 ENCOUNTER — TELEPHONE (OUTPATIENT)
Dept: HEMATOLOGY/ONCOLOGY | Facility: CLINIC | Age: 44
End: 2024-07-10
Payer: MEDICARE

## 2024-08-01 ENCOUNTER — TELEPHONE (OUTPATIENT)
Dept: HEMATOLOGY/ONCOLOGY | Facility: CLINIC | Age: 44
End: 2024-08-01
Payer: MEDICARE

## 2024-08-01 NOTE — TELEPHONE ENCOUNTER
Called patient to move follow up genetic appointment and she will be out of town so rescheduled to November.

## 2024-08-07 ENCOUNTER — TELEPHONE (OUTPATIENT)
Dept: GASTROENTEROLOGY | Facility: CLINIC | Age: 44
End: 2024-08-07
Payer: MEDICARE

## 2024-08-08 ENCOUNTER — TELEPHONE (OUTPATIENT)
Dept: INTERNAL MEDICINE | Facility: CLINIC | Age: 44
End: 2024-08-08
Payer: MEDICARE

## 2024-08-09 ENCOUNTER — NURSE TRIAGE (OUTPATIENT)
Dept: ADMINISTRATIVE | Facility: CLINIC | Age: 44
End: 2024-08-09
Payer: MEDICARE

## 2024-08-09 ENCOUNTER — OFFICE VISIT (OUTPATIENT)
Dept: INTERNAL MEDICINE | Facility: CLINIC | Age: 44
End: 2024-08-09
Payer: MEDICARE

## 2024-08-09 ENCOUNTER — LAB VISIT (OUTPATIENT)
Dept: LAB | Facility: HOSPITAL | Age: 44
End: 2024-08-09
Attending: INTERNAL MEDICINE
Payer: MEDICARE

## 2024-08-09 VITALS
DIASTOLIC BLOOD PRESSURE: 82 MMHG | OXYGEN SATURATION: 96 % | SYSTOLIC BLOOD PRESSURE: 120 MMHG | WEIGHT: 199.94 LBS | HEIGHT: 65 IN | HEART RATE: 86 BPM | BODY MASS INDEX: 33.31 KG/M2

## 2024-08-09 DIAGNOSIS — R19.7 ACUTE DIARRHEA: ICD-10-CM

## 2024-08-09 DIAGNOSIS — R19.7 ACUTE DIARRHEA: Primary | ICD-10-CM

## 2024-08-09 LAB
ALBUMIN SERPL BCP-MCNC: 4 G/DL (ref 3.5–5.2)
ALP SERPL-CCNC: 53 U/L (ref 55–135)
ALT SERPL W/O P-5'-P-CCNC: 34 U/L (ref 10–44)
ANION GAP SERPL CALC-SCNC: 11 MMOL/L (ref 8–16)
AST SERPL-CCNC: 30 U/L (ref 10–40)
BASOPHILS # BLD AUTO: 0.02 K/UL (ref 0–0.2)
BASOPHILS NFR BLD: 0.4 % (ref 0–1.9)
BILIRUB SERPL-MCNC: 0.5 MG/DL (ref 0.1–1)
BUN SERPL-MCNC: 12 MG/DL (ref 6–20)
CALCIUM SERPL-MCNC: 9.4 MG/DL (ref 8.7–10.5)
CHLORIDE SERPL-SCNC: 107 MMOL/L (ref 95–110)
CO2 SERPL-SCNC: 23 MMOL/L (ref 23–29)
CREAT SERPL-MCNC: 1 MG/DL (ref 0.5–1.4)
DIFFERENTIAL METHOD BLD: ABNORMAL
EOSINOPHIL # BLD AUTO: 0.1 K/UL (ref 0–0.5)
EOSINOPHIL NFR BLD: 1.2 % (ref 0–8)
ERYTHROCYTE [DISTWIDTH] IN BLOOD BY AUTOMATED COUNT: 12.1 % (ref 11.5–14.5)
EST. GFR  (NO RACE VARIABLE): >60 ML/MIN/1.73 M^2
GLUCOSE SERPL-MCNC: 109 MG/DL (ref 70–110)
HCT VFR BLD AUTO: 41.6 % (ref 37–48.5)
HGB BLD-MCNC: 13.5 G/DL (ref 12–16)
IMM GRANULOCYTES # BLD AUTO: 0.02 K/UL (ref 0–0.04)
IMM GRANULOCYTES NFR BLD AUTO: 0.4 % (ref 0–0.5)
LYMPHOCYTES # BLD AUTO: 0.9 K/UL (ref 1–4.8)
LYMPHOCYTES NFR BLD: 16.8 % (ref 18–48)
MCH RBC QN AUTO: 29.7 PG (ref 27–31)
MCHC RBC AUTO-ENTMCNC: 32.5 G/DL (ref 32–36)
MCV RBC AUTO: 91 FL (ref 82–98)
MONOCYTES # BLD AUTO: 0.6 K/UL (ref 0.3–1)
MONOCYTES NFR BLD: 11.9 % (ref 4–15)
NEUTROPHILS # BLD AUTO: 3.6 K/UL (ref 1.8–7.7)
NEUTROPHILS NFR BLD: 69.3 % (ref 38–73)
NRBC BLD-RTO: 0 /100 WBC
PLATELET # BLD AUTO: 258 K/UL (ref 150–450)
PMV BLD AUTO: 11.2 FL (ref 9.2–12.9)
POTASSIUM SERPL-SCNC: 3.9 MMOL/L (ref 3.5–5.1)
PROT SERPL-MCNC: 6.7 G/DL (ref 6–8.4)
RBC # BLD AUTO: 4.55 M/UL (ref 4–5.4)
SODIUM SERPL-SCNC: 141 MMOL/L (ref 136–145)
WBC # BLD AUTO: 5.13 K/UL (ref 3.9–12.7)

## 2024-08-09 PROCEDURE — 85025 COMPLETE CBC W/AUTO DIFF WBC: CPT | Performed by: INTERNAL MEDICINE

## 2024-08-09 PROCEDURE — 99214 OFFICE O/P EST MOD 30 MIN: CPT | Mod: PBBFAC | Performed by: INTERNAL MEDICINE

## 2024-08-09 PROCEDURE — 99999 PR PBB SHADOW E&M-EST. PATIENT-LVL IV: CPT | Mod: PBBFAC,,, | Performed by: INTERNAL MEDICINE

## 2024-08-09 PROCEDURE — 36415 COLL VENOUS BLD VENIPUNCTURE: CPT | Performed by: INTERNAL MEDICINE

## 2024-08-09 PROCEDURE — 80053 COMPREHEN METABOLIC PANEL: CPT | Performed by: INTERNAL MEDICINE

## 2024-08-12 ENCOUNTER — PATIENT MESSAGE (OUTPATIENT)
Dept: INTERNAL MEDICINE | Facility: CLINIC | Age: 44
End: 2024-08-12
Payer: MEDICARE

## 2024-08-12 ENCOUNTER — LAB VISIT (OUTPATIENT)
Dept: LAB | Facility: HOSPITAL | Age: 44
End: 2024-08-12
Attending: INTERNAL MEDICINE
Payer: MEDICARE

## 2024-08-12 DIAGNOSIS — R19.7 ACUTE DIARRHEA: ICD-10-CM

## 2024-08-12 PROCEDURE — 87046 STOOL CULTR AEROBIC BACT EA: CPT | Performed by: INTERNAL MEDICINE

## 2024-08-12 PROCEDURE — 87329 GIARDIA AG IA: CPT | Performed by: INTERNAL MEDICINE

## 2024-08-12 PROCEDURE — 83993 ASSAY FOR CALPROTECTIN FECAL: CPT | Performed by: INTERNAL MEDICINE

## 2024-08-12 PROCEDURE — 87427 SHIGA-LIKE TOXIN AG IA: CPT | Performed by: INTERNAL MEDICINE

## 2024-08-12 PROCEDURE — 87338 HPYLORI STOOL AG IA: CPT | Performed by: INTERNAL MEDICINE

## 2024-08-12 PROCEDURE — 87045 FECES CULTURE AEROBIC BACT: CPT | Performed by: INTERNAL MEDICINE

## 2024-08-12 PROCEDURE — 87186 SC STD MICRODIL/AGAR DIL: CPT | Performed by: INTERNAL MEDICINE

## 2024-08-12 PROCEDURE — 87425 ROTAVIRUS AG IA: CPT | Performed by: INTERNAL MEDICINE

## 2024-08-12 PROCEDURE — 87449 NOS EACH ORGANISM AG IA: CPT | Performed by: INTERNAL MEDICINE

## 2024-08-12 RX ORDER — MELOXICAM 15 MG/1
15 TABLET ORAL
Qty: 30 TABLET | Refills: 1 | Status: SHIPPED | OUTPATIENT
Start: 2024-08-12

## 2024-08-13 LAB
CRYPTOSP AG STL QL IA: NEGATIVE
E COLI SXT1 STL QL IA: NEGATIVE
E COLI SXT2 STL QL IA: NEGATIVE
G LAMBLIA AG STL QL IA: NEGATIVE
RV AG STL QL IA.RAPID: NEGATIVE

## 2024-08-14 ENCOUNTER — OFFICE VISIT (OUTPATIENT)
Dept: GASTROENTEROLOGY | Facility: CLINIC | Age: 44
End: 2024-08-14
Payer: MEDICARE

## 2024-08-14 ENCOUNTER — TELEPHONE (OUTPATIENT)
Dept: GASTROENTEROLOGY | Facility: CLINIC | Age: 44
End: 2024-08-14
Payer: MEDICARE

## 2024-08-14 VITALS
BODY MASS INDEX: 32.59 KG/M2 | HEART RATE: 61 BPM | SYSTOLIC BLOOD PRESSURE: 149 MMHG | HEIGHT: 66 IN | DIASTOLIC BLOOD PRESSURE: 91 MMHG | WEIGHT: 202.81 LBS

## 2024-08-14 DIAGNOSIS — K90.0 CELIAC DISEASE: Primary | ICD-10-CM

## 2024-08-14 DIAGNOSIS — R19.7 DIARRHEA, UNSPECIFIED TYPE: ICD-10-CM

## 2024-08-14 PROCEDURE — 99213 OFFICE O/P EST LOW 20 MIN: CPT | Mod: PBBFAC

## 2024-08-14 PROCEDURE — 99999 PR PBB SHADOW E&M-EST. PATIENT-LVL III: CPT | Mod: PBBFAC,GC,,

## 2024-08-14 PROCEDURE — 99203 OFFICE O/P NEW LOW 30 MIN: CPT | Mod: S$PBB,GC,,

## 2024-08-14 NOTE — TELEPHONE ENCOUNTER
----- Message from Debra Pugh sent at 8/14/2024 11:37 AM CDT -----  Who Called: Pt    What is the request in detail: Requesting call back to discuss recent missed calls. Please advise    Can the clinic reply by [facility]? No    Best Call Back Number: [phone]      Additional Information:

## 2024-08-14 NOTE — PROGRESS NOTES
[facility] NOTE    Name: Caryl Cedeno  : 1980  Date of Service: 2024   PCP: Joann Snyder MD    Reason for visit:   Chief Complaint   Patient presents with    Diarrhea       HPI:     The patient is a 45 yo female with Celiac disease, Sjogrens, dysautonomia, small fiber neuropathy, presenting to clinic today regarding diarrhea.  Celiac hx: diagnosed in [facility] many years ago, + biopsy, + labs (we dont have results)    Diarrhea:   3 week history of diarrhea at today's appt ()   Was travelling to [place] to visit parents when it started    Patient is strict gluten free.   On most recent egd/colon 2019 by Dr. Rubio the biopsies were negative for celiac but she was strict gluten free and having no diarrhea  She does report recent abx use for a breast abscess which may have been the culprit vs a large seafood meal she had the night prior to travelling to [place]. Mom also has celiac but patient reports she frequently consumes gluten, may have been a source.  PCP ordered stool studies which are unremarkable, C diff cancelled for formed stool sample.       Most Recent Upper Endoscopy:   2019  Impression:             - Normal examined duodenum. Biopsied.                         - Normal stomach. Biopsied.                         - Normal esophagus.                         - Z-line regular, 40 cm from the incisors.   Recommendation:         - Discharge patient to home.                         - Await pathology results.                         - Telephone endoscopist for pathology results in 2                         weeks.                         - Return to GI clinic.                         - The findings and recommendations were discussed                         with the patient.     Most Recent Colonoscopy:   2019  Impression:             - The examined portion of the ileum was normal.                         - Diverticulosis in the recto-sigmoid colon and in                          the sigmoid colon.                         - The examination was otherwise normal.                         - No specimens collected.   Recommendation:         - Discharge patient to home.                         - Return to GI clinic at the next available                         appointment.                         - Repeat colonoscopy age 43 years old for screening                         purposes.                         - The findings and recommendations were discussed                         with the patient.                         - Use original regular Metamucil one tablespoon in                         12 ounces of water every 12 hours ( or Twice daily).     Review of Systems:   Review of Systems   Constitutional:  Negative for chills and fever.   Gastrointestinal:  Positive for diarrhea. Negative for abdominal pain, blood in stool, constipation, heartburn, melena, nausea and vomiting.       Medications:   Current Outpatient Medications:     azaTHIOprine (IMURAN) 50 mg Tab, Take 1 tablet by mouth 2 (two) times daily., Disp: , Rfl:     fexofenadine (ALLEGRA) 60 MG tablet, Take 60 mg by mouth once daily., Disp: , Rfl:     fluconazole (DIFLUCAN) 150 MG Tab, Take one tablet by mouth today.  Take second tablet by mouth in 3 days, if symptoms still present., Disp: 2 tablet, Rfl: 0    honey (MEDIHONEY, HONEY,) 100 % Pste, Apply 1 mL topically once daily., Disp: 15 mL, Rfl: 0    labetaloL (NORMODYNE) 200 MG tablet, Take 200 mg by mouth 2 (two) times daily., Disp: , Rfl:     meloxicam (MOBIC) 15 MG tablet, TAKE 1 TABLET(15 MG) BY MOUTH EVERY DAY, Disp: 30 tablet, Rfl: 1    naltrexone 1.5 mg Cap, Take 1 capsule by mouth once daily. TAKE 1 CAPSULE BY MOUTH ONCE A DAY FOR 1 WEEK then INCREASE to 2 CAPSULE(S) ONCE A DAY THEREAFTER, Disp: , Rfl:     NALTREXONE HCL, BULK, MISC, TAKE ONE CAPSULE BY MOUTH EVERY DAY, Disp: , Rfl:     NALTREXONE HCL, BULK, MISC, TAKE ONE CAPSULE BY MOUTH EVERY DAY, Disp: ,  "Rfl:     pilocarpine (SALAGEN) 5 MG Tab, Take 5 mg by mouth 3 (three) times daily., Disp: , Rfl:     pregabalin (LYRICA) 25 MG capsule, Take 1 in AM and 2 at night  Indications: Fibromyalgia Syndrome, Disp: , Rfl:     ZOLMitriptan (ZOMIG) 5 mg Spry, USE 1 SPRAY IN EACH NOSTRIL ONCE DAILY as needed for migraine., Disp: 6 each, Rfl: 3  No current facility-administered medications for this visit.    Facility-Administered Medications Ordered in Other Visits:     0.9%  NaCl infusion, , Intravenous, Continuous, Juanjo Ward MD     Past medical history, past surgical history, family history, allergies, and social history reviewed and updated in EMR.     Vitals:   Vitals:    08/14/24 1401   BP: (!) 149/91   Pulse: 61   Weight: 92 kg (202 lb 13.2 oz)   Height: 5' 6" (1.676 m)     Body mass index is 32.74 kg/m².    Physical Exam:   Physical Exam  Constitutional:       General: She is not in acute distress.  HENT:      Head: Normocephalic and atraumatic.   Pulmonary:      Effort: Pulmonary effort is normal.   Abdominal:      General: Abdomen is flat. There is no distension.      Palpations: Abdomen is soft.      Tenderness: There is no abdominal tenderness. There is no guarding.     Assessment:   1. Celiac disease            Plan:   - since your diarrhea seems to be resolving on its own by  the time of this appointment, I favor no further workup of the diarrhea at this time. I will continue to follow the labs that have not yet resulted in case those come back abnormal but for now continue to hold the azathioprine a few more days until the diarrhea has full resolved  - you are due for your colonoscopy per Dr. Rubio's recommendations from the last one, so I have ordered the colonoscopy as well     Disposition: Follow-up in PRN     Discussed with staff attending. Attestation to follow.     Joann Montalvo MD  Gastroenterology and Hepatology Fellow, PGY-4  Pager # [phone]   "

## 2024-08-14 NOTE — PROGRESS NOTES
"GENERAL GI PATIENT INTAKE:    COVID symptoms in the last 7 days (runny nose, sore throat, congestion, cough, fever): No  PCP: Joann Snyder  If not PCP-  number given to establish [phone]: N/A    ALLERGIES REVIEWED:  Yes    CHIEF COMPLAINT:    Chief Complaint   Patient presents with    Diarrhea       VITAL SIGNS:  BP (!) 149/91   Pulse 61   Ht 5' 6" (1.676 m)   Wt 92 kg (202 lb 13.2 oz)   BMI 32.74 kg/m²      Change in medical, surgical, family or social history: No      REVIEWED MEDICATION LIST RECONCILED INCLUDING ABOVE MEDS:  Yes     "

## 2024-08-15 LAB
BACTERIA STL CULT: NORMAL
CALPROTECTIN STL-MCNT: 223 MCG/G

## 2024-08-16 ENCOUNTER — PATIENT MESSAGE (OUTPATIENT)
Dept: GASTROENTEROLOGY | Facility: CLINIC | Age: 44
End: 2024-08-16
Payer: MEDICARE

## 2024-08-16 ENCOUNTER — PATIENT MESSAGE (OUTPATIENT)
Dept: INTERNAL MEDICINE | Facility: CLINIC | Age: 44
End: 2024-08-16
Payer: MEDICARE

## 2024-08-16 ENCOUNTER — TELEPHONE (OUTPATIENT)
Dept: GASTROENTEROLOGY | Facility: CLINIC | Age: 44
End: 2024-08-16
Payer: MEDICARE

## 2024-08-16 DIAGNOSIS — Z12.11 SCREEN FOR COLON CANCER: Primary | ICD-10-CM

## 2024-08-16 NOTE — TELEPHONE ENCOUNTER
----- Message from Wendy Humphries sent at 8/16/2024  3:59 PM CDT -----  Regarding: orders  Contact: [phone]  Pt requesting orders for colonoscopy. Pls call to discuss.

## 2024-08-19 ENCOUNTER — OFFICE VISIT (OUTPATIENT)
Dept: ORTHOPEDICS | Facility: CLINIC | Age: 44
End: 2024-08-19
Payer: MEDICARE

## 2024-08-19 ENCOUNTER — HOSPITAL ENCOUNTER (OUTPATIENT)
Dept: RADIOLOGY | Facility: HOSPITAL | Age: 44
Discharge: HOME | End: 2024-08-19
Attending: PHYSICIAN ASSISTANT
Payer: MEDICARE

## 2024-08-19 DIAGNOSIS — M70.62 TROCHANTERIC BURSITIS OF LEFT HIP: ICD-10-CM

## 2024-08-19 DIAGNOSIS — M25.552 PAIN OF LEFT HIP: ICD-10-CM

## 2024-08-19 DIAGNOSIS — M54.16 LUMBAR RADICULOPATHY: Primary | ICD-10-CM

## 2024-08-19 PROCEDURE — 73502 X-RAY EXAM HIP UNI 2-3 VIEWS: CPT | Mod: 26,LT,, | Performed by: RADIOLOGY

## 2024-08-19 PROCEDURE — 99999 PR PBB SHADOW E&M-EST. PATIENT-LVL III: CPT | Mod: PBBFAC,,, | Performed by: PHYSICIAN ASSISTANT

## 2024-08-19 PROCEDURE — 73502 X-RAY EXAM HIP UNI 2-3 VIEWS: CPT | Mod: TC,LT

## 2024-08-19 PROCEDURE — 99213 OFFICE O/P EST LOW 20 MIN: CPT | Mod: PBBFAC,25 | Performed by: PHYSICIAN ASSISTANT

## 2024-08-19 PROCEDURE — 99214 OFFICE O/P EST MOD 30 MIN: CPT | Mod: S$PBB,,, | Performed by: PHYSICIAN ASSISTANT

## 2024-08-19 NOTE — PROGRESS NOTES
Patient ID: Caryl Cedeno is a 44 y.o. female.    Chief Complaint: Pain of the Left Hip      HISTORY:  Caryl Cedeno is a 44 y.o. female who returns to me today for evaluation of left hip and leg pain.  She states the pain has been present for about 2 months.  The pain starts at the top of the hip and radiates down to the calf.  The pain is worse with laying down, raising her leg, bending over.  She has a long history of low back pain and has had MELITA in the past as well as RFA procedures.  She feels that her symptoms are similar to those she has had in the past which responded very well to an RFA procedure.  She recently had new lumbar MRI done.      PMH/PSH/FamHx/SocHx:    Unchanged from prior visit.    ROS:  Constitution: Negative for chills, fever and weakness.   Respiratory: Negative for cough and shortness of breath.   Musculoskeletal: Positive for left hip pain  Psychiatric/Behavioral: The patient is not nervous/anxious.       PHYSICAL EXAM:   Low back/hip  Skin intact  Mild TTP along GTB  Painless passive ROM  - log roll  - stincfield  - SLR    IMAGING: X-rays of the left hip, personally reviewed by me, demonstrate well maintained joint space.  No fracture or dislocation.     MRI lumbar spine:  FINDINGS:   The cord ends around L1-L2.   Vertebral body heights are maintained.   No evidence of acute fracture.   A grade 1 L2-L3 and possibly minimal L3-L4 retrolisthesis. Grade 1 L4-5 anterolisthesis. No facet subluxation. Bilateral chronic appearing L4 pars defects.   Disc dehydration most evident at L4-5 and L5-S1 with posterior annular fissuring.   No suspicious paraspinal mass or inflammation. Normal caliber of the visible abdominal aorta.      T12-L1: No evidence for significant neural foraminal or thecal sac narrowing based on sagittal images.     L1-2: No evidence for significant neural foraminal or thecal sac narrowing based on sagittal images.     L2-3: Shallow disc bulging mildly  indenting the thecal sac anteriorly with slight recess narrowing and perhaps some subtle contact of the transitioning nerve roots but without overt impingement. Mild facet and ligament flavum hypertrophy. Minor to mild foraminal narrowing without exiting nerve root displacement.     L3-4: There is no significant disc bulge or canal or foraminal narrowing. No nerve root displacement demonstrated. Mild to moderate facet and ligament flavum hypertrophy.     L4-5: Moderate disc height loss with broad-based disc bulging most prominent centrally with disc material extending slightly above the L4 inferior endplate with extrusion like morphology such as seen on series 4 image 7. In combination with moderate facet and ligament flavum hypertrophy there is relatively mild circumferential thecal sac constriction with slight crowding of the central nerve roots and slight recess narrowing but without overt central nerve root impingement. There is moderate to severe bilateral foraminal narrowing with contact of both exiting L4 nerve roots with perhaps slight displacement.     L5-S1: Annular disc bulging with marginal osteophytes and mild to moderate facet and ligament flavum hypertrophy producing moderate bilateral foraminal narrowing with possible contact of the exiting nerve roots but without exiting nerve root displacement. Mild indentation of the thecal sac anteriorly with slight recess narrowing but without central nerve root impingement.     ASSESSMENT/PLAN:    Caryl was seen today for pain.    Diagnoses and all orders for this visit:    Lumbar radiculopathy    Pain of left hip  -     X-Ray Hip 2 or 3 views Left with Pelvis when performed; Future    Trochanteric bursitis of left hip      - Although she may have a component of greater trochanteric bursitis, I think the majority of her symptoms sound consistent with lumbar radiculopathy  -  At this point I would recommend she follow up with pain management to discuss  interventional procedures as she has had success with RFA in the past for similar symptoms  - She will reach out if she needs referral  - Follow up if symptoms worsen or fail to improve

## 2024-08-22 VITALS — WEIGHT: 199 LBS | HEIGHT: 66 IN | BODY MASS INDEX: 31.98 KG/M2

## 2024-08-22 RX ORDER — SOD SULF/POT CHLORIDE/MAG SULF 1.479 G
12 TABLET ORAL DAILY
Qty: 24 TABLET | Refills: 0 | Status: SHIPPED | OUTPATIENT
Start: 2024-08-22

## 2024-08-22 NOTE — TELEPHONE ENCOUNTER
"Joann Montalvo MD  [facility] Patients  Caller: Unspecified (1 week ago)  Procedure: Colonoscopy    Diagnosis: Screening colonoscopy    Procedure Timin-12 weeks    *If within 4 weeks selected, please rosemary as high priority*    *If greater than 12 weeks, please select "5-12 weeks" and delay sending until 2 months prior to requested date*    Provider: Any GI provider    Location: Any Site    Additional Scheduling Information: No scheduling concerns    Prep Specifications: Standard prep (wants sutabs)    Is the patient taking a GLP-1 Agonist:no    Have you attached a patient to this message: yes  "

## 2024-08-22 NOTE — TELEPHONE ENCOUNTER
Spoke to patient to schedule procedure(s) Colonoscopy       Physician to perform procedure(s) Dr. DARLYN Castañeda  Date of Procedure (s) 9/12/24  Arrival Time 1:00 PM  Time of Procedure(s) 2:00 PM   Location of Procedure(s) [facility] 4[facility] Floor  Type of Rx Prep sent to patient: Sutab  Instructions provided to patient via [facility]    Patient was informed on the following information and verbalized understanding. Screening questionnaire reviewed with patient and complete. If procedure requires anesthesia, a responsible adult needs to be present to accompany the patient home, patient cannot drive after receiving anesthesia. Appointment details are tentative, especially check-in time. Patient will receive a prep-op call 7 days prior to confirm check-in time for procedure. If applicable the patient should contact their pharmacy to verify Rx for procedure prep is ready for pick-up. Patient was advised to call the scheduling department at [phone] if pharmacy states no Rx is available. Patient was advised to call the endoscopy scheduling department if any questions or concerns arise.      [facility] Scheduling Department

## 2024-08-28 ENCOUNTER — TELEPHONE (OUTPATIENT)
Dept: HEMATOLOGY/ONCOLOGY | Facility: CLINIC | Age: 44
End: 2024-08-28
Payer: MEDICARE

## 2024-08-28 NOTE — TELEPHONE ENCOUNTER
Called patient and left message to call back to reschedule appointment on 11/14/24 as provider will be out of the office

## 2024-09-06 ENCOUNTER — TELEPHONE (OUTPATIENT)
Dept: ENDOSCOPY | Facility: HOSPITAL | Age: 44
End: 2024-09-06
Payer: MEDICARE

## 2024-09-11 ENCOUNTER — TELEPHONE (OUTPATIENT)
Dept: ENDOSCOPY | Facility: HOSPITAL | Age: 44
End: 2024-09-11
Payer: MEDICARE

## 2024-09-11 NOTE — TELEPHONE ENCOUNTER
Attempted to contact patient to confirm appointment for colonoscopy on 9/12/24.  Left voicemail on patient's phone and 's.  Portal message sent.

## 2024-09-12 ENCOUNTER — ANESTHESIA (OUTPATIENT)
Dept: ENDOSCOPY | Facility: HOSPITAL | Age: 44
End: 2024-09-12
Payer: MEDICARE

## 2024-09-12 ENCOUNTER — TELEPHONE (OUTPATIENT)
Dept: ENDOSCOPY | Facility: HOSPITAL | Age: 44
End: 2024-09-12
Payer: MEDICARE

## 2024-09-12 ENCOUNTER — ANESTHESIA EVENT (OUTPATIENT)
Dept: ENDOSCOPY | Facility: HOSPITAL | Age: 44
End: 2024-09-12
Payer: MEDICARE

## 2024-09-12 ENCOUNTER — TELEPHONE (OUTPATIENT)
Dept: GASTROENTEROLOGY | Facility: CLINIC | Age: 44
End: 2024-09-12
Payer: MEDICARE

## 2024-09-12 NOTE — TELEPHONE ENCOUNTER
----- Message from Ramona Eastman sent at 9/10/2024  4:37 PM CDT -----  Type:   Call    Who Called:pt  Does the patient know what this is regarding?:colonoscopy  Would the patient rather a call back or a response via [facility]? call  Best Call Back Number: [phone]  Additional Information: Pt would like to reschedule Colonoscopy

## 2024-09-12 NOTE — TELEPHONE ENCOUNTER
Received voicemail from patient, requesting cancellation of colonoscopy later today.  Patient removed from schedule and added to reschedule list.

## 2024-09-12 NOTE — ANESTHESIA PREPROCEDURE EVALUATION
Caryl Cedeno is a 44 y.o., female.  Pre-operative evaluation for COLONOSCOPY (N/A)    Pre-operative evaluation for Procedure(s) (LRB):  COLONOSCOPY (N/A)    Caryl Cedeno is a 44 y.o. female     Patient Active Problem List   Diagnosis    Celiac disease    Small fiber neuropathy    Fibromyalgia    Throat papilloma    POTS (postural orthostatic tachycardia syndrome)    Migraine with aura and without status migrainosus, not intractable    Dysautonomia    Numbness and tingling of both lower extremities    Chronic bilateral low back pain without sciatica    Right upper quadrant pain    Cervicalgia    Osteoarthritis of spine with radiculopathy, lumbar region    Cervical spondylosis    Chronic pain    Sjogren's syndrome    Laryngopharyngeal reflux (LPR)    Cervicogenic headache    Diarrhea       Review of patient's allergies indicates:   Allergen Reactions    Latex, natural rubber     Gluten protein Rash    Latex Rash       Current Facility-Administered Medications on File Prior to Encounter   Medication Dose Route Frequency Provider Last Rate Last Admin    0.9%  NaCl infusion   Intravenous Continuous Juanjo Ward MD         Current Outpatient Medications on File Prior to Encounter   Medication Sig Dispense Refill    azaTHIOprine (IMURAN) 50 mg Tab Take 1 tablet by mouth 2 (two) times daily.      labetaloL (NORMODYNE) 200 MG tablet Take 200 mg by mouth 2 (two) times daily.      naltrexone 1.5 mg Cap Take 1 capsule by mouth once daily. TAKE 1 CAPSULE BY MOUTH ONCE A DAY FOR 1 WEEK then INCREASE to 2 CAPSULE(S) ONCE A DAY THEREAFTER      pilocarpine (SALAGEN) 5 MG Tab Take 5 mg by mouth 3 (three) times daily.      pregabalin (LYRICA) 25 MG capsule Take 1 in AM and 2 at night  Indications: Fibromyalgia Syndrome      fexofenadine (ALLEGRA) 60 MG tablet Take 60 mg by mouth once daily.       fluconazole (DIFLUCAN) 150 MG Tab Take one tablet by mouth today.  Take second tablet by mouth in 3 days, if symptoms still present. 2 tablet 0    honey (MEDIHONEY, HONEY,) 100 % Pste Apply 1 mL topically once daily. 15 mL 0    NALTREXONE HCL, BULK, MISC TAKE ONE CAPSULE BY MOUTH EVERY DAY      NALTREXONE HCL, BULK, MISC TAKE ONE CAPSULE BY MOUTH EVERY DAY      sod sulf-pot chloride-mag sulf (SUTAB) 1.479-0.188- 0.225 gram tablet Take 12 tablets by mouth once daily. Please follow Endoscopy 's instructions. Please apply coupon code. Please apply coupon code. BIN: [id number], PCN: [id number], GROUP: [id number], MEMBER ID: [id number] 24 tablet 0    ZOLMitriptan (ZOMIG) 5 mg Spry USE 1 SPRAY IN EACH NOSTRIL ONCE DAILY as needed for migraine. 6 each 3       Past Surgical History:   Procedure Laterality Date    CERVICAL SPINE SURGERY  2016    COLONOSCOPY N/A 10/28/2019    Procedure: COLONOSCOPY;  Surgeon: Ezra Castañeda MD;  Location: [address]);  Service: Endoscopy;  Laterality: N/A;  LATEX Allergy    EPIDURAL STEROID INJECTION N/A 4/20/2021    Procedure: INJECTION, STEROID, EPIDURAL C7/T1;  Surgeon: Nevaeh Esposito MD;  Location: [facility];  Service: Pain Management;  Laterality: N/A;  CONSENT NEEDED    ESOPHAGOGASTRODUODENOSCOPY N/A 10/28/2019    Procedure: EGD (ESOPHAGOGASTRODUODENOSCOPY);  Surgeon: Ezra Castañeda MD;  Location: [address]);  Service: Endoscopy;  Laterality: N/A;  LATEX Allergy    LAPAROSCOPIC CHOLECYSTECTOMY N/A 7/9/2019    Procedure: CHOLECYSTECTOMY, LAPAROSCOPIC;  Surgeon: Jagjit Martinez Jr., MD;  Location: [facility] OR [address] [place] [address];  Service: General;  Laterality: N/A;    LIVER BIOPSY  7/9/2019    Procedure: BIOPSY, LIVER;  Surgeon: Jagjit Martinez Jr., MD;  Location: [facility] OR [address] [place] [address];  Service: General;;    SPINE SURGERY      TRANSFORAMINAL EPIDURAL INJECTION OF STEROID Right 5/18/2021    Procedure: INJECTION, STEROID, EPIDURAL, TRANSFORAMINAL APPROACH L4/5 and L5/S1;   Surgeon: Nevaeh Esposito MD;  Location: [facility];  Service: Pain Management;  Laterality: Right;  Consent needed       Social History     Socioeconomic History    Marital status:    Tobacco Use    Smoking status: Former    Smokeless tobacco: Never   Substance and Sexual Activity    Alcohol use: Yes     Comment: rarely     Drug use: No    Sexual activity: Yes     Partners: Male     Birth control/protection: None   Other Topics Concern    Are you pregnant or think you may be? No    Breast-feeding No     Social Drivers of Health     Financial Resource Strain: Low Risk  (1/26/2024)    Overall Financial Resource Strain (CARDIA)     Difficulty of Paying Living Expenses: Not very hard   Food Insecurity: No Food Insecurity (4/2/2024)    [facility]    Hunger Vital Sign     Worried About Running Out of Food in the Last Year: Never true     Ran Out of Food in the Last Year: Never true   Transportation Needs: No Transportation Needs (1/26/2024)    PRAPARE - Transportation     Lack of Transportation (Medical): No     Lack of Transportation (Non-Medical): No   Physical Activity: Inactive (1/26/2024)    Exercise Vital Sign     Days of Exercise per Week: 0 days     Minutes of Exercise per Session: 0 min   Stress: Stress Concern Present (1/26/2024)    [place] citizen [place] [employer] - Occupational Stress Questionnaire     Feeling of Stress : Rather much   Housing Stability: Low Risk  (1/26/2024)    Housing Stability Vital Sign     Unable to Pay for Housing in the Last Year: No     Number of Places Lived in the Last Year: 1     Unstable Housing in the Last Year: No             Pre-op Assessment    I have reviewed the Patient Summary Reports.    I have reviewed the NPO Status.   I have reviewed the Medications.     Review of Systems  Anesthesia Hx:  No problems with previous Anesthesia   History of prior surgery of interest to airway management or planning:            Denies  Personal Hx of Anesthesia complications.                    Cardiovascular:     Hypertension                                          Pulmonary:  Pulmonary Normal                       Hepatic/GI:  Bowel Prep.                   Neurological:      Headaches                                 Endocrine:  Endocrine Normal                Physical Exam  General: Well nourished, Cooperative, Alert and Oriented    Airway:  Mallampati: / II  Mouth Opening: Normal  TM Distance: Normal  Tongue: Normal  Neck ROM: Normal ROM    Dental:  Intact        Anesthesia Plan  Type of Anesthesia, risks & benefits discussed:    Anesthesia Type: Gen Natural Airway  Intra-op Monitoring Plan: Standard ASA Monitors  Post Op Pain Control Plan: multimodal analgesia  Induction:  IV  Airway Plan: Direct, Post-Induction  Informed Consent: Informed consent signed with the Patient and all parties understand the risks and agree with anesthesia plan.  All questions answered.   ASA Score: 2    Ready For Surgery From Anesthesia Perspective.     .

## 2024-10-14 ENCOUNTER — PATIENT MESSAGE (OUTPATIENT)
Dept: INTERNAL MEDICINE | Facility: CLINIC | Age: 44
End: 2024-10-14
Payer: MEDICARE

## 2024-10-18 ENCOUNTER — NURSE TRIAGE (OUTPATIENT)
Dept: ADMINISTRATIVE | Facility: CLINIC | Age: 44
End: 2024-10-18
Payer: MEDICARE

## 2024-10-18 NOTE — TELEPHONE ENCOUNTER
Caller states that she was prescribed paxlovid for covid. Caller states she has taken medication incorrectly since receiving script. Caller states she is feeling better and would like to know if she can stop taking medication. Caller informed that her medication would have to be discontinued per provider. Caller stated that she will contacted online provider for further assistance.  Pt advised per protocol and verbalized understanding.     Reason for Disposition   [1] Caller requesting NON-URGENT health information AND [2] PCP's office is the best resource    Additional Information   Negative: Triager needs further essential information from caller in order to complete triage    Protocols used: Information Only Call - No Triage-A-

## 2024-10-28 RX ORDER — MELOXICAM 15 MG/1
15 TABLET ORAL
Qty: 30 TABLET | Refills: 1 | Status: SHIPPED | OUTPATIENT
Start: 2024-10-28

## 2024-11-15 ENCOUNTER — HOSPITAL ENCOUNTER (OUTPATIENT)
Facility: HOSPITAL | Age: 44
Discharge: HOME | End: 2024-11-15
Attending: INTERNAL MEDICINE | Admitting: INTERNAL MEDICINE
Payer: MEDICARE

## 2024-11-15 ENCOUNTER — TELEPHONE (OUTPATIENT)
Dept: SURGERY | Facility: CLINIC | Age: 44
End: 2024-11-15
Payer: MEDICARE

## 2024-11-15 VITALS
RESPIRATION RATE: 16 BRPM | OXYGEN SATURATION: 100 % | SYSTOLIC BLOOD PRESSURE: 143 MMHG | TEMPERATURE: 98 F | DIASTOLIC BLOOD PRESSURE: 88 MMHG | BODY MASS INDEX: 31.98 KG/M2 | WEIGHT: 199 LBS | HEIGHT: 66 IN | HEART RATE: 70 BPM

## 2024-11-15 DIAGNOSIS — K64.4 SKIN TAG OF PERIANAL REGION: Primary | ICD-10-CM

## 2024-11-15 DIAGNOSIS — Z12.11 SCREEN FOR COLON CANCER: ICD-10-CM

## 2024-11-15 LAB
B-HCG UR QL: NEGATIVE
CTP QC/QA: YES

## 2024-11-15 PROCEDURE — 25000003 PHARM REV CODE 250: Performed by: NURSE ANESTHETIST, CERTIFIED REGISTERED

## 2024-11-15 PROCEDURE — 37000009 HC ANESTHESIA EA ADD 15 MINS: Performed by: INTERNAL MEDICINE

## 2024-11-15 PROCEDURE — 88305 TISSUE EXAM BY PATHOLOGIST: CPT | Mod: 26,,, | Performed by: PATHOLOGY

## 2024-11-15 PROCEDURE — 81025 URINE PREGNANCY TEST: CPT | Performed by: INTERNAL MEDICINE

## 2024-11-15 PROCEDURE — 45380 COLONOSCOPY AND BIOPSY: CPT | Mod: PT,,, | Performed by: INTERNAL MEDICINE

## 2024-11-15 PROCEDURE — 37000008 HC ANESTHESIA 1ST 15 MINUTES: Performed by: INTERNAL MEDICINE

## 2024-11-15 PROCEDURE — 27201012 HC FORCEPS, HOT/COLD, DISP: Performed by: INTERNAL MEDICINE

## 2024-11-15 PROCEDURE — 45380 COLONOSCOPY AND BIOPSY: CPT | Mod: PT | Performed by: INTERNAL MEDICINE

## 2024-11-15 PROCEDURE — 63600175 PHARM REV CODE 636 W HCPCS: Performed by: NURSE ANESTHETIST, CERTIFIED REGISTERED

## 2024-11-15 PROCEDURE — 88305 TISSUE EXAM BY PATHOLOGIST: CPT | Performed by: PATHOLOGY

## 2024-11-15 RX ORDER — KETAMINE HCL IN 0.9 % NACL 50 MG/5 ML
SYRINGE (ML) INTRAVENOUS
Status: DISCONTINUED | OUTPATIENT
Start: 2024-11-15 | End: 2024-11-15

## 2024-11-15 RX ORDER — SODIUM CHLORIDE 9 MG/ML
INJECTION, SOLUTION INTRAVENOUS CONTINUOUS
Status: DISCONTINUED | OUTPATIENT
Start: 2024-11-15 | End: 2024-11-15 | Stop reason: HOSPADM

## 2024-11-15 RX ORDER — LIDOCAINE HYDROCHLORIDE 20 MG/ML
INJECTION, SOLUTION EPIDURAL; INFILTRATION; INTRACAUDAL; PERINEURAL
Status: DISCONTINUED | OUTPATIENT
Start: 2024-11-15 | End: 2024-11-15

## 2024-11-15 RX ORDER — SODIUM CHLORIDE 0.9 % (FLUSH) 0.9 %
10 SYRINGE (ML) INJECTION
Status: DISCONTINUED | OUTPATIENT
Start: 2024-11-15 | End: 2024-11-15 | Stop reason: HOSPADM

## 2024-11-15 RX ORDER — GLUCAGON 1 MG
1 KIT INJECTION
Status: DISCONTINUED | OUTPATIENT
Start: 2024-11-15 | End: 2024-11-15 | Stop reason: HOSPADM

## 2024-11-15 RX ORDER — PROPOFOL 10 MG/ML
VIAL (ML) INTRAVENOUS
Status: DISCONTINUED | OUTPATIENT
Start: 2024-11-15 | End: 2024-11-15

## 2024-11-15 RX ADMIN — PROPOFOL 50 MG: 10 INJECTION, EMULSION INTRAVENOUS at 12:11

## 2024-11-15 RX ADMIN — PROPOFOL 150 MCG/KG/MIN: 10 INJECTION, EMULSION INTRAVENOUS at 12:11

## 2024-11-15 RX ADMIN — Medication 20 MG: at 12:11

## 2024-11-15 RX ADMIN — LIDOCAINE HYDROCHLORIDE 100 MG: 20 INJECTION, SOLUTION EPIDURAL; INFILTRATION; INTRACAUDAL; PERINEURAL at 12:11

## 2024-11-15 NOTE — H&P
[facility] - Endoscopy  History & Physical    Subjective:      Chief Complaint/Reason for Admission:     Direct access screening colonoscopy average risk for CRC by personal and family history today.    Caryl Cedeno is a 44 y.o. female.    Past Medical History:   Diagnosis Date    Allergy     Celiac disease     Fibromyalgia     Hypertension     Migraines     Small fiber neuropathy     per patient is types: sensory and autonomic     Past Surgical History:   Procedure Laterality Date    CERVICAL SPINE SURGERY  2016    COLONOSCOPY N/A 10/28/2019    Procedure: COLONOSCOPY;  Surgeon: Ezra Castañeda MD;  Location: [facility] (4TH FLR);  Service: Endoscopy;  Laterality: N/A;  LATEX Allergy    EPIDURAL STEROID INJECTION N/A 4/20/2021    Procedure: INJECTION, STEROID, EPIDURAL C7/T1;  Surgeon: Nevaeh Esposito MD;  Location: [facility];  Service: Pain Management;  Laterality: N/A;  CONSENT NEEDED    ESOPHAGOGASTRODUODENOSCOPY N/A 10/28/2019    Procedure: EGD (ESOPHAGOGASTRODUODENOSCOPY);  Surgeon: Ezra Castañeda MD;  Location: [facility] (4TH FLR);  Service: Endoscopy;  Laterality: N/A;  LATEX Allergy    LAPAROSCOPIC CHOLECYSTECTOMY N/A 7/9/2019    Procedure: CHOLECYSTECTOMY, LAPAROSCOPIC;  Surgeon: Jagjit Martinez Jr., MD;  Location: [facility] OR 2ND FLR;  Service: General;  Laterality: N/A;    LIVER BIOPSY  7/9/2019    Procedure: BIOPSY, LIVER;  Surgeon: Jagjit Martinez Jr., MD;  Location: [facility] OR 2ND FLR;  Service: General;;    SPINE SURGERY      TRANSFORAMINAL EPIDURAL INJECTION OF STEROID Right 5/18/2021    Procedure: INJECTION, STEROID, EPIDURAL, TRANSFORAMINAL APPROACH L4/5 and L5/S1;  Surgeon: Nevaeh Esposito MD;  Location: [facility];  Service: Pain Management;  Laterality: Right;  Consent needed     Social History     Tobacco Use    Smoking status: Former    Smokeless tobacco: Never   Substance Use Topics    Alcohol use: Yes     Comment: rarely     Drug use: No       PTA Medications    Medication Sig    azaTHIOprine (IMURAN) 50 mg Tab Take 1 tablet by mouth 2 (two) times daily.    fexofenadine (ALLEGRA) 60 MG tablet Take 60 mg by mouth once daily.    fluconazole (DIFLUCAN) 150 MG Tab Take one tablet by mouth today.  Take second tablet by mouth in 3 days, if symptoms still present.    honey (MEDIHONEY, HONEY,) 100 % Pste Apply 1 mL topically once daily.    labetaloL (NORMODYNE) 200 MG tablet Take 200 mg by mouth 2 (two) times daily.    meloxicam (MOBIC) 15 MG tablet TAKE 1 TABLET(15 MG) BY MOUTH EVERY DAY    naltrexone 1.5 mg Cap Take 1 capsule by mouth once daily. TAKE 1 CAPSULE BY MOUTH ONCE A DAY FOR 1 WEEK then INCREASE to 2 CAPSULE(S) ONCE A DAY THEREAFTER    NALTREXONE HCL, BULK, MISC TAKE ONE CAPSULE BY MOUTH EVERY DAY    NALTREXONE HCL, BULK, MISC TAKE ONE CAPSULE BY MOUTH EVERY DAY    pilocarpine (SALAGEN) 5 MG Tab Take 5 mg by mouth 3 (three) times daily.    pregabalin (LYRICA) 25 MG capsule Take 1 in AM and 2 at night  Indications: Fibromyalgia Syndrome    sod sulf-pot chloride-mag sulf (SUTAB) 1.479-0.188- 0.225 gram tablet Take 12 tablets by mouth once daily. Please follow Endoscopy 's instructions. Please apply coupon code. Please apply coupon code. BIN: [id number], PCN: 2001, GROUP: [id number], MEMBER ID: [id number]    ZOLMitriptan (ZOMIG) 5 mg Spry USE 1 SPRAY IN EACH NOSTRIL ONCE DAILY as needed for migraine.     Review of patient's allergies indicates:   Allergen Reactions    Latex, natural rubber     Gluten protein Rash    Latex Rash        Review of Systems   Constitutional:  Negative for fever.       Objective:      Vital Signs (Most Recent)       Vital Signs Range (Last 24H):       Physical Exam  Neurological:      Mental Status: She is alert and oriented to person, place, and time.           Assessment:    Direct access screening colonoscopy average risk for CRC by personal and family history today.      Plan:    Direct access screening colonoscopy average risk for CRC  by personal and family history today.

## 2024-11-15 NOTE — TRANSFER OF CARE
"Anesthesia Transfer of Care Note    Patient: Caryl Cedeno    Procedure(s) Performed: Procedure(s) (LRB):  COLONOSCOPY (N/A)    Patient location: [facility]    Anesthesia Type: general    Transport from OR: Transported from OR on room air with adequate spontaneous ventilation    Post pain: adequate analgesia    Post assessment: no apparent anesthetic complications and tolerated procedure well    Post vital signs: stable    Level of consciousness: awake and alert    Nausea/Vomiting: no nausea/vomiting    Complications: none    Transfer of care protocol was followedComments: Report given to recovery RN. All questions answered.       Last vitals: Visit Vitals  BP (!) 149/87 (Patient Position: Lying)   Pulse 73   Temp 36.6 °C (97.9 °F) (Temporal)   Resp 16   Ht 5' 6" (1.676 m)   Wt 90.3 kg (199 lb)   SpO2 96%   Breastfeeding No   BMI 32.12 kg/m²     "

## 2024-11-15 NOTE — PROVATION PATIENT INSTRUCTIONS
Discharge Summary/Instructions after an Endoscopic Procedure  Patient Name: Caryl Cedeno  Patient MRN: [id number]  Patient YOB: 1980  Friday, November 15, 2024  Ezra Castañeda MD  Dear patient,  As a result of recent federal legislation (The Federal Cures Act), you may   receive lab or pathology results from your procedure in your [facility]   account before your physician is able to contact you. Your physician or   their representative will relay the results to you with their   recommendations at their soonest availability.  Thank you,  RESTRICTIONS:  During your procedure today, you received medications for sedation.  These   medications may affect your judgment, balance and coordination.  Therefore,   for 24 hours, you have the following restrictions:   - DO NOT drive a car, operate machinery, make legal/financial decisions,   sign important papers or drink alcohol.    ACTIVITY:  Today: no heavy lifting, straining or running due to procedural   sedation/anesthesia.  The following day: return to full activity including work.  DIET:  Eat and drink normally unless instructed otherwise.     TREATMENT FOR COMMON SIDE EFFECTS:  - Mild abdominal pain, nausea, belching, bloating or excessive gas:  rest,   eat lightly and use a heating pad.  - Sore Throat: treat with throat lozenges and/or gargle with warm salt   water.  - Because air was used during the procedure, expelling large amounts of air   from your rectum or belching is normal.  - If a bowel prep was taken, you may not have a bowel movement for 1-3 days.    This is normal.  SYMPTOMS TO WATCH FOR AND REPORT TO YOUR PHYSICIAN:  1. Abdominal pain or bloating, other than gas cramps.  2. Chest pain.  3. Back pain.  4. Signs of infection such as: chills or fever occurring within 24 hours   after the procedure.  5. Rectal bleeding, which would show as bright red, maroon, or black stools.   (A tablespoon of blood from the rectum is not serious, especially  if   hemorrhoids are present.)  6. Vomiting.  7. Weakness or dizziness.  GO DIRECTLY TO THE NEAREST EMERGENCY ROOM IF YOU HAVE ANY OF THE FOLLOWING:      Difficulty breathing              Chills and/or fever over 101 F   Persistent vomiting and/or vomiting blood   Severe abdominal pain   Severe chest pain   Black, tarry stools   Bleeding- more than one tablespoon   Any other symptom or condition that you feel may need urgent attention  Your doctor recommends these additional instructions:  If any biopsies were taken, your doctors clinic will contact you in 1 to 2   weeks with any results.  - Discharge patient to home.   - Await pathology results.   - Telephone endoscopist for pathology results in 3 weeks.   - Refer to a colo-rectal surgeon at the next available appointment. Referal   placed for perianal skin tag evaluation.  - Repeat colonoscopy in 10 years for screening purposes.   - THIS RECOMMENDATION of 10-Years FOR NEXT SCREENING COLONOSCOPY ASSUMES   THAT YOU WILL NOT HAVE HAD OR NOT HAD A FIRST OR SECOND DEGREE RELATIVE/S   WITH COLORECTAL CANCER OR AN ADVANCE COLON ADENOMAS BEFORE THE AGE OF 60   YEARS OF AGE OF THOSE INDIVIDUALS BY THE TIME OF YOUR NEXT SCREENING   COLONOSCOPY.                - Return to GI clinic at the next available appointment.   - The findings and recommendations were discussed with the patient.  For questions, problems or results please call your physician - Ezra Castañeda MD at Work:  [phone].  [facility], EMERGENCY ROOM PHONE NUMBER: [phone]  IF A COMPLICATION OR EMERGENCY SITUATION ARISES AND YOU ARE UNABLE TO REACH   YOUR PHYSICIAN - GO DIRECTLY TO THE EMERGENCY ROOM.  Ezra Castañeda MD  11/15/2024 12:53:47 PM  This report has been verified and signed electronically.  Dear patient,  As a result of recent federal legislation (The Federal Cures Act), you may   receive lab or pathology results from your procedure in your [facility]   account before your  physician is able to contact you. Your physician or   their representative will relay the results to you with their   recommendations at their soonest availability.  Thank you,  PROVATION

## 2024-11-19 LAB
FINAL PATHOLOGIC DIAGNOSIS: NORMAL
GROSS: NORMAL
Lab: NORMAL

## 2024-11-20 NOTE — ANESTHESIA POSTPROCEDURE EVALUATION
Anesthesia Post Evaluation    Patient: Caryl Cedeno    Procedure(s) Performed: Procedure(s) (LRB):  COLONOSCOPY (N/A)    Final Anesthesia Type: general      Patient location during evaluation: GI PACU  Patient participation: Yes- Able to Participate  Level of consciousness: awake  Post-procedure vital signs: reviewed and stable  Pain management: adequate  Airway patency: patent    PONV status at discharge: No PONV  Anesthetic complications: no      Cardiovascular status: blood pressure returned to baseline  Respiratory status: unassisted  Hydration status: euvolemic  Follow-up not needed.              Vitals Value Taken Time   /88 11/15/24 1332   Temp 36.6 °C (97.9 °F) 11/15/24 1250   Pulse 70 11/15/24 1333   Resp 16 11/15/24 1333   SpO2 99 % 11/15/24 1333   Vitals shown include unfiled device data.      No case tracking events are documented in the log.      Pain/Jesus Score: No data recorded

## 2024-11-21 ENCOUNTER — TELEPHONE (OUTPATIENT)
Dept: HEMATOLOGY/ONCOLOGY | Facility: CLINIC | Age: 44
End: 2024-11-21
Payer: MEDICARE

## 2024-11-21 NOTE — TELEPHONE ENCOUNTER
Called patient and left message to move appointment on 11/26/24 to 12/2/24 and to call if appointment day does not work as the provider will not be in office.

## 2024-12-08 ENCOUNTER — PATIENT MESSAGE (OUTPATIENT)
Dept: GASTROENTEROLOGY | Facility: CLINIC | Age: 44
End: 2024-12-08
Payer: MEDICARE

## 2024-12-09 ENCOUNTER — TELEPHONE (OUTPATIENT)
Dept: HEMATOLOGY/ONCOLOGY | Facility: CLINIC | Age: 44
End: 2024-12-09
Payer: MEDICARE

## 2024-12-10 ENCOUNTER — OFFICE VISIT (OUTPATIENT)
Dept: HEMATOLOGY/ONCOLOGY | Facility: CLINIC | Age: 44
End: 2024-12-10
Payer: MEDICARE

## 2024-12-10 ENCOUNTER — TELEPHONE (OUTPATIENT)
Dept: GENETICS | Facility: CLINIC | Age: 44
End: 2024-12-10
Payer: MEDICARE

## 2024-12-10 ENCOUNTER — PATIENT MESSAGE (OUTPATIENT)
Dept: HEMATOLOGY/ONCOLOGY | Facility: CLINIC | Age: 44
End: 2024-12-10

## 2024-12-10 DIAGNOSIS — Z71.83 ENCOUNTER FOR NONPROCREATIVE GENETIC COUNSELING: Primary | ICD-10-CM

## 2024-12-10 DIAGNOSIS — Z91.89 AT INCREASED RISK OF BREAST CANCER: ICD-10-CM

## 2024-12-10 DIAGNOSIS — Z83.2 FAMILY HISTORY OF AUTOIMMUNE DISORDER: ICD-10-CM

## 2024-12-10 DIAGNOSIS — Z87.2 HISTORY OF BREAST ABSCESS: ICD-10-CM

## 2024-12-10 DIAGNOSIS — Z80.3 FAMILY HISTORY OF BREAST CANCER: ICD-10-CM

## 2024-12-10 PROCEDURE — 99205 OFFICE O/P NEW HI 60 MIN: CPT | Mod: 95,,, | Performed by: NURSE PRACTITIONER

## 2024-12-10 NOTE — Clinical Note
"Ritesh, can you please contact pt to schedule her in the [facility] breast clinic for ASAP given she is high-risk and overdue for screening mammogram with a "still healing" breast abscess?  Thanks! Jose"

## 2024-12-10 NOTE — Clinical Note
Hello,  Can you please contact pt to schedule visit in general genetics clinic per pt request given family history of autoimmune disease?  Referral is in.  Thanks! Jose

## 2024-12-10 NOTE — TELEPHONE ENCOUNTER
GIORGIOM informing pt to call office callback number [phone] to schedule appt with GC/family history of autoimmune disease.       ----- Message from Bria Jackson sent at 12/10/2024  2:30 PM CST -----  Regarding: RE: Please advise  Yes, that's fine.  ----- Message -----  From: Rachelle Santoyo MA  Sent: 12/10/2024   2:24 PM CST  To: Bria Jackson CGC  Subject: Please advise                                    GC?  ----- Message -----  From: Jose Chandra DNP  Sent: 12/10/2024   2:20 PM CST  To: Rachelle Santoyo MA    Hello,    Can you please contact pt to schedule visit in general genetics clinic per pt request given family history of autoimmune disease?  Referral is in.    Thanks!  Jose

## 2024-12-10 NOTE — PROGRESS NOTES
"Cancer Genetics  Department of Hematology and Oncology  [facility]    Date of Service:  12/10/24  Visit Provider:  Jose Chandra DNP  Collaborating Physician:  Gila Mcdaniels MD    Patient ID  Name: Caryl Cedeno    : 1980    MRN: [id number]      Referring Provider  No referring provider defined for this encounter.    Televisit Information  The patient location is:  [place], [place].    The chief complaint leading to consultation is:  As below.    Visit type:  audiovisual.    Face-to-face time with patient:  Approximately 34 minutes.   Total time:  Approximately 61 minutes.  This includes face to face time and non-face to face time preparing to see the patient (eg, review of tests), obtaining and/or reviewing separately obtained history, documenting clinical information in the electronic or other health record, independently interpreting results and communicating results to the patient/family/caregiver, or care coordinator.     SUBJECTIVE      Chief Complaint: Genetic Evaluation    History of Present Illness (HPI):  Caryl Cedeno ("Caryl"), 44 y.o., assigned female sex at birth, Ms. Recently met with me on 08/10/2021 for cancer-genetic risk assessment.  At that visit, she opted to proceed with germline cancer-genetic testing; however, Dariela Krishna had tried to call Caryl to set up the blood work for such and was not able to reach Caryl, per Dariela's documentation.  Caryl returns today for cancer-genetic risk assessment.    Focused Medical History  Genetic testing:  No  Cancer:  No  Colonoscopy review:  10/28/2019 (age 39) colonoscopy:  No polyps  11/15/2024 (age 44) colonoscopy:  No polyps  Patient reported that she has previously had about 1 polyp in total  Other tumor or pertinent mass/lesion:  upper GI lesion removed not cancer but metaplasia  Pancreatitis:  No  Blood disorder:  No    Focused Surgical " History  Reproductive organs:  Intact    Breast Cancer Risk Assessment Questionnaire   Mammographic breast density:  heterogeneously dense   Age at menarche:  11  Age at first live childbirth:   (AB)  Menopausal status:  premenopausal; LMP 2024  Hormone replacement therapy use history:  never  Breast biopsy history and findings:  never  Thoracic radiation therapy history:  never    ONCOLOGY PEDIGREE  Ancestry:  [employer]:  No  Consanguinity:  None known, particularly not in close family  Hereditary cancer genetic testing in blood relatives:  None known  Other than noted, no known close/distant family history of cancer.  Other than noted, no known family history of colon polyps.    ** If this pedigree appears small/illegible on your screen, expand this note window horizontally. **      Review of Systems  See HPI.    Had a right-breast abscess in c.2024.  Saw ERIK Palumbo, for this.  Patient states it is still healing but that she has no breast-related concerns.     OBJECTIVE     Patient Active Problem List    Diagnosis Date Noted    Diarrhea 2024    Cervicogenic headache 2021    Laryngopharyngeal reflux (LPR) 10/26/2021    Sjogren's syndrome 2021    Osteoarthritis of spine with radiculopathy, lumbar region 2021    Cervical spondylosis 2021    Chronic pain 2021    Cervicalgia 2021    Right upper quadrant pain 10/28/2019    Chronic bilateral low back pain without sciatica 2018    Dysautonomia 2018    Numbness and tingling of both lower extremities 2018    POTS (postural orthostatic tachycardia syndrome) 01/10/2018    Migraine with aura and without status migrainosus, not intractable 01/10/2018    Throat papilloma 2017    Celiac disease 2017    Small fiber neuropathy 2017    Fibromyalgia 2017     Past Medical History:   Diagnosis Date    Allergy     Celiac disease     Fibromyalgia     Hypertension     Migraines   "   Small fiber neuropathy     per patient is types: sensory and autonomic     Physical Exam  Significantly limited secondary to the inherent nature of a virtual visit.  Very pleasant patient.  Constitutional      Appearance:  Appears well developed and well nourished. No distress.   Neurological     Mental Status:  Alert and oriented.  Psychiatric         Mood and Affect:  Normal.     Thought Content:  Normal.     Speech:  Normal.     Behavior:  Normal.     Judgment:  Normal.    ASSESSMENT / PLAN      Caryl Cedeno ("Caryl"), 44 y.o., assigned female sex at birth, Ms. Recently met with me on 08/10/2021 for cancer-genetic risk assessment.  At that visit, she opted to proceed with germline cancer-genetic testing; however, Dariela Krishna had tried to call Caryl to set up the blood work for such and was not able to reach Caryl, per Dariela's documentation.  Caryl returns today for cancer-genetic risk assessment.      From a clinical standpoint, regarding hereditary cancer susceptibility gene testing:  Caryl meets current National Comprehensive Cancer Network (NCCN) criteria for such genetic testing based on her paternal grandmother's early-onset breast cancer.  If Caryl's father has undergone testing of all appropriate genes, with negative results, such testing would not be indicated for Caryl at this time.    Cancer Genetics:  Germline cancer genetic testing is the testing of genes associated with cancer, known as cancer susceptibility genes.  Just as these genes are inherited from parents--one copy of each gene from each parent--mutations in these genes can be inherited, as well.  A mutation in a cancer susceptibility gene adversely affects the gene's ability to prevent cancer; therefore, carriers of cancer susceptibility gene mutations may be at increased risk for certain cancers.     Causes of Cancer:  Only approximately 5%-10% of cancers are caused by an inherited cancer susceptibility gene " mutation; rather, the majority of cancers are sporadic.  Causes of sporadic cancers may include environmental risk factors, lifestyle risk factors, and non-modifiable risk factors.  It is important to note that members of a family often share not only their genetics but also other risk factors, including environmental and lifestyle risk factors, so cancers can be familial.     Potential Results of Genetic Testing, and Their Implications:  Potential results of genetic testing include positive, negative, and variant of unknown significance (VUS).    Positive:  A positive result indicates the presence of at least one clinically significant gene mutation, and the individual's associated cancer risks vary depending upon the cancer susceptibility gene(s) in which there is/are a mutation(s).  With a positive result, depending upon the specific result and the individual's clinical history, modified risk management may be recommended, including measures for risk reduction and/or surveillance; however, there are not always effective strategies for modified risk management.  There can be reproductive implications with a positive genetic test result.  There can be cancer-treatment implications with a positive genetic test result.  Negative:  A negative result indicates that no clinically significant mutations were identified in the gene(s) tested.  The ability to interpret the meaning of a negative genetic testing result is limited in an individual unaffected with cancer whose affected relatives have not first undergone such testing.  The most informative individual in a family to undergo testing for hereditary cancer syndromes first are those who have personally had cancer.  A negative result in the patient does not indicate that that individual cannot develop cancer, and, in fact, that individual may even be at increased risk for cancer based on shared risk factors with affected relatives.    VUS:  A VUS indicates that there  is not presently enough data for the laboratory to make a determination as to whether the genetic variant is clinically significant.  VUSs are not typically acted upon clinically.       Genetic Mutation Inheritance:  When an individual tests positive for a gene mutation, their first-degree relatives each have a 50% chance of carrying the same mutation, and other, more distant blood relatives can also be at risk of carrying the same mutation.      Genetic Discrimination:  Genetic discrimination occurs when individuals are discriminated against on the basis of their genetic information.  The Genetic Information Nondiscrimination Act of 2008 (RANDI) is U[place]. federal legislation that provides some protections against use of an individual's genetic information by their health insurer and by their employer.  Title I of RANDI prohibits most health insurers from utilizing an individual's genetic information to make decisions regarding insurance eligibility or premium charges; this protection does not apply to health insurance obtained through a job with the , and it may not apply to health insurance obtained through the Federal Employees Health Benefits Plan.  Title II of RANDI prohibits covered entities, in many cases, from requesting or requiring the genetic information of employees and applicants and from using said information to make employment decisions; this protection does not apply to employers with fewer than 15 employees or to the .  RANDI does not protect individuals from genetic discrimination by any other type of policy or entity, including but not limited to life insurance, disability insurance, long-term care insurance,  benefits, and [facility] benefits.    Genetic Testing Logistics:  An outside laboratory would perform the testing after a blood sample is collected at [facility].  One can expect this genetic testing to take approximately three weeks to result.  There is a  potential for the patient to incur out-of-pocket costs related to genetic testing.  Post-test genetic counseling can be conducted once the genetic testing results are available.     Plan:  Offered Caryl options of proceeding with hereditary cancer susceptibility gene testing at this time versus delaying/declining such.  Caryl desires to proceed with such testing at this time.  Provided germline cancer genetic test options of focused panel versus broad panel, and Caryl opts for the latter.      Genetic Test Information:  Testing lab: [facility]   Test panel: [employer]-Cancer® +MicroEdge® (9511-R)  Test Options: BRCA1 and BRCA2 gene sequence and deletion/duplication (8838);  Selected Genes: AIP, ALK, APC, KRISTA, ATRIP, AXIN2, BAP1, BARD1, BMPR1A, BRCA1, BRCA2, BRIP1, CDC73, CDH1, CDK4, CDKN1B, CDKN2A, CEBPA, CHEK2, CTNNA1, DDX41, DICER1, EGFR, EPCAM, ETV6, FH, FLCN, GATA2, GREM1, HOXB13, KIT, LZTR1, MAX, MBD4, MEN1, MET, MITF, MLH1, MLH3, MSH2, MSH3, MSH6, MUTYH, NF1, NF2, NTHL1, PALB2, PDGFRA, PHOX2B, PMS2, POLD1, POLE, POT1, ZXUHD5F, PTCH1, PTEN, RAD51B, RAD51C, RAD51D, RB1, RET, RNF43, RPS20, RUNX1, SDHA, SDHAF2, SDHB, SDHC, SDHD, SMAD4, SMARCA4, SMARCB1, SMARCE1, STK11, SUFU, PJEF561, TP53, TSC1, TSC2, VHL, WT1    [facility] Order Number: [id number]      ICD-10 code(s): Z80.3   Verbal informed consent: Obtained   Specimen type: Blood   Specimen collection by: [facility] ([place])   Specimen collection date: To be scheduled for 12/16/2024 at 1pm at [place]   Results expected by: Approximately 2-3 weeks after the genetic testing lab receives the specimen   Post-test genetic counseling: ~4 weeks after sample collection          ICD-10-CM ICD-9-CM   1. Encounter for nonprocreative genetic counseling  Z71.83 V26.33   2. Family history of breast cancer  Z80.3 V16.3   3. At increased risk of breast cancer  Z91.89 V15.89   4. History of breast abscess  Z87.2 V13.3   5. Family history of autoimmune disorder  Z83.2 V19.8  "    1. Encounter for nonprocreative genetic counseling    2. Family history of breast cancer  - Misc Sendout Test, Blood [facility] Hereditary Cancer DNA+RNA (2 tubes); Future    3. At increased risk of breast cancer  There are models that help us to "calculate" your breast cancer risk.  Your current, estimated risks for developing breast cancer are as follows, per Odalis-Jeana 8 model in Epic:    5-year:  2.33% (increased risk)  10-year:  5.04% (does reach the threshold at which risk-reducing medication is recommended unless otherwise contraindicated)  Lifetime:  20.17% (increased risk)  - Ambulatory referral/consult to Breast Surgery; Future ([facility])    4. History of breast abscess  Had a right-breast abscess in c.05/2024.  Saw ERIK Palumbo, for this.  Patient states it is still healing but that she has no breast-related concerns.  - Ambulatory referral/consult to Breast Surgery; Future ([facility])    5. Family history of autoimmune disorder  Patient states she would like referral to General Genetics.  - Ambulatory referral/consult to Genetics; Future ([facility])           Information to Follow Your Cancer Genetics Visit    Caryl:  Below is some information to follow your cancer genetics visit.  Please read this information and let me know if you have any questions or concerns.    Health Maintenance / Cancer Risks and Risk Management    Only a small percentage (approximately 5%-10%) of cancers are hereditary, meaning caused by an inherited gene mutation; rather, most cancers are sporadic.  Environmental factors, lifestyle factors, and even factors beyond our control play a significant role in the development of many cancers.  As such, an individual can develop cancer even if they don't have an identifiable hereditary predisposition.  Furthermore, even with negative genetic testing, an individual can still be at increased risk for certain cancers, as " "they may independently have risk factors for those cancers and/or may share risk factors with their family members affected by cancer.  For these reasons, it is strongly recommended that you ensure that your healthcare providers are aware of and up-to-date on your personal and family history so that your medical management, including cancer screenings, can be based in part off of this information.      The following cancer screenings are currently recommended for you (please note that these recommendations can change based on updates to clinical guidelines and/or with changes to your medical or family history and are therefore only considered accurate as of the date on which this document was composed; additional and/or alternative screenings may be recommended by your healthcare providers, and recommendations from your healthcare providers directly managing these risks supersede the below recommendations):     The terms "female" and "male" below refer to assigned sex at birth.    Cancer in general:    Attend an annual visit with a primary care provider (PCP) for history review, physical exam, and age-appropriate testing.    Gynecologic cancers (females only):  Attend an annual visit with your gynecology provider, which may include pelvic exam and/or Pap smear/HPV testing.    Breast cancer (females only):  There are models that help us to "calculate" your breast cancer risk.  Your current, estimated risks for developing breast cancer are as follows, per Tyrer-Raduzick 8 model in Epic:    5-year:  2.33% (increased risk)  10-year:  5.04% (does reach the threshold at which risk-reducing medication is recommended unless otherwise contraindicated)  Lifetime:  20.17% (increased risk)    If you have never had breast cancer, undergo ongoing breast cancer risk assessment and breast cancer risk-reduction counseling with your gynecology provider (or you can reach out to me for such) or, if you have one, your breast specialist. "   Whether you have breasts or have undergone mastectomies, practice ongoing breast awareness, meaning you are familiar with your breasts, perform breast self-exams at least monthly, and promptly report changes/concerns to your gynecology provider or breast specialist/oncologist.  Undergo clinical breast exam by a healthcare provider every 12 months or, if your gynecology provider or breast specialist/oncologist recommends, more often.  If you have one or both of your breasts (in other words, you haven't undergone a double mastectomy), undergo annual mammogram.  This can be ordered by your PCP, gynecology provider, or breast specialist/oncologist.  If you have a breast specialist/oncologist who has recommended such, undergo supplemental screenings for breast cancer.    Colorectal cancer:  Determine with your PCP or, if you have one, your gastroenterology (GI) provider whether you are considered to be at average risk versus increased risk for colorectal cancer.  Based on your risk category and corresponding guideline recommendations and, if applicable, the results of your prior colorectal cancer screening, undergo screenings (which may consist of colonoscopy or other test) for colorectal cancer as recommended by your PCP or GI provider.  With regard to family history, please let me know if any of the following applies, as such could change colorectal cancer screening recommendations for you:  If you learn moving forward that any blood relative of yours has been diagnosed with colorectal cancer.  If a first-degree relative (i.e., your parent, sibling, or child) has a colorectal polyp history including any of the following characteristics:  Adenoma with high-grade dysplasia  Adenoma or sessile serrated polyp/adenoma 1 cm or larger in size  Villous or tubulovillous adenoma  Traditional serrated adenoma (TSA)  Sessile serrated lesion/polyp/adenoma with any dysplasia  Cumulatively 10 or more polyps    Prostate cancer  (males only):  Determine with your PCP or, if you have one, your urology provider whether you are considered to be at average risk versus increased risk for prostate cancer.  Based on your risk category and corresponding guideline recommendations and, if applicable, the results of your prior prostate cancer screening, undergo screenings for prostate cancer, which include prostate-specific antigen (PSA) testing with or without digital rectal exam (DEVAUGHN).    Skin cancer:  Undergo total-body skin examination (TBSE) with a dermatology provider annually or, if recommended by your dermatology provider, more often.    Pancreatic cancer:  If you have been told you are at increased risk of developing pancreatic cancer based on significant family history and/or other risk factors, consult with a pancreas specialist about undergoing annual screening for pancreatic cancer, which often consists of MRI/MR cholangiopancreatography (MRCP) in alternation with annual endoscopic ultrasound (EUS) of the pancreas.    Lung cancer:  If you have a 20-pack-year smoking history or greater, shared patient/provider decision-making regarding low-dose CT scan (LDCT) is recommended starting at age 50.  Please let me know if you do have at least a 20-pack-year smoking history or if you are unsure.     Other cancer:  Undergo screenings/surveillance as recommended by your healthcare providers.    Referrals placed based on the above:  Please notify me if you do not hear from these office(s) within the next 2 weeks to schedule an appointment.  [facility]     If you are not established with a healthcare specialist listed above, please let me know so I can refer you.    Some information regarding general cancer risk reduction:  It is recommended to avoid tobacco use; be physically active; maintain a healthy weight; eat a diet rich in fruits, vegetables, and whole grains and low in saturated/trans fat, red meat, and processed meat;  limit alcohol consumption (zero is best); protect against sexually transmitted infections; protect against the sun, and avoid tanning beds; and get regular screenings for cancers as recommended by your healthcare team.    Regarding Your Relatives    Your relatives also may be at increased risk for certain cancers, depending upon their own personal and family history; therefore, it is important that they, too, ensure that their own healthcare providers are aware of and up-to-date on their personal and family history so that their medical management, including cancer screenings, can be based in part off of this information.      Additionally, it is possible for your relatives to have hereditary cancer susceptibility gene mutations even if/when you have negative genetic testing.      Your relatives should speak with a healthcare provider about their cancer risks and whether genetic counseling and potentially testing may be indicated for them.  Anyone interested in pursuing cancer genetic counseling/testing may contact the [facility] at [phone] to schedule an appointment or may visit [email or web address] to locate a genetic specialist near them.    Follow-up    Please continue to follow up with all healthcare providers as directed.    Moving forward, please update me with any changes to--or new information learned about--your personal or family history and with any relative's genetic testing results.      Please don't hesitate to reach out with any questions or concerns.        Follow-up:  Follow up in about 1 month (around 1/10/2025) for post-test genetic counseling.    Questions were encouraged and answered to the patient's satisfaction, and she verbalized understanding of the information and agreement with the plan.  Advised Caryl that pertinent information will be accessible in this visit note for her review.      Jose Chandra, DNP, APRN, FNP-BC, AOCNP, CGRA  Nurse Practitioner, Cancer Genetics  Department of  Hematology and Oncology  [facility]  [facility]          Routed to Cancer Genetics Staff:  - Please schedule Ambry blood work for 12/16/2024 at 1pm at [place].        Portions of the record may have been created with voice-recognition software. Occasional wrong-word or sound-a-like substitutions may have occurred due to the inherent limitations of voice-recognition software. Read the chart carefully and recognize, using context, where substitutions have occurred.

## 2025-01-15 ENCOUNTER — TELEPHONE (OUTPATIENT)
Dept: HEMATOLOGY/ONCOLOGY | Facility: CLINIC | Age: 45
End: 2025-01-15
Payer: MEDICARE

## 2025-01-25 ENCOUNTER — TELEPHONE (OUTPATIENT)
Dept: INTERNAL MEDICINE | Facility: CLINIC | Age: 45
End: 2025-01-25
Payer: MEDICARE

## 2025-01-25 NOTE — TELEPHONE ENCOUNTER
I will be out on the afternoon of 1/31. Can you please reschedule with her - I have some availability on am of that day or can work her in somewhere else - just let me know her preference.

## 2025-01-29 ENCOUNTER — PATIENT MESSAGE (OUTPATIENT)
Dept: INTERNAL MEDICINE | Facility: CLINIC | Age: 45
End: 2025-01-29
Payer: MEDICARE

## 2025-02-21 DIAGNOSIS — Z00.00 ENCOUNTER FOR MEDICARE ANNUAL WELLNESS EXAM: ICD-10-CM

## 2025-03-21 ENCOUNTER — OFFICE VISIT (OUTPATIENT)
Dept: INTERNAL MEDICINE | Facility: CLINIC | Age: 45
End: 2025-03-21
Payer: MEDICARE

## 2025-03-21 VITALS
DIASTOLIC BLOOD PRESSURE: 80 MMHG | HEART RATE: 72 BPM | HEIGHT: 66 IN | BODY MASS INDEX: 31.55 KG/M2 | WEIGHT: 196.31 LBS | SYSTOLIC BLOOD PRESSURE: 118 MMHG | OXYGEN SATURATION: 98 %

## 2025-03-21 DIAGNOSIS — M35.01 SJOGREN'S SYNDROME WITH KERATOCONJUNCTIVITIS SICCA: ICD-10-CM

## 2025-03-21 DIAGNOSIS — D84.9 IMMUNOSUPPRESSION: ICD-10-CM

## 2025-03-21 DIAGNOSIS — Z12.31 ENCOUNTER FOR SCREENING MAMMOGRAM FOR BREAST CANCER: ICD-10-CM

## 2025-03-21 DIAGNOSIS — G90.1 DYSAUTONOMIA: ICD-10-CM

## 2025-03-21 DIAGNOSIS — M47.812 CERVICAL SPONDYLOSIS: ICD-10-CM

## 2025-03-21 DIAGNOSIS — G90.A POTS (POSTURAL ORTHOSTATIC TACHYCARDIA SYNDROME): Chronic | ICD-10-CM

## 2025-03-21 DIAGNOSIS — G70.9 NEUROMUSCULAR DISORDER: Primary | ICD-10-CM

## 2025-03-21 DIAGNOSIS — M47.816 LUMBAR FACET ARTHROPATHY: ICD-10-CM

## 2025-03-21 DIAGNOSIS — K90.0 CELIAC DISEASE: ICD-10-CM

## 2025-03-21 DIAGNOSIS — M43.06 PARS DEFECT OF LUMBAR SPINE: ICD-10-CM

## 2025-03-21 DIAGNOSIS — G61.9 INFLAMMATORY POLYNEUROPATHY, UNSPECIFIED: ICD-10-CM

## 2025-03-21 PROCEDURE — G2211 COMPLEX E/M VISIT ADD ON: HCPCS | Mod: S$PBB,,, | Performed by: INTERNAL MEDICINE

## 2025-03-21 PROCEDURE — 99214 OFFICE O/P EST MOD 30 MIN: CPT | Mod: S$PBB,,, | Performed by: INTERNAL MEDICINE

## 2025-03-21 PROCEDURE — 99999 PR PBB SHADOW E&M-EST. PATIENT-LVL V: CPT | Mod: PBBFAC,,, | Performed by: INTERNAL MEDICINE

## 2025-03-21 PROCEDURE — 99215 OFFICE O/P EST HI 40 MIN: CPT | Mod: PBBFAC | Performed by: INTERNAL MEDICINE

## 2025-03-21 NOTE — PROGRESS NOTES
"Subjective:       Patient ID: Caryl Cedeno is a 44 y.o. female.    Chief Complaint: Annual Exam    HPI  Caryl Cedeno is a 44 y.o. female here for a yearly preventative healthcare visit.      currently with COVID. He got sick about 48 hours ago and has been isolating. She does not have sx.     Hx celiac sprue on gluten free diet.  POTS, HTN  Chronic neck and back pain; Small fiber neuropathy.   Her cardiologist doctor diagnosed with Sjogren's by lip biopsy.   On pilocarpine which helps a lot even at lower dose.   Rheum at [facility]    HTN; POTS  On labetalol 200mg bid    Had terrible acne on her cheeks, forehead and chin.   She decreased her azathioprine to once daily and this seems to be helping. Will follow up w her rheumatologist concerning this.     She is unable to work due to aforementioned medical issues.      recently diagnosed w diabetes and a lot of his aches and pains improved with control of BG so she wonders what her BG are.     Naltrexone 3mg daily - compounded at [facility] Dr. ALLISON     Bilateral pars defect L5-L5 with grade II anterolisthesis on XR 8/2023. MRI then done, has not followed with pain physician since and would like to establish at [facility].   Review of Systems   Constitutional:  Negative for fever.   Respiratory:  Negative for shortness of breath.    Cardiovascular:  Negative for chest pain.   Musculoskeletal:  Positive for back pain.   Skin: Negative.        Objective:   /80 (BP Location: Left arm, Patient Position: Sitting)   Pulse 72   Ht 5' 6" (1.676 m)   Wt 89 kg (196 lb 5.1 oz)   SpO2 98%   BMI 31.69 kg/m²      Physical Exam  Constitutional:       General: She is not in acute distress.     Appearance: She is well-developed. She is not diaphoretic.   HENT:      Head: Normocephalic and atraumatic.   Cardiovascular:      Rate and Rhythm: Normal rate and regular rhythm.   Pulmonary:      Effort: Pulmonary effort is normal. No respiratory " distress.      Breath sounds: No wheezing or rales.   Skin:     General: Skin is warm and dry.   Neurological:      Mental Status: She is alert and oriented to person, place, and time.   Psychiatric:         Behavior: Behavior normal.         Assessment:       1. Neuromuscular disorder    2. Immunosuppression    3. Inflammatory polyneuropathy, unspecified    4. Dysautonomia    5. Cervical spondylosis    6. POTS (postural orthostatic tachycardia syndrome)    7. Sjogren's syndrome with keratoconjunctivitis sicca    8. Celiac disease    9. Pars defect of lumbar spine    10. Lumbar facet arthropathy    11. Encounter for screening mammogram for breast cancer        Plan:       Neuromuscular disorder  Immunosuppression  Inflammatory polyneuropathy, unspecified  Dysautonomia  Cervical spondylosis  POTS (postural orthostatic tachycardia syndrome)  Sjogren's syndrome with keratoconjunctivitis sicca  Celiac disease  - stable and controlled  - continue current regimen    Pars defect of lumbar spine  Lumbar facet arthropathy  -     Ambulatory referral/consult to Pain Clinic; Future; Expected date: 03/28/2025  -     PT evaluate and treat; Future    Encounter for screening mammogram for breast cancer  -     Mammo Digital Screening Bilat w/ Erick (XPD); Future; Expected date: 05/31/2025          Health Maintenance Due   Topic    Mammogram       Handicap form  Pain clinic  Aquatic therapy

## 2025-04-04 ENCOUNTER — TELEPHONE (OUTPATIENT)
Dept: INTERNAL MEDICINE | Facility: CLINIC | Age: 45
End: 2025-04-04
Payer: MEDICARE

## 2025-04-04 DIAGNOSIS — G70.9 NEUROMUSCULAR DISORDER: Primary | ICD-10-CM

## 2025-04-04 RX ORDER — PREGABALIN 25 MG/1
25 CAPSULE ORAL 2 TIMES DAILY
Qty: 60 CAPSULE | Refills: 0 | Status: SHIPPED | OUTPATIENT
Start: 2025-04-04 | End: 2025-05-04

## 2025-04-04 NOTE — TELEPHONE ENCOUNTER
----- Message from Buffy sent at 4/4/2025  1:35 PM CDT -----  Type:  Patient Returning CallWho Called:ptWho Left Message for Patient:Jeri Enriquez MADoes the patient know what this is regarding?:returning callWould the patient rather a call back or a response via [facility]? callBest Call Back Number: [phone] Information:

## 2025-04-04 NOTE — TELEPHONE ENCOUNTER
Having a problem with getting her fill early for her medication says she tried to  an refill but it wasn't due yet but she is running out.

## 2025-04-04 NOTE — TELEPHONE ENCOUNTER
----- Message from Buffy sent at 4/3/2025  5:09 PM CDT -----  Type:  Needs Medical AdviceWho Called: ptWould the patient rather a call back or a response via [facility]? [facility] Call Back Number: [phone] Information: pt wanting to speak with off regarding her medication pregabalin (LYRICA) 25 MG capsule

## 2025-04-05 ENCOUNTER — NURSE TRIAGE (OUTPATIENT)
Dept: ADMINISTRATIVE | Facility: CLINIC | Age: 45
End: 2025-04-05
Payer: MEDICARE

## 2025-04-05 DIAGNOSIS — G70.9 NEUROMUSCULAR DISORDER: ICD-10-CM

## 2025-04-05 NOTE — TELEPHONE ENCOUNTER
Pt states she was short of medication. Provider sent in Lyrica 25 mg refill as ordered and asking for provider's office to contact pharmacy to okay early refill. Advised to contact provider's office when open per protocol. Pt refused triage for pain. Pt states that she starts to feel like micro seizures are in her brain without meds. Advised to go to ED if having s/s of withdrawal per nursing judgement since pt does not want triage. VU. Encounter routed to provider.   Reason for Disposition   Caller requesting a CONTROLLED substance prescription refill (e.g., narcotics, ADHD medicines)    Protocols used: Medication Refill and Renewal Call-A-

## 2025-04-07 ENCOUNTER — TELEPHONE (OUTPATIENT)
Dept: INTERNAL MEDICINE | Facility: CLINIC | Age: 45
End: 2025-04-07
Payer: MEDICARE

## 2025-04-07 RX ORDER — PREGABALIN 25 MG/1
25 CAPSULE ORAL 2 TIMES DAILY
Qty: 60 CAPSULE | Refills: 5 | Status: SHIPPED | OUTPATIENT
Start: 2025-04-07 | End: 2025-05-07

## 2025-04-07 NOTE — TELEPHONE ENCOUNTER
----- Message from Jessie sent at 4/7/2025  1:06 PM CDT -----  Contact: Self/ [phone]  Type: Rx Clarification/ Additional Information/ QuestionsMedication:pregabalin (LYRICA) 25 MG capsuleWhat questions do you have about the medication, if any?What information is needed?Okay to fill early Pharmacy number: [id number] [address] [place] [facility] [place] [address]A[place]: [phone] Fax: [phone] Pharmacy Contact Name:NAComments: pt said that she is calling in regards to needing the doctor to okay for her to have an early refill on her medication . Please advise

## 2025-05-02 ENCOUNTER — OFFICE VISIT (OUTPATIENT)
Dept: PAIN MEDICINE | Facility: CLINIC | Age: 45
End: 2025-05-02
Payer: MEDICARE

## 2025-05-02 VITALS — DIASTOLIC BLOOD PRESSURE: 80 MMHG | HEART RATE: 75 BPM | SYSTOLIC BLOOD PRESSURE: 120 MMHG

## 2025-05-02 DIAGNOSIS — M43.06 PARS DEFECT OF LUMBAR SPINE: ICD-10-CM

## 2025-05-02 DIAGNOSIS — M47.816 LUMBAR FACET ARTHROPATHY: ICD-10-CM

## 2025-05-02 PROCEDURE — 99999 PR PBB SHADOW E&M-EST. PATIENT-LVL III: CPT | Mod: PBBFAC,,, | Performed by: EMERGENCY MEDICINE

## 2025-05-02 PROCEDURE — 99213 OFFICE O/P EST LOW 20 MIN: CPT | Mod: PBBFAC | Performed by: EMERGENCY MEDICINE

## 2025-05-02 NOTE — PROGRESS NOTES
Interventional Pain Management - New Patient Visit    Chief Complaint   Patient presents with    Back Pain     Left side of lower back        HPI 05/02/2025: Caryl Cedeno  presents to the clinic for the evaluation of the above pain. The pain started years go  . Original Pain Description: The pain is located in the left side lower back pain  and is radiating to the left leg and to calf laterally. The pain is described as aching, stabbing, and throbbing. Exacerbating factors: Sitting, Bending, Night Time, Extension, Lifting, and walking . Mitigating factors laying down. Symptoms interfere with daily activity. The patient feels like symptoms have been worsening. Patient denies denies perineal paresthesias, bowel/bladder dysfunction. Patient has had RFA for lumbar spine that treated her pain.     PAIN SCORES:  Best: Pain is 2  Current: Pain is 4  Worst: Pain is 9  Interval History 12/16/2021:  The patient returns to clinic today for follow up of neck and back pain via virtual visit. She is s/p bilateral occipital nerve blocks on 12/2/2021. She reports 60% relief of her headaches. She continues to report neck pain and headaches. She feels as though her pain is reduced in severity and decreased headache episodes. Since last visit, she has been diagnosed with Sjogrens. She has recently started an immunosuppressant medication. She has weaned Savella again, now taking half a pill BID. She continues to take Lyrica. She denies any other health changes.       Interval History 6/1/2021:  The patient returns to clinic today for follow up of neck and low back pain via virtual visit. She is s/p right L4/5 and L5/S1 TF MELITA on 5/18/2021. She reports 30% relief of her low back and leg pain. She reports one recent episode where her right leg went numb while walking. Her greatest area of concern at this time is her upper neck pain and headaches. She reports headaches that begin at the base of her skull and radiates forward. She  reports significant tenderness and pain to the base of her skull. She continues to report neck pain. She continues to report diffuse body pain with brain fog and intermittent dizziness. She asks about Ketamine infusions. She continues to wean Lyrica and Savella. She denies any other health changes.     Interval History 5/4/2021:  The patient returns to clinic today for follow up of neck and low back pain. She is s/p C7/T1 IL MELITA on 4/20/2021. She reports 50% relief of her neck pain. She reports intermittent neck pain. She does report neck pain at the base of her head. She does use ice with benefit. She continues to report low back pain that radiates into the lateral aspect of her right leg to her foot pain. She reports intermittent left leg pain. Her pain is worse with prolonged standing and walking. She did see Dr. Beaulieu with Neurosurgery who does not recommend surgical intervention at this time. She continues to wean Lyrica and Savella. She denies any other health changes. Her pain today is 4/10.     Interval History 4/8/2021:  The patient returns to clinic today for follow up of neck and low back pain via virtual visit. She is here today for imaging review. She continues to report neck pain and headaches. She does have diffuse arm and hand pain. She also reports low back pain. She has intermittent numbness to both legs. She has difficulty sleeping due to pain. Her pain is worse with prolonged standing and walking. Her neck pain is worse with riding in the car. She is currently taking Lyrica and Savella. She is trying to wean these medications. She denies any other health changes. Her pain today is 4/10.     Initial encounter:     Caryl Cedeno presents to the clinic for the evaluation of neck and lower back pain. The pain started years ago following multiple issues and symptoms have been worsening.    Brief history:  Hx of dysautonomia; going to fertility clinic trying to get pregnant. Pain Description:  The pain is located in the neck and headache and diffusely in the arms and legs associated with a small fiber neuropathy.   Today the pain is a 6/10  The pain is described as burning, numbing, sharp, shooting, tight band and tingling Symptoms interfere with daily activity, sleeping and work. Exacerbating factors: Standing, Walking, Extension, Flexing, Lifting and Getting out of bed/chair Mitigating factors medications and physical therapy. Patient denies urinary incontinence and bowel incontinence. Patient denies any suicidal or homicidal ideations    6 weeks of Conservative therapy:  PT: Completed  Chiro:  HEP: Unable due to pain    Treatments / Medications:   Meloxicam 15mg  Lyrica 25mg weaning due to braing fog  LDN 3mg    Antiplatelets/Anticoagulants/Immunosuppressants:  NA    Interventional Pain Procedures:   4/20/2021- C7/T1 IL MELITA  5/18/2021- Right L4/5 and L5/S1 TF MELITA  12/2/2021- Bilateral occipital nerve blocks- 60% relief  Lumbar RFA 2021-20    IMAGING:    MRI LUMBAR SPINE WITHOUT CONTRAST 06/26/24  L2-3: Shallow DB mildly indenting TS anteriorly w/slight recess narrowing and subtle contact of transitioning NRs. Mild facet and LF hypertrophy. Minor-mild foraminal narrowing. G1 AL L2-L3  L3-4: Mild-mod facet and LF hypertrophy.  G1 RL L3-L4  L4-5: Mod disc height loss wBBDB+extrusion+mod facet & LF hypertrophy w/TS effacement &slight crowding of central NRs & slight LR narrowing. Mod-severe BL foraminal narrowing w/contact exiting L4NRs BL.  G1 AL L4-5. BL chronic appearing L4 pars defects.   L5-1: Annular DB w/marginal osteophytes and mild-mod facet and LF hypertrophy->mod BL foraminal narrowing w/possible contact exiting NRs. Mild indentation of TS anteriorly with slight recess narrowing     XR LUMBAR SPINE 5 VIEW WITH FLEX AND EXT    04/07/2021  Mild lordosis lumbar spine with primary anterolisthesis L4 on L5 due to adjacent pars defect with spondylosis degenerative disc disease and facet joint  arthropathy changes.  At L4-5 posterior step-off 7 mm on extension and 11.3 mm on flexion.     XR C-SPINE AP, LAT, OBLIQUES W FLEX-EXT, XR LUMBAR SPINE AP LATERAL OBLIQUES, XR THORACIC SPINE (COMPLETE)  8/18/2022  Status post C5-C6 ACDF without evidence of hardware loosening or   failure.   Bilateral L4 pars defects with grade 1 anterolisthesis of L4 on L5.   Mild degenerative changes at the lower cervical spine and L4-L5.      Past Surgical History:   Procedure Laterality Date    CERVICAL SPINE SURGERY  2016    COLONOSCOPY N/A 10/28/2019    Procedure: COLONOSCOPY;  Surgeon: Ezra Castañeda MD;  Location: [facility] (4TH FLR);  Service: Endoscopy;  Laterality: N/A;  LATEX Allergy    COLONOSCOPY N/A 11/15/2024    Procedure: COLONOSCOPY;  Surgeon: Ezra Castañeda MD;  Location: [facility] (2ND FLR);  Service: Endoscopy;  Laterality: N/A;  Referred by: Dr. Joann Saleem, previous scope with Dr. Castañeda, [facility], instructions sent to portal-[facility]  9/10 pt not confirmed-does not want to removed from schedule-veda to let us know-st  9/11/24- lvm on pt's phone and 's, portal msg sent. DBM   9/13/24 Pt. r    EPIDURAL STEROID INJECTION N/A 4/20/2021    Procedure: INJECTION, STEROID, EPIDURAL C7/T1;  Surgeon: Nevaeh Esposito MD;  Location: [facility] PAIN MGT;  Service: Pain Management;  Laterality: N/A;  CONSENT NEEDED    ESOPHAGOGASTRODUODENOSCOPY N/A 10/28/2019    Procedure: EGD (ESOPHAGOGASTRODUODENOSCOPY);  Surgeon: Ezra Castañeda MD;  Location: [facility] ENDO (4TH FLR);  Service: Endoscopy;  Laterality: N/A;  LATEX Allergy    LAPAROSCOPIC CHOLECYSTECTOMY N/A 7/9/2019    Procedure: CHOLECYSTECTOMY, LAPAROSCOPIC;  Surgeon: Jagjit Martinez Jr., MD;  Location: [facility] OR [address] [place] [address];  Service: General;  Laterality: N/A;    LIVER BIOPSY  7/9/2019    Procedure: BIOPSY, LIVER;  Surgeon: Jagjit Martinez Jr., MD;  [facility];  Service: General;;    SPINE SURGERY      TRANSFORAMINAL EPIDURAL INJECTION OF  STEROID Right 5/18/2021    Procedure: INJECTION, STEROID, EPIDURAL, TRANSFORAMINAL APPROACH L4/5 and L5/S1;  Surgeon: Nevaeh Esposito MD;  Location: [facility];  Service: Pain Management;  Laterality: Right;  Consent needed       Social History     Socioeconomic History    Marital status:    Tobacco Use    Smoking status: Former    Smokeless tobacco: Never   Substance and Sexual Activity    Alcohol use: Yes     Comment: rarely     Drug use: No    Sexual activity: Yes     Partners: Male     Birth control/protection: None   Other Topics Concern    Are you pregnant or think you may be? No    Breast-feeding No     Social Drivers of Health     Financial Resource Strain: Low Risk  (12/10/2024)    Overall Financial Resource Strain (CARDIA)     Difficulty of Paying Living Expenses: Not very hard   Food Insecurity: No Food Insecurity (12/10/2024)    Hunger Vital Sign     Worried About Running Out of Food in the Last Year: Never true     Ran Out of Food in the Last Year: Never true   Transportation Needs: No Transportation Needs (1/26/2024)    PRAPARE - Transportation     Lack of Transportation (Medical): No     Lack of Transportation (Non-Medical): No   Physical Activity: Inactive (12/10/2024)    Exercise Vital Sign     Days of Exercise per Week: 0 days     Minutes of Exercise per Session: 10 min   Stress: Stress Concern Present (12/10/2024)    [employer] [place] - Occupational Stress Questionnaire     Feeling of Stress : To some extent   Housing Stability: Low Risk  (1/26/2024)    Housing Stability Vital Sign     Unable to Pay for Housing in the Last Year: No     Number of Places Lived in the Last Year: 1     Unstable Housing in the Last Year: No       Medications/Allergies: See med card    ROS:  GENERAL: No fever. No chills. No fatigue. Denies weight loss. Denies weight gain.  Back / musculoskeletal / neuro : See HPI    VITALS:   Vitals:    05/02/25 1506   BP: 120/80   Pulse: 75   PainSc:    4   PainLoc: Back     There is no height or weight on file to calculate BMI.      5/2/2025     3:06 PM 12/2/2021     9:00 AM 3/31/2021     1:57 PM   Last 3 PDI Scores   Pain Disability Index (PDI) 56 27 35       PHYSICAL EXAM:   GENERAL: Well appearing, in no acute distress, alert and oriented x3.  PSYCH:  Mood and affect appropriate.  SKIN: Skin color, texture, turgor normal, no rashes or lesions.  HEENT:  Normocephalic, atraumatic. Cranial nerves grossly intact.  NECK: No pain to palpation over the cervical paraspinous muscles. No pain to palpation over facets. No pain with neck flexion, extension, or lateral flexion.   PULM: No evidence of respiratory difficulty, symmetric chest rise.  GI:  Non-distended  BACK: Normal range of motion. No pain to palpation over the spinous processes.  Pain with axial loading bilaterally There is no pain with palpation over the sacroiliac joints bilaterally.   EXTREMITIES: No deformities, edema, or skin discoloration.   MUSCULOSKELETAL: Shoulder, hip, and knee provocative maneuvers are negative. No atrophy is noted.  NEURO: Sensation is equal and appropriate bilaterally. Bilateral upper and lower extremity strength is normal and symmetric. Bilateral upper and lower extremity coordination and muscle stretch reflexes are physiologic and symmetric. Plantar response are downgoing. Straight leg raising in the supine position is negative to radicular pain.   GAIT: normal.      LABS:    Lab Results   Component Value Date    HGBA1C 5.6 03/29/2024       Lab Results   Component Value Date    CREATININE 1.0 08/09/2024         ASSESSMENT: 44 y.o. year old female with pain, consistent with:    Encounter Diagnoses   Name Primary?    Pars defect of lumbar spine     Lumbar facet arthropathy        DISCUSSION: Caryl Cedeno is a patient with PMHX of FM, POTs and Sjogren syndrome who comes to us with chronic lower back pain in left radiculopathy that is likely multifactorial.  Patient  reports that her left radiculopathy was best treated with rhizotomy.  Discussed with patient that we will review previous records and decide on plan moving forward with her at next appointment.     PLAN:  - I have stressed the importance of physical activity and a home exercise plan to help with pain and improve health.  - Patient can continue with medications for now since they are providing benefits, using them appropriately, and without side effects.  - Counseled patient regarding the importance of activity modification and constant sleeping habits.  - Imaging: Reviewed available imaging with patient and answered any questions they had regarding study.  - Records: Obtain old records from outside physicians and imaging  - The patient's pathophysiology was explained in detail with reference to x-rays, models, other visual aids as appropriate.   - Follow up visit: return to clinic in 3-4 weeks      I would like to thank Joann Snyder MD for the opportunity to assist in the care of this patient. We had a very nice visit and I look forward to continuing their care. Please let me know if I can be of further assistance.     Jeff Corley MD  05/02/2025

## 2025-05-30 ENCOUNTER — HOSPITAL ENCOUNTER (OUTPATIENT)
Dept: RADIOLOGY | Facility: HOSPITAL | Age: 45
Discharge: HOME | End: 2025-05-30
Attending: INTERNAL MEDICINE
Payer: MEDICARE

## 2025-05-30 DIAGNOSIS — Z12.31 ENCOUNTER FOR SCREENING MAMMOGRAM FOR BREAST CANCER: ICD-10-CM

## 2025-05-30 PROCEDURE — 77067 SCR MAMMO BI INCL CAD: CPT | Mod: 26,,, | Performed by: RADIOLOGY

## 2025-05-30 PROCEDURE — 77063 BREAST TOMOSYNTHESIS BI: CPT | Mod: TC

## 2025-05-30 PROCEDURE — 77063 BREAST TOMOSYNTHESIS BI: CPT | Mod: 26,,, | Performed by: RADIOLOGY

## 2025-06-12 ENCOUNTER — RESULTS FOLLOW-UP (OUTPATIENT)
Dept: INTERNAL MEDICINE | Facility: CLINIC | Age: 45
End: 2025-06-12

## 2025-06-12 DIAGNOSIS — Z91.89 INCREASED RISK OF BREAST CANCER: Primary | ICD-10-CM

## 2025-08-04 ENCOUNTER — OFFICE VISIT (OUTPATIENT)
Dept: HEMATOLOGY/ONCOLOGY | Facility: CLINIC | Age: 45
End: 2025-08-04
Payer: MEDICARE

## 2025-08-04 ENCOUNTER — TELEPHONE (OUTPATIENT)
Dept: HEMATOLOGY/ONCOLOGY | Facility: CLINIC | Age: 45
End: 2025-08-04
Payer: MEDICARE

## 2025-08-04 DIAGNOSIS — Z91.89 AT HIGH RISK FOR BREAST CANCER: ICD-10-CM

## 2025-08-04 DIAGNOSIS — Z12.31 ENCOUNTER FOR SCREENING MAMMOGRAM FOR HIGH-RISK PATIENT: Primary | ICD-10-CM

## 2025-08-04 DIAGNOSIS — Z83.2 FAMILY HISTORY OF AUTOIMMUNE DISORDER: ICD-10-CM

## 2025-08-04 DIAGNOSIS — Z91.89 INCREASED RISK OF BREAST CANCER: ICD-10-CM

## 2025-08-04 DIAGNOSIS — Z80.3 FAMILY HISTORY OF BREAST CANCER: ICD-10-CM

## 2025-08-04 PROCEDURE — 98006 SYNCH AUDIO-VIDEO EST MOD 30: CPT | Mod: 95,,,

## 2025-08-04 PROCEDURE — G2211 COMPLEX E/M VISIT ADD ON: HCPCS | Mod: 95,,,

## 2025-08-04 NOTE — PROGRESS NOTES
The patient location is: [place]  The chief complaint leading to consultation is: [place]     Visit type: audiovisual      30 minutes of total time spent on the encounter, which includes face to face time and non-face to face time preparing to see the patient (eg, review of tests), Obtaining and/or reviewing separately obtained history, Documenting clinical information in the electronic or other health record, Independently interpreting results (not separately reported) and communicating results to the patient/family/caregiver, or Care coordination (not separately reported).      Each patient to whom he or she provides medical services by telemedicine is:  (1) informed of the relationship between the physician and patient and the respective role of any other health care provider with respect to management of the patient; and (2) notified that he or she may decline to receive medical services by telemedicine and may withdraw from such care at any time.      [facility] note    Reason For Consultation:   high-risk for breast cancer    Referring Provider:   Joann Snyder MD  [id number] [place] [address]    Records Obtained: Records of the patients history including those obtained from the referring provider were reviewed and summarized in detail.    HPI:   Caryl Cedeno is a 45 y.o. who presents for consultation of increased risk of breast cancer.      Today, she feels okay.  She has chronic illnesses and was having a hard time making it to her appointment so we switched to virtual.   She has no breast concerns.   However, she would like a breast reduction as her back pain is terrible.   We reviewed her personal history and family history which is significant for cancer.   Breast imaging reviewed last mammogram 5/30/2025 which was negative.    She has Heterogeneously dense breast tissue and a TC score of 20.56%.     Her score was recalculated at today's visit and remained  "20.05%  This places her in the high risk category.      High Risk Breast cancer specific history:  - Age: 45 y.o.   - Height/Weight:  Estimated body surface area is 2.04 meters squared as calculated from the following:    Height as of 3/21/25: 5' 6" (1.676 m).    Weight as of 3/21/25: 89 kg (196 lb 5.1 oz).  - There is no height or weight on file to calculate BMI.  - Breast density per BI-RADS: C (heterogeneously dense)  - Age at menarche:   11  - Number of pregnancies: ; age of first live birth:  na  - History of breast feeding:    No   -Uterus and ovaries intact: Yes  - Menopausal status: perimenopausal. Age at menopause, if applicable:    na  - HRT: No  - Genetic testing:  No  - Personal history of cancer: No  - Previous chest radiation exposure between ages 10-30 years old: No  - Personal history of breast biopsy:  No  - [employer] Inheritance:  No Other   - Family history of cancer:    Cancer-related family history includes Breast cancer (age of onset: 30) in her paternal grandmother; Cancer in her mother and other family members; Cancer (age of onset: 71 - 88) in her maternal grandfather; Lymphoma (age of onset: 89) in her maternal grandfather; Rectal cancer (age of onset: 70 - 88) in her maternal grandfather; Skin cancer in her father. There is no history of Esophageal cancer, Ovarian cancer, or Melanoma.    Social History   Social History     Tobacco Use    Smoking status: Former    Smokeless tobacco: Never   Substance Use Topics    Alcohol use: Yes     Comment: rarely      Exercise regimen: Yes  Patient's occupation: Data Unavailable.   Networked reference to record EEP 1000[DIsabled     SEE CALCULATED RISK BELOW.     Past Medical   Past Medical History:   Diagnosis Date    Allergy     Celiac disease     Fibromyalgia     Hypertension     Migraines     Small fiber neuropathy     per patient is types: sensory and autonomic   Problem List[1]  Family History  Family History   Problem Relation Name " Age of Onset    Cancer Mother          non-melanoma skin ca    Hashimoto's thyroiditis Mother      Bladder Cancer Father  74    Skin cancer Father          pt thinks    Autoimmune disease Father      Autoimmune disease Sister      Colonic polyp Maternal Grandfather          pt believes    Lymphoma Maternal Grandfather  89        in brain    Celiac disease Maternal Grandfather      Cancer Maternal Grandfather  71 - 88        small-intestine (2nd cancer)    Rectal cancer Maternal Grandfather  70 - 88        1st cancer    Breast cancer Paternal Grandmother  30        twice (2nd a recurrence vs. new primary)    Hashimoto's thyroiditis Maternal Aunt Jessica     Cancer Other Michelle         unk type    Cancer Other Tristian         unk type (COD?)    Cancer Other Ren          of smoking-related cancer    Sudden death Other          heart or brain event while driving (COD)    Colon cancer Neg Hx      Esophageal cancer Neg Hx      Ovarian cancer Neg Hx      Melanoma Neg Hx       Medications  Current Medications[2]  Allergies  Review of patient's allergies indicates:   Allergen Reactions    Latex, natural rubber     Gluten protein Rash    Latex Rash       Review of Systems  Review of Systems   Musculoskeletal:  Positive for back pain.   Psychiatric/Behavioral:  The patient is nervous/anxious.         Objective:      Vitals: There were no vitals filed for this visit.  BMI: There is no height or weight on file to calculate BMI.   There is no height or weight on file to calculate BSA.    Physical Exam  Physical Exam  Constitutional:       General: She is awake.      Appearance: Normal appearance.   Neurological:      Mental Status: She is alert and oriented to person, place, and time.   Psychiatric:         Mood and Affect: Mood normal.         Behavior: Behavior normal. Behavior is cooperative.         Thought Content: Thought content normal.         Judgment: Judgment normal.          Breast Exam: deferred      Laboratory  Data: reviewed most recent   Imaging: reviewed most recent      General Education discussed:      1 in 11 women diagnosed with breast cancer in the [place] were under 44yo.       Individuals should undergo breast cancer risk assessment by age 25 years and be counseled regarding potential benefits, risks, and limitations of breast screening in the context of their risk stratification.       1. General education: (not patient specific)    Risk factors associated with breast cancer are categorized into 2 groups: Modifiable and Non-modifiable. Modifiable risk factors include use of hormones, alcohol, smoking, diet and exercise. Non-modifiable risk factors include breast density, genetics, chest radiation, previous pregnancies, age of first period, and age of menopause.     Factors associated with greater breast cancer risk: (this list is not patient specific)  -Increasing age The risk of breast cancer increases with older age.  -Female sex  -White race (In the [place], the highest breast cancer risk occurs among White women, although breast cancer remains the most common cancer among women of every major ethnic/racial group )  -Weight and body fat in postmenopausal women -Obesity (defined as body mass index [BMI] >=30 kg/m2) is associated with an overall increase  in morbidity and mortality. However, the risk of breast cancer associated with BMI differs by menopausal status.   ?Postmenopausal women - A higher BMI and/or perimenopausal weight gain have been consistently associated with a higher risk of breast cancer among postmenopausal women. The association between a higher BMI and postmenopausal breast cancer risk may be mediated by higher estrogen levels resulting from the peripheral conversion of estrogen precursors (from adipose tissue) to estrogen   -Tall stature -women who were >175 cm (69 inches) tall were 20 percent more likely to develop breast cancer than those <160 cm (63 inches) tall.  -Benign  breast disease  Benign breast disease represents a spectrum of disorders that come to clinical attention because of patient symptoms (such as breast pain), palpable lesions or other findings on physical examination, or as imaging abnormalities. Following establishment of a benign diagnosis, treatment in general is aimed at symptomatic relief and patient education.  Some benign breast diseases, such as atypical hyperplasia or lobular carcinoma in situ, confer an increase in the patient's future risk of developing breast cancer and should lead to counseling about screening recommendations and risk reduction strategies. These lesions are considered as risk markers, rather than as premalignant lesions, because those cancers that subsequently develop are not necessarily in the area of the atypia and may occur in the contralateral breast.  Benign epithelial breast lesions can be classified histologically into three categories: nonproliferative, proliferative without atypia, and atypical hyperplasia. The categorization is based upon the degree of cellular proliferation and atypia.  -Dense breast tissue -- The density of breast tissue reflects the relative amount of glandular and connective tissue (parenchyma) to adipose tissue. Women with mammographically dense breast tissue, generally defined as dense tissue comprising >=75 percent of the breast, have a four to five times higher breast cancer risk compared with women of similar age with less or no dense tissue. Although breast density is a largely inherited trait, other factors can influence density. For example, lower density has been associated with higher levels of physical activity  and with a low-fat, high-carbohydrate diet. In postmenopausal women, estrogen and progesterone increase breast density  while the ER antagonist tamoxifen decreases breast density.  Despite the association of exogenous hormones with breast density, breast density is not strongly correlated  with endogenous hormone levels. Breast tend to become more fatty with age.   Dense breasts -  occurring in an estimated 50% of women in their 40s, 40% in their 50s, and 25% of women older than 60. Density can make it more difficult to detect breast cancer and increases breast cancer risk, both of which suggest that additional imaging can improve early diagnosis of the disease.   -Bone mineral density -In multiple studies, women with higher bone density have a higher breast cancer risk  -Hormonal factors:   Risk vs benefit of hormone replacement therapy varies per person and should be assessed individually with their provider/GYN.  -Reproductive factors -Earlier menarche (before age 12) or later menopause (after age 52), Nulliparity, Increasing age at first full-term pregnancy.   (Of note, It is estimated that for every 12 months of breastfeeding, there was a 4.3 percent reduction in the relative risk (RR) of breast cancer)  -Personal and family history of breast cancer  -Alcohol use and smoking  -Exposure to therapeutic ionizing radiation           2. Risk stratifying models:  There are several models available for stratifying breast cancer risk, and Gurwinder is presently the model utilized by [facility] and is a model recommended per current NCCN guidelines.      Educational videos:   What Is a TC Score?    https://youtu.be/Auot8VOYbjP     What If I Have a High-Risk TC Score?     https://youtu.be/jUz6uYr4m5O      The Robina Model for Breast Cancer risk estimates the absolute 5 year risk and lifetime risk of developing breast cancer. Family history includes only first degree relatives with breast cancer, which is not enough information to estimate the risk of a patient having BRCA mutation. It also underestimates the cancer risk for patients with extensive family history. The Robina Model is a good predictor of risk for populations but not for individuals. It adjusts risk for race/ethnicity. It may  underestimate breast cancer risk in patients with atypical hyperplasia and strong family history. The Robina Model was NOT designed to estimate risk for: Women with a prior diagnosis of breast cancer, lobular carcinoma in situ (LCIS), or ductal carcinoma in situ (DCIS);  Women who have received previous radiation therapy to the chest for treatment of Hodgkin lymphoma;  Women with gene mutations in BRCA1 or BRCA2, or those who are known to have certain genetic syndromes that increase risk for breast cancer; Women of age <35 or >85.    There are limitations to every model for risk assessment, particularly that TC can overestimate risk in women with atypical hyperplasia and dense breasts and that Robina underestimates risk for those with a strong family history of breast or ovarian cancers as well as non-white women with atypical hyperplasia which can make them appear to not be candidates for risk reducing therapies.         3. High risk patient screening:       INCREASED RISK SCREENING: per NCCN                          MRI breast:   The use of MRI for breast cancer detection is based on the concept of  tumor angiogenesis or neovascularity. Tumor-associated blood vessels have increased permeability, which leads to prompt uptake and release of gadolinium within the first one to two minutes after administration, leading to a pattern of rapid enhancement and washout on MRI.   Bilateral breast examination - Both breasts should be evaluated in an MRI study, for comparison purposes, even when concern about possible pathology involves only one breast.  Contrast - Intravenous gadolinium contrast must be used to maximize cancer detection and is administered before breast MRI to highlight the neovascularity associated with cancers. Contrast is not necessary when the study is performed to evaluate silicone implant integrity.  Allergic and anaphylactoid reactions to gadolinium are rare, but can occur. In addition, in patients with  renal failure, gadolinium can cause contrast nephropathy and/or nephrogenic systemic fibrosis.   A few studies have also reported gadolinium deposition in the brain from repeated intravenous administration, with the degree of deposition varying based on the specific contrast agent. The clinical significance of this deposition remains unknown, and no data for humans exist to show any adverse effects or harm at this time.     FDA Drug Safety Communication: FDA identifies no harmful effects to date with brain retention of gadolinium-based contrast agents for MRIs;   review to continue: https:// www.fda.gov/Drugs/DrugSafety/rbq378669.htm    -Contact insurance company with regards to coverage of MRI breasts.   -Cannot undergo an MRI if pregnant.   -MRI's may have false positives                 TIBURCIO (contrast enhanced mammogram):   TIBURCIO is a breast imaging technique that uses an iodine-based intravenous (IV) contrast agent in combination with a standard digital mammogram   TIBURCIO is the main alternative for anyone who benefits by but cannot have a breast MRI with IV contrast.    Main indications:   High risk screening   Suspicious clinical symptoms with inconclusive mammogram and ultrasound   New breast cancer, evaluate extent of disease   Pre and post milton-adjuvant systemic therapy evaluation     For high-risk screening, TIBURCIO replaces the regular non-contrast mammogram and any other supplemental test     Note: A study comparing various types of scans had found that mammography enhanced with iodine-based dye can detect three times as many invasive cancers in dense breast tissue as ultrasound and contrast-enhanced mammography can find tumors that are much smaller than those found by regular mammography. M.R.I.s are better at detecting more tumors than standard mammograms, the study found, but are considerably more expensive.   M.R.I. scans turned up 17.4 cancers per 1,000 exams, while ultrasounds found only 4.2 cancers per 1,000  exams. Contrast-enhanced mammograms detected 19.2 cancers per 1,000 exams, but the difference between M.R.I. and contrast-enhanced mammography was not statistically significant..   With that being said, gold standard is having a mammogram and an MRI breast once a year. Guidelines do state that if a women cannot get the MRI, then a contrast enhanced mammogram can be done.     For age over 75 years, screening recommendations are considered on an individual basis.       ACR guidelines:    Note: New American College of Radiology® (ACR®) breast cancer screening guidelines  now call for all women -- particularly Black and Ashkenazi Anglican women -- to have risk assessment by age 25 to determine if screening earlier than age 40 is needed. The ACR continues to recommend annual screening starting at age 40 for women of average risk, but earlier and more intensive screening for high-risk patients. The new ACR guidelines  for high-risk women were published online May 3 in the Journal of the American College of Radiology (JACR ).    Early detection decreases breast cancer death. The ACR recommends annual screening beginning at age 40 for women of average risk and earlier and/or more intensive screening for women at higher-than-average risk. For most women at higher-than-average risk, the supplemental screening method of choice is breast MRI. Women with genetics-based increased risk, those with a calculated lifetime risk of 20% or more, and those exposed to chest radiation at young ages are recommended to undergo MRI surveillance starting at ages 25 to 30 and annual mammography (with a variable starting age between 25 and 40, depending on the type of risk). Mutation carriers can delay mammographic screening until age 40 if annual screening breast MRI is performed as recommended. Women diagnosed with breast cancer before age 50 or with personal histories of breast cancer and dense breasts should undergo annual supplemental breast  MRI. Others with personal histories, and those with atypia at biopsy, should strongly consider MRI screening, especially if other risk factors are present. For women with dense breasts who desire supplemental screening, breast MRI is recommended. For those who qualify for but cannot undergo breast MRI, contrast-enhanced mammography or ultrasound could be considered. All women should undergo risk assessment by age 25, especially Black women and women of Ashkenazi Roman Catholic heritage, so that those at higher-than-average risk can be identified and appropriate screening initiated.      -RECOMMENDED LIFESTYLE MODIFICATIONS FOR HIGH RISK PATIENTS:    * Reviewed Lifestyle modifications which have shown benefit:  Limit alcohol consumption to less than 1 drink per day (1 ounce liquor, 6 oz wine, 8 oz beer) and no more than 3 drinks per week.   Avoid smoking.  Exercise at least 150 minutes per week of moderate intensity aerobic activity or at least 75 minutes of vigorous activity. Exercise can lower the relative risk of breast cancer by ~18-20%.  Maintain healthy weight and avoid post-menopausal weight gain. Avoid processed foods and eat more lean proteins, fruits and vegetables.                               * Available resources include genetic counseling, nutrition, weight management.    -requirements for Bariatric surgery. BMI must be >40 with no comorbidities or 35> with at least two comorbidities pertaining obesity (Htn, type 2 diabetes, Kumar, Osteoarthritis, and `  hyperlipidemia)          -CHEMOPREVENTION:                * For women at high risk for breast cancer, endocrine therapy can reduce the risk of invasive and/or in situ breast cancers. (tamoxifen for premenopausal or postmenopausal women and raloxifene or exemestane for postmenopausal women).   -Above have been shown to lower the risk of breast cancer incidence, however there is no survival benefit in patients who don't have breast cancer.        Tamoxifen:     Data regarding tamoxifen risk reduction are limited to pre- and postmenopausal individuals >=35 years of age with a Robina Model 5-year breast cancer risk of >=1.7% or a 10-year risk by ALEXANDRIA/Tyrer-Cuzicke of >=5% or a history of LCIS.  Tamoxifen: 20 mg per day for 5 years was shown to reduce risk of breast cancer by 49%. Among individuals with a history of AH, this dose and duration of tamoxifen were associated with an 86% reduction in breast cancer risk. Low-dose tamoxifen (5 mg per day for 3-5 years)d is an option if patient is symptomatic on the 20-mg dose or if patient is unwilling or unable to take standard-dose tamoxifen.1 This low dosage needs further investigation in premenopausal individuals.  The efficacy of tamoxifen risk reduction in individuals who are carriers of BRCA1/2 and other pathogenic mutations is less well studied than in other risk groups. Limited data suggest there may be a benefit, likely a larger benefit, for BRCA2 carriers.  For healthy, premenopausal individuals at elevated risk for breast cancer, data regarding the risk/benefit ratio for tamoxifen appear relatively favorable (category 1).  For postmenopausal individuals at elevated risk for breast cancer, data regarding the risk/benefit ratio for tamoxifen are influenced by age, presence of uterus, or comorbid conditions (category 1). There are insufficient data on ethnicity and rac  -Risks of Tamoxifen side effects include hot flashes, invasive endometrial cancer in women > 49 years of age (2.3/1000 compared to 0.9/1000), cataracts, increased risk of pulmonary embolism among others.    Raloxifene:    Data regarding raloxifene risk reduction are limited to postmenopausal individuals >=35 years of age with a Robina Model 5-year breast cancer risk >=1.7% or a 10-year risk by ALEXANDRIA/Tyrer-Cuzicke of >=5% or a history of LCIS.  Raloxifene: 60 mg per day was found to be equivalent to tamoxifen for breast cancer risk reduction in the initial  comparison. While raloxifene in long-term follow-up appears to be less efficacious in risk reduction than tamoxifen, consideration of toxicity may still lead to the choice of raloxifene over tamoxifen in individuals with an intact uterus.  There are no data regarding the use of raloxifene in individuals who are carriers of BRCA1/2 and other pathogenic mutations or who have had prior thoracic radiation.  For postmenopausal individuals at elevated risk for breast cancer, data regarding the risk/benefit ratio for raloxifene are influenced by age or comorbid conditions (category 1). There are insufficient data on ethnicity and race.  Use of raloxifene for breast cancer risk reduction in premenopausal individuals is inappropriate unless part of a clinical trial.     Aromatase Inhibitors (exemestane and anastrozole)   Data regarding exemestane are from a single large randomized study limited to postmenopausal individuals >=35 years of age with a Robina Model 5-year breast cancer risk >=1.7% or a 10-year risk by ALEXANDRIA/Tyrer-Cuzicke of >=5% or a history of LCIS.  Data regarding anastrozole are from a single large randomized study limited to postmenopausal individuals 40 to 70 years of age with the following risk compared with the general population: Aged 40 to 44 years - 4 times higher Aged 45 to 60 years - >=2 times higher Aged 60 to 70 years - >=1.5 times higher Individuals who did not meet these criteria but had a Tyrer-Cuzicke model 10-year breast cancer risk >5% were also included.  Exemestane: 25 mg per day was found to reduce the relative incidence of invasive breast cancer by 65% from 0.55% to 0.19% with a median follow-up of 3 years.  Anastrozole: 1 mg per day was found to reduce the relative incidence of breast cancer by 53% with a median follow-up of 5 years.  There are retrospective data that aromatase inhibitors can reduce the risk of contralateral breast cancer in BRCA1/2 patients with ER-positive breast cancer  who take aromatase inhibitors as adjuvant agents.  For postmenopausal individuals at elevated risk for breast cancer, data regarding the risk/benefit ratio for aromatase inhibitor agents are influenced by age and comorbid conditions such as osteoporosis (category 1). There are insufficient data on ethnicity and race.  Use of aromatase inhibitors for breast cancer risk reduction in premenopausal individuals is inappropriate         Assessment:     1. Increased risk of breast cancer  -     Ambulatory referral/consult to Breast Surgery           Breast Cancer Risk Stratification   Current, Estimated Breast Cancer Risk Model Used Patient's Score Patient's Risk Category   5-year Robina Model 3.1%  [] N/A given age <35   [] Average risk (<1.7%)   [x] Increased risk (>=1.7%)   10-year Tyrer-Cuzick v8.0b 5.2%  [] <5%   [x] >=5%    Lifetime (to age 85) Tyrer-Cuzick v8.0b Tyrer-Cuzick: 20.05%   [] Average risk (<15%)   [] Intermediate risk (>=15% - <20%)   [x] High risk (>=20%)   According to the [employer], patients with a lifetime breast cancer risk of 20% or higher might benefit from supplemental screening exams. Women with a 5-year risk greater than or equal to 1.7% may benefit from chemoprevention agents (tamoxifen, raloxifene, aromatase inhibitors) to reduce risk.      Plan:       Patient elects to proceed with alternating mammograms and Mris.  MMG due 5/31/2026  MRI due 11/30/2025  She will alternate CBE here and with GYN/PCP. Sees GYN yearly.   Patient opts out of chemoprevention but will call in the future if wishes to pursue.  Encourage breast awareness, including monthly breast self-exams.   Recommend lifestyle modifications as above.   Referral to cancer genetics.      RTC in 1 year to see me. Patient has chronic illnesses virtual appointment can be scheduled as well as seeing a provider in the clinic on the same day as imaging to limit commute frequency.     Return to clinic in 1 year with IMMANUEL  appointment and imaging.     Patient is in agreement with the proposed treatment plan. All questions were answered to the patient's satisfaction. Patient knows to call clinic for any new or worsening symptoms and if anything is needed before the next clinic visit.      NNEKA Porras  Hematology & Medical Oncology   [facility] [place] [address]  ph. [phone]  Fax. [phone]    Collaborating physician, Dr. Baer.    Approximately 20 minutes were spent face-to-face with the patient.  Approximately 30 minutes in total were spent on this encounter, which includes face-to-face time and non-face-to-face time preparing to see the patient (e.g., review of tests), obtaining and/or reviewing separately obtained history, documenting clinical information in the electronic or other health record, independently interpreting results (not separately reported) and communicating results to the patient/family/caregiver, or care coordination (not separately reported).      code applied: patient requires or will require a continuous, longitudinal, and active collaborative plan of care related to this patient's health condition, breast cancer --the management of which requires the direction of a practitioner with specialized clinical knowledge, skill, and expertise.         Questions were encouraged and answered to patient's satisfaction, and patient verbalized understanding of information and agreement with the plan. Advised patient to RTC with any interval changes or concerns.      Route Chart for Scheduling    Med Onc Chart Routing      Follow up with physician    Follow up with IMMANUEL 1 year. NNEKA Urrutia   Infusion scheduling note    Injection scheduling note    Labs    Imaging Mammogram and MRI   MMG due 5/31/2026; MRi due 11/30/2025   Pharmacy appointment No pharmacy appointment needed      Other referrals No nutrition appointment needed -  No referral to Oncology Primary Care  needed -  No massage appointment needed    No additional referrals needed                30 minutes of total time spent on the encounter, which includes face to face time and non-face to face time preparing to see the patient (eg, review of tests), Obtaining and/or reviewing separately obtained history, Documenting clinical information in the electronic or other health record, Independently interpreting results (not separately reported) and communicating results to the patient/family/caregiver, or Care coordination (not separately reported).           [1]   Patient Active Problem List  Diagnosis    Celiac disease    Small fiber neuropathy    Fibromyalgia    Throat papilloma    POTS (postural orthostatic tachycardia syndrome)    Migraine with aura and without status migrainosus, not intractable    Dysautonomia    Numbness and tingling of both lower extremities    Chronic bilateral low back pain without sciatica    Right upper quadrant pain    Cervicalgia    Osteoarthritis of spine with radiculopathy, lumbar region    Cervical spondylosis    Chronic pain    Sjogren's syndrome    Laryngopharyngeal reflux (LPR)    Cervicogenic headache    Diarrhea    Neuromuscular disorder    Immunosuppression    Inflammatory polyneuropathy, unspecified   [2]   Current Outpatient Medications:     azaTHIOprine (IMURAN) 50 mg Tab, Take 25 mg by mouth 2 (two) times daily. Take 1 in AM and 2 in PM, Disp: , Rfl:     fexofenadine (ALLEGRA) 60 MG tablet, Take 60 mg by mouth once daily., Disp: , Rfl:     fluconazole (DIFLUCAN) 150 MG Tab, Take one tablet by mouth today.  Take second tablet by mouth in 3 days, if symptoms still present., Disp: 2 tablet, Rfl: 0    honey (MEDIHONEY, HONEY,) 100 % Pste, Apply 1 mL topically once daily., Disp: 15 mL, Rfl: 0    labetaloL (NORMODYNE) 200 MG tablet, Take 200 mg by mouth 2 (two) times daily., Disp: , Rfl:     meloxicam (MOBIC) 15 MG tablet, TAKE 1 TABLET(15 MG) BY MOUTH EVERY DAY, Disp: 30 tablet, Rfl:  1    NALTREXONE HCL, BULK, MISC, 3mg compounded daily, Disp: , Rfl:     pilocarpine (SALAGEN) 5 MG Tab, Take 5 mg by mouth 3 (three) times daily., Disp: , Rfl:     pregabalin (LYRICA) 25 MG capsule, Take 1 capsule (25 mg total) by mouth 2 (two) times daily., Disp: 60 capsule, Rfl: 5    sod sulf-pot chloride-mag sulf (SUTAB) 1.479-0.188- 0.225 gram tablet, Take 12 tablets by mouth once daily. Please follow Endoscopy 's instructions. Please apply coupon code. Please apply coupon code. BIN: [id number], PCN: [id number], GROUP: [id number], MEMBER ID: [id number], Disp: 24 tablet, Rfl: 0    ZOLMitriptan (ZOMIG) 5 mg Spry, USE 1 SPRAY IN EACH NOSTRIL ONCE DAILY as needed for migraine., Disp: 6 each, Rfl: 3  No current facility-administered medications for this visit.    Facility-Administered Medications Ordered in Other Visits:     0.9%  NaCl infusion, , Intravenous, Continuous, Juanjo Ward MD

## 2025-08-04 NOTE — TELEPHONE ENCOUNTER
Copied from CRM #[id number]. Topic: Appointments - Appointment Access  >> Aug 4, 2025  2:17 PM Med Assistant Megan wrote:  Type:  Needs Medical Advice    Who Called: Caryl  Would the patient rather a call back or a response via [facility]? Call back  Best Call Back Number: [phone]   Additional Information: Calling to speak with staff in regards to her appt today is running behind has chronic illness and wanted to see if she can come in @ 3pm Please call back to advise or do Virtual.

## 2025-08-05 ENCOUNTER — PATIENT MESSAGE (OUTPATIENT)
Dept: HEMATOLOGY/ONCOLOGY | Facility: CLINIC | Age: 45
End: 2025-08-05
Payer: MEDICARE

## (undated) DEVICE — ELECTRODE REM PLYHSV RETURN 9

## (undated) DEVICE — GOWN SURGICAL X-LARGE

## (undated) DEVICE — TUBING HF INSUFFLATION W/ FLTR

## (undated) DEVICE — SCISSOR 5MMX35CM DIRECT DRIVE

## (undated) DEVICE — NDL HYPO REG 25G X 1 1/2

## (undated) DEVICE — SUT 0 VICRYL / UR6 (J603)

## (undated) DEVICE — CANNULA ENDOPATH XCEL 5X100MM

## (undated) DEVICE — TROCAR ENDOPATH XCEL 5X100MM

## (undated) DEVICE — SEE MEDLINE ITEM 157117

## (undated) DEVICE — CLOSURE SKIN 1X5 STERI-STRIP

## (undated) DEVICE — TROCAR ENDOPATH XCEL 12MM 10CM

## (undated) DEVICE — SUT MCRYL PLUS 4-0 PS2 27IN

## (undated) DEVICE — DRESSING LEUKOPLAST FLEX 1X3IN

## (undated) DEVICE — CLIP HEMO-LOK ML

## (undated) DEVICE — DRAPE ABDOMINAL TIBURON 14X11

## (undated) DEVICE — TRAY MINOR GEN SURG